# Patient Record
Sex: FEMALE | Race: OTHER | HISPANIC OR LATINO | Employment: FULL TIME | URBAN - METROPOLITAN AREA
[De-identification: names, ages, dates, MRNs, and addresses within clinical notes are randomized per-mention and may not be internally consistent; named-entity substitution may affect disease eponyms.]

---

## 2017-01-04 ENCOUNTER — CONVERSION ENCOUNTER (OUTPATIENT)
Dept: MAMMOGRAPHY | Facility: CLINIC | Age: 42
End: 2017-01-04

## 2017-04-19 ENCOUNTER — CONVERSION ENCOUNTER (OUTPATIENT)
Dept: MAMMOGRAPHY | Facility: CLINIC | Age: 42
End: 2017-04-19

## 2018-06-06 ENCOUNTER — CONVERSION ENCOUNTER (OUTPATIENT)
Dept: MAMMOGRAPHY | Facility: CLINIC | Age: 43
End: 2018-06-06

## 2018-08-23 ENCOUNTER — OFFICE VISIT (OUTPATIENT)
Dept: FAMILY MEDICINE CLINIC | Facility: CLINIC | Age: 43
End: 2018-08-23
Payer: COMMERCIAL

## 2018-08-23 VITALS
HEIGHT: 67 IN | WEIGHT: 243.3 LBS | RESPIRATION RATE: 16 BRPM | HEART RATE: 70 BPM | SYSTOLIC BLOOD PRESSURE: 110 MMHG | DIASTOLIC BLOOD PRESSURE: 70 MMHG | TEMPERATURE: 96.8 F | BODY MASS INDEX: 38.19 KG/M2

## 2018-08-23 DIAGNOSIS — G47.00 INSOMNIA, UNSPECIFIED TYPE: ICD-10-CM

## 2018-08-23 DIAGNOSIS — Z98.890 STATUS POST GASTRIC SURGERY: ICD-10-CM

## 2018-08-23 DIAGNOSIS — R51.9 CHRONIC NONINTRACTABLE HEADACHE, UNSPECIFIED HEADACHE TYPE: Primary | ICD-10-CM

## 2018-08-23 DIAGNOSIS — K21.9 GASTROESOPHAGEAL REFLUX DISEASE WITHOUT ESOPHAGITIS: ICD-10-CM

## 2018-08-23 DIAGNOSIS — R52 PAIN: ICD-10-CM

## 2018-08-23 DIAGNOSIS — G89.29 CHRONIC NONINTRACTABLE HEADACHE, UNSPECIFIED HEADACHE TYPE: Primary | ICD-10-CM

## 2018-08-23 DIAGNOSIS — R42 DIZZINESS: ICD-10-CM

## 2018-08-23 LAB — HBA1C MFR BLD HPLC: 5.5 %

## 2018-08-23 PROCEDURE — 3008F BODY MASS INDEX DOCD: CPT | Performed by: NURSE PRACTITIONER

## 2018-08-23 PROCEDURE — 99214 OFFICE O/P EST MOD 30 MIN: CPT | Performed by: NURSE PRACTITIONER

## 2018-08-23 RX ORDER — PANTOPRAZOLE SODIUM 40 MG/1
TABLET, DELAYED RELEASE ORAL EVERY 24 HOURS
COMMUNITY
Start: 2018-03-29 | End: 2018-08-23 | Stop reason: SDUPTHER

## 2018-08-23 RX ORDER — TOPIRAMATE 25 MG/1
TABLET ORAL
COMMUNITY
Start: 2018-01-25 | End: 2018-08-23 | Stop reason: SDUPTHER

## 2018-08-23 RX ORDER — ERGOCALCIFEROL 1.25 MG/1
CAPSULE ORAL
Refills: 1 | COMMUNITY
Start: 2018-08-01 | End: 2021-04-06

## 2018-08-23 RX ORDER — TRAMADOL HYDROCHLORIDE 50 MG/1
50 TABLET ORAL EVERY 8 HOURS PRN
Qty: 20 TABLET | Refills: 0 | Status: SHIPPED | OUTPATIENT
Start: 2018-08-23 | End: 2018-11-14 | Stop reason: SDUPTHER

## 2018-08-23 RX ORDER — IBUPROFEN 600 MG/1
600 TABLET ORAL EVERY 6 HOURS
COMMUNITY
Start: 2016-07-21 | End: 2020-12-03 | Stop reason: ALTCHOICE

## 2018-08-23 RX ORDER — TRAZODONE HYDROCHLORIDE 50 MG/1
50 TABLET ORAL
Qty: 30 TABLET | Refills: 0 | Status: SHIPPED | OUTPATIENT
Start: 2018-08-23 | End: 2018-08-23 | Stop reason: SDUPTHER

## 2018-08-23 RX ORDER — PANTOPRAZOLE SODIUM 40 MG/1
40 TABLET, DELAYED RELEASE ORAL EVERY 24 HOURS
Qty: 90 TABLET | Refills: 3 | Status: SHIPPED | OUTPATIENT
Start: 2018-08-23 | End: 2020-12-03 | Stop reason: SDUPTHER

## 2018-08-23 RX ORDER — TRAMADOL HYDROCHLORIDE 50 MG/1
TABLET ORAL EVERY 6 HOURS
COMMUNITY
Start: 2017-12-08 | End: 2018-08-23 | Stop reason: SDUPTHER

## 2018-08-23 RX ORDER — MISOPROSTOL 200 UG/1
TABLET ORAL
COMMUNITY
Start: 2015-05-01 | End: 2019-05-28 | Stop reason: ALTCHOICE

## 2018-08-23 RX ORDER — TRAZODONE HYDROCHLORIDE 50 MG/1
TABLET ORAL
Qty: 90 TABLET | Refills: 0 | Status: SHIPPED | OUTPATIENT
Start: 2018-08-23 | End: 2020-01-08 | Stop reason: SDUPTHER

## 2018-08-23 RX ORDER — LEVOTHYROXINE SODIUM 0.12 MG/1
TABLET ORAL
Refills: 1 | COMMUNITY
Start: 2018-08-23 | End: 2019-04-25 | Stop reason: SDUPTHER

## 2018-08-23 RX ORDER — TOPIRAMATE 50 MG/1
50 TABLET, FILM COATED ORAL 2 TIMES DAILY
Qty: 180 TABLET | Refills: 3 | Status: SHIPPED | OUTPATIENT
Start: 2018-08-23 | End: 2020-01-08 | Stop reason: SDUPTHER

## 2018-08-23 RX ORDER — PSEUDOEPHEDRINE HCL 30 MG
TABLET ORAL 3 TIMES DAILY
COMMUNITY
Start: 2018-01-17 | End: 2019-05-28 | Stop reason: SDUPTHER

## 2018-08-23 NOTE — PROGRESS NOTES
Assessment/Plan:    No problem-specific Assessment & Plan notes found for this encounter  Diagnoses and all orders for this visit:    Chronic nonintractable headache, unspecified headache type  -     topiramate (TOPAMAX) 25 mg tablet;   Get back on medication   Gastroesophageal reflux disease without esophagitis  -     pantoprazole (PROTONIX) 40 mg tablet; every 24 hours    Pain  -     traMADol (ULTRAM) 50 mg tablet; every 6 (six) hours  I appreciate no hernia   If flares and painful to ER   Travels for months at a time  If wants removed will let me know  Dizziness  Will check labs and make use not related to surgery   NO labs in long time for this  Set up appt for gastric bypass follow up       Other orders  -     levothyroxine 125 mcg tablet; TAKE ONE TABLET BY MOUTH IN THE MORNING ON EMPTY STOMACH  -     traMADol (ULTRAM) 50 mg tablet; every 6 (six) hours  -     topiramate (TOPAMAX) 25 mg tablet; take 1 tab  daily x 3 days then 1 tab twice a day x 5 days then 2 tabs twice a day  -     pantoprazole (PROTONIX) 40 mg tablet; every 24 hours  -     Multiple Vitamins-Minerals (MULTIVITAMIN ADULT PO); every 24 hours  -     misoprostol (CYTOTEC) 200 mcg tablet; Take by mouth  -     Calcium Citrate 250 MG TABS; 3 (three) times a day  -     Cyanocobalamin ER 1000 MCG TBCR; take one tab daily  -     ergocalciferol (VITAMIN D2) 50,000 units; TAKE 2 CAPSULES BY MOUTH EVERY WEEK WITH DINNER  -     ibuprofen (MOTRIN) 600 mg tablet; Take 600 mg by mouth every 6 (six) hours          Subjective:      Patient ID: Hillary Chang is a 41 y o  female  HPI    Ada here for headaches    Ongoing issues   Is using the topamax for headaches  Headaches 3 times this month   Has ran out of medication while traveling  Had gastric bypass about 1 year ago and hasn't follow up with labs  She is feeling dizziness at times   Is eating well      Using tylenol pm for sleep  Using nightly    Has tried melatonin and not helpful  Needs something for sleep     Feels a ball in the center of umbilicus    Is able to reduce at this time    No pain today       The following portions of the patient's history were reviewed and updated as appropriate: allergies, current medications, past family history, past medical history, past social history, past surgical history and problem list     Review of Systems      Objective:  Vitals:    08/23/18 1056   BP: 110/70   BP Location: Left arm   Patient Position: Sitting   Cuff Size: Large   Pulse: 70   Resp: 16   Temp: (!) 96 8 °F (36 °C)   TempSrc: Temporal   Weight: 110 kg (243 lb 4 8 oz)   Height: 5' 6 93" (1 7 m)      Physical Exam

## 2018-08-23 NOTE — PATIENT INSTRUCTIONS
Dolor de barbi por migraña, cuidados ambulatorios   INFORMACIÓN GENERAL:   Un dolor de barbi por migraña  es un dolor de barbi severo  El dolor puede ser tan severo que interfiere con bakari actividades cotidianas  Susan migraña puede durar de unas horas o hasta varios días  La causa exacta de la migraña no es conocida  Es posible que sea provocada por cambios en los químicos corporales y nervios muy sensibles en schulz cerebro  Factores desencadenantes comunes de la migraña incluyen los siguientes:   · La trudy del sol, luces brillantes o resplandecientes, ruido y olores enoc y el humo    · Ciertos alimentos o bebidas marc el chocolate, azúcares artificiales, vino tinto o bebidas alcohólicas    · El calor, la humedad o cambios de clima    · Cambios hormonales en las mujeres a causa de las píldoras anticonceptivas, el [place], la menopausia o carl schulz período menstrual    · El estrés, esfuerzo ocular, dormir demasiado o no dormir suficiente    · Omitir comidas o esperar mucho tiempo para comer  Signos de advertencia comunes incluyen los siguientes:  Los signos de advertencia típicamente comienzan de 15 a 60 minutos antes del comienzo de un dolor de barbi  Los signos de advertencia más comunes incluyen:  · Cambios visuales, comúnmente conocidos marc auras  Schulz visión podría estar borrosa o las cosas se podrían mervin diferente  Usted podría tener manchas de ceguera que marin [place]  Es posible también que jasson manchas brillantes, líneas o tenga alucinaciones       · Cansancio anormal o bostezos frecuentes    · Hormigueo en schulz brazo o pierna  Busque cuidados inmediatos para los siguientes síntomas:   · Un dolor de barbi que parece ser diferente o más doloroso que los que sufre típicamente    · Un dolor de barbi severo con fiebre o rigidez en el crystal    · Nuevos problemas con el habla, la visión, el equilibrio o el movimiento    · Sensación de [place] o confusión  El tratamiento para la migraña  puede llegar a incluir medicamentos para ayudar a prevenir o parar la migraña  Es probable que usted también necesite medicamentos para disminuir el dolor o prevenir el vómito  Maneje bakari síntomas:   · Descanse  en rae habitación oscura y silenciosa  [place] ayudará a reducir schulz dolor  · Aplique hielo  a schulz barbi por 15 a 20 minutos cada hora o según indicaciones  Use rae compresa de hielo o ponga hielo maday en rae bolsa de plástico  Jodi Sailors la compresa con rae toalla  El hielo ayuda a disminuir el dolor  · Aplique calor  a schulz barbi por 20 a 30 minutos cada 2 horas por los días indicados  El calor ayuda a disminuir el dolor y los espasmos musculares  Usted puede alternar [employer] calor y el frío  · Apunte en un diario bakari migrañas  Anote cuando empiezan y terminan bakari migrañas  Incluya bakari síntomas y lo que estaba haciendo cuando empezó la migraña  Anote lo que comió o tomó las 24 horas antes de que [employer]  Debbie Pride dolor y dónde le duele  Anote lo que hizo para tratar schulz migraña y si funcionó o no  Prevenga otro dolor de barbi por migraña:   · No fume  Si usted fuma, nunca es demasiado tarde para dejarlo  El humo del tabaco puede provocar [place]  Si usted fuma, pida información acerca de cómo dejar de hacerlo  · No luis f alcohol   el alcohol puede provocar migrañas  También es posible que interfiera con los medicamentos utilizados para tratar schulz migraña  · Lise ejercicio regularmente  Consulte acerca del plan de ejercicio más adecuado para usted  · Maneje el estrés  el estrés podría provocar migrañas  Aprenda nuevas maneras de relajarse hermila marc la respiración profunda  · Mantenga un horario de dormir  Acuéstese y levántese a la misma hora cada día  · Coma rae variedad de alimentos sanos  Los alimentos saludables incluyen la fruta, verduras, panes de grano entero, productos lácteos bajos en grasa, frijoles, carne magra y pescado   Evite alimentos que pueden provocar rae Benjadonis Desaiz marc el chocolate, cafeína o azúcares artificiales  Programe rae cherelle con schulz proveedor de hamilton según indicaciones:  Lleve schulz diario de migrañas a bakari citas con schulz proveedor de hamilton  Anote bakari preguntas para que las recuerde carl bakari citas  ACUERDOS SOBRE SCHULZ CUIDADO:   Usted tiene el derecho de participar en la planificación de schulz cuidado  Aprenda todo lo que pueda sobre schulz condición y marc darle tratamiento  Discuta con bakari médicos bakari opciones de tratamiento para juntos decidir el cuidado que usted quiere recibir  Usted siempre tiene el derecho a rechazar schulz tratamiento  Esta información es sólo para uso en educación  Schulz intención no es darle un consejo médico sobre enfermedades o tratamientos  Colsulte con schulz [employer] antes de seguir cualquier régimen médico para saber si es seguro y efectivo para usted  © 2014 [address] is for End User's use only and may not be sold, redistributed or otherwise used for commercial purposes  All illustrations and images included in CareNotes® are the copyrighted property of A D A M , Inc  or Michael Ferrari

## 2018-09-04 ENCOUNTER — TELEPHONE (OUTPATIENT)
Dept: FAMILY MEDICINE CLINIC | Facility: CLINIC | Age: 43
End: 2018-09-04

## 2018-09-04 NOTE — TELEPHONE ENCOUNTER
Advise labs back    B12 is ok  B1 is low  And ferritin which is iron stores is low   Need to take a Vitiamin B1 and iron daily

## 2018-09-06 DIAGNOSIS — E56.9 VITAMIN DEFICIENCY: Primary | ICD-10-CM

## 2018-09-06 RX ORDER — FERROUS SULFATE TAB EC 324 MG (65 MG FE EQUIVALENT) 324 (65 FE) MG
324 TABLET DELAYED RESPONSE ORAL
Refills: 0
Start: 2018-09-06 | End: 2021-06-25 | Stop reason: ALTCHOICE

## 2018-09-06 RX ORDER — THIAMINE HCL 50 MG
50 TABLET ORAL DAILY
Refills: 0
Start: 2018-09-06 | End: 2021-04-16 | Stop reason: HOSPADM

## 2018-09-06 NOTE — TELEPHONE ENCOUNTER
Have her start iron daily and B1(thiamine)  Can start with 50 mg of thiamine or whatever she has at home

## 2018-10-12 ENCOUNTER — HOSPITAL ENCOUNTER (EMERGENCY)
Facility: HOSPITAL | Age: 43
Discharge: HOME | End: 2018-10-12
Attending: EMERGENCY MEDICINE
Payer: COMMERCIAL

## 2018-10-12 ENCOUNTER — APPOINTMENT (EMERGENCY)
Dept: CT IMAGING | Facility: HOSPITAL | Age: 43
End: 2018-10-12
Payer: COMMERCIAL

## 2018-10-12 VITALS
SYSTOLIC BLOOD PRESSURE: 120 MMHG | BODY MASS INDEX: 38.84 KG/M2 | WEIGHT: 247.44 LBS | OXYGEN SATURATION: 100 % | RESPIRATION RATE: 18 BRPM | DIASTOLIC BLOOD PRESSURE: 77 MMHG | TEMPERATURE: 98.1 F | HEART RATE: 56 BPM

## 2018-10-12 DIAGNOSIS — R10.31 RIGHT LOWER QUADRANT ABDOMINAL PAIN: Primary | ICD-10-CM

## 2018-10-12 DIAGNOSIS — K42.9 UMBILICAL HERNIA WITHOUT OBSTRUCTION AND WITHOUT GANGRENE: ICD-10-CM

## 2018-10-12 DIAGNOSIS — M62.830 BACK MUSCLE SPASM: ICD-10-CM

## 2018-10-12 DIAGNOSIS — M54.50 ACUTE RIGHT-SIDED LOW BACK PAIN WITHOUT SCIATICA: ICD-10-CM

## 2018-10-12 LAB
ALBUMIN SERPL BCP-MCNC: 4 G/DL (ref 3–5.2)
ALP SERPL-CCNC: 118 U/L (ref 43–122)
ALT SERPL W P-5'-P-CCNC: 36 U/L (ref 9–52)
ANION GAP SERPL CALCULATED.3IONS-SCNC: 6 MMOL/L (ref 5–14)
AST SERPL W P-5'-P-CCNC: 19 U/L (ref 14–36)
BACTERIA UR QL AUTO: ABNORMAL /HPF
BASOPHILS # BLD AUTO: 0.1 THOUSANDS/ΜL (ref 0–0.1)
BASOPHILS NFR BLD AUTO: 1 % (ref 0–1)
BILIRUB SERPL-MCNC: 0.5 MG/DL
BILIRUB UR QL STRIP: NEGATIVE
BUN SERPL-MCNC: 16 MG/DL (ref 5–25)
CALCIUM SERPL-MCNC: 9 MG/DL (ref 8.4–10.2)
CHLORIDE SERPL-SCNC: 101 MMOL/L (ref 97–108)
CLARITY UR: CLEAR
CO2 SERPL-SCNC: 29 MMOL/L (ref 22–30)
COLOR UR: YELLOW
CREAT SERPL-MCNC: 1.02 MG/DL (ref 0.6–1.2)
EOSINOPHIL # BLD AUTO: 0.1 THOUSAND/ΜL (ref 0–0.4)
EOSINOPHIL NFR BLD AUTO: 1 % (ref 0–6)
ERYTHROCYTE [DISTWIDTH] IN BLOOD BY AUTOMATED COUNT: 16 %
EXT PREG TEST URINE: NEGATIVE
GFR SERPL CREATININE-BSD FRML MDRD: 68 ML/MIN/1.73SQ M
GLUCOSE SERPL-MCNC: 88 MG/DL (ref 70–99)
GLUCOSE UR STRIP-MCNC: NEGATIVE MG/DL
HCT VFR BLD AUTO: 34.2 % (ref 36–46)
HGB BLD-MCNC: 11 G/DL (ref 12–16)
HGB UR QL STRIP.AUTO: 10
KETONES UR STRIP-MCNC: NEGATIVE MG/DL
LEUKOCYTE ESTERASE UR QL STRIP: NEGATIVE
LYMPHOCYTES # BLD AUTO: 2.5 THOUSANDS/ΜL (ref 0.5–4)
LYMPHOCYTES NFR BLD AUTO: 24 % (ref 20–50)
MCH RBC QN AUTO: 27.5 PG (ref 26–34)
MCHC RBC AUTO-ENTMCNC: 32.2 G/DL (ref 31–36)
MCV RBC AUTO: 85 FL (ref 80–100)
MONOCYTES # BLD AUTO: 0.9 THOUSAND/ΜL (ref 0.2–0.9)
MONOCYTES NFR BLD AUTO: 9 % (ref 1–10)
NEUTROPHILS # BLD AUTO: 6.8 THOUSANDS/ΜL (ref 1.8–7.8)
NEUTS SEG NFR BLD AUTO: 66 % (ref 45–65)
NITRITE UR QL STRIP: NEGATIVE
NON-SQ EPI CELLS URNS QL MICRO: ABNORMAL /HPF
PH UR STRIP.AUTO: 5 [PH] (ref 4.5–8)
PLATELET # BLD AUTO: 555 THOUSANDS/UL (ref 150–450)
PMV BLD AUTO: 6.7 FL (ref 8.9–12.7)
POTASSIUM SERPL-SCNC: 4.6 MMOL/L (ref 3.6–5)
PROT SERPL-MCNC: 7.1 G/DL (ref 5.9–8.4)
PROT UR STRIP-MCNC: NEGATIVE MG/DL
RBC # BLD AUTO: 4.01 MILLION/UL (ref 4–5.2)
RBC #/AREA URNS AUTO: ABNORMAL /HPF
SODIUM SERPL-SCNC: 136 MMOL/L (ref 137–147)
SP GR UR STRIP.AUTO: 1.02 (ref 1–1.04)
UROBILINOGEN UA: NEGATIVE MG/DL
WBC # BLD AUTO: 10.3 THOUSAND/UL (ref 4.5–11)
WBC #/AREA URNS AUTO: ABNORMAL /HPF

## 2018-10-12 PROCEDURE — 96361 HYDRATE IV INFUSION ADD-ON: CPT

## 2018-10-12 PROCEDURE — 81001 URINALYSIS AUTO W/SCOPE: CPT | Performed by: NURSE PRACTITIONER

## 2018-10-12 PROCEDURE — 81003 URINALYSIS AUTO W/O SCOPE: CPT | Performed by: NURSE PRACTITIONER

## 2018-10-12 PROCEDURE — 85025 COMPLETE CBC W/AUTO DIFF WBC: CPT | Performed by: NURSE PRACTITIONER

## 2018-10-12 PROCEDURE — 36415 COLL VENOUS BLD VENIPUNCTURE: CPT | Performed by: NURSE PRACTITIONER

## 2018-10-12 PROCEDURE — 99284 EMERGENCY DEPT VISIT MOD MDM: CPT

## 2018-10-12 PROCEDURE — 80053 COMPREHEN METABOLIC PANEL: CPT | Performed by: NURSE PRACTITIONER

## 2018-10-12 PROCEDURE — 74176 CT ABD & PELVIS W/O CONTRAST: CPT

## 2018-10-12 PROCEDURE — 81025 URINE PREGNANCY TEST: CPT | Performed by: NURSE PRACTITIONER

## 2018-10-12 PROCEDURE — 96374 THER/PROPH/DIAG INJ IV PUSH: CPT

## 2018-10-12 RX ORDER — KETOROLAC TROMETHAMINE 30 MG/ML
INJECTION, SOLUTION INTRAMUSCULAR; INTRAVENOUS
Status: COMPLETED
Start: 2018-10-12 | End: 2018-10-12

## 2018-10-12 RX ORDER — SENNOSIDES 8.6 MG
650 CAPSULE ORAL EVERY 8 HOURS PRN
Qty: 21 TABLET | Refills: 0 | Status: SHIPPED | OUTPATIENT
Start: 2018-10-12 | End: 2018-10-19

## 2018-10-12 RX ORDER — METHOCARBAMOL 750 MG/1
750 TABLET, FILM COATED ORAL EVERY 6 HOURS PRN
Qty: 28 TABLET | Refills: 0 | Status: SHIPPED | OUTPATIENT
Start: 2018-10-12 | End: 2018-11-14 | Stop reason: SDUPTHER

## 2018-10-12 RX ORDER — DICLOFENAC POTASSIUM 50 MG/1
50 TABLET, FILM COATED ORAL 3 TIMES DAILY
Qty: 21 TABLET | Refills: 0 | Status: SHIPPED | OUTPATIENT
Start: 2018-10-12 | End: 2020-12-03 | Stop reason: ALTCHOICE

## 2018-10-12 RX ORDER — KETOROLAC TROMETHAMINE 30 MG/ML
15 INJECTION, SOLUTION INTRAMUSCULAR; INTRAVENOUS ONCE
Status: COMPLETED | OUTPATIENT
Start: 2018-10-12 | End: 2018-10-12

## 2018-10-12 RX ADMIN — SODIUM CHLORIDE 1000 ML: 9 INJECTION, SOLUTION INTRAVENOUS at 19:19

## 2018-10-12 RX ADMIN — KETOROLAC TROMETHAMINE 15 MG: 30 INJECTION, SOLUTION INTRAMUSCULAR; INTRAVENOUS at 19:37

## 2018-10-12 NOTE — ED PROVIDER NOTES
History  Chief Complaint   Patient presents with    Flank Pain     Right sided flank pain for 3 weeks, "Im peeing very little and dark yellow"  Was taking tylenol but hasn't had any for the past two days  8 out 10 pain   Nausea     Patient is a 44-year-old female presenting to the emergency department reporting right lower back right flank and right lower quadrant abdominal pain  Patient reports this has been ongoing over the past 3 weeks, progressively getting worse  She describes the reports decrease in urination and notes dark urine  She also reports pain with urination  She states she has history of pyelonephritis  She denies any fevers  She reports intermittent nausea  No vomiting or diarrhea  She reports no chest tightness or discomfort  No difficulty breathing  She denies any rashes  Otherwise reporting no other symptoms  Denies any other concerns  Prior to Admission Medications   Prescriptions Last Dose Informant Patient Reported? Taking?    Calcium Citrate 250 MG TABS  Self Yes No   Sig: 3 (three) times a day   Cyanocobalamin ER 1000 MCG TBCR  Self Yes No   Sig: take one tab daily   Multiple Vitamins-Minerals (MULTIVITAMIN ADULT PO)  Self Yes No   Sig: every 24 hours   ergocalciferol (VITAMIN D2) 50,000 units  Self Yes No   Sig: TAKE 2 CAPSULES BY MOUTH EVERY WEEK WITH DINNER   ferrous sulfate 324 (65 Fe) mg   No No   Sig: Take 1 tablet (324 mg total) by mouth daily before breakfast   ibuprofen (MOTRIN) 600 mg tablet   Yes No   Sig: Take 600 mg by mouth every 6 (six) hours   levothyroxine 125 mcg tablet  Self Yes No   Sig: TAKE ONE TABLET BY MOUTH IN THE MORNING ON EMPTY STOMACH   misoprostol (CYTOTEC) 200 mcg tablet  Self Yes No   Sig: Take by mouth   pantoprazole (PROTONIX) 40 mg tablet   No No   Sig: Take 1 tablet (40 mg total) by mouth every 24 hours   thiamine (VITAMIN B1) 50 mg tablet   No No   Sig: Take 1 tablet (50 mg total) by mouth daily   topiramate (TOPAMAX) 50 MG tablet   No No   Sig: Take 1 tablet (50 mg total) by mouth 2 (two) times a day   traMADol (ULTRAM) 50 mg tablet   No No   Sig: Take 1 tablet (50 mg total) by mouth every 8 (eight) hours as needed for moderate pain   traZODone (DESYREL) 50 mg tablet   No No   Sig: TAKE 1 TABLET BY MOUTH AT BEDTIME AS NEEDED FOR SLEEP      Facility-Administered Medications: None       Past Medical History:   Diagnosis Date    Abscess of labia     Anxiety     Bipolar disorder (HCC)     Breast lump     Frequent headaches     Hemorrhoids     Hypothyroidism 7/1/2013    Migraine     Morbid [facility] [address] ) 7/1/2013    Psychotic disorder ([facility] )        Past Surgical History:   Procedure Laterality Date    BARIATRIC SURGERY      BREAST LUMPECTOMY Left     benign    CHOLECYSTECTOMY      GALLBLADDER SURGERY         Family History   Problem Relation Age of Onset    Liver cancer Mother     Prostate cancer Father     Cancer Family      I have reviewed and agree with the history as documented  Social History   Substance Use Topics    Smoking status: Former Smoker     Types: Cigarettes     Quit date: 2008    Smokeless tobacco: Never Used    Alcohol use No        Review of Systems   Constitutional: Negative for chills, fatigue and fever  Respiratory: Negative for chest tightness and shortness of breath  Cardiovascular: Negative for chest pain and palpitations  Gastrointestinal: Positive for abdominal pain (Right lower quadrant) and nausea ( reporting no current nausea  )  Negative for blood in stool, constipation, diarrhea and vomiting  Genitourinary: Positive for decreased urine volume, dysuria, flank pain ( right ) and hematuria  Negative for difficulty urinating, frequency, pelvic pain, urgency, vaginal bleeding, vaginal discharge and vaginal pain  Musculoskeletal: Positive for back pain ( right lower and right flank)  Negative for joint swelling, neck pain and neck stiffness  Skin: Negative for rash  Allergic/Immunologic: Negative for immunocompromised state  Neurological: Negative for dizziness, weakness, light-headedness, numbness and headaches  Hematological: Negative for adenopathy  Physical Exam  Physical Exam   Constitutional: She is oriented to person, place, and time  She appears well-developed and well-nourished  No distress  Eyes: Conjunctivae are normal    Neck: Neck supple  Cardiovascular: Normal rate and regular rhythm  Pulmonary/Chest: Effort normal and breath sounds normal  No respiratory distress  Abdominal: Soft  Bowel sounds are normal  She exhibits no distension (Obese ) and no mass  There is tenderness in the right lower quadrant and suprapubic area  There is CVA tenderness (  Right)  There is no rigidity, no rebound and no guarding  Musculoskeletal:        Cervical back: Normal         Thoracic back: Normal         Lumbar back: She exhibits tenderness, pain and spasm  She exhibits normal range of motion, no bony tenderness, no swelling, no edema, no deformity, no laceration and normal pulse  Back:    Neurological: She is alert and oriented to person, place, and time  Skin: Skin is warm and dry  Psychiatric: She has a normal mood and affect  Nursing note and vitals reviewed        Vital Signs  ED Triage Vitals   Temperature Pulse Respirations Blood Pressure SpO2   10/12/18 1816 10/12/18 1816 10/12/18 1816 10/12/18 1816 10/12/18 1816   98 1 °F (36 7 °C) 63 18 111/74 98 %      Temp Source Heart Rate Source Patient Position - Orthostatic VS BP Location FiO2 (%)   10/12/18 1816 10/12/18 1816 10/12/18 1816 10/12/18 1816 --   Tympanic Monitor Sitting Left arm       Pain Score       10/12/18 1937       Worst Possible Pain           Vitals:    10/12/18 1816   BP: 111/74   Pulse: 63   Patient Position - Orthostatic VS: Sitting       Visual Acuity      ED Medications  Medications   sodium chloride 0 9 % bolus 1,000 mL (0 mL Intravenous Stopped 10/12/18 2036) ketorolac (TORADOL) injection 15 mg (15 mg Intravenous Given 10/12/18 1937)       Diagnostic Studies  Results Reviewed     Procedure Component Value Units Date/Time    Comprehensive metabolic panel [[id number]]  (Abnormal) Collected:  10/12/18 1918    Lab Status:  Final result Specimen:  Blood from Arm, Right Updated:  10/12/18 1948     Sodium 136 (L) mmol/L      Potassium 4 6 mmol/L      Chloride 101 mmol/L      CO2 29 mmol/L      ANION GAP 6 mmol/L      BUN 16 mg/dL      Creatinine 1 02 mg/dL      Glucose 88 mg/dL      Calcium 9 0 mg/dL      AST 19 U/L      ALT 36 U/L      Alkaline Phosphatase 118 U/L      Total Protein 7 1 g/dL      Albumin 4 0 g/dL      Total Bilirubin 0 50 mg/dL      eGFR 68 ml/min/1 73sq m     Narrative:         National Kidney Disease Education Program recommendations are as follows:  GFR calculation is accurate only with a steady state creatinine  Chronic Kidney disease less than 60 ml/min/1 73 sq  meters  Kidney failure less than 15 ml/min/1 73 sq  meters      Urine Microscopic [[id number]]  (Abnormal) Collected:  10/12/18 1859    Lab Status:  Final result Specimen:  Urine from Urine, Clean Catch Updated:  10/12/18 1938     RBC, UA 0-1 (A) /hpf      WBC, UA 0-1 (A) /hpf      Epithelial Cells Occasional /hpf      Bacteria, UA None Seen /hpf     CBC and differential [[id number]]  (Abnormal) Collected:  10/12/18 1918    Lab Status:  Final result Specimen:  Blood from Arm, Right Updated:  10/12/18 1936     WBC 10 30 Thousand/uL      RBC 4 01 Million/uL      Hemoglobin 11 0 (L) g/dL      Hematocrit 34 2 (L) %      MCV 85 fL      MCH 27 5 pg      MCHC 32 2 g/dL      RDW 16 0 (H) %      MPV 6 7 (L) fL      Platelets 750 (H) Thousands/uL      Neutrophils Relative 66 (H) %      Lymphocytes Relative 24 %      Monocytes Relative 9 %      Eosinophils Relative 1 %      Basophils Relative 1 %      Neutrophils Absolute 6 80 Thousands/µL      Lymphocytes Absolute 2 50 Thousands/µL      Monocytes Absolute 0 90 Thousand/µL      Eosinophils Absolute 0 10 Thousand/µL      Basophils Absolute 0 10 Thousands/µL     UA w Reflex to Microscopic w Reflex to Culture [[id number]]  (Abnormal) Collected:  10/12/18 1859    Lab Status:  Final result Specimen:  Urine from Urine, Clean Catch Updated:  10/12/18 1916     Color, UA Yellow     Clarity, UA Clear     Specific Gravity, UA 1 025     pH, UA 5 0     Leukocytes, UA Negative     Nitrite, UA Negative     Protein, UA Negative mg/dl      Glucose, UA Negative mg/dl      Ketones, UA Negative mg/dl      Bilirubin, UA Negative     Blood, UA 10 0 (A)     UROBILINOGEN UA Negative mg/dL     POCT pregnancy, urine [[id number]]  (Normal) Resulted:  10/12/18 1904    Lab Status:  Final result Specimen:  Urine Updated:  10/12/18 1904     EXT PREG TEST UR (Ref: Negative) negative                 CT abdomen pelvis wo contrast   Final Result by Honorio Sutherland DO (10/12 2009)   No CT evidence of renal colic  Bilobed paraumbilical fat-containing hernia with mild inflammatory change  Please correlate clinically  Workstation performed: [id number]                    Procedures  Procedures       Phone Contacts  ED Phone Contact    ED Course  ED Course as of Oct 12 2101   Fri Oct 12, 2018   2049 Labs overall normal   No white count  UA shows some microscopic red cells, no bacteria  There is no indication of UTI  CT was overall negative with the exception of some identified rafia umbilical hernias, which the patient states she know she has  These were likely incisional hernia secondary to her laparoscopic bariatric surgery that was done approximately 1 year ago  Patient states her pain has been mostly relieved after receiving IV Toradol  She continues to report no current nausea  She is otherwise denying any additional symptoms reports no other concerns    Patient instructed to follow up with bariatric surgeon also her family provider in General surgery regarding the periumbilical hernias  She is stable for discharge at this time  MDM  Number of Diagnoses or Management Options  Acute right-sided low back pain without sciatica:   Back muscle spasm:   Right lower quadrant abdominal pain:   Umbilical hernia without obstruction and without gangrene:   Diagnosis management comments: See ED course note  There is no concern for an emergent cause of her back and abdominal pain  Patient will be referred back to bariatric surgeon for follow-up and also will be referred to primary provider for further follow-up with General surgery regarding umbilical hernias  Patient will be given diclofenac, Tylenol, and Robaxin regarding low back pain and spasm  Patient instructed to return to the ED with any worsening symptoms or emergent concerns  Amount and/or Complexity of Data Reviewed  Clinical lab tests: ordered and reviewed  Tests in the radiology section of CPT®: ordered and reviewed  Discuss the patient with other providers: yes    Patient Progress  Patient progress: stable    CritCare Time    Disposition  Final diagnoses:   Right lower quadrant abdominal pain   Umbilical hernia without obstruction and without gangrene   Acute right-sided low back pain without sciatica   Back muscle spasm     Time reflects when diagnosis was documented in both MDM as applicable and the Disposition within this note     Time User Action Codes Description Comment    10/12/2018  8:54 PM Ronal Bell Add [R10 31] Right lower quadrant abdominal pain     10/12/2018  8:55 PM Ronal Slaughter [D34 9] Umbilical hernia without obstruction and without gangrene     10/12/2018  8:55 PM Ronal Slaughter [M54 5] Acute right-sided low back pain without sciatica     10/12/2018  8:55 PM Ronal Slaughter [P92 945] Back muscle spasm       ED Disposition     ED Disposition Condition Comment    Discharge  Dorota Bernard discharge to home/self care      Condition at discharge: Stable Follow-up Information     Follow up With Specialties Details Why Contact Info Additional Information    Leigh Ann Cronin MD Family Medicine In 3 days  [address] [facility] [address]  [phone]       Your bariatric surgeon, as discussed  Schedule an appointment as soon as possible for a visit       [address] [place] [address] Schedule an appointment as soon as possible for a visit  [address]a [address] [phone] [facility], [address] [facility] [place], [place], [address] [address] Heart Emergency Department Emergency Medicine  As needed, If symptoms worsen [address] [address] [phone]           Patient's Medications   Discharge Prescriptions    ACETAMINOPHEN (TYLENOL) 650 MG CR TABLET    Take 1 tablet (650 mg total) by mouth every 8 (eight) hours as needed for mild pain or moderate pain for up to 7 days       Start Date: 10/12/2018End Date: 10/19/2018       Order Dose: 650 mg       Quantity: 21 tablet    Refills: 0    DICLOFENAC POTASSIUM (CATAFLAM) 50 MG TABLET    Take 1 tablet (50 mg total) by mouth 3 (three) times a day for 7 days       Start Date: 10/12/2018End Date: 10/19/2018       Order Dose: 50 mg       Quantity: 21 tablet    Refills: 0    METHOCARBAMOL (ROBAXIN) 750 MG TABLET    Take 1 tablet (750 mg total) by mouth every 6 (six) hours as needed for muscle spasms for up to 7 days       Start Date: 10/12/2018End Date: 10/19/2018       Order Dose: 750 mg       Quantity: 28 tablet    Refills: 0     No discharge procedures on file      ED Provider  Electronically Signed by           Amy Parr  10/12/18 1210

## 2018-10-13 NOTE — DISCHARGE INSTRUCTIONS
Dolor abdominal   LO QUE NECESITA SABER:   El dolor abdominal puede ser sordo, [place], o jayda  Usted puede sentir dolor localizado en rae ton área del abdomen o en todo el abdomen  El dolor puede ser causado por rae afección marc estreñimiento, sensibilidad o intoxicación alimentaria, infección o rae obstrucción  Asimismo, el dolor abdominal puede deberse a rae hernia, apendicitis o Jigar Reek  Las enfermedades del hígado, la vesícula o el riñón también pueden causar dolor abdominal  La causa del dolor abdominal puede ser desconocida  INSTRUCCIONES SOBRE [facility]:   Regrese a la jesus de emergencias si:   · Usted comienza a sentir un dolor en el pecho o dificultad para respirar que antes no sentía  · Usted siente un dolor con pulsaciones en la parte superior del abdomen o en la parte inferior de la espalda que de repente se vuelve shana  · El dolor se localiza en la parte inferior derecha del abdomen y KÖBRANDYMANNYOGESH cuando se [place]  · Usted tiene fiebre por encima de los 100 4 °F (38 °C) o escalofríos  · Usted tiene vómitos y no puede retener líquidos ni alimentos en el estómago  · El dolor no mejora o empeora en las próximas 8 a 12 horas  · Usted nota jose en schulz vómito o heces, o éstas tienen un aspecto negruzco y alquitranado  · Schulz piel o las partes tanmay de bakari ojos se vuelven amarillentas  · Si usted es rae bethel y presenta abundante sangrado vaginal que no es schulz menstruación  Pregúntele a schulz Ramón Todd vitaminas y minerales son adecuados para usted  · Usted siente dolor en la parte inferior de la espalda  · Usted es Luci Balboa y tiene dolor en los testículos  · Siente dolor al Zack Fire  · Usted tiene preguntas o inquietudes acerca de schulz condición o cuidado  Acuda en 24 horas a rae cherelle de seguimiento con schulz médico o marc se le indique:  Anote bakari preguntas para que se acuerde de hacerlas carl bakari visitas     Medicamentos:   · Erenest Daksha ser administrados para calmar schulz estómago y prevenir los vómitos o para disminuir el dolor  Pregunte cómo se debe jesse los analgésicos de forma poe  · [place] bakari medicamentos marc se le haya indicado  Consulte con schulz médico si usted clarence que schulz medicamento no le está ayudando o si presenta efectos secundarios  Infórmele si es alérgico a algún medicamento  Mantenga rae lista actualizada de los [place], las vitaminas y los productos herbales que sabiha  Incluya los siguientes datos de los medicamentos: cantidad, frecuencia y motivo de administración  Traiga con usted la lista o los envases de la píldoras a bakari citas de seguimiento  Lleve la lista de los medicamentos con usted en quintin de rae emergencia  © 2017 [facility] Information is for End User's use only and may not be sold, redistributed or otherwise used for commercial purposes  All illustrations and images included in CareNotes® are the copyrighted property of A D A M , Inc  or [address]  Esta información es sólo para uso en educación  Schulz intención no es darle un consejo médico sobre enfermedades o tratamientos  Colsulte con schulz Madelin Skelton farmacéutico antes de seguir cualquier régimen médico para saber si es seguro y efectivo para usted  Hernia umbilical   LO QUE NECESITA SABER:   Rae hernia umbilical es un abultamiento a través de los músculos abdominales en el área del ombligo  La hernia podría contener tejido del abdomen, parte de un órgano (marc el intestino) o líquido  Las hernias umbilicales se deben usualmente a un hueco o a un área débil en los músculos de la pared abdominal   INSTRUCCIONES SOBRE [facility]:   Medicamentos:  · Ibuprofeno o acetaminofen:  Estos medicamentos disminuyen el dolor  Están disponibles sin receta médica  Consulte con schulz médico cuál medicamento es el adecuado para usted  Pregunte la cantidad y la frecuencia con que debe tomarlos  [address]   Estos medicamentos pueden provocar sangrado estomacal si no se gage correctamente  El ibuprofeno puede provocar daño al Luan Grise  No tome ibuprofeno si usted tiene enfermedad de los riñones, [place]ila o si es alérgico a la aspirina  El acetaminofeno puede dañar el hígado  No luis f alcohol si está tomado acetaminofén  · [place] bakari medicamentos marc se le haya indicado  Consulte con schulz médico si usted clarence que schulz medicamento no le está ayudando o si presenta efectos secundarios  Infórmele si es alérgico a algún medicamento  Mantenga rae lista actualizada de los [employer], las vitaminas y los productos herbales que sabiha  Incluya los siguientes datos de los medicamentos: cantidad, frecuencia y motivo de administración  Traiga con usted la lista o los envases de la píldoras a bakari citas de seguimiento  Lleve la lista de los medicamentos con usted en quintin de rae emergencia  Acuda a bakari consultas de control con schulz médico según le indicaron  Anote bakari preguntas para que se acuerde de hacerlas carl bakari visitas  Cuidado personal:   · Actividad:  Evite levantar objetos pesados, inclinarse y hacer esfuerzos  Trate de no permanecer de pie carl largos periodos de Prashanth  Si tiene que toser, trate de [employer]  Al toser sostenga la hernia con bakari mark o con rae almohada  Pregúntele a schulz médico si usted puede tener relaciones sexuales  · Evite el estreñimiento:  El esfuerzo carl las evacuaciones intestinales podría empeorar la hernia  Consuma alimentos ricos en fibra y tome diariamente por lo menos 8 vasos de [place]  Rae dieta antoinette en fibra incluye granos integrales, salvado de melinda, cereales y frutas y verduras sin cocinar  El caminar u otros ejercicios también pueden ayudar  Schulz médico podría ordenarle medicamentos altos en fibra o un ablandador de heces para ayudarlo a que las evacuaciones intestinales kylah mas suaves y regulares   No  use un enema o laxantes sin la autorización de schulz médico     · Lo que debe usar:  No use nada ajustado sobre la hernia  Pregunte a boone médico si usted debe usar un cinturón de soporte o rae faja para mantener la hernia en boone lugar  · Pregunte a boone médico cómo reducir la hernia:  Algunas veces la hernia puede deslizarse de regreso al abdomen si usted se acuesta en posición plana carl un rato  Si esto no funciona, pregunte a boone médico si usted debería empujar cuidadosamente la hernia para hacerla regresar a boone puesto  Si la hernia no se desliza de regreso al abdomen fácilmente, pare de empujarla y llame a boone médico  Si la hernia duele o está muy sensible, no trate de empujarla para hacerla regresar a boone sitio  Pregúntele a boone Olman Banker vitaminas y minerales son adecuados para usted  · Usted tiene náuseas o vómitos  · No puede empujar suavemente la hernia de regreso al abdomen  (Hágalo solamente si boone médico le ha mostrado cómo hacerlo )     · Está estreñido o tiene jose en las evacuaciones intestinales  · La hernia se está agrandando  · Usted tiene preguntas o inquietudes acerca de boone condición o cuidado  Regrese a la jesus de emergencias si:   · Usted tiene fiebre  · La hernia está atrapada afuera del abdomen y es dolorosa, está inflamada o dura  · Usted deshawn por completo de tener evacuaciones intestinales y deshawn de pasar gas  · El dolor intestinal es intenso o DAMION  © 2017 2600 Ramesh Bill Information is for End User's use only and may not be sold, redistributed or otherwise used for commercial purposes  All illustrations and images included in CareNotes® are the copyrighted property of A D A M , Inc  or Michael Ferrari  Esta información es sólo para uso en educación  Boone intención no es darle un consejo médico sobre enfermedades o tratamientos  Colsulte con boone Rajni Mccormick farmacéutico antes de seguir cualquier régimen médico para saber si es seguro y efectivo para usted        Dolor de espalda   LO QUE NECESITA SABER:   El dolor de espalda es común  Puede ser causado por rae gran cantidad de afecciones, marc la artritis o por el deterioro de los discos de la columna vertebral  El riesgo del dolor de espalda aumenta al sufrir lesiones, por falta de [place], o inclinarse hacia adelante o girar de un lado a otro de forma repetitiva  Usted puede estar adolorido o sentir rigidez en karoline o ambos lados de la espalda  El dolor se puede propagar a bakari glúteos o muslos  INSTRUCCIONES SOBRE [facility]:   Medicamentos:   · AINEs (Analgésicos antiinflamatorios no esteroides)  ayudan a disminuir la inflamación y el dolor  Genet medicamento esta disponible con o sin rae receta médica  Los AINEs pueden causar sangrado estomacal o problemas renales en ciertas personas  Si usted sabiha un medicamento anticoagulante, siempre pregúntele a schulz médico si los INA son seguros para usted  Siempre sigrid la etiqueta de genet medicamento y [place] instrucciones  · El acetaminofén  [employer]  Está disponible sin receta médica  Pregunte la cantidad y la frecuencia con que debe tomarlos  [address]  El acetaminofén puede causar daño en el hígado cuando no se sabiha de forma correcta  · Un medicamento con receta para el dolor  podrían ser Hunter Marinas  Pregunte al médico cómo debe jesse genet medicamento de forma poe  · [place] bakari medicamentos marc se le haya indicado  Consulte con schulz médico si usted clarence que schulz medicamento no le está ayudando o si presenta efectos secundarios  Infórmele si es alérgico a cualquier medicamento  Mantenga rae lista actualizada de los [place], las vitaminas y los productos herbales que sabiha  Incluya los siguientes datos de los medicamentos: cantidad, frecuencia y motivo de administración  Traiga con usted la lista o los envases de la píldoras a bakari citas de seguimiento  Lleve la lista de los medicamentos con usted en quintin de rae emergencia    Acuda en 2 semanas a schulz cherelle de control con schulz médico, o según le indicaron:  Anote bakari preguntas para que se acuerde de hacerlas carl bakari visitas  La forma de controlar schulz dolor de espalda:   · Aplique hielo  en schulz espalda o en el área afectada de 15 a 20 minutos cada hora o según le indicaron  Use un paquete de hielo o ponga hielo molido dentro de [employer]  Cúbrala con rae toalla  El hielo disminuye el dolor y ayuda a evitar el daño en los tejidos  · La aplicación de calor  en schulz espalda o en el área afectada de 20 a 30 minutos cada 2 horas carl los días que le indicaron  El calor ayuda a disminuir el dolor y los espasmos musculares  · Manténgase activo  lo más que pueda sin causar [employer]  El reposo en cama puede empeorar schulz dolor de espalda  Evite levantar objetos hasta que ya no tenga dolor  Regrese a la jesus de emergencias si:   · Usted tiene dolor, entumecimiento o debilidad en rae o en ambas piernas  · El dolor se vuelve tan intenso que lo incapacita para caminar  · Usted no puede controlar schulz orina ni bakari deposiciones intestinales  · Usted tiene dolor de espalda intenso con dolor en el pecho  · Usted tiene dolor de espalda intenso, náuseas y vómito  · Usted tiene un dolor de espalda intenso que se propaga a un costado o al área genital   Billie Jester a schulz Selah Freed vitaminas y minerales son adecuados para usted  · Usted tiene dolor de espalda que no mejora con el reposo, ni con el medicamento para el dolor  · Usted tiene fiebre  · Usted tiene un dolor que empeora cuando está acostado boca Shima Espinoza o al descansar  · Usted tiene dolor que empeora cuando tose o estornuda  · Usted pierde peso sin proponérselo  · Usted tiene preguntas o inquietudes acerca de schulz condición o cuidado  © 2017 26[address] Ramesh Bill Information is for End User's use only and may not be sold, redistributed or otherwise used for commercial purposes   All illustrations and images included in CareNotes® are the copyrighted property of A  D A M , Inc  or Michael Vega  Esta información es sólo para uso en educación  Schulz intención no es darle un consejo médico sobre enfermedades o tratamientos  Colsulte con schulz Verlon Martha farmacéutico antes de seguir cualquier régimen médico para saber si es seguro y efectivo para usted  Espasmo muscular   LO QUE NECESITA SABER:   Un espasmo muscular es rae contracción convulsiva involuntaria de cualquier músculo o de un david de músculos  Un calambre muscular es un espasmo muscular muy doloroso  Los calambres musculares generalmente ocurren después del ejercicio intenso o carl el [facility]  Estos también pueden ser provocados por ciertos medicamentos, la deshidratación, bajos niveles de calcio o magnesio u otras condiciones de [place]  Kassy Ghazi [facility]:   Medicamentos:  Usted podría  necesitar lo siguiente:  · AINEs (Analgésicos antiinflamatorios no esteroides)  pueden disminuir la inflamación y el dolor o la fiebre  [place]athy medicamento esta disponible con o sin rae receta médica  Los AINEs pueden causar sangrado estomacal o problemas renales en ciertas personas  Si usted sabiha un medicamento anticoagulante, siempre pregúntele a schulz médico si los INA son seguros para usted  Siempre sigrid la etiqueta de cathy medicamento y [place] instrucciones  · [place] bakari medicamentos marc se le haya indicado  Consulte con schulz médico si usted clarence que schulz medicamento no le está ayudando o si presenta efectos secundarios  Infórmele si es alérgico a algún medicamento  Mantenga rae lista actualizada de los [place], las vitaminas y los productos herbales que sabiha  Incluya los siguientes datos de los medicamentos: cantidad, frecuencia y motivo de administración  Traiga con usted la lista o los envases de la píldoras a bakari citas de seguimiento  Lleve la lista de los medicamentos con usted en quintin de rae emergencia  Acuda a bakari consultas de control con schulz médico según le indicaron    Larry Laos puede necesitar otros exámenes o tratamientos  También es posible que lo refieran a un fisioterapeuta u otro especialista  Anote bakari preguntas para que se acuerde de hacerlas carl bakari visitas  Cuidados personales:   · El estiramiento  de schulz músculo ayuda a aliviar el calambre  También puede ser de [place] el músculo estirado hasta que el calambre desaparezca  · Aplique calor  para ayudar a disminuir el dolor y el espasmo muscular  Aplíquese calor en el área lesionada carl 20 a 30 minutos cada 2 horas carl la cantidad de [employer] indiquen  · Aplique hielo  para ayudar a disminuir la inflamación y el dolor  El hielo también puede contribuir a evitar el daño de los tejidos  Use un paquete de hielo o ponga hielo molido dentro de [employer]  Envuelva la compresa o bolsa con rae toalla y colóquela sobre schulz músculo por 15 a 20 minutos cada hora o marc se lo indicaron  · Consuma más líquidos  para ayudar a prevenir espasmos musculares causados por la deshidratación  Las bebidas atléticas pueden ayudar a reemplazar los electrolitos que pierde carl el ejercicio por la sudoración  Pregunte a schulz médico sobre la cantidad de líquido que necesita jesse todos los días y cuáles le recomienda  · Consuma alimentos saludables , marc frutas, verduras, granos integrales, productos lácteos bajos en grasa y proteínas bajas en grasa (carne [place], legumbres y pescado)  Si está embarazada, pregúntele a schulz médico qué alimentos son ricos en magnesio y [place]  Estos pueden ayudar para [employer] calambres carl el Fran Diaz  · Dé masajes suaves sobre el músculo  para aliviar el calambre  · Respire profundo varias veces  hasta que el calambre se mejore  Acuéstese mientras respira profundo para que no sufra un mareo o desvanecimiento  Pregúntele a schulz Lesle Fetch vitaminas y minerales son adecuados para usted     · Usted presenta signos de deshidratación, marc dolor de [place], [place] de color amarillo oscuro, ojos o boca secos o latidos cardíacos rápidos  · Usted tiene preguntas o inquietudes acerca de boone condición o cuidado  Regrese a la jesus de emergencias si:   · El músculo con el calambre está caliente al tacto, inflamado o enrojecido  · Usted sufre con frecuencia y sin alivio de calambres musculares en varios músculos diferentes  · Usted presenta calambres musculares con entumecimiento, cosquilleo y sensación quemante en bakari mark y pies  © 2017 26[address] Ramesh Bill Information is for End User's use only and may not be sold, redistributed or otherwise used for commercial purposes  All illustrations and images included in CareNotes® are the copyrighted property of A D A M , Inc  or Michael Ferrari  Esta información es sólo para uso en educación  Boone intención no es darle un consejo médico sobre enfermedades o tratamientos  Colsulte con boone Sebastien Keas farmacéutico antes de seguir cualquier régimen médico para saber si es seguro y efectivo para usted

## 2018-11-14 DIAGNOSIS — R52 PAIN: ICD-10-CM

## 2018-11-14 DIAGNOSIS — M62.830 BACK MUSCLE SPASM: ICD-10-CM

## 2018-11-14 RX ORDER — METHOCARBAMOL 750 MG/1
750 TABLET, FILM COATED ORAL EVERY 6 HOURS PRN
Qty: 28 TABLET | Refills: 0 | Status: SHIPPED | OUTPATIENT
Start: 2018-11-14 | End: 2019-02-08 | Stop reason: SDUPTHER

## 2018-11-14 RX ORDER — TRAMADOL HYDROCHLORIDE 50 MG/1
50 TABLET ORAL EVERY 8 HOURS PRN
Qty: 20 TABLET | Refills: 0 | Status: SHIPPED | OUTPATIENT
Start: 2018-11-14 | End: 2019-05-28 | Stop reason: ALTCHOICE

## 2019-01-04 ENCOUNTER — TELEPHONE (OUTPATIENT)
Dept: FAMILY MEDICINE CLINIC | Facility: CLINIC | Age: 44
End: 2019-01-04

## 2019-01-23 ENCOUNTER — TELEPHONE (OUTPATIENT)
Dept: FAMILY MEDICINE CLINIC | Facility: CLINIC | Age: 44
End: 2019-01-23

## 2019-01-23 NOTE — TELEPHONE ENCOUNTER
Patient called she would like for you to call her she said that she needs to speak with you she can be reached at [phone] ty

## 2019-02-08 ENCOUNTER — OFFICE VISIT (OUTPATIENT)
Dept: FAMILY MEDICINE CLINIC | Facility: CLINIC | Age: 44
End: 2019-02-08
Payer: COMMERCIAL

## 2019-02-08 VITALS
TEMPERATURE: 98.9 F | SYSTOLIC BLOOD PRESSURE: 116 MMHG | DIASTOLIC BLOOD PRESSURE: 80 MMHG | HEART RATE: 70 BPM | BODY MASS INDEX: 39.27 KG/M2 | WEIGHT: 250.2 LBS

## 2019-02-08 DIAGNOSIS — R10.9 ABDOMINAL PAIN, UNSPECIFIED ABDOMINAL LOCATION: Primary | ICD-10-CM

## 2019-02-08 DIAGNOSIS — M62.830 BACK MUSCLE SPASM: ICD-10-CM

## 2019-02-08 DIAGNOSIS — K42.9 PERIUMBILICAL HERNIA: ICD-10-CM

## 2019-02-08 DIAGNOSIS — K29.50 CHRONIC GASTRITIS WITHOUT BLEEDING, UNSPECIFIED GASTRITIS TYPE: ICD-10-CM

## 2019-02-08 DIAGNOSIS — Z98.84 HISTORY OF GASTRIC BYPASS: ICD-10-CM

## 2019-02-08 PROCEDURE — 1036F TOBACCO NON-USER: CPT | Performed by: NURSE PRACTITIONER

## 2019-02-08 PROCEDURE — 99214 OFFICE O/P EST MOD 30 MIN: CPT | Performed by: NURSE PRACTITIONER

## 2019-02-08 RX ORDER — METHOCARBAMOL 750 MG/1
750 TABLET, FILM COATED ORAL EVERY 6 HOURS PRN
Qty: 28 TABLET | Refills: 0 | Status: CANCELLED | OUTPATIENT
Start: 2019-02-08 | End: 2019-02-15

## 2019-02-08 RX ORDER — METHOCARBAMOL 750 MG/1
750 TABLET, FILM COATED ORAL EVERY 6 HOURS PRN
Qty: 28 TABLET | Refills: 0 | Status: SHIPPED | OUTPATIENT
Start: 2019-02-08 | End: 2019-02-08 | Stop reason: SDUPTHER

## 2019-02-08 RX ORDER — METHOCARBAMOL 750 MG/1
750 TABLET, FILM COATED ORAL EVERY 6 HOURS PRN
Qty: 28 TABLET | Refills: 0 | Status: SHIPPED | OUTPATIENT
Start: 2019-02-08 | End: 2020-12-03 | Stop reason: SDUPTHER

## 2019-02-08 NOTE — PATIENT INSTRUCTIONS
Dieta para la diverticulitis   LO QUE NECESITA SABER:   ¿Cuál es la dieta para la diverticulitis? [place] [employer] para la diverticulitis incluye alimentos que permiten que los intestinos descansen mientras usted tiene diverticulitis  La diverticulitis es rae condición que provoca que los divertículos (bolsas pequeñas) que están a lo lam de los intestinos se inflamen o se infecten  [place] es provocado por evacuaciones intestinales duras, alimentos o bacteria que se quedan atorados [employer]  ¿Qué alimentos puedo comer mientras tengo diverticulitis? · Podrían recomendarle que siga rae dieta de líquidos katie por 2 a 3 días  [place] [employer]akes líquida absoluta se compone de líquidos transparentes y alimentos que se encuentran en estado líquido a temperatura ambiente  Boone médico le indicará cuándo puede comenzar a comer alimentos sólidos  Los ejemplos de líquidos katie [facility] siguientes:     ¨ [place] y [place] katie (marc de [place], arándano o uva), jugos cítricos colados o ponche de frutas  ¨ Café o té (sin crema o leche)    ¨ Bebidas deportivas o refrescos katie, marc refresco de jengibre, soda de lima kassandra, o agua mineral (no refresco de cola o cerveza de raíz)    ¨ Caldo o consomé everton    ¨ Paletas heladas (sin puré de frutas ni fibra)    ¨ Gelatina con sabor sin fruta    · Podrían recomendarle rae dieta baja en fibra hasta que bakari síntomas mejoren    Boone médico le indicará cuándo usted puede agregar lentamente alimentos altos en fibra de [place] a boone dieta:    ¨ Crema de melinda y [place] sruthi    ¨ Pan shaw, pastas de harina christina y [place]    ¨ Fruta enlatada y jayla cocida, sin la piel o las semillas, y jugo sin pulpa    ¨ Vegetales enlatados y jayla cocidos sin cáscara o semillas y [place] vegetal    ¨ Leche de Chad Santillan sin Darrick Pierre de soya y [id number]  Lisa Moody Arias y brittny de agua    ¨ Huevos, kim de ave (marc de puma y Raghu), pescado y carne de almas [place] y Jaya cocida     ¨ Tofu y [employer] de irene secos, marc la crema de cacahuate    ¨ Caldo y sopas coladas preparadas con alimentos bajos en fibra  ¿Qué alimentos debería evitar mientras tengo diverticulitis? Evite los alimentos que son altos en fibra mientras que tiene síntomas de diverticulitis  Los siguientes son ejemplos de alimentos altos en fibra:  · Granos integrales y panes, y cereales hechos de granos enteros    · Fruta seca, fruta fresca con la cáscara y pulpa de la fruta    · Vegetales crudos    · Verduras verdes, marc la espinaca    · Carne dura y carne con cartílago    · Legumbres, marc frijoles pintos y lentejas  ¿Cuándo riya comunicarme con mi médico?   · Dana síntomas empeoran o no desaparecen  · Tiene preguntas acerca de los alimentos que debería consumir  · Usted tiene preguntas o inquietudes acerca de belcher condición o cuidado  ACUERDOS SOBRE BELCHER CUIDADO:   Usted tiene el derecho de ayudar a planear belcher cuidado  Aprenda todo lo que pueda sobre belcher condición y marc darle tratamiento  Discuta dana opciones de tratamiento con dana médicos para decidir el cuidado que usted desea recibir  Usted siempre tiene el derecho de rechazar el tratamiento  Esta información es sólo para uso en educación  Belcher intención no es darle un consejo médico sobre enfermedades o tratamientos  Colsulte con belcher Letitia Jonatan farmacéutico antes de seguir cualquier régimen médico para saber si es seguro y efectivo para usted  © 2017 26[address] Ramesh Bill Information is for End User's use only and may not be sold, redistributed or otherwise used for commercial purposes  All illustrations and images included in CareNotes® are the copyrighted property of [employer]  or Michael Ferrari

## 2019-02-08 NOTE — PROGRESS NOTES
Assessment/Plan:    No problem-specific Assessment & Plan notes found for this encounter  Diagnoses and all orders for this visit:    Abdominal pain, unspecified abdominal location  -     CT abdomen pelvis wo contrast; Future  -     Ambulatory referral to Gastroenterology; Future    Chronic gastritis without bleeding, unspecified gastritis type  -     Ambulatory referral to Gastroenterology; Future    History of gastric bypass  -     Ambulatory referral to Bariatric Surgery; Future    Back muscle spasm  -     Discontinue: methocarbamol (ROBAXIN) 750 mg tablet; Take 1 tablet (750 mg total) by mouth every 6 (six) hours as needed for muscle spasms for up to 7 days  -     methocarbamol (ROBAXIN) 750 mg tablet; Take 1 tablet (750 mg total) by mouth every 6 (six) hours as needed for muscle spasms for up to 7 days    Periumbilical hernia  -     Ambulatory referral to General Surgery; Future          Subjective:      Patient ID: Georgette Worthy is a 41 y o  female  HPI    Francisco Malone presents today for abdominal pain  It is mainly on the right upper quadrant  She has had her gallbladder are removed already  She did have stone she relates  She went to the ER back in October for right flank pain  They did do a CT of the abdomen and pelvis  They did not see a stone at that time  They also noted noted the gastric bypass  She is still taking her Protonix on an empty stomach  She does have diarrhea  Feeling like always tired  Eating tons of seeds  The following portions of the patient's history were reviewed and updated as appropriate: allergies, current medications, past family history, past medical history, past social history, past surgical history and problem list     Review of Systems   Constitutional: Negative for activity change, appetite change, fatigue, fever and unexpected weight change  HENT: Negative for congestion, dental problem and sneezing  Eyes: Negative for discharge and visual disturbance  Respiratory: Negative for cough and wheezing  Gastrointestinal: Positive for abdominal pain, diarrhea and nausea  Negative for constipation and vomiting  Endocrine: Negative for polydipsia and polyuria  Genitourinary: Negative for dysuria and frequency  Musculoskeletal: Negative for arthralgias  Skin: Negative for rash  Allergic/Immunologic: Negative for environmental allergies and food allergies  Neurological: Negative for headaches  Hematological: Negative for adenopathy  Psychiatric/Behavioral: Negative for behavioral problems and sleep disturbance  Objective:  Vitals:    02/08/19 1556   BP: 116/80   BP Location: Left arm   Patient Position: Sitting   Cuff Size: Large   Pulse: 70   Temp: 98 9 °F (37 2 °C)   Weight: 113 kg (250 lb 3 2 oz)      Physical Exam   Constitutional: She appears well-developed and well-nourished  Cardiovascular: Normal rate, regular rhythm and normal heart sounds  Pulmonary/Chest: Effort normal and breath sounds normal    Abdominal: Soft  There is tenderness  Vitals reviewed

## 2019-04-24 ENCOUNTER — TELEPHONE (OUTPATIENT)
Dept: FAMILY MEDICINE CLINIC | Facility: CLINIC | Age: 44
End: 2019-04-24

## 2019-04-25 ENCOUNTER — TELEPHONE (OUTPATIENT)
Dept: FAMILY MEDICINE CLINIC | Facility: CLINIC | Age: 44
End: 2019-04-25

## 2019-04-25 DIAGNOSIS — R10.9 ABDOMINAL PAIN, UNSPECIFIED ABDOMINAL LOCATION: ICD-10-CM

## 2019-04-25 DIAGNOSIS — Z98.84 HISTORY OF GASTRIC BYPASS: ICD-10-CM

## 2019-04-25 DIAGNOSIS — K29.50 CHRONIC GASTRITIS WITHOUT BLEEDING, UNSPECIFIED GASTRITIS TYPE: ICD-10-CM

## 2019-04-25 DIAGNOSIS — E66.01 MORBID OBESITY (HCC): Primary | ICD-10-CM

## 2019-04-25 DIAGNOSIS — E03.9 HYPOTHYROIDISM, UNSPECIFIED TYPE: Primary | ICD-10-CM

## 2019-04-25 RX ORDER — LEVOTHYROXINE SODIUM 0.12 MG/1
125 TABLET ORAL DAILY
Qty: 90 TABLET | Refills: 0 | Status: SHIPPED | OUTPATIENT
Start: 2019-04-25 | End: 2019-07-24

## 2019-05-22 ENCOUNTER — TELEPHONE (OUTPATIENT)
Dept: FAMILY MEDICINE CLINIC | Facility: CLINIC | Age: 44
End: 2019-05-22

## 2019-05-22 DIAGNOSIS — G44.89 OTHER HEADACHE SYNDROME: Primary | ICD-10-CM

## 2019-05-22 LAB — HBA1C MFR BLD HPLC: 5.8 %

## 2019-05-24 ENCOUNTER — OFFICE VISIT (OUTPATIENT)
Dept: GASTROENTEROLOGY | Facility: MEDICAL CENTER | Age: 44
End: 2019-05-24
Payer: COMMERCIAL

## 2019-05-24 VITALS
HEIGHT: 67 IN | TEMPERATURE: 97.6 F | HEART RATE: 73 BPM | DIASTOLIC BLOOD PRESSURE: 78 MMHG | WEIGHT: 261 LBS | BODY MASS INDEX: 40.97 KG/M2 | SYSTOLIC BLOOD PRESSURE: 124 MMHG

## 2019-05-24 DIAGNOSIS — R10.11 RIGHT UPPER QUADRANT ABDOMINAL PAIN: Primary | ICD-10-CM

## 2019-05-24 DIAGNOSIS — K76.0 FATTY LIVER: ICD-10-CM

## 2019-05-24 DIAGNOSIS — R19.7 DIARRHEA, UNSPECIFIED TYPE: ICD-10-CM

## 2019-05-24 DIAGNOSIS — R11.0 NAUSEA: ICD-10-CM

## 2019-05-24 PROCEDURE — 99244 OFF/OP CNSLTJ NEW/EST MOD 40: CPT | Performed by: INTERNAL MEDICINE

## 2019-05-24 RX ORDER — SODIUM, POTASSIUM,MAG SULFATES 17.5-3.13G
180 SOLUTION, RECONSTITUTED, ORAL ORAL ONCE
Qty: 180 ML | Refills: 0 | Status: SHIPPED | OUTPATIENT
Start: 2019-05-24 | End: 2020-08-31 | Stop reason: ALTCHOICE

## 2019-05-28 ENCOUNTER — TRANSCRIBE ORDERS (OUTPATIENT)
Dept: ADMINISTRATIVE | Facility: HOSPITAL | Age: 44
End: 2019-05-28

## 2019-05-28 ENCOUNTER — OFFICE VISIT (OUTPATIENT)
Dept: FAMILY MEDICINE CLINIC | Facility: CLINIC | Age: 44
End: 2019-05-28
Payer: COMMERCIAL

## 2019-05-28 VITALS
HEIGHT: 67 IN | BODY MASS INDEX: 41.15 KG/M2 | WEIGHT: 262.2 LBS | SYSTOLIC BLOOD PRESSURE: 108 MMHG | DIASTOLIC BLOOD PRESSURE: 62 MMHG | HEART RATE: 80 BPM | TEMPERATURE: 98 F

## 2019-05-28 DIAGNOSIS — N62 LARGE BREASTS: ICD-10-CM

## 2019-05-28 DIAGNOSIS — Z98.84 HISTORY OF GASTRIC BYPASS: ICD-10-CM

## 2019-05-28 DIAGNOSIS — Z12.39 BREAST SCREENING, UNSPECIFIED: Primary | ICD-10-CM

## 2019-05-28 DIAGNOSIS — E66.01 MORBID OBESITY (HCC): ICD-10-CM

## 2019-05-28 DIAGNOSIS — E16.2 HYPOGLYCEMIA: Primary | ICD-10-CM

## 2019-05-28 DIAGNOSIS — Z12.31 ENCOUNTER FOR SCREENING MAMMOGRAM FOR BREAST CANCER: ICD-10-CM

## 2019-05-28 PROCEDURE — 99214 OFFICE O/P EST MOD 30 MIN: CPT | Performed by: NURSE PRACTITIONER

## 2019-05-28 RX ORDER — TOPIRAMATE 25 MG/1
TABLET ORAL
COMMUNITY
Start: 2018-01-25 | End: 2019-05-28 | Stop reason: ALTCHOICE

## 2019-05-28 RX ORDER — LEVOTHYROXINE SODIUM 137 UG/1
TABLET ORAL
Refills: 1 | COMMUNITY
Start: 2019-05-24 | End: 2021-09-13 | Stop reason: SDUPTHER

## 2019-05-28 RX ORDER — ATORVASTATIN CALCIUM 20 MG/1
TABLET, FILM COATED ORAL
COMMUNITY
Start: 2017-06-28 | End: 2021-06-08

## 2019-06-03 ENCOUNTER — HOSPITAL ENCOUNTER (OUTPATIENT)
Dept: ULTRASOUND IMAGING | Facility: HOSPITAL | Age: 44
Discharge: HOME | End: 2019-06-03
Attending: INTERNAL MEDICINE
Payer: COMMERCIAL

## 2019-06-03 DIAGNOSIS — K76.0 FATTY LIVER: ICD-10-CM

## 2019-06-03 DIAGNOSIS — R10.11 RIGHT UPPER QUADRANT ABDOMINAL PAIN: ICD-10-CM

## 2019-06-03 PROCEDURE — 76705 ECHO EXAM OF ABDOMEN: CPT

## 2019-06-11 ENCOUNTER — TELEPHONE (OUTPATIENT)
Dept: FAMILY MEDICINE CLINIC | Facility: CLINIC | Age: 44
End: 2019-06-11

## 2019-06-25 ENCOUNTER — HOSPITAL ENCOUNTER (OUTPATIENT)
Dept: MAMMOGRAPHY | Facility: CLINIC | Age: 44
Discharge: HOME | End: 2019-06-25
Payer: COMMERCIAL

## 2019-06-25 VITALS — BODY MASS INDEX: 42.11 KG/M2 | HEIGHT: 66 IN | WEIGHT: 262 LBS

## 2019-06-25 DIAGNOSIS — Z12.39 BREAST SCREENING, UNSPECIFIED: ICD-10-CM

## 2019-06-25 PROCEDURE — 77067 SCR MAMMO BI INCL CAD: CPT

## 2019-07-22 ENCOUNTER — TELEPHONE (OUTPATIENT)
Dept: FAMILY MEDICINE CLINIC | Facility: CLINIC | Age: 44
End: 2019-07-22

## 2019-07-22 NOTE — TELEPHONE ENCOUNTER
Patient is calling because she needs a work note due to her  migraines for today if you can please approved and give patient a call back when ready thank you   [phone]

## 2019-08-07 RX ORDER — SODIUM CHLORIDE 9 MG/ML
125 INJECTION, SOLUTION INTRAVENOUS CONTINUOUS
Status: CANCELLED | OUTPATIENT
Start: 2019-08-07

## 2019-08-08 ENCOUNTER — HOSPITAL ENCOUNTER (OUTPATIENT)
Dept: GASTROENTEROLOGY | Facility: HOSPITAL | Age: 44
Setting detail: OUTPATIENT SURGERY
Discharge: HOME | End: 2019-08-08
Attending: INTERNAL MEDICINE

## 2019-12-23 ENCOUNTER — TELEPHONE (OUTPATIENT)
Dept: FAMILY MEDICINE CLINIC | Facility: CLINIC | Age: 44
End: 2019-12-23

## 2019-12-23 NOTE — TELEPHONE ENCOUNTER
Left message to call back regarding verifying that FMLA forms are indeed for her and answer questions needed to fill out forms   Dates out etc     Forms in [employer]'s  bin front office by gautam Rodriguez [employer]

## 2020-01-07 DIAGNOSIS — Z23 NEED FOR INFLUENZA VACCINATION: Primary | ICD-10-CM

## 2020-01-07 DIAGNOSIS — Z11.4 SCREENING FOR HIV (HUMAN IMMUNODEFICIENCY VIRUS): ICD-10-CM

## 2020-01-07 DIAGNOSIS — Z23 NEED FOR TETANUS BOOSTER: ICD-10-CM

## 2020-01-08 ENCOUNTER — OFFICE VISIT (OUTPATIENT)
Dept: FAMILY MEDICINE CLINIC | Facility: CLINIC | Age: 45
End: 2020-01-08
Payer: COMMERCIAL

## 2020-01-08 VITALS
DIASTOLIC BLOOD PRESSURE: 80 MMHG | RESPIRATION RATE: 18 BRPM | HEIGHT: 66 IN | BODY MASS INDEX: 44.66 KG/M2 | WEIGHT: 277.9 LBS | HEART RATE: 80 BPM | SYSTOLIC BLOOD PRESSURE: 120 MMHG

## 2020-01-08 DIAGNOSIS — E03.9 HYPOTHYROIDISM, UNSPECIFIED TYPE: ICD-10-CM

## 2020-01-08 DIAGNOSIS — Z23 IMMUNIZATION DUE: ICD-10-CM

## 2020-01-08 DIAGNOSIS — E78.5 HYPERLIPIDEMIA, UNSPECIFIED HYPERLIPIDEMIA TYPE: ICD-10-CM

## 2020-01-08 DIAGNOSIS — R51.9 CHRONIC NONINTRACTABLE HEADACHE, UNSPECIFIED HEADACHE TYPE: ICD-10-CM

## 2020-01-08 DIAGNOSIS — G89.29 CHRONIC NONINTRACTABLE HEADACHE, UNSPECIFIED HEADACHE TYPE: ICD-10-CM

## 2020-01-08 DIAGNOSIS — E66.01 MORBID OBESITY WITH BMI OF 40.0-44.9, ADULT (HCC): Primary | ICD-10-CM

## 2020-01-08 DIAGNOSIS — G47.00 INSOMNIA, UNSPECIFIED TYPE: ICD-10-CM

## 2020-01-08 DIAGNOSIS — Z11.4 SCREENING FOR HIV (HUMAN IMMUNODEFICIENCY VIRUS): ICD-10-CM

## 2020-01-08 PROCEDURE — 99214 OFFICE O/P EST MOD 30 MIN: CPT | Performed by: NURSE PRACTITIONER

## 2020-01-08 PROCEDURE — 90715 TDAP VACCINE 7 YRS/> IM: CPT

## 2020-01-08 PROCEDURE — 90471 IMMUNIZATION ADMIN: CPT

## 2020-01-08 RX ORDER — TOPIRAMATE 50 MG/1
50 TABLET, FILM COATED ORAL 2 TIMES DAILY
Qty: 180 TABLET | Refills: 3 | Status: SHIPPED | OUTPATIENT
Start: 2020-01-08 | End: 2020-12-03 | Stop reason: SDUPTHER

## 2020-01-08 RX ORDER — TRAZODONE HYDROCHLORIDE 50 MG/1
50 TABLET ORAL
Qty: 90 TABLET | Refills: 0 | Status: SHIPPED | OUTPATIENT
Start: 2020-01-08 | End: 2021-04-16 | Stop reason: HOSPADM

## 2020-01-08 NOTE — PROGRESS NOTES
Assessment/Plan:         Diagnoses and all orders for this visit:    Morbid obesity with BMI of 40 0-44 9, adult ([facility] [address] )    Hyperlipidemia, unspecified hyperlipidemia type  lipids  Hypothyroidism, unspecified type  Seeing endocrine  Immunization due  -     TDAP VACCINE GREATER THAN OR EQUAL TO 8YO IM    Screening for HIV (human immunodeficiency virus)  -     HIV 1/2 AG-AB combo; Future          Subjective:      Patient ID: Jorge Luis Lopez is a 45 y o  female  HPI  Here for follow up on chronic conditions  Sees endocrine  Traveling for months at a time     The following portions of the patient's history were reviewed and updated as appropriate: allergies, current medications, past family history, past medical history, past social history, past surgical history and problem list     Review of Systems   Constitutional: Negative for activity change, appetite change, fatigue, fever and unexpected weight change  HENT: Negative for congestion, dental problem and sneezing  Eyes: Negative for discharge and visual disturbance  Respiratory: Negative for cough and wheezing  Cardiovascular: Negative for chest pain  Gastrointestinal: Negative for abdominal pain, constipation, diarrhea, nausea and vomiting  Endocrine: Negative for polydipsia and polyuria  Genitourinary: Negative for dysuria and frequency  Musculoskeletal: Negative for arthralgias  Skin: Negative for rash  Allergic/Immunologic: Negative for environmental allergies and food allergies  Neurological: Negative for headaches  Hematological: Negative for adenopathy  Psychiatric/Behavioral: Negative for behavioral problems and sleep disturbance  Objective:  Vitals:    01/08/20 1556   BP: 120/80   BP Location: Left arm   Patient Position: Sitting   Cuff Size: Large   Pulse: 80   Resp: 18   Weight: 126 kg (277 lb 14 4 oz)   Height: 5' 6" (1 676 m)      Physical Exam   Constitutional: She is oriented to person, place, and time  She appears well-developed and well-nourished  HENT:   Head: Normocephalic  Right Ear: External ear normal    Left Ear: External ear normal    Nose: Nose normal    Eyes: Pupils are equal, round, and reactive to light  Conjunctivae are normal  Right eye exhibits no discharge  Left eye exhibits no discharge  Neck: Normal range of motion  Neck supple  No thyromegaly present  Cardiovascular: Normal rate, regular rhythm and normal heart sounds  No murmur heard  Pulmonary/Chest: Effort normal and breath sounds normal    Abdominal: Soft  Bowel sounds are normal  There is no tenderness  Musculoskeletal: Normal range of motion  Lymphadenopathy:     She has no cervical adenopathy  Neurological: She is alert and oriented to person, place, and time  Skin: Skin is warm  No rash noted  Psychiatric: She has a normal mood and affect  Her behavior is normal    Vitals reviewed  On metformin   Does not know what mg  Drom dr Berta Black Counseling: Body mass index is 44 85 kg/m²  The BMI is above normal  Nutrition recommendations include reducing portion sizes, decreasing overall calorie intake, 3-5 servings of fruits/vegetables daily, reducing fast food intake, consuming healthier snacks, decreasing soda and/or juice intake and moderation in carbohydrate intake  Exercise recommendations include exercising 3-5 times per week

## 2020-01-15 ENCOUNTER — OFFICE VISIT (OUTPATIENT)
Dept: FAMILY MEDICINE CLINIC | Facility: CLINIC | Age: 45
End: 2020-01-15
Payer: COMMERCIAL

## 2020-01-15 VITALS
BODY MASS INDEX: 44.48 KG/M2 | TEMPERATURE: 98.7 F | SYSTOLIC BLOOD PRESSURE: 110 MMHG | RESPIRATION RATE: 18 BRPM | DIASTOLIC BLOOD PRESSURE: 84 MMHG | WEIGHT: 276.8 LBS | HEIGHT: 66 IN | HEART RATE: 80 BPM

## 2020-01-15 DIAGNOSIS — Z11.59 SCREENING FOR VIRAL DISEASE: ICD-10-CM

## 2020-01-15 DIAGNOSIS — J02.9 PHARYNGITIS, UNSPECIFIED ETIOLOGY: ICD-10-CM

## 2020-01-15 DIAGNOSIS — Z11.2 SCREENING FOR STREPTOCOCCAL INFECTION: Primary | ICD-10-CM

## 2020-01-15 DIAGNOSIS — J06.9 VIRAL UPPER RESPIRATORY INFECTION: ICD-10-CM

## 2020-01-15 DIAGNOSIS — B34.9 VIRAL INFECTION: Primary | ICD-10-CM

## 2020-01-15 PROCEDURE — 3008F BODY MASS INDEX DOCD: CPT | Performed by: NURSE PRACTITIONER

## 2020-01-15 PROCEDURE — 87651 STREP A DNA AMP PROBE: CPT | Performed by: NURSE PRACTITIONER

## 2020-01-15 PROCEDURE — 99214 OFFICE O/P EST MOD 30 MIN: CPT | Performed by: NURSE PRACTITIONER

## 2020-01-15 PROCEDURE — 87631 RESP VIRUS 3-5 TARGETS: CPT | Performed by: NURSE PRACTITIONER

## 2020-01-15 RX ORDER — BENZONATATE 100 MG/1
100 CAPSULE ORAL 3 TIMES DAILY PRN
Qty: 20 CAPSULE | Refills: 0 | Status: SHIPPED | OUTPATIENT
Start: 2020-01-15 | End: 2021-04-16 | Stop reason: HOSPADM

## 2020-01-15 NOTE — PATIENT INSTRUCTIONS
Infección respiratoria superior   LO QUE NECESITA SABER:   ¿Qué es rae infección de las vías respiratorias superiores? Rae infección de las vías respiratorias superiores también se conoce marc resfriado común  Puede afectar schulz [place], oídos y los senos paranasales  ¿Qué causa un resfriado? El resfriado común es causado por un virus  Existen varias clases de virus que causan resfriados y cada karoline es contagioso  [place] quiere decir que el virus puede transmitirse fácilmente a otra persona cuando rae persona enferma tose o estornuda  El virus también puede transmitirse si usted toca algo que rae persona con un resfriado ha tocado  Usted está más propenso a contraer un resfriado en el invierno  Schulz riesgo de contraer un resfriado puede aumentar si usted fuma cigarrillos o padece de alergias, marc la fiebre del heno  ¿Cuáles son los signos y síntomas de un resfriado? Los síntomas del resfriado generalmente son [place] graves carl los primeros 3 a 5 días  Puede presentar cualquiera de los siguientes signos o síntomas:  · Secreción nasal o nariz tapada    · Estornudos y tos    · [place] irritada o ronquera    · Ojos enrojecidos, llorosos e irritados    · [employer]     · Escalofríos y fiebre    · Dolor de [place], jerrod corporales o músculos adoloridos  ¿Cómo es tratado un resfriado? No hay william para el resfriado común  Los resfriados son provocados por virus y no mejoran con antibióticos  [address] en 7 a 14 días  Puede que usted siga tosiendo por 2 a 3 semanas  Lo siguiente podría ayudar a disminuir bakari síntomas:  · Los descongestionantes  ayudan a reducir la congestión nasal y Theadora Mooney a respirar más fácilmente  Si sabiha pastillas descongestionantes, pueden causarle agitación o problemas para dormir  No use aerosol descongestionante por más de unos cuantos días  · Los jarabes para la tos  ayudan a reducir la tos   Pregúntele a schulz médico cuál tipo de medicamento para la tos es mejor para usted  · AINEs (Analgésicos antiinflamatorios no esteroides) marc el ibuprofeno, ayudan a disminuir la inflamación, el dolor y la [place]  Los AINEs pueden causar sangrado estomacal o problemas renales en ciertas personas  Si usted sabiha un medicamento anticoagulante, siempre pregúntele a schulz médico si los INA son seguros para usted  Siempre aurelia la etiqueta de genet medicamento y [place] instrucciones  · Acetaminofeno:  cedric el dolor y baja la fiebre  Está disponible sin receta médica  Pregunte la cantidad y la frecuencia con que debe tomarlos  [address]  Aurelia las etiquetas de todos los demás medicamentos que esté usando para saber si también contienen acetaminofén, o pregunte a schulz médico o farmacéutico  El acetaminofén puede causar daño en el hígado cuando no se sabiha de forma correcta  No use más de 4 gramos (4000 miligramos) en total de acetaminofeno en un día  ¿Cómo puedo manejar mi resfriado? · Descanse el mayor tiempo posible  Empiece a hacer un poco más día a día  · [employer] líquidos marc se le indique  Los líquidos le ayudarán a aflojar y disolver la mucosidad para que la pueda expectorar  Los líquidos ayudarán a evitar la deshidratación  Los líquidos que ayudan a prevenir la deshidratación pueden ser Hedda Fast y caldo  No tome líquidos que contienen cafeína  La cafeína puede aumentar el riesgo de deshidratación  Pregúntele a schulz médico cuál es la cantidad de líquido que necesita ingerir a diario  · Alivie el dolor de garganta  Lise gárgaras de agua tibia con sal  [place] ayuda a aliviar el dolor de garganta  Prepare agua salina disolviendo ¼ de cucharada de sal a 1 taza de agua tibia  Usted puede también chupar dulces duros o pastillas para la garganta  Usted puede usar aerosol para el dolor de garganta  · Use un humidificador o vaporizador  Use un humidificador de ish frío o un vaporizador para elevar la humedad en schulz casa   [place] podría ayudarle a respirar más fácilmente y al mismo tiempo disminuir la tos  · Use gotas nasales de solución salina marc se le haya indicado  Estas ayudan a [place]  · Aplique vaselina en la parte externa alrededor de las fosas nasales  Esta puede disminuir la irritación de sonarse la nariz  · No fume  La nicotina y otros químicos en los cigarrillos y cigarros pueden empeorar dana síntomas  También pueden causar infecciones marc la bronquitis o la neumonía  Pida información a schulz médico si usted actualmente fuma y necesita ayuda para dejar de fumar  Los cigarrillos electrónicos o tabaco sin humo todavía contienen nicotina  Consulte con schulz médico antes de [employer]  ¿Qué puedo hacer para evitar la propagación del resfridado? · Trate de mantenerse alejado de [employer] primeros 2 a 3 días de schulz resfriado cuando éste es más fácil de transmitir  · No comparta alimentos o bebidas  · No comparta toallas de mano con otros miembros de ibeth en el hogar  · [employer] frecuentemente, especialmente después de sonarse la nariz  Bandar la espalda a otras personas y cúbrase la boca y la nariz con un pañuelo desechable cuando estornuda o tose  ¿Cuándo riya buscar atención inmediata? · Usted tiene dolor torácico y dificultad para respirar  ¿Cuándo riya comunicarme con mi médico?   · Usted tiene fiebre más antoinette de de 102ºF (39ºC)  · Schulz garganta irritada empeora o usted ve manchas tanmay o krishna en schulz garganta  · Dana síntomas empeoran después de 3 a 5 días o schulz resfriado no mejora en 14 días  · Usted tiene sarpullido en alguna parte de schulz piel  · Usted tiene bultos grandes y sensible en schulz crystal    · Usted tiene secreción espesa de color caroline o amarillo proveniente de schulz nariz  · Usted expectora mucosidad de color amarillo, caroline o con [place]  · Usted vomita por más de 24 horas y no puede retener líquidos en el estómago      · Usted tiene un grave dolor de oído  · Usted tiene preguntas o inquietudes acerca de schulz condición o cuidado  ACUERDOS SOBRE SCHULZ CUIDADO:   Usted tiene el derecho de ayudar a plan[place] schulz cuidado  Aprenda todo lo que pueda sobre schulz condición y marc darle tratamiento  Discuta bakari opciones de tratamiento con bakari médicos para decidir el cuidado que usted desea recibir  Usted siempre tiene el derecho de rechazar el tratamiento  Esta información es sólo para uso en educación  Schulz intención no es darle un consejo médico sobre enfermedades o tratamientos  Colsulte con schulz [place] [employer] farmacéutico antes de seguir cualquier régimen médico para saber si es seguro y efectivo para usted  © 2017 [address]  Information is for End User's use only and may not be sold, redistributed or otherwise used for commercial purposes  All illustrations and images included in CareNotes® are the copyrighted property of A D A M , Inc  or Michael Ferrair

## 2020-01-15 NOTE — PROGRESS NOTES
Assessment/Plan:         Diagnoses and all orders for this visit:    Viral upper respiratory infection  Increase fluids    OTC cold and sinus medication  Pharyngitis, unspecified etiology  Throat culture sent    No meds started   Viral infection  -     benzonatate (TESSALON PERLES) 100 mg capsule; Take 1 capsule (100 mg total) by mouth 3 (three) times a day as needed for cough  Flu screen done  No treatment started  Subjective:      Patient ID: Tomas Hernandez is a 45 y o  female  HPI  Patient is here for upper respiratory symptoms which started about a week ago  She is having hit congestion along with headaches  She also has cough and sore throat  Cough is waking her up at night  She recently has started to have a productive cough  She denies any fevers at this time  She also is having some body achiness  The following portions of the patient's history were reviewed and updated as appropriate: allergies, current medications, past family history, past medical history, past social history, past surgical history and problem list     Review of Systems   Constitutional: Positive for activity change, chills and fatigue  Negative for appetite change and fever  HENT: Positive for congestion, postnasal drip and sore throat  Respiratory: Positive for cough  Negative for shortness of breath and wheezing  Cardiovascular: Negative for chest pain  Gastrointestinal: Positive for diarrhea  Negative for vomiting  Musculoskeletal: Positive for arthralgias  Neurological: Positive for headaches  Negative for dizziness, weakness and light-headedness  Objective:  Vitals:    01/15/20 0941   BP: 110/84   BP Location: Left arm   Patient Position: Sitting   Cuff Size: Large   Pulse: 80   Resp: 18   Temp: 98 7 °F (37 1 °C)   TempSrc: Oral   Weight: 126 kg (276 lb 12 8 oz)   Height: 5' 6" (1 676 m)      Physical Exam   Constitutional: She appears well-developed and well-nourished     HENT: Right Ear: Hearing, tympanic membrane and ear canal normal    Left Ear: Hearing, tympanic membrane and ear canal normal    Mouth/Throat: Mucous membranes are normal  Posterior oropharyngeal erythema present  Tonsils are 1+ on the right  Tonsils are 1+ on the left  Eyes: EOM are normal    Cardiovascular: Normal rate  Pulmonary/Chest: Effort normal and breath sounds normal    Abdominal: Soft  Bowel sounds are normal    Lymphadenopathy:     She has cervical adenopathy  Skin: Skin is warm  Vitals reviewed

## 2020-01-15 NOTE — LETTER
January 15, 2020     Patient: Alberto Humphrey   YOB: 1975   Date of Visit: 1/15/2020       To Whom it May Concern:    Ta Umaña is under my professional care  She was seen in my office on 1/15/2020  She may return to work on 1/20/2020  If you have any questions or concerns, please don't hesitate to call           Sincerely,          Ivie Baumgarten, CRNP        CC: No Recipients

## 2020-01-16 LAB
FLUAV RNA NPH QL NAA+PROBE: NORMAL
FLUBV RNA NPH QL NAA+PROBE: NORMAL
RSV RNA NPH QL NAA+PROBE: NORMAL
S PYO DNA THROAT QL NAA+PROBE: NORMAL

## 2020-01-21 ENCOUNTER — HOSPITAL ENCOUNTER (EMERGENCY)
Facility: HOSPITAL | Age: 45
Discharge: HOME | End: 2020-01-21
Attending: EMERGENCY MEDICINE | Admitting: EMERGENCY MEDICINE
Payer: COMMERCIAL

## 2020-01-21 VITALS
SYSTOLIC BLOOD PRESSURE: 120 MMHG | TEMPERATURE: 97.1 F | DIASTOLIC BLOOD PRESSURE: 81 MMHG | HEART RATE: 76 BPM | BODY MASS INDEX: 45.12 KG/M2 | OXYGEN SATURATION: 98 % | RESPIRATION RATE: 18 BRPM | WEIGHT: 279.54 LBS

## 2020-01-21 DIAGNOSIS — J20.9 ACUTE BRONCHITIS: Primary | ICD-10-CM

## 2020-01-21 PROCEDURE — 99284 EMERGENCY DEPT VISIT MOD MDM: CPT | Performed by: PHYSICIAN ASSISTANT

## 2020-01-21 PROCEDURE — 99284 EMERGENCY DEPT VISIT MOD MDM: CPT

## 2020-01-21 RX ORDER — DEXTROMETHORPHAN HYDROBROMIDE AND PROMETHAZINE HYDROCHLORIDE 15; 6.25 MG/5ML; MG/5ML
5 SOLUTION ORAL 4 TIMES DAILY PRN
Qty: 118 ML | Refills: 0 | Status: SHIPPED | OUTPATIENT
Start: 2020-01-21 | End: 2020-01-28

## 2020-01-21 RX ORDER — AZITHROMYCIN 250 MG/1
250 TABLET, FILM COATED ORAL DAILY
Qty: 6 TABLET | Refills: 0 | Status: SHIPPED | OUTPATIENT
Start: 2020-01-21 | End: 2020-01-26

## 2020-01-21 NOTE — ED PROVIDER NOTES
History  Chief Complaint   Patient presents with    Sore Throat     for 1 week with congestion and cough    Chest Pain     from coughing so much  only took robitussin once in a while     This is a 61-year-old female patient who has had a productive cough x1 week does not believe she had fevers  She has been taking Robitussin and Tessalon Perles without improvement  The sputum is yellowish green  She is not short of breath no chest pain  The cough keeps her up at night  No headache no blurred vision or double vision no nasal congestion she does have a sore throat when she coughs but is able to swallow control secretions eating and drinking  No nausea vomiting diarrhea abdominal pain  No urinary symptoms no rash  Nothing makes this worse or better          Prior to Admission Medications   Prescriptions Last Dose Informant Patient Reported? Taking?    Calcium Citrate 200 MG TABS   Yes No   Si tablet 3 (three) times a day   Cyanocobalamin ER 1000 MCG TBCR  Self Yes No   Sig: take one tab daily   Multiple Vitamins-Minerals (MULTIVITAMIN ADULT PO)  Self Yes No   Sig: every 24 hours   SUPREP BOWEL PREP KIT 17 5-3 13-1 6 GM/177ML SOLN   No No   Sig: Take 180 mL by mouth once for 1 dose   atorvastatin (LIPITOR) 20 mg tablet   Yes No   Sig: TK 1 T PO QHS   benzonatate (TESSALON PERLES) 100 mg capsule   No No   Sig: Take 1 capsule (100 mg total) by mouth 3 (three) times a day as needed for cough   diclofenac potassium (CATAFLAM) 50 mg tablet   No No   Sig: Take 1 tablet (50 mg total) by mouth 3 (three) times a day for 7 days   ergocalciferol (VITAMIN D2) 50,000 units  Self Yes No   Sig: TAKE 2 CAPSULES BY MOUTH EVERY WEEK WITH DINNER   ferrous sulfate 324 (65 Fe) mg  Self No No   Sig: Take 1 tablet (324 mg total) by mouth daily before breakfast   ibuprofen (MOTRIN) 600 mg tablet  Self Yes No   Sig: Take 600 mg by mouth every 6 (six) hours   levothyroxine 125 mcg tablet   No No   Sig: Take 1 tablet (125 mcg total) by mouth daily for 90 days   levothyroxine 137 mcg tablet   Yes No   Sig: TAKE ONE TABLET BY MOUTH EVERY MORNING ON EMPTY STOMACH   methocarbamol (ROBAXIN) 750 mg tablet   No No   Sig: Take 1 tablet (750 mg total) by mouth every 6 (six) hours as needed for muscle spasms for up to 7 days   pantoprazole (PROTONIX) 40 mg tablet  Self No No   Sig: Take 1 tablet (40 mg total) by mouth every 24 hours   thiamine (VITAMIN B1) 50 mg tablet  Self No No   Sig: Take 1 tablet (50 mg total) by mouth daily   topiramate (TOPAMAX) 50 MG tablet   No No   Sig: Take 1 tablet (50 mg total) by mouth 2 (two) times a day   traZODone (DESYREL) 50 mg tablet   No No   Sig: Take 1 tablet (50 mg total) by mouth daily at bedtime as needed for sleep      Facility-Administered Medications: None       Past Medical History:   Diagnosis Date    Abscess of labia     Anxiety     Bipolar disorder (HCC)     Breast lump     Frequent headaches     Hemorrhoids     Hypothyroidism 2013    Migraine     Morbid obesity ([address] ) 2013    Psychotic disorder (HCC)        Past Surgical History:   Procedure Laterality Date    BARIATRIC SURGERY      BREAST BIOPSY Right 1999    BREAST LUMPECTOMY Left     benign    CHOLECYSTECTOMY      GALLBLADDER SURGERY         Family History   Problem Relation Age of Onset    Liver cancer Mother     Prostate cancer Father     Cancer Family      I have reviewed and agree with the history as documented  Social History     Tobacco Use    Smoking status: Former Smoker     Types: Cigarettes     Last attempt to quit: 2008     Years since quittin 0    Smokeless tobacco: Former User   Substance Use Topics    Alcohol use: No    Drug use: No        Review of Systems   All other systems reviewed and are negative  Physical Exam  Physical Exam   Constitutional: She appears well-developed and well-nourished  HENT:   Head: Normocephalic and atraumatic     Right Ear: External ear normal    Left Ear: External ear normal    Nose: Nose normal    Mouth/Throat: Oropharynx is clear and moist    Eyes: Pupils are equal, round, and reactive to light  Conjunctivae are normal    Neck: Normal range of motion  Neck supple  Cardiovascular: Normal rate and regular rhythm  Pulmonary/Chest: Effort normal    Patient has no respiratory distress  She has mild rhonchi that clears with cough   Abdominal: Soft  Bowel sounds are normal  There is no tenderness  Neurological: She is alert  Skin: Skin is warm  Psychiatric: She has a normal mood and affect  Her behavior is normal    Nursing note and vitals reviewed  Vital Signs  ED Triage Vitals [01/21/20 0844]   Temperature Pulse Respirations Blood Pressure SpO2   (!) 97 1 °F (36 2 °C) 76 18 120/81 98 %      Temp src Heart Rate Source Patient Position - Orthostatic VS BP Location FiO2 (%)   -- Monitor Sitting Left arm --      Pain Score       8           Vitals:    01/21/20 0844   BP: 120/81   Pulse: 76   Patient Position - Orthostatic VS: Sitting         Visual Acuity      ED Medications  Medications - No data to display    Diagnostic Studies  Results Reviewed     None                 No orders to display              Procedures  Procedures         ED Course                               MDM      Disposition  Final diagnoses:   Acute bronchitis     Time reflects when diagnosis was documented in both MDM as applicable and the Disposition within this note     Time User Action Codes Description Comment    1/21/2020  8:54 AM Dylon Rich Add [J20 9] Acute bronchitis       ED Disposition     ED Disposition Condition Date/Time Comment    Discharge Stable Tue Jan 21, 2020  8:54 AM Ta Umaña discharge to home/self care              Follow-up Information     Follow up With Specialties Details [address] [facility] Nurse Practitioner   [address] [place] [address]  [phone]            Patient's Medications   Discharge Prescriptions AZITHROMYCIN (ZITHROMAX) 250 MG TABLET    Take 1 tablet (250 mg total) by mouth daily for 5 days 2 po day 1 then the 1 po day 2-5       Start Date: 1/21/2020 End Date: 1/26/2020       Order Dose: 250 mg       Quantity: 6 tablet    Refills: 0    PROMETHAZINE-DM (PHENERGAN-DM) 6 25-15 MG/5 ML ORAL SYRUP    Take 5 mL by mouth 4 (four) times a day as needed for cough for up to 7 days       Start Date: 1/21/2020 End Date: 1/28/2020       Order Dose: 5 mL       Quantity: 118 mL    Refills: 0     No discharge procedures on file      ED Provider  Electronically Signed by           Bharat Crawley PA-C  01/21/20 5521

## 2020-01-27 LAB — HBA1C MFR BLD HPLC: 5.7 %

## 2020-03-18 ENCOUNTER — TELEPHONE (OUTPATIENT)
Dept: FAMILY MEDICINE CLINIC | Facility: CLINIC | Age: 45
End: 2020-03-18

## 2020-03-18 NOTE — TELEPHONE ENCOUNTER
Patient called requesting a letter to stay off work for the next 30 days  She states she has hashimoto's and anemia and she cant be expose to the covid 19  I asked if she was expose to covid 19, I even asked all the questions for covid 19 patient does not meet the criteria  I also asked if she was expose at work or anyone had any symptoms patient stated no one at work has complain  But she thinks since her immune system is low she needs to be out  Please advise patient would like letter to be done today

## 2020-04-01 ENCOUNTER — TELEPHONE (OUTPATIENT)
Dept: FAMILY MEDICINE CLINIC | Facility: CLINIC | Age: 45
End: 2020-04-01

## 2020-04-02 ENCOUNTER — TELEMEDICINE (OUTPATIENT)
Dept: FAMILY MEDICINE CLINIC | Facility: CLINIC | Age: 45
End: 2020-04-02
Payer: COMMERCIAL

## 2020-04-02 DIAGNOSIS — R05.9 COUGH: ICD-10-CM

## 2020-04-02 DIAGNOSIS — R05.9 COUGH: Primary | ICD-10-CM

## 2020-04-02 DIAGNOSIS — Z20.828 EXPOSURE TO SARS-ASSOCIATED CORONAVIRUS: ICD-10-CM

## 2020-04-02 PROCEDURE — 99214 OFFICE O/P EST MOD 30 MIN: CPT | Performed by: NURSE PRACTITIONER

## 2020-04-02 RX ORDER — ALBUTEROL SULFATE 90 UG/1
AEROSOL, METERED RESPIRATORY (INHALATION)
Qty: 42.5 G | Refills: 2 | Status: SHIPPED | OUTPATIENT
Start: 2020-04-02 | End: 2021-04-04 | Stop reason: SDUPTHER

## 2020-04-02 RX ORDER — ALBUTEROL SULFATE 90 UG/1
2 AEROSOL, METERED RESPIRATORY (INHALATION) EVERY 4 HOURS PRN
Qty: 1 INHALER | Refills: 5 | Status: SHIPPED | OUTPATIENT
Start: 2020-04-02 | End: 2020-04-02

## 2020-04-03 PROCEDURE — 99214 OFFICE O/P EST MOD 30 MIN: CPT | Performed by: NURSE PRACTITIONER

## 2020-04-04 ENCOUNTER — TELEPHONE (OUTPATIENT)
Dept: FAMILY MEDICINE CLINIC | Facility: CLINIC | Age: 45
End: 2020-04-04

## 2020-04-05 ENCOUNTER — TELEPHONE (OUTPATIENT)
Dept: FAMILY MEDICINE CLINIC | Facility: CLINIC | Age: 45
End: 2020-04-05

## 2020-04-06 ENCOUNTER — TELEPHONE (OUTPATIENT)
Dept: FAMILY MEDICINE CLINIC | Facility: CLINIC | Age: 45
End: 2020-04-06

## 2020-04-07 ENCOUNTER — TELEMEDICINE (OUTPATIENT)
Dept: FAMILY MEDICINE CLINIC | Facility: CLINIC | Age: 45
End: 2020-04-07
Payer: COMMERCIAL

## 2020-04-07 DIAGNOSIS — Z20.822 SUSPECTED COVID-19 VIRUS INFECTION: Primary | ICD-10-CM

## 2020-04-07 PROCEDURE — 99213 OFFICE O/P EST LOW 20 MIN: CPT | Performed by: NURSE PRACTITIONER

## 2020-04-08 ENCOUNTER — TELEPHONE (OUTPATIENT)
Dept: FAMILY MEDICINE CLINIC | Facility: CLINIC | Age: 45
End: 2020-04-08

## 2020-04-10 ENCOUNTER — TELEPHONE (OUTPATIENT)
Dept: FAMILY MEDICINE CLINIC | Facility: CLINIC | Age: 45
End: 2020-04-10

## 2020-04-13 ENCOUNTER — TELEPHONE (OUTPATIENT)
Dept: FAMILY MEDICINE CLINIC | Facility: CLINIC | Age: 45
End: 2020-04-13

## 2020-04-15 ENCOUNTER — TELEPHONE (OUTPATIENT)
Dept: FAMILY MEDICINE CLINIC | Facility: CLINIC | Age: 45
End: 2020-04-15

## 2020-05-19 ENCOUNTER — TELEPHONE (OUTPATIENT)
Dept: FAMILY MEDICINE CLINIC | Facility: CLINIC | Age: 45
End: 2020-05-19

## 2020-06-01 ENCOUNTER — TELEPHONE (OUTPATIENT)
Dept: FAMILY MEDICINE CLINIC | Facility: CLINIC | Age: 45
End: 2020-06-01

## 2020-07-10 ENCOUNTER — TRANSCRIBE ORDERS (OUTPATIENT)
Dept: ADMINISTRATIVE | Facility: HOSPITAL | Age: 45
End: 2020-07-10

## 2020-07-10 ENCOUNTER — APPOINTMENT (OUTPATIENT)
Dept: LAB | Facility: HOSPITAL | Age: 45
End: 2020-07-10
Payer: COMMERCIAL

## 2020-07-10 DIAGNOSIS — E06.3 CHRONIC LYMPHOCYTIC THYROIDITIS: ICD-10-CM

## 2020-07-10 DIAGNOSIS — E66.01 MORBID OBESITY (HCC): ICD-10-CM

## 2020-07-10 DIAGNOSIS — E03.8 CENTRAL HYPOTHYROIDISM: Primary | ICD-10-CM

## 2020-07-10 DIAGNOSIS — R73.03 DIABETES MELLITUS, LATENT: ICD-10-CM

## 2020-07-10 DIAGNOSIS — E03.8 CENTRAL HYPOTHYROIDISM: ICD-10-CM

## 2020-07-10 LAB
25(OH)D3 SERPL-MCNC: 22.6 NG/ML (ref 30–100)
ALBUMIN SERPL BCP-MCNC: 3.2 G/DL (ref 3.5–5)
ALP SERPL-CCNC: 119 U/L (ref 46–116)
ALT SERPL W P-5'-P-CCNC: 19 U/L (ref 12–78)
ANION GAP SERPL CALCULATED.3IONS-SCNC: 9 MMOL/L (ref 4–13)
AST SERPL W P-5'-P-CCNC: 13 U/L (ref 5–45)
BASOPHILS # BLD AUTO: 0.06 THOUSANDS/ΜL (ref 0–0.1)
BASOPHILS NFR BLD AUTO: 1 % (ref 0–1)
BILIRUB SERPL-MCNC: 0.29 MG/DL (ref 0.2–1)
BUN SERPL-MCNC: 12 MG/DL (ref 5–25)
CALCIUM SERPL-MCNC: 8.5 MG/DL (ref 8.3–10.1)
CHLORIDE SERPL-SCNC: 104 MMOL/L (ref 100–108)
CHOLEST SERPL-MCNC: 229 MG/DL (ref 50–200)
CO2 SERPL-SCNC: 24 MMOL/L (ref 21–32)
CREAT SERPL-MCNC: 1.1 MG/DL (ref 0.6–1.3)
EOSINOPHIL # BLD AUTO: 0.07 THOUSAND/ΜL (ref 0–0.61)
EOSINOPHIL NFR BLD AUTO: 1 % (ref 0–6)
ERYTHROCYTE [DISTWIDTH] IN BLOOD BY AUTOMATED COUNT: 20.8 % (ref 11.6–15.1)
EST. AVERAGE GLUCOSE BLD GHB EST-MCNC: 105 MG/DL
GFR SERPL CREATININE-BSD FRML MDRD: 62 ML/MIN/1.73SQ M
GLUCOSE P FAST SERPL-MCNC: 81 MG/DL (ref 65–99)
HBA1C MFR BLD: 5.3 %
HCT VFR BLD AUTO: 36.1 % (ref 34.8–46.1)
HDLC SERPL-MCNC: 64 MG/DL
HGB BLD-MCNC: 10.3 G/DL (ref 11.5–15.4)
IMM GRANULOCYTES # BLD AUTO: 0.02 THOUSAND/UL (ref 0–0.2)
IMM GRANULOCYTES NFR BLD AUTO: 0 % (ref 0–2)
LDLC SERPL CALC-MCNC: 142 MG/DL (ref 0–100)
LYMPHOCYTES # BLD AUTO: 2.32 THOUSANDS/ΜL (ref 0.6–4.47)
LYMPHOCYTES NFR BLD AUTO: 29 % (ref 14–44)
MCH RBC QN AUTO: 23.5 PG (ref 26.8–34.3)
MCHC RBC AUTO-ENTMCNC: 28.5 G/DL (ref 31.4–37.4)
MCV RBC AUTO: 82 FL (ref 82–98)
MONOCYTES # BLD AUTO: 0.6 THOUSAND/ΜL (ref 0.17–1.22)
MONOCYTES NFR BLD AUTO: 7 % (ref 4–12)
NEUTROPHILS # BLD AUTO: 5.02 THOUSANDS/ΜL (ref 1.85–7.62)
NEUTS SEG NFR BLD AUTO: 62 % (ref 43–75)
NONHDLC SERPL-MCNC: 165 MG/DL
NRBC BLD AUTO-RTO: 0 /100 WBCS
PLATELET # BLD AUTO: 661 THOUSANDS/UL (ref 149–390)
PMV BLD AUTO: 8.5 FL (ref 8.9–12.7)
POTASSIUM SERPL-SCNC: 4 MMOL/L (ref 3.5–5.3)
PROT SERPL-MCNC: 7.2 G/DL (ref 6.4–8.2)
RBC # BLD AUTO: 4.38 MILLION/UL (ref 3.81–5.12)
SODIUM SERPL-SCNC: 137 MMOL/L (ref 136–145)
T4 FREE SERPL-MCNC: 0.51 NG/DL (ref 0.76–1.46)
T4 FREE SERPL-MCNC: 0.51 NG/DL (ref 0.76–1.46)
TRIGL SERPL-MCNC: 113 MG/DL
TSH SERPL DL<=0.05 MIU/L-ACNC: 20.98 UIU/ML (ref 0.36–3.74)
WBC # BLD AUTO: 8.09 THOUSAND/UL (ref 4.31–10.16)

## 2020-07-10 PROCEDURE — 83036 HEMOGLOBIN GLYCOSYLATED A1C: CPT

## 2020-07-10 PROCEDURE — 84443 ASSAY THYROID STIM HORMONE: CPT

## 2020-07-10 PROCEDURE — 84439 ASSAY OF FREE THYROXINE: CPT

## 2020-07-10 PROCEDURE — 36415 COLL VENOUS BLD VENIPUNCTURE: CPT

## 2020-07-10 PROCEDURE — 82306 VITAMIN D 25 HYDROXY: CPT

## 2020-07-10 PROCEDURE — 80061 LIPID PANEL: CPT

## 2020-07-10 PROCEDURE — 85025 COMPLETE CBC W/AUTO DIFF WBC: CPT

## 2020-07-10 PROCEDURE — 80053 COMPREHEN METABOLIC PANEL: CPT

## 2020-08-31 ENCOUNTER — OFFICE VISIT (OUTPATIENT)
Dept: FAMILY MEDICINE CLINIC | Facility: CLINIC | Age: 45
End: 2020-08-31
Payer: COMMERCIAL

## 2020-08-31 VITALS
HEART RATE: 77 BPM | HEIGHT: 67 IN | WEIGHT: 289.3 LBS | DIASTOLIC BLOOD PRESSURE: 82 MMHG | OXYGEN SATURATION: 98 % | TEMPERATURE: 97.9 F | RESPIRATION RATE: 18 BRPM | SYSTOLIC BLOOD PRESSURE: 114 MMHG | BODY MASS INDEX: 45.4 KG/M2

## 2020-08-31 DIAGNOSIS — N76.0 ACUTE VAGINITIS: Primary | ICD-10-CM

## 2020-08-31 PROCEDURE — 1036F TOBACCO NON-USER: CPT | Performed by: NURSE PRACTITIONER

## 2020-08-31 PROCEDURE — 87661 TRICHOMONAS VAGINALIS AMPLIF: CPT | Performed by: NURSE PRACTITIONER

## 2020-08-31 PROCEDURE — 3008F BODY MASS INDEX DOCD: CPT | Performed by: NURSE PRACTITIONER

## 2020-08-31 PROCEDURE — 87591 N.GONORRHOEAE DNA AMP PROB: CPT | Performed by: NURSE PRACTITIONER

## 2020-08-31 PROCEDURE — 87481 CANDIDA DNA AMP PROBE: CPT | Performed by: NURSE PRACTITIONER

## 2020-08-31 PROCEDURE — 99214 OFFICE O/P EST MOD 30 MIN: CPT | Performed by: NURSE PRACTITIONER

## 2020-08-31 PROCEDURE — 87491 CHLMYD TRACH DNA AMP PROBE: CPT | Performed by: NURSE PRACTITIONER

## 2020-08-31 PROCEDURE — 87801 DETECT AGNT MULT DNA AMPLI: CPT | Performed by: NURSE PRACTITIONER

## 2020-08-31 NOTE — PROGRESS NOTES
Subjective:   Chief Complaint   Patient presents with    Vaginal discomfort     started 2 months ago with pain and itching, tried Monistat and it is not helping        Patient ID: Estrella Castro is a 39 y o  female  Presents today for complaints of vaginal discharge, odor and pain accompanied with bleeding off and on  The bleeding is not associated with her menses or intercourse  She has tried Monistat 1 day without relief      The following portions of the patient's history were reviewed and updated as appropriate: allergies, current medications, past family history, past medical history, past social history, past surgical history and problem list     Review of Systems   Constitutional: Negative for chills, fatigue and fever  Respiratory: Negative for cough, shortness of breath and wheezing  Cardiovascular: Negative for palpitations  Genitourinary: Positive for vaginal discharge (with blood and odor) and vaginal pain (irritation)  Negative for dysuria, genital sores and pelvic pain  Psychiatric/Behavioral: Negative for dysphoric mood  The patient is not nervous/anxious  Objective:  Vitals:    08/31/20 1035   BP: 114/82   BP Location: Left arm   Patient Position: Sitting   Cuff Size: Large   Pulse: 77   Resp: 18   Temp: 97 9 °F (36 6 °C)   TempSrc: Temporal   SpO2: 98%   Weight: 131 kg (289 lb 4 8 oz)   Height: 5' 7" (1 702 m)      Physical Exam  Constitutional:       Appearance: Normal appearance  Cardiovascular:      Rate and Rhythm: Normal rate and regular rhythm  Heart sounds: Normal heart sounds  Pulmonary:      Effort: Pulmonary effort is normal       Breath sounds: No wheezing  Genitourinary:     General: Normal vulva  Labia:         Right: No rash, tenderness, lesion or injury  Left: No rash, tenderness, lesion or injury  Vagina: Vaginal discharge and erythema present        Cervix: Normal       Uterus: Normal        Adnexa: Right: Tenderness present  No mass or fullness  Left: No mass, tenderness or fullness  Rectum: Normal    Skin:     General: Skin is warm and dry  Neurological:      Mental Status: She is alert and oriented to person, place, and time  Psychiatric:         Mood and Affect: Mood normal          Behavior: Behavior normal            Assessment/Plan:    No problem-specific Assessment & Plan notes found for this encounter         Diagnoses and all orders for this visit:    Acute vaginitis  -     Molecular Vaginal Panel  -     Chlamydia/GC amplified DNA by PCR

## 2020-08-31 NOTE — PATIENT INSTRUCTIONS
Vaginosis bacteriana   LO QUE NECESITA SABER:   La vaginosis bacteriana es rae infección en la vagina  Se puede desarrollar vaginitis, la cual es rae irritación e inflamación de la vagina  INSTRUCCIONES SOBRE [facility]:   Medicamentos:   · Antibióticos:  Le recetan éstos para matar la bacteria que causa la vaginosis bacteriana  Se los pueden recetar marc rae píldora o crema para introducir en la vagina  Ayr o use el medicamento marc se lo indicaron  · Ayr bakari medicamentos marc se le haya indicado  Consulte con schulz médico si usted clarence que schulz medicamento no le está ayudando o si presenta efectos secundarios  Infórmele si es alérgico a algún medicamento  Mantenga rae lista actualizada de los [place], las vitaminas y los productos herbales que sabiha  Incluya los siguientes datos de los medicamentos: cantidad, frecuencia y motivo de administración  Traiga con usted la lista o los envases de la píldoras a bakari citas de seguimiento  Lleve la lista de los medicamentos con usted en quintin de rae emergencia  Prevenir la vaginosis bacteriana:   · Mantenga el área vaginal limpio y seco:  Lleve ropa interior y pantimedias hechas de algodón en el área de la entrepierna  Límpiese de adelante hacia atrás después de orinar o de tener rae evacuación intestinal  Después de bañarse, enjuague el jabón de la shakir vaginal para disminuir el riesgo de rae irritación  · No utilice productos que provocan irritación:  Siempre use tampones y toallas sanitarias sin aroma  No use atomizadores, ni talcos, ni tampones con aroma debido a que pueden causar irritación y aumentar schulz riesgo de presentar vaginosis bacteriana  Los detergentes y suavizantes para la ropa también pueden causar irritación  · No se romain duchas vaginales:  Estas pueden provocar un desequilibrio de la bacteria saludable de la vagina      · Utilice preservativos o condones de látex:  Estos ayudan a prevenir contra otras infecciones y charlette que schulz esperanza contraiga la infección  Pregúntele a boone [place] vitaminas y minerales son adecuados para usted  · Dana síntomas regresan o no mejoran con el tratamiento  · Usted tiene sangrado vaginal que no es de boone [place]  · Usted tiene preguntas o inquietudes acerca de boone condición o cuidado  © 2017 [facility] Information is for End User's use only and may not be sold, redistributed or otherwise used for commercial purposes  All illustrations and images included in CareNotes® are the copyrighted property of [employer]  or Michael Ferrari  Esta información es sólo para uso en educación  Boone intención no es darle un consejo médico sobre enfermedades o tratamientos  Colsulte con boone Gracie Angst farmacéutico antes de seguir cualquier régimen médico para saber si es seguro y efectivo para usted

## 2020-09-01 LAB
C GLABRATA DNA VAG QL NAA+PROBE: NEGATIVE
C KRUSEI DNA VAG QL NAA+PROBE: NEGATIVE
CANDIDA SP 6 PNL VAG NAA+PROBE: NEGATIVE
T VAGINALIS DNA VAG QL NAA+PROBE: NEGATIVE
VAGINOSIS/ITIS DNA PNL VAG PROBE+SIG AMP: NEGATIVE

## 2020-09-02 ENCOUNTER — TELEPHONE (OUTPATIENT)
Dept: FAMILY MEDICINE CLINIC | Facility: CLINIC | Age: 45
End: 2020-09-02

## 2020-09-02 LAB
C TRACH DNA SPEC QL NAA+PROBE: NEGATIVE
N GONORRHOEA DNA SPEC QL NAA+PROBE: NEGATIVE

## 2020-09-02 NOTE — TELEPHONE ENCOUNTER
Left message for patient to call the office, per [facility]   Please call patient with vaginal cultures negative for infection

## 2020-09-03 NOTE — TELEPHONE ENCOUNTER
Gave patient the message  Patient is asking if there is anything she can do over the counter because she is uncomfortable  Please advise

## 2020-09-09 ENCOUNTER — TELEPHONE (OUTPATIENT)
Dept: OTHER | Facility: OTHER | Age: 45
End: 2020-09-09

## 2020-12-03 ENCOUNTER — OFFICE VISIT (OUTPATIENT)
Dept: FAMILY MEDICINE CLINIC | Facility: CLINIC | Age: 45
End: 2020-12-03
Payer: COMMERCIAL

## 2020-12-03 VITALS
HEIGHT: 67 IN | SYSTOLIC BLOOD PRESSURE: 118 MMHG | RESPIRATION RATE: 18 BRPM | TEMPERATURE: 95.6 F | HEART RATE: 78 BPM | DIASTOLIC BLOOD PRESSURE: 72 MMHG | BODY MASS INDEX: 44.76 KG/M2 | WEIGHT: 285.2 LBS

## 2020-12-03 DIAGNOSIS — K29.50 CHRONIC GASTRITIS WITHOUT BLEEDING, UNSPECIFIED GASTRITIS TYPE: Primary | ICD-10-CM

## 2020-12-03 DIAGNOSIS — Z98.84 HISTORY OF GASTRIC BYPASS: ICD-10-CM

## 2020-12-03 DIAGNOSIS — G89.29 CHRONIC NONINTRACTABLE HEADACHE, UNSPECIFIED HEADACHE TYPE: ICD-10-CM

## 2020-12-03 DIAGNOSIS — F50.89 PICA IN ADULTS: ICD-10-CM

## 2020-12-03 DIAGNOSIS — R74.8 ABNORMAL SERUM LEVEL OF ALKALINE PHOSPHATASE: Primary | ICD-10-CM

## 2020-12-03 DIAGNOSIS — R73.03 PREDIABETES: ICD-10-CM

## 2020-12-03 DIAGNOSIS — E78.5 HYPERLIPIDEMIA, UNSPECIFIED HYPERLIPIDEMIA TYPE: ICD-10-CM

## 2020-12-03 DIAGNOSIS — R51.9 CHRONIC NONINTRACTABLE HEADACHE, UNSPECIFIED HEADACHE TYPE: ICD-10-CM

## 2020-12-03 DIAGNOSIS — E03.9 HYPOTHYROIDISM, UNSPECIFIED TYPE: ICD-10-CM

## 2020-12-03 DIAGNOSIS — K21.9 GASTROESOPHAGEAL REFLUX DISEASE WITHOUT ESOPHAGITIS: ICD-10-CM

## 2020-12-03 DIAGNOSIS — M62.830 BACK MUSCLE SPASM: ICD-10-CM

## 2020-12-03 DIAGNOSIS — E66.01 MORBID OBESITY (HCC): ICD-10-CM

## 2020-12-03 PROBLEM — E04.2 MULTINODULAR GOITER: Status: ACTIVE | Noted: 2020-07-01

## 2020-12-03 PROBLEM — E55.9 VITAMIN D DEFICIENCY: Status: ACTIVE | Noted: 2020-07-01

## 2020-12-03 PROBLEM — E03.8 HYPOTHYROIDISM DUE TO HASHIMOTO'S THYROIDITIS: Status: ACTIVE | Noted: 2020-07-01

## 2020-12-03 PROBLEM — E06.3 HYPOTHYROIDISM DUE TO HASHIMOTO'S THYROIDITIS: Status: ACTIVE | Noted: 2020-07-01

## 2020-12-03 LAB
25(OH)D3 SERPL-MCNC: 26.5 NG/ML (ref 30–100)
ALBUMIN SERPL BCP-MCNC: 3.4 G/DL (ref 3.5–5)
ALP SERPL-CCNC: 159 U/L (ref 46–116)
ALT SERPL W P-5'-P-CCNC: 18 U/L (ref 12–78)
ANION GAP SERPL CALCULATED.3IONS-SCNC: 3 MMOL/L (ref 4–13)
AST SERPL W P-5'-P-CCNC: 11 U/L (ref 5–45)
BASOPHILS # BLD AUTO: 0.06 THOUSANDS/ΜL (ref 0–0.1)
BASOPHILS NFR BLD AUTO: 1 % (ref 0–1)
BILIRUB SERPL-MCNC: 0.25 MG/DL (ref 0.2–1)
BUN SERPL-MCNC: 13 MG/DL (ref 5–25)
CALCIUM ALBUM COR SERPL-MCNC: 9.3 MG/DL (ref 8.3–10.1)
CALCIUM SERPL-MCNC: 8.8 MG/DL (ref 8.3–10.1)
CHLORIDE SERPL-SCNC: 109 MMOL/L (ref 100–108)
CHOLEST SERPL-MCNC: 211 MG/DL (ref 50–200)
CO2 SERPL-SCNC: 27 MMOL/L (ref 21–32)
CREAT SERPL-MCNC: 0.95 MG/DL (ref 0.6–1.3)
EOSINOPHIL # BLD AUTO: 0.08 THOUSAND/ΜL (ref 0–0.61)
EOSINOPHIL NFR BLD AUTO: 1 % (ref 0–6)
ERYTHROCYTE [DISTWIDTH] IN BLOOD BY AUTOMATED COUNT: 18.9 % (ref 11.6–15.1)
EST. AVERAGE GLUCOSE BLD GHB EST-MCNC: 108 MG/DL
FERRITIN SERPL-MCNC: 4 NG/ML (ref 8–388)
GFR SERPL CREATININE-BSD FRML MDRD: 74 ML/MIN/1.73SQ M
GLUCOSE SERPL-MCNC: 84 MG/DL (ref 65–140)
HBA1C MFR BLD: 5.4 %
HCT VFR BLD AUTO: 33.1 % (ref 34.8–46.1)
HDLC SERPL-MCNC: 60 MG/DL
HGB BLD-MCNC: 9.5 G/DL (ref 11.5–15.4)
IMM GRANULOCYTES # BLD AUTO: 0.03 THOUSAND/UL (ref 0–0.2)
IMM GRANULOCYTES NFR BLD AUTO: 0 % (ref 0–2)
IRON SERPL-MCNC: 7 UG/DL (ref 50–170)
LDLC SERPL CALC-MCNC: 133 MG/DL (ref 0–100)
LYMPHOCYTES # BLD AUTO: 2.59 THOUSANDS/ΜL (ref 0.6–4.47)
LYMPHOCYTES NFR BLD AUTO: 27 % (ref 14–44)
MCH RBC QN AUTO: 22.7 PG (ref 26.8–34.3)
MCHC RBC AUTO-ENTMCNC: 28.7 G/DL (ref 31.4–37.4)
MCV RBC AUTO: 79 FL (ref 82–98)
MONOCYTES # BLD AUTO: 0.66 THOUSAND/ΜL (ref 0.17–1.22)
MONOCYTES NFR BLD AUTO: 7 % (ref 4–12)
NEUTROPHILS # BLD AUTO: 6.11 THOUSANDS/ΜL (ref 1.85–7.62)
NEUTS SEG NFR BLD AUTO: 64 % (ref 43–75)
NONHDLC SERPL-MCNC: 151 MG/DL
NRBC BLD AUTO-RTO: 0 /100 WBCS
PLATELET # BLD AUTO: 641 THOUSANDS/UL (ref 149–390)
PMV BLD AUTO: 8.9 FL (ref 8.9–12.7)
POTASSIUM SERPL-SCNC: 4.2 MMOL/L (ref 3.5–5.3)
PROT SERPL-MCNC: 7.4 G/DL (ref 6.4–8.2)
RBC # BLD AUTO: 4.19 MILLION/UL (ref 3.81–5.12)
SODIUM SERPL-SCNC: 139 MMOL/L (ref 136–145)
T4 FREE SERPL-MCNC: 0.8 NG/DL (ref 0.76–1.46)
TRIGL SERPL-MCNC: 92 MG/DL
TSH SERPL DL<=0.05 MIU/L-ACNC: 17.6 UIU/ML (ref 0.36–3.74)
VIT B12 SERPL-MCNC: 217 PG/ML (ref 100–900)
WBC # BLD AUTO: 9.53 THOUSAND/UL (ref 4.31–10.16)

## 2020-12-03 PROCEDURE — 80053 COMPREHEN METABOLIC PANEL: CPT | Performed by: NURSE PRACTITIONER

## 2020-12-03 PROCEDURE — 36415 COLL VENOUS BLD VENIPUNCTURE: CPT | Performed by: NURSE PRACTITIONER

## 2020-12-03 PROCEDURE — 82728 ASSAY OF FERRITIN: CPT | Performed by: NURSE PRACTITIONER

## 2020-12-03 PROCEDURE — 83036 HEMOGLOBIN GLYCOSYLATED A1C: CPT | Performed by: NURSE PRACTITIONER

## 2020-12-03 PROCEDURE — 80061 LIPID PANEL: CPT | Performed by: NURSE PRACTITIONER

## 2020-12-03 PROCEDURE — 1036F TOBACCO NON-USER: CPT | Performed by: NURSE PRACTITIONER

## 2020-12-03 PROCEDURE — 99214 OFFICE O/P EST MOD 30 MIN: CPT | Performed by: NURSE PRACTITIONER

## 2020-12-03 PROCEDURE — 84439 ASSAY OF FREE THYROXINE: CPT | Performed by: NURSE PRACTITIONER

## 2020-12-03 PROCEDURE — 84443 ASSAY THYROID STIM HORMONE: CPT | Performed by: NURSE PRACTITIONER

## 2020-12-03 PROCEDURE — 84080 ASSAY ALKALINE PHOSPHATASES: CPT | Performed by: NURSE PRACTITIONER

## 2020-12-03 PROCEDURE — 82607 VITAMIN B-12: CPT | Performed by: NURSE PRACTITIONER

## 2020-12-03 PROCEDURE — 82306 VITAMIN D 25 HYDROXY: CPT | Performed by: NURSE PRACTITIONER

## 2020-12-03 PROCEDURE — 84075 ASSAY ALKALINE PHOSPHATASE: CPT | Performed by: NURSE PRACTITIONER

## 2020-12-03 PROCEDURE — 3008F BODY MASS INDEX DOCD: CPT | Performed by: NURSE PRACTITIONER

## 2020-12-03 PROCEDURE — 83540 ASSAY OF IRON: CPT | Performed by: NURSE PRACTITIONER

## 2020-12-03 PROCEDURE — 85025 COMPLETE CBC W/AUTO DIFF WBC: CPT | Performed by: NURSE PRACTITIONER

## 2020-12-03 RX ORDER — ACARBOSE 50 MG/1
50 TABLET ORAL
COMMUNITY
Start: 2020-11-28 | End: 2021-04-06

## 2020-12-03 RX ORDER — METHOCARBAMOL 750 MG/1
TABLET, FILM COATED ORAL
Qty: 30 TABLET | Refills: 0 | Status: SHIPPED | OUTPATIENT
Start: 2020-12-03 | End: 2021-04-06

## 2020-12-03 RX ORDER — TOPIRAMATE 50 MG/1
50 TABLET, FILM COATED ORAL 2 TIMES DAILY
Qty: 180 TABLET | Refills: 3 | Status: SHIPPED | OUTPATIENT
Start: 2020-12-03 | End: 2021-04-16 | Stop reason: HOSPADM

## 2020-12-03 RX ORDER — ROSUVASTATIN CALCIUM 10 MG/1
10 TABLET, COATED ORAL DAILY
COMMUNITY
Start: 2020-11-28 | End: 2021-04-16 | Stop reason: HOSPADM

## 2020-12-03 RX ORDER — PANTOPRAZOLE SODIUM 40 MG/1
40 TABLET, DELAYED RELEASE ORAL EVERY 24 HOURS
Qty: 90 TABLET | Refills: 3 | Status: SHIPPED | OUTPATIENT
Start: 2020-12-03 | End: 2021-09-29

## 2020-12-08 LAB
ALP BONE CFR SERPL: 33 % (ref 14–68)
ALP INTEST CFR SERPL: 2 % (ref 0–18)
ALP LIVER CFR SERPL: 65 % (ref 18–85)
ALP SERPL-CCNC: 157 IU/L (ref 39–117)

## 2021-01-04 ENCOUNTER — TELEPHONE (OUTPATIENT)
Dept: FAMILY MEDICINE CLINIC | Facility: CLINIC | Age: 46
End: 2021-01-04

## 2021-01-13 ENCOUNTER — TELEPHONE (OUTPATIENT)
Dept: GASTROENTEROLOGY | Facility: CLINIC | Age: 46
End: 2021-01-13

## 2021-01-15 ENCOUNTER — TELEPHONE (OUTPATIENT)
Dept: FAMILY MEDICINE CLINIC | Facility: CLINIC | Age: 46
End: 2021-01-15

## 2021-01-19 ENCOUNTER — TELEPHONE (OUTPATIENT)
Dept: FAMILY MEDICINE CLINIC | Facility: CLINIC | Age: 46
End: 2021-01-19

## 2021-02-05 ENCOUNTER — TRANSCRIBE ORDERS (OUTPATIENT)
Dept: MAMMOGRAPHY | Facility: CLINIC | Age: 46
End: 2021-02-05

## 2021-02-05 DIAGNOSIS — N64.4 BREAST PAIN: Primary | ICD-10-CM

## 2021-02-09 ENCOUNTER — OFFICE VISIT (OUTPATIENT)
Dept: FAMILY MEDICINE CLINIC | Facility: CLINIC | Age: 46
End: 2021-02-09
Payer: COMMERCIAL

## 2021-02-09 VITALS
BODY MASS INDEX: 45.2 KG/M2 | RESPIRATION RATE: 20 BRPM | WEIGHT: 288 LBS | OXYGEN SATURATION: 98 % | HEART RATE: 73 BPM | HEIGHT: 67 IN | TEMPERATURE: 97.8 F | DIASTOLIC BLOOD PRESSURE: 80 MMHG | SYSTOLIC BLOOD PRESSURE: 142 MMHG

## 2021-02-09 DIAGNOSIS — N62 LARGE BREASTS: Primary | ICD-10-CM

## 2021-02-09 DIAGNOSIS — M54.6 CHRONIC BILATERAL THORACIC BACK PAIN: ICD-10-CM

## 2021-02-09 DIAGNOSIS — G89.29 CHRONIC BILATERAL THORACIC BACK PAIN: ICD-10-CM

## 2021-02-09 DIAGNOSIS — F50.89 PICA IN ADULTS: ICD-10-CM

## 2021-02-09 LAB
BASOPHILS # BLD AUTO: 0.07 THOUSANDS/ΜL (ref 0–0.1)
BASOPHILS NFR BLD AUTO: 1 % (ref 0–1)
EOSINOPHIL # BLD AUTO: 0.08 THOUSAND/ΜL (ref 0–0.61)
EOSINOPHIL NFR BLD AUTO: 1 % (ref 0–6)
ERYTHROCYTE [DISTWIDTH] IN BLOOD BY AUTOMATED COUNT: 18.3 % (ref 11.6–15.1)
FERRITIN SERPL-MCNC: 3 NG/ML (ref 8–388)
HCT VFR BLD AUTO: 32.3 % (ref 34.8–46.1)
HGB BLD-MCNC: 9.1 G/DL (ref 11.5–15.4)
IMM GRANULOCYTES # BLD AUTO: 0.04 THOUSAND/UL (ref 0–0.2)
IMM GRANULOCYTES NFR BLD AUTO: 0 % (ref 0–2)
LYMPHOCYTES # BLD AUTO: 2.3 THOUSANDS/ΜL (ref 0.6–4.47)
LYMPHOCYTES NFR BLD AUTO: 21 % (ref 14–44)
MCH RBC QN AUTO: 21 PG (ref 26.8–34.3)
MCHC RBC AUTO-ENTMCNC: 28.2 G/DL (ref 31.4–37.4)
MCV RBC AUTO: 75 FL (ref 82–98)
MONOCYTES # BLD AUTO: 0.63 THOUSAND/ΜL (ref 0.17–1.22)
MONOCYTES NFR BLD AUTO: 6 % (ref 4–12)
NEUTROPHILS # BLD AUTO: 7.63 THOUSANDS/ΜL (ref 1.85–7.62)
NEUTS SEG NFR BLD AUTO: 71 % (ref 43–75)
NRBC BLD AUTO-RTO: 0 /100 WBCS
PLATELET # BLD AUTO: 671 THOUSANDS/UL (ref 149–390)
PMV BLD AUTO: 8.9 FL (ref 8.9–12.7)
RBC # BLD AUTO: 4.33 MILLION/UL (ref 3.81–5.12)
WBC # BLD AUTO: 10.75 THOUSAND/UL (ref 4.31–10.16)

## 2021-02-09 PROCEDURE — 3008F BODY MASS INDEX DOCD: CPT | Performed by: NURSE PRACTITIONER

## 2021-02-09 PROCEDURE — 36415 COLL VENOUS BLD VENIPUNCTURE: CPT | Performed by: NURSE PRACTITIONER

## 2021-02-09 PROCEDURE — 85025 COMPLETE CBC W/AUTO DIFF WBC: CPT | Performed by: NURSE PRACTITIONER

## 2021-02-09 PROCEDURE — 1036F TOBACCO NON-USER: CPT | Performed by: NURSE PRACTITIONER

## 2021-02-09 PROCEDURE — 82728 ASSAY OF FERRITIN: CPT | Performed by: NURSE PRACTITIONER

## 2021-02-09 PROCEDURE — 3725F SCREEN DEPRESSION PERFORMED: CPT | Performed by: NURSE PRACTITIONER

## 2021-02-09 PROCEDURE — 99213 OFFICE O/P EST LOW 20 MIN: CPT | Performed by: NURSE PRACTITIONER

## 2021-02-09 NOTE — PROGRESS NOTES
Assessment/Plan:         Diagnoses and all orders for this visit:    Large breasts  -     Ambulatory referral to Plastic Surgery; Future    Chronic bilateral thoracic back pain  -     Ambulatory referral to Plastic Surgery; Future    Pica in adults  -     CBC and differential  -     Ferritin          Subjective:      Patient ID: Shekhar Ibrahim is a 39 y o  female  HPI  Bilateral breast pain   Mainly on left breast and now starting on right  Feels like something burning  Had a lump in left breast when in teens  Benign  Was removed  Having back pain    Pain in shoulders and back area  Ongoing issues with very large breasts  Has pICA   Taking iron not as directed   The following portions of the patient's history were reviewed and updated as appropriate: allergies, current medications, past family history, past medical history, past social history, past surgical history and problem list     Review of Systems   Constitutional: Negative for activity change and appetite change  Respiratory: Negative for cough  Cardiovascular: Negative for chest pain  Musculoskeletal: Positive for back pain  Objective:  Vitals:    02/09/21 1118   BP: 142/80   BP Location: Left arm   Patient Position: Sitting   Cuff Size: Standard   Pulse: 73   Resp: 20   Temp: 97 8 °F (36 6 °C)   TempSrc: Temporal   SpO2: 98%   Weight: 131 kg (288 lb)   Height: 5' 7" (1 702 m)      Physical Exam  Vitals signs reviewed  Constitutional:       Appearance: Normal appearance  Chest:      Breasts:         Right: No mass  Left: No mass  Comments: No lumps noted on exam      Quite large breasts   Neurological:      Mental Status: She is alert

## 2021-02-11 ENCOUNTER — TELEPHONE (OUTPATIENT)
Dept: FAMILY MEDICINE CLINIC | Facility: CLINIC | Age: 46
End: 2021-02-11

## 2021-02-11 DIAGNOSIS — D50.9 IRON DEFICIENCY ANEMIA, UNSPECIFIED IRON DEFICIENCY ANEMIA TYPE: Primary | ICD-10-CM

## 2021-02-11 NOTE — TELEPHONE ENCOUNTER
Patient verbally understood and made aware of blood work results  Patient also aware of Anastacio GUZMAN recommendation on daily Iron supplements and referral to Hematology      Order placed

## 2021-02-11 NOTE — TELEPHONE ENCOUNTER
----- [facility] at 2/11/2021  9:09 AM EST -----  Advise definite anemia    Needs to take iron routinely   I want to send her to see a hematologist to get her anemia better       Is she ok if I place referral?   If so send back to me

## 2021-03-25 ENCOUNTER — HOSPITAL ENCOUNTER (OUTPATIENT)
Dept: MAMMOGRAPHY | Facility: CLINIC | Age: 46
Discharge: HOME | End: 2021-03-25
Payer: COMMERCIAL

## 2021-03-25 VITALS — BODY MASS INDEX: 46.28 KG/M2 | WEIGHT: 288 LBS | HEIGHT: 66 IN

## 2021-03-25 DIAGNOSIS — N64.4 BREAST PAIN: ICD-10-CM

## 2021-03-25 PROCEDURE — 77066 DX MAMMO INCL CAD BI: CPT

## 2021-03-25 PROCEDURE — G0279 TOMOSYNTHESIS, MAMMO: HCPCS

## 2021-03-25 PROCEDURE — 76642 ULTRASOUND BREAST LIMITED: CPT

## 2021-04-01 ENCOUNTER — TELEMEDICINE (OUTPATIENT)
Dept: FAMILY MEDICINE CLINIC | Facility: CLINIC | Age: 46
End: 2021-04-01
Payer: COMMERCIAL

## 2021-04-01 DIAGNOSIS — U07.1 COVID-19 VIRUS INFECTION: Primary | ICD-10-CM

## 2021-04-01 PROCEDURE — 99213 OFFICE O/P EST LOW 20 MIN: CPT | Performed by: NURSE PRACTITIONER

## 2021-04-01 NOTE — PROGRESS NOTES
COVID-19 Outpatient Progress Note    Assessment/Plan:    Problem List Items Addressed This Visit     None         Disposition:     I recommended continued isolation until at least 24 hours have passed since recovery defined as resolution of fever without the use of fever-reducing medications AND improvement in COVID symptoms AND 10 days have passed since onset of symptoms (or 10 days have passed since date of first positive viral diagnostic test for asymptomatic patients)  I have spent 15 minutes directly with the patient  Greater than 50% of this time was spent in counseling/coordination of care regarding: prognosis, instructions for management, patient and family education, importance of treatment compliance and impressions  Encounter provider THOMAS Galloway    Provider located at [facility] AT [address] GROUP  [address] [place] [id number]    Recent Visits  No visits were found meeting these conditions  Showing recent visits within past 7 days and meeting all other requirements     Today's Visits  Date Type Provider Dept   04/01/21 Telemedicine Juan Carlos Garcia, [address]   Showing today's visits and meeting all other requirements     Future Appointments  No visits were found meeting these conditions  Showing future appointments within next 150 days and meeting all other requirements      This virtual check-in was done via [employer] and patient was informed that this is a secure, HIPAA-compliant platform  She agrees to proceed  Patient agrees to participate in a virtual check in via telephone or video visit instead of presenting to the office to address urgent/immediate medical needs  Patient is aware this is a billable service  After connecting through [facility], the patient was identified by name and date of birth   Ta Umaña was informed that this was a telemedicine visit and that the exam was being conducted confidentially over secure lines  My office door was closed  No one else was in the room  Ta CROFT Chasidy acknowledged consent and understanding of privacy and security of the telemedicine visit  I informed the patient that I have reviewed her record in Epic and presented the opportunity for her to ask any questions regarding the visit today  The patient agreed to participate  Subjective: Doug Wen is a 39 y o  female who has been screened for COVID-19  Symptom change since last report: unchanged  Patient is currently asymptomatic  Patient's symptoms include fever, chills, fatigue, malaise, nasal congestion, anosmia, loss of taste, cough, shortness of breath, chest tightness, abdominal pain, nausea, diarrhea, myalgias and headache  Patient denies rhinorrhea, sore throat and vomiting  Ta has been staying home and has isolated themselves in her home  She is taking care to not share personal items and is cleaning all surfaces that are touched often, like counters, tabletops, and doorknobs using household cleaning sprays or wipes  She is wearing a mask when she leaves her room       Date of symptom onset: 3/30/2021  Date of positive COVID-19 PCR: 3/30/2021    No results found for: Maria Elena Nunes, [address]  Past Medical History:   Diagnosis Date    Abscess of labia     Anxiety     [facility] ([facility] )     Breast lump     Frequent headaches     Hemorrhoids     Hypothyroidism 7/1/2013    Migraine     Morbid [facility] ([facility] ) 7/1/2013    Obesity     Psychotic disorder ([facility] )      Past Surgical History:   Procedure Laterality Date    BARIATRIC SURGERY      BREAST BIOPSY Right 1999    BREAST LUMPECTOMY Left     benign    CHOLECYSTECTOMY      GALLBLADDER SURGERY       Current Outpatient Medications   Medication Sig Dispense Refill    acarbose (PRECOSE) 50 mg tablet Take 50 mg by mouth      albuterol (PROVENTIL HFA,VENTOLIN HFA) 90 mcg/act inhaler INHALE 2 PUFFS EVERY 4 HOURS AS NEEDED FOR WHEEZING OR SHORTNESS OR BREATH 42 5 g 2    atorvastatin (LIPITOR) 20 mg tablet TK 1 T PO QHS      benzonatate (TESSALON PERLES) 100 mg capsule Take 1 capsule (100 mg total) by mouth 3 (three) times a day as needed for cough 20 capsule 0    Calcium Citrate 200 MG TABS 1 tablet 3 (three) times a day      Cyanocobalamin ER 1000 MCG TBCR take one tab daily      ergocalciferol (VITAMIN D2) 50,000 units TAKE 2 CAPSULES BY MOUTH EVERY WEEK WITH DINNER  1    ferrous sulfate 324 (65 Fe) mg Take 1 tablet (324 mg total) by mouth daily before breakfast  0    levothyroxine 137 mcg tablet TAKE ONE TABLET BY MOUTH EVERY MORNING ON EMPTY STOMACH  1    methocarbamol (ROBAXIN) 750 mg tablet Take 1 tab twice a day as needed for spasms 30 tablet 0    Multiple Vitamins-Minerals (MULTIVITAMIN ADULT PO) every 24 hours      pantoprazole (PROTONIX) 40 mg tablet Take 1 tablet (40 mg total) by mouth every 24 hours 90 tablet 3    rosuvastatin (CRESTOR) 10 MG tablet Take 10 mg by mouth daily      thiamine (VITAMIN B1) 50 mg tablet Take 1 tablet (50 mg total) by mouth daily  0    topiramate (TOPAMAX) 50 MG tablet Take 1 tablet (50 mg total) by mouth 2 (two) times a day 180 tablet 3    traZODone (DESYREL) 50 mg tablet Take 1 tablet (50 mg total) by mouth daily at bedtime as needed for sleep 90 tablet 0     No current facility-administered medications for this visit  Allergies   Allergen Reactions    No Active Allergies        Review of Systems   Constitutional: Positive for chills, fatigue and fever  HENT: Positive for congestion  Negative for rhinorrhea and sore throat  Respiratory: Positive for cough, chest tightness and shortness of breath  Gastrointestinal: Positive for abdominal pain, diarrhea and nausea  Negative for vomiting  Musculoskeletal: Positive for myalgias  Neurological: Positive for headaches  Objective:     There were no vitals filed for this visit     Physical Exam  Constitutional:       Appearance: She is ill-appearing  Pulmonary:      Effort: Pulmonary effort is normal  No respiratory distress (No dyspnea or cough noted while conversing)  Neurological:      Mental Status: She is alert and oriented to person, place, and time  Psychiatric:         Mood and Affect: Mood normal          Behavior: Behavior normal        VIRTUAL VISIT DISCLAIMER    Ta Umaña acknowledges that she has consented to an online visit or consultation  She understands that the online visit is based solely on information provided by her, and that, in the absence of a face-to-face physical evaluation by the physician, the diagnosis she receives is both limited and provisional in terms of accuracy and completeness  This is not intended to replace a full medical face-to-face evaluation by the physician  Ta Umaña understands and accepts these terms

## 2021-04-02 ENCOUNTER — TELEMEDICINE (OUTPATIENT)
Dept: FAMILY MEDICINE CLINIC | Facility: CLINIC | Age: 46
End: 2021-04-02
Payer: COMMERCIAL

## 2021-04-02 DIAGNOSIS — U07.1 COVID-19 VIRUS INFECTION: Primary | ICD-10-CM

## 2021-04-02 PROCEDURE — 99213 OFFICE O/P EST LOW 20 MIN: CPT | Performed by: NURSE PRACTITIONER

## 2021-04-02 NOTE — PROGRESS NOTES
COVID-19 Outpatient Progress Note    Assessment/Plan:    Problem List Items Addressed This Visit        Other    COVID-19 virus infection - Primary       offered Critical access hospital   Not interested   Will prone and stay hydrated  Disposition:     I recommended continued isolation until at least 24 hours have passed since recovery defined as resolution of fever without the use of fever-reducing medications AND improvement in COVID symptoms AND 10 days have passed since onset of symptoms (or 10 days have passed since date of first positive viral diagnostic test for asymptomatic patients)  In isolation until until 4/10    I have spent 10 minutes directly with the patient  Greater than 50% of this time was spent in counseling/coordination of care regarding: prognosis and impressions  Encounter provider Emmanuel Marshall, [facility]    Provider located at [facility] AT [address] GROUP  [address] [place] [phone]    Recent Visits  Date Type Provider Dept   04/01/21 Telemedicine Radha Gordon, [address]    Showing recent visits within past 7 days and meeting all other requirements     Future Appointments  No visits were found meeting these conditions  Showing future appointments within next 150 days and meeting all other requirements      This virtual check-in was done via [employer] and patient was informed that this is a secure, HIPAA-compliant platform  She agrees to proceed  Patient agrees to participate in a virtual check in via telephone or video visit instead of presenting to the office to address urgent/immediate medical needs  Patient is aware this is a billable service  After connecting through [facility], the patient was identified by name and date of birth  Ta Umaña was informed that this was a telemedicine visit and that the exam was being conducted confidentially over secure lines  My office door was closed   No one else was in the room  Ta Umaña acknowledged consent and understanding of privacy and security of the telemedicine visit  I informed the patient that I have reviewed her record in Epic and presented the opportunity for her to ask any questions regarding the visit today  The patient agreed to participate  Subjective: Ileana Pérez is a 39 y o  female who has been screened for COVID-19  Symptom change since last report: improving  Patient's symptoms include fever, chills, fatigue, anosmia, cough, shortness of breath, chest tightness, abdominal pain, nausea, diarrhea, myalgias and headache  Patient denies rhinorrhea, sore throat, loss of taste and vomiting  Ta has been staying home and has isolated themselves in her home  She is taking care to not share personal items and is cleaning all surfaces that are touched often, like counters, tabletops, and doorknobs using household cleaning sprays or wipes  She is wearing a mask when she leaves her room       Date of symptom onset: 3/30/2021  Date of positive COVID-19 PCR: 4/1/2021    No results found for: Ava Polo  Past Medical History:   Diagnosis Date    Abscess of labia     Anxiety     [facility]     Breast lump     Frequent headaches     Hemorrhoids     Hypothyroidism 7/1/2013    Migraine     Morbid obesity ([facility] ) 7/1/2013    Obesity     Psychotic disorder ([facility] )      Past Surgical History:   Procedure Laterality Date    BARIATRIC SURGERY      BREAST BIOPSY Right 1999    BREAST LUMPECTOMY Left     benign    CHOLECYSTECTOMY      GALLBLADDER SURGERY       Current Outpatient Medications   Medication Sig Dispense Refill    acarbose (PRECOSE) 50 mg tablet Take 50 mg by mouth      albuterol (PROVENTIL HFA,VENTOLIN HFA) 90 mcg/act inhaler INHALE 2 PUFFS EVERY 4 HOURS AS NEEDED FOR WHEEZING OR SHORTNESS OR BREATH 42 5 g 2    atorvastatin (LIPITOR) 20 mg tablet TK 1 T PO QHS  benzonatate (TESSALON PERLES) 100 mg capsule Take 1 capsule (100 mg total) by mouth 3 (three) times a day as needed for cough 20 capsule 0    Calcium Citrate 200 MG TABS 1 tablet 3 (three) times a day      Cyanocobalamin ER 1000 MCG TBCR take one tab daily      ergocalciferol (VITAMIN D2) 50,000 units TAKE 2 CAPSULES BY MOUTH EVERY WEEK WITH DINNER  1    ferrous sulfate 324 (65 Fe) mg Take 1 tablet (324 mg total) by mouth daily before breakfast  0    levothyroxine 137 mcg tablet TAKE ONE TABLET BY MOUTH EVERY MORNING ON EMPTY STOMACH  1    methocarbamol (ROBAXIN) 750 mg tablet Take 1 tab twice a day as needed for spasms 30 tablet 0    Multiple Vitamins-Minerals (MULTIVITAMIN ADULT PO) every 24 hours      pantoprazole (PROTONIX) 40 mg tablet Take 1 tablet (40 mg total) by mouth every 24 hours 90 tablet 3    rosuvastatin (CRESTOR) 10 MG tablet Take 10 mg by mouth daily      thiamine (VITAMIN B1) 50 mg tablet Take 1 tablet (50 mg total) by mouth daily  0    topiramate (TOPAMAX) 50 MG tablet Take 1 tablet (50 mg total) by mouth 2 (two) times a day 180 tablet 3    traZODone (DESYREL) 50 mg tablet Take 1 tablet (50 mg total) by mouth daily at bedtime as needed for sleep 90 tablet 0     No current facility-administered medications for this visit  Allergies   Allergen Reactions    No Active Allergies        Review of Systems   Constitutional: Positive for chills, fatigue and fever  HENT: Negative for rhinorrhea and sore throat  Respiratory: Positive for cough, chest tightness and shortness of breath  Gastrointestinal: Positive for abdominal pain, diarrhea and nausea  Negative for vomiting  Musculoskeletal: Positive for myalgias  Neurological: Positive for headaches  Objective: There were no vitals filed for this visit  Physical Exam  Constitutional:       Appearance: Normal appearance  She is obese     Pulmonary:      Effort: Pulmonary effort is normal    Neurological:      Mental Status: She is alert and oriented to person, place, and time  VIRTUAL VISIT DISCLAIMER    Ta Umaña acknowledges that she has consented to an online visit or consultation  She understands that the online visit is based solely on information provided by her, and that, in the absence of a face-to-face physical evaluation by the physician, the diagnosis she receives is both limited and provisional in terms of accuracy and completeness  This is not intended to replace a full medical face-to-face evaluation by the physician  Ta Umaña understands and accepts these terms

## 2021-04-04 ENCOUNTER — TELEMEDICINE (OUTPATIENT)
Dept: FAMILY MEDICINE CLINIC | Facility: CLINIC | Age: 46
End: 2021-04-04
Payer: COMMERCIAL

## 2021-04-04 DIAGNOSIS — U07.1 COVID-19 VIRUS INFECTION: Primary | ICD-10-CM

## 2021-04-04 PROCEDURE — 99214 OFFICE O/P EST MOD 30 MIN: CPT | Performed by: NURSE PRACTITIONER

## 2021-04-04 RX ORDER — ALBUTEROL SULFATE 90 UG/1
2 AEROSOL, METERED RESPIRATORY (INHALATION) EVERY 6 HOURS PRN
Qty: 1 INHALER | Refills: 5 | Status: SHIPPED | OUTPATIENT
Start: 2021-04-04 | End: 2021-07-22

## 2021-04-04 RX ORDER — IBUPROFEN 600 MG/1
600 TABLET ORAL EVERY 8 HOURS PRN
Qty: 30 TABLET | Refills: 0 | Status: SHIPPED | OUTPATIENT
Start: 2021-04-04 | End: 2021-04-06

## 2021-04-05 ENCOUNTER — TELEMEDICINE (OUTPATIENT)
Dept: FAMILY MEDICINE CLINIC | Facility: CLINIC | Age: 46
End: 2021-04-05
Payer: COMMERCIAL

## 2021-04-05 DIAGNOSIS — U07.1 COVID-19 VIRUS INFECTION: Primary | ICD-10-CM

## 2021-04-05 PROCEDURE — 99212 OFFICE O/P EST SF 10 MIN: CPT | Performed by: NURSE PRACTITIONER

## 2021-04-05 NOTE — PROGRESS NOTES
COVID-19 Outpatient Progress Note    Assessment/Plan:    Problem List Items Addressed This Visit        Other    COVID-19 virus infection - Primary       doing better today   Disposition:     I recommended continued isolation until at least 24 hours have passed since recovery defined as resolution of fever without the use of fever-reducing medications AND improvement in COVID symptoms AND 10 days have passed since onset of symptoms (or 10 days have passed since date of first positive viral diagnostic test for asymptomatic patients)  I have spent 10 minutes directly with the patient  Greater than 50% of this time was spent in counseling/coordination of care regarding: prognosis and impressions  Encounter provider THOMAS Lau    Provider located at [facility] AT [address] GROUP  [address] [place] [id number]    Recent Visits  Date Type Provider Dept   04/04/21 [address], [address]   04/02/21 [address], CRNP [facility] [place]   04/01/21 Telemedicine Larry Call, [address]   Showing recent visits within past 7 days and meeting all other requirements     Today's Visits  Date Type Provider Dept   04/05/21 [address], [address]   Showing today's visits and meeting all other requirements     Future Appointments  No visits were found meeting these conditions  Showing future appointments within next 150 days and meeting all other requirements      This virtual check-in was done via [employer] and patient was informed that this is a secure, HIPAA-compliant platform  She agrees to proceed  Patient agrees to participate in a virtual check in via telephone or video visit instead of presenting to the office to address urgent/immediate medical needs  Patient is aware this is a billable service      After connecting through [facility], the patient was identified by name and date of birth  Gerardadolfo Douglasfatmatajin was informed that this was a telemedicine visit and that the exam was being conducted confidentially over secure lines  My office door was closed  No one else was in the room  Shellisharlene CROFT LanreAmarjitfatmatajin acknowledged consent and understanding of privacy and security of the telemedicine visit  I informed the patient that I have reviewed her record in Epic and presented the opportunity for her to ask any questions regarding the visit today  The patient agreed to participate  Subjective: Lo Espana is a 39 y o  female who has been screened for COVID-19  Symptom change since last report: improving  Patient's symptoms include fever, fatigue, anosmia, loss of taste, cough, shortness of breath, chest tightness, abdominal pain, myalgias and headache  Patient denies chills, nausea, vomiting and diarrhea  Ta has been staying home and has isolated themselves in her home  She is taking care to not share personal items and is cleaning all surfaces that are touched often, like counters, tabletops, and doorknobs using household cleaning sprays or wipes  She is wearing a mask when she leaves her room       Date of symptom onset: 3/30/2021    No results found for: Codey Farr, [address]  Past Medical History:   Diagnosis Date    Abscess of labia     Anxiety     [facility]ipolar disorder ([facility] )     Breast lump     Frequent headaches     Hemorrhoids     Hypothyroidism 7/1/2013    Migraine     Morbid obesity [facility] ) 7/1/2013    Obesity     Psychotic disorder ([facility] )      Past Surgical History:   Procedure Laterality Date    BARIATRIC SURGERY      BREAST BIOPSY Right 1999    BREAST LUMPECTOMY Left     benign    CHOLECYSTECTOMY      GALLBLADDER SURGERY       Current Outpatient Medications   Medication Sig Dispense Refill    acarbose (PRECOSE) 50 mg tablet Take 50 mg by mouth      albuterol (PROVENTIL HFA,VENTOLIN HFA) 90 mcg/act inhaler Inhale 2 puffs every 6 (six) hours as needed for wheezing or shortness of breath 1 Inhaler 5    atorvastatin (LIPITOR) 20 mg tablet TK 1 T PO QHS      benzonatate (TESSALON PERLES) 100 mg capsule Take 1 capsule (100 mg total) by mouth 3 (three) times a day as needed for cough 20 capsule 0    Calcium Citrate 200 MG TABS 1 tablet 3 (three) times a day      Cyanocobalamin ER 1000 MCG TBCR take one tab daily      ergocalciferol (VITAMIN D2) 50,000 units TAKE 2 CAPSULES BY MOUTH EVERY WEEK WITH DINNER  1    ferrous sulfate 324 (65 Fe) mg Take 1 tablet (324 mg total) by mouth daily before breakfast  0    ibuprofen (MOTRIN) 600 mg tablet Take 1 tablet (600 mg total) by mouth every 8 (eight) hours as needed for mild pain or fever 30 tablet 0    levothyroxine 137 mcg tablet TAKE ONE TABLET BY MOUTH EVERY MORNING ON EMPTY STOMACH  1    methocarbamol (ROBAXIN) 750 mg tablet Take 1 tab twice a day as needed for spasms 30 tablet 0    Multiple Vitamins-Minerals (MULTIVITAMIN ADULT PO) every 24 hours      pantoprazole (PROTONIX) 40 mg tablet Take 1 tablet (40 mg total) by mouth every 24 hours 90 tablet 3    rosuvastatin (CRESTOR) 10 MG tablet Take 10 mg by mouth daily      thiamine (VITAMIN B1) 50 mg tablet Take 1 tablet (50 mg total) by mouth daily  0    topiramate (TOPAMAX) 50 MG tablet Take 1 tablet (50 mg total) by mouth 2 (two) times a day 180 tablet 3    traZODone (DESYREL) 50 mg tablet Take 1 tablet (50 mg total) by mouth daily at bedtime as needed for sleep 90 tablet 0     No current facility-administered medications for this visit  Allergies   Allergen Reactions    No Active Allergies        Review of Systems   Constitutional: Positive for fatigue and fever  Negative for chills  Respiratory: Positive for cough, chest tightness and shortness of breath  Gastrointestinal: Positive for abdominal pain  Negative for diarrhea, nausea and vomiting     Musculoskeletal: Positive for myalgias  Neurological: Positive for headaches  Objective: There were no vitals filed for this visit  Physical Exam  Constitutional:       Appearance: Normal appearance  She is obese  Pulmonary:      Effort: Pulmonary effort is normal    Neurological:      Mental Status: She is alert and oriented to person, place, and time  VIRTUAL VISIT DISCLAIMER    Ta Umaña acknowledges that she has consented to an online visit or consultation  She understands that the online visit is based solely on information provided by her, and that, in the absence of a face-to-face physical evaluation by the physician, the diagnosis she receives is both limited and provisional in terms of accuracy and completeness  This is not intended to replace a full medical face-to-face evaluation by the physician  Ta Umaña understands and accepts these terms

## 2021-04-06 ENCOUNTER — HOSPITAL ENCOUNTER (OUTPATIENT)
Facility: HOSPITAL | Age: 46
Setting detail: OBSERVATION
End: 2021-04-07
Attending: EMERGENCY MEDICINE | Admitting: FAMILY MEDICINE
Payer: COMMERCIAL

## 2021-04-06 ENCOUNTER — APPOINTMENT (EMERGENCY)
Dept: RADIOLOGY | Facility: HOSPITAL | Age: 46
End: 2021-04-06
Payer: COMMERCIAL

## 2021-04-06 ENCOUNTER — TELEMEDICINE (OUTPATIENT)
Dept: FAMILY MEDICINE CLINIC | Facility: CLINIC | Age: 46
End: 2021-04-06
Payer: COMMERCIAL

## 2021-04-06 ENCOUNTER — TELEPHONE (OUTPATIENT)
Dept: FAMILY MEDICINE CLINIC | Facility: CLINIC | Age: 46
End: 2021-04-06

## 2021-04-06 ENCOUNTER — APPOINTMENT (OUTPATIENT)
Dept: CT IMAGING | Facility: HOSPITAL | Age: 46
End: 2021-04-06
Payer: COMMERCIAL

## 2021-04-06 DIAGNOSIS — U07.1 COVID-19 VIRUS INFECTION: Primary | ICD-10-CM

## 2021-04-06 DIAGNOSIS — R06.82 TACHYPNEA: ICD-10-CM

## 2021-04-06 DIAGNOSIS — U07.1 PNEUMONIA DUE TO COVID-19 VIRUS: Primary | ICD-10-CM

## 2021-04-06 DIAGNOSIS — R06.02 SHORTNESS OF BREATH: ICD-10-CM

## 2021-04-06 DIAGNOSIS — J12.82 PNEUMONIA DUE TO COVID-19 VIRUS: Primary | ICD-10-CM

## 2021-04-06 PROBLEM — A41.9 SEPSIS (HCC): Status: ACTIVE | Noted: 2021-04-06

## 2021-04-06 PROBLEM — D64.9 ANEMIA: Status: ACTIVE | Noted: 2021-04-06

## 2021-04-06 LAB
ALBUMIN SERPL BCP-MCNC: 3.5 G/DL (ref 3–5.2)
ALP SERPL-CCNC: 129 U/L (ref 43–122)
ALT SERPL W P-5'-P-CCNC: 12 U/L
ANION GAP SERPL CALCULATED.3IONS-SCNC: 7 MMOL/L (ref 5–14)
APTT PPP: 36 SECONDS (ref 23–37)
AST SERPL W P-5'-P-CCNC: 31 U/L (ref 14–36)
ATRIAL RATE: 84 BPM
BASE EX.OXY STD BLDV CALC-SCNC: 58.9 %
BASE EXCESS BLDV CALC-SCNC: -1.9 MMOL/L (ref -2.1–2.1)
BASOPHILS # BLD AUTO: 0.1 THOUSANDS/ΜL (ref 0–0.1)
BASOPHILS NFR BLD AUTO: 3 % (ref 0–1)
BILIRUB SERPL-MCNC: 0.2 MG/DL
BUN SERPL-MCNC: 6 MG/DL (ref 5–25)
CALCIUM SERPL-MCNC: 8.3 MG/DL (ref 8.4–10.2)
CHLORIDE SERPL-SCNC: 103 MMOL/L (ref 97–108)
CK SERPL-CCNC: 84 U/L (ref 30–135)
CO2 SERPL-SCNC: 26 MMOL/L (ref 22–30)
CREAT SERPL-MCNC: 0.76 MG/DL (ref 0.6–1.2)
CRP SERPL QL: 168.9 MG/L
D DIMER PPP FEU-MCNC: 0.79 UG/ML FEU
EOSINOPHIL # BLD AUTO: 0 THOUSAND/ΜL (ref 0–0.4)
EOSINOPHIL NFR BLD AUTO: 0 % (ref 0–6)
ERYTHROCYTE [DISTWIDTH] IN BLOOD BY AUTOMATED COUNT: 22.4 %
ERYTHROCYTE [SEDIMENTATION RATE] IN BLOOD: 118 MM/HOUR (ref 0–19)
FERRITIN SERPL-MCNC: 79 NG/ML (ref 8–388)
FOLATE SERPL-MCNC: >20 NG/ML (ref 3.1–17.5)
GFR SERPL CREATININE-BSD FRML MDRD: 96 ML/MIN/1.73SQ M
GLUCOSE SERPL-MCNC: 97 MG/DL (ref 70–99)
HCO3 BLDV-SCNC: 23.7 MMOL/L (ref 23–28)
HCT VFR BLD AUTO: 31.9 % (ref 36–46)
HGB BLD-MCNC: 9.6 G/DL (ref 12–16)
HIV 1+2 AB+HIV1 P24 AG SERPL QL IA: NORMAL
HIV1 P24 AG SER QL: NORMAL
INR PPP: 1.02 (ref 0.84–1.19)
IRON SERPL-MCNC: 14 UG/DL (ref 50–170)
LACTATE SERPL-SCNC: 1.1 MMOL/L (ref 0.7–2)
LDH SERPL-CCNC: 1060 U/L (ref 313–618)
LYMPHOCYTES # BLD AUTO: 0.5 THOUSANDS/ΜL (ref 0.5–4)
LYMPHOCYTES NFR BLD AUTO: 11 % (ref 25–45)
MCH RBC QN AUTO: 21.3 PG (ref 26–34)
MCHC RBC AUTO-ENTMCNC: 30.2 G/DL (ref 31–36)
MCV RBC AUTO: 70 FL (ref 80–100)
MICROCYTES BLD QL AUTO: PRESENT
MONOCYTES # BLD AUTO: 0.2 THOUSAND/ΜL (ref 0.2–0.9)
MONOCYTES NFR BLD AUTO: 5 % (ref 1–10)
NEUTROPHILS # BLD AUTO: 3.5 THOUSANDS/ΜL (ref 1.8–7.8)
NEUTS SEG NFR BLD AUTO: 81 % (ref 45–65)
NT-PROBNP SERPL-MCNC: 52.5 PG/ML (ref 0–299)
O2 CT BLDV-SCNC: 8.5 ML/DL
P AXIS: 12 DEGREES
PCO2 BLDV: 43 MM HG (ref 41–51)
PH BLDV: 7.35 [PH] (ref 7.35–7.45)
PLATELET # BLD AUTO: 369 THOUSANDS/UL (ref 150–450)
PLATELET BLD QL SMEAR: ADEQUATE
PMV BLD AUTO: 7.3 FL (ref 8.9–12.7)
PO2 BLDV: 32 MM HG
POIKILOCYTOSIS BLD QL SMEAR: PRESENT
POTASSIUM SERPL-SCNC: 3.5 MMOL/L (ref 3.6–5)
PR INTERVAL: 136 MS
PROCALCITONIN SERPL-MCNC: <0.05 NG/ML
PROT SERPL-MCNC: 7.3 G/DL (ref 5.9–8.4)
PROTHROMBIN TIME: 13.6 SECONDS (ref 11.6–14.5)
QRS AXIS: 44 DEGREES
QRSD INTERVAL: 74 MS
QT INTERVAL: 378 MS
QTC INTERVAL: 446 MS
RBC # BLD AUTO: 4.53 MILLION/UL (ref 4–5.2)
RBC MORPH BLD: NORMAL
SODIUM SERPL-SCNC: 136 MMOL/L (ref 137–147)
T WAVE AXIS: 43 DEGREES
TIBC SERPL-MCNC: 330 UG/DL (ref 250–450)
TROPONIN I SERPL-MCNC: <0.01 NG/ML (ref 0–0.03)
VENTRICULAR RATE: 84 BPM
VIT B12 SERPL-MCNC: 886 PG/ML (ref 100–900)
WBC # BLD AUTO: 4.3 THOUSAND/UL (ref 4.5–11)

## 2021-04-06 PROCEDURE — 87040 BLOOD CULTURE FOR BACTERIA: CPT | Performed by: EMERGENCY MEDICINE

## 2021-04-06 PROCEDURE — 93010 ELECTROCARDIOGRAM REPORT: CPT | Performed by: INTERNAL MEDICINE

## 2021-04-06 PROCEDURE — 82550 ASSAY OF CK (CPK): CPT | Performed by: EMERGENCY MEDICINE

## 2021-04-06 PROCEDURE — 83540 ASSAY OF IRON: CPT | Performed by: STUDENT IN AN ORGANIZED HEALTH CARE EDUCATION/TRAINING PROGRAM

## 2021-04-06 PROCEDURE — 86705 HEP B CORE ANTIBODY IGM: CPT | Performed by: STUDENT IN AN ORGANIZED HEALTH CARE EDUCATION/TRAINING PROGRAM

## 2021-04-06 PROCEDURE — 86704 HEP B CORE ANTIBODY TOTAL: CPT | Performed by: STUDENT IN AN ORGANIZED HEALTH CARE EDUCATION/TRAINING PROGRAM

## 2021-04-06 PROCEDURE — 83880 ASSAY OF NATRIURETIC PEPTIDE: CPT | Performed by: EMERGENCY MEDICINE

## 2021-04-06 PROCEDURE — 84145 PROCALCITONIN (PCT): CPT | Performed by: EMERGENCY MEDICINE

## 2021-04-06 PROCEDURE — 83605 ASSAY OF LACTIC ACID: CPT | Performed by: EMERGENCY MEDICINE

## 2021-04-06 PROCEDURE — 82746 ASSAY OF FOLIC ACID SERUM: CPT | Performed by: STUDENT IN AN ORGANIZED HEALTH CARE EDUCATION/TRAINING PROGRAM

## 2021-04-06 PROCEDURE — 99219 PR INITIAL OBSERVATION CARE/DAY 50 MINUTES: CPT | Performed by: FAMILY MEDICINE

## 2021-04-06 PROCEDURE — 86803 HEPATITIS C AB TEST: CPT | Performed by: STUDENT IN AN ORGANIZED HEALTH CARE EDUCATION/TRAINING PROGRAM

## 2021-04-06 PROCEDURE — 71045 X-RAY EXAM CHEST 1 VIEW: CPT

## 2021-04-06 PROCEDURE — 80053 COMPREHEN METABOLIC PANEL: CPT | Performed by: EMERGENCY MEDICINE

## 2021-04-06 PROCEDURE — 85025 COMPLETE CBC W/AUTO DIFF WBC: CPT | Performed by: EMERGENCY MEDICINE

## 2021-04-06 PROCEDURE — 93005 ELECTROCARDIOGRAM TRACING: CPT

## 2021-04-06 PROCEDURE — 83550 IRON BINDING TEST: CPT | Performed by: STUDENT IN AN ORGANIZED HEALTH CARE EDUCATION/TRAINING PROGRAM

## 2021-04-06 PROCEDURE — 87806 HIV AG W/HIV1&2 ANTB W/OPTIC: CPT | Performed by: STUDENT IN AN ORGANIZED HEALTH CARE EDUCATION/TRAINING PROGRAM

## 2021-04-06 PROCEDURE — 83615 LACTATE (LD) (LDH) ENZYME: CPT | Performed by: EMERGENCY MEDICINE

## 2021-04-06 PROCEDURE — 86140 C-REACTIVE PROTEIN: CPT | Performed by: EMERGENCY MEDICINE

## 2021-04-06 PROCEDURE — 85610 PROTHROMBIN TIME: CPT | Performed by: EMERGENCY MEDICINE

## 2021-04-06 PROCEDURE — 36415 COLL VENOUS BLD VENIPUNCTURE: CPT | Performed by: EMERGENCY MEDICINE

## 2021-04-06 PROCEDURE — 71275 CT ANGIOGRAPHY CHEST: CPT

## 2021-04-06 PROCEDURE — 84484 ASSAY OF TROPONIN QUANT: CPT | Performed by: EMERGENCY MEDICINE

## 2021-04-06 PROCEDURE — 96374 THER/PROPH/DIAG INJ IV PUSH: CPT

## 2021-04-06 PROCEDURE — NC001 PR NO CHARGE: Performed by: FAMILY MEDICINE

## 2021-04-06 PROCEDURE — 85652 RBC SED RATE AUTOMATED: CPT | Performed by: EMERGENCY MEDICINE

## 2021-04-06 PROCEDURE — 3725F SCREEN DEPRESSION PERFORMED: CPT | Performed by: NURSE PRACTITIONER

## 2021-04-06 PROCEDURE — 87340 HEPATITIS B SURFACE AG IA: CPT | Performed by: STUDENT IN AN ORGANIZED HEALTH CARE EDUCATION/TRAINING PROGRAM

## 2021-04-06 PROCEDURE — 96361 HYDRATE IV INFUSION ADD-ON: CPT

## 2021-04-06 PROCEDURE — 85379 FIBRIN DEGRADATION QUANT: CPT | Performed by: EMERGENCY MEDICINE

## 2021-04-06 PROCEDURE — 85730 THROMBOPLASTIN TIME PARTIAL: CPT | Performed by: EMERGENCY MEDICINE

## 2021-04-06 PROCEDURE — 83520 IMMUNOASSAY QUANT NOS NONAB: CPT | Performed by: EMERGENCY MEDICINE

## 2021-04-06 PROCEDURE — 99213 OFFICE O/P EST LOW 20 MIN: CPT | Performed by: NURSE PRACTITIONER

## 2021-04-06 PROCEDURE — 99285 EMERGENCY DEPT VISIT HI MDM: CPT | Performed by: EMERGENCY MEDICINE

## 2021-04-06 PROCEDURE — G1004 CDSM NDSC: HCPCS

## 2021-04-06 PROCEDURE — 99285 EMERGENCY DEPT VISIT HI MDM: CPT

## 2021-04-06 PROCEDURE — 82728 ASSAY OF FERRITIN: CPT | Performed by: EMERGENCY MEDICINE

## 2021-04-06 PROCEDURE — 82805 BLOOD GASES W/O2 SATURATION: CPT | Performed by: EMERGENCY MEDICINE

## 2021-04-06 PROCEDURE — 1036F TOBACCO NON-USER: CPT | Performed by: NURSE PRACTITIONER

## 2021-04-06 PROCEDURE — 82607 VITAMIN B-12: CPT | Performed by: STUDENT IN AN ORGANIZED HEALTH CARE EDUCATION/TRAINING PROGRAM

## 2021-04-06 RX ORDER — FERROUS SULFATE 325(65) MG
325 TABLET ORAL
Status: DISCONTINUED | OUTPATIENT
Start: 2021-04-07 | End: 2021-04-07 | Stop reason: HOSPADM

## 2021-04-06 RX ORDER — ACETAMINOPHEN 325 MG/1
975 TABLET ORAL ONCE
Status: COMPLETED | OUTPATIENT
Start: 2021-04-06 | End: 2021-04-06

## 2021-04-06 RX ORDER — KETOROLAC TROMETHAMINE 30 MG/ML
15 INJECTION, SOLUTION INTRAMUSCULAR; INTRAVENOUS ONCE
Status: COMPLETED | OUTPATIENT
Start: 2021-04-06 | End: 2021-04-06

## 2021-04-06 RX ORDER — BENZONATATE 100 MG/1
100 CAPSULE ORAL 3 TIMES DAILY PRN
Status: DISCONTINUED | OUTPATIENT
Start: 2021-04-06 | End: 2021-04-07 | Stop reason: HOSPADM

## 2021-04-06 RX ORDER — ACETAMINOPHEN 325 MG/1
975 TABLET ORAL EVERY 8 HOURS SCHEDULED
Status: DISCONTINUED | OUTPATIENT
Start: 2021-04-06 | End: 2021-04-07 | Stop reason: HOSPADM

## 2021-04-06 RX ORDER — ZINC SULFATE 50(220)MG
220 CAPSULE ORAL
Status: DISCONTINUED | OUTPATIENT
Start: 2021-04-07 | End: 2021-04-07 | Stop reason: HOSPADM

## 2021-04-06 RX ORDER — LANOLIN ALCOHOL/MO/W.PET/CERES
50 CREAM (GRAM) TOPICAL DAILY
Status: DISCONTINUED | OUTPATIENT
Start: 2021-04-07 | End: 2021-04-07 | Stop reason: HOSPADM

## 2021-04-06 RX ORDER — FAMOTIDINE 20 MG/1
20 TABLET, FILM COATED ORAL 2 TIMES DAILY
Status: DISCONTINUED | OUTPATIENT
Start: 2021-04-06 | End: 2021-04-06

## 2021-04-06 RX ORDER — FAMOTIDINE 20 MG/1
20 TABLET, FILM COATED ORAL 2 TIMES DAILY
Status: DISCONTINUED | OUTPATIENT
Start: 2021-04-06 | End: 2021-04-07 | Stop reason: HOSPADM

## 2021-04-06 RX ORDER — ASCORBIC ACID 500 MG
1000 TABLET ORAL EVERY 12 HOURS SCHEDULED
Status: DISCONTINUED | OUTPATIENT
Start: 2021-04-06 | End: 2021-04-07 | Stop reason: HOSPADM

## 2021-04-06 RX ORDER — ZINC SULFATE 50(220)MG
220 CAPSULE ORAL DAILY
Status: DISCONTINUED | OUTPATIENT
Start: 2021-04-07 | End: 2021-04-06

## 2021-04-06 RX ORDER — DEXAMETHASONE SODIUM PHOSPHATE 4 MG/ML
6 INJECTION, SOLUTION INTRA-ARTICULAR; INTRALESIONAL; INTRAMUSCULAR; INTRAVENOUS; SOFT TISSUE EVERY 24 HOURS
Status: DISCONTINUED | OUTPATIENT
Start: 2021-04-06 | End: 2021-04-07 | Stop reason: HOSPADM

## 2021-04-06 RX ORDER — DOXYCYCLINE HYCLATE 100 MG/1
100 CAPSULE ORAL EVERY 12 HOURS
Status: DISCONTINUED | OUTPATIENT
Start: 2021-04-06 | End: 2021-04-07 | Stop reason: HOSPADM

## 2021-04-06 RX ORDER — SODIUM CHLORIDE 9 MG/ML
200 INJECTION, SOLUTION INTRAVENOUS CONTINUOUS
Status: DISCONTINUED | OUTPATIENT
Start: 2021-04-06 | End: 2021-04-07

## 2021-04-06 RX ORDER — PANTOPRAZOLE SODIUM 40 MG/1
40 TABLET, DELAYED RELEASE ORAL EVERY 24 HOURS
Status: DISCONTINUED | OUTPATIENT
Start: 2021-04-06 | End: 2021-04-06

## 2021-04-06 RX ORDER — MELATONIN
2000 DAILY
Status: DISCONTINUED | OUTPATIENT
Start: 2021-04-07 | End: 2021-04-07 | Stop reason: HOSPADM

## 2021-04-06 RX ORDER — CEFTRIAXONE 1 G/50ML
1000 INJECTION, SOLUTION INTRAVENOUS EVERY 24 HOURS
Status: DISCONTINUED | OUTPATIENT
Start: 2021-04-06 | End: 2021-04-07 | Stop reason: HOSPADM

## 2021-04-06 RX ORDER — ATORVASTATIN CALCIUM 40 MG/1
40 TABLET, FILM COATED ORAL
Status: DISCONTINUED | OUTPATIENT
Start: 2021-04-06 | End: 2021-04-07 | Stop reason: HOSPADM

## 2021-04-06 RX ORDER — CALCIUM CARBONATE 500(1250)
1 TABLET ORAL
Status: DISCONTINUED | OUTPATIENT
Start: 2021-04-07 | End: 2021-04-06

## 2021-04-06 RX ORDER — CALCIUM CARBONATE 500(1250)
1 TABLET ORAL
Status: DISCONTINUED | OUTPATIENT
Start: 2021-04-07 | End: 2021-04-07 | Stop reason: HOSPADM

## 2021-04-06 RX ORDER — ALBUTEROL SULFATE 90 UG/1
2 AEROSOL, METERED RESPIRATORY (INHALATION) EVERY 6 HOURS PRN
Status: DISCONTINUED | OUTPATIENT
Start: 2021-04-06 | End: 2021-04-07 | Stop reason: HOSPADM

## 2021-04-06 RX ADMIN — ACETAMINOPHEN 975 MG: 325 TABLET, FILM COATED ORAL at 23:06

## 2021-04-06 RX ADMIN — ACETAMINOPHEN 975 MG: 325 TABLET, FILM COATED ORAL at 16:14

## 2021-04-06 RX ADMIN — DEXAMETHASONE SODIUM PHOSPHATE 6 MG: 4 INJECTION, SOLUTION INTRAMUSCULAR; INTRAVENOUS at 20:32

## 2021-04-06 RX ADMIN — FAMOTIDINE 20 MG: 20 TABLET ORAL at 20:31

## 2021-04-06 RX ADMIN — ENOXAPARIN SODIUM 40 MG: 40 INJECTION SUBCUTANEOUS at 20:32

## 2021-04-06 RX ADMIN — IOHEXOL 100 ML: 350 INJECTION, SOLUTION INTRAVENOUS at 18:35

## 2021-04-06 RX ADMIN — SODIUM CHLORIDE 500 ML: 0.9 INJECTION, SOLUTION INTRAVENOUS at 16:00

## 2021-04-06 RX ADMIN — SODIUM CHLORIDE 200 ML/HR: 0.9 INJECTION, SOLUTION INTRAVENOUS at 20:31

## 2021-04-06 RX ADMIN — KETOROLAC TROMETHAMINE 15 MG: 30 INJECTION, SOLUTION INTRAMUSCULAR; INTRAVENOUS at 16:11

## 2021-04-06 RX ADMIN — OXYCODONE HYDROCHLORIDE AND ACETAMINOPHEN 1000 MG: 500 TABLET ORAL at 20:32

## 2021-04-06 RX ADMIN — ALBUTEROL SULFATE 2 PUFF: 90 AEROSOL, METERED RESPIRATORY (INHALATION) at 21:09

## 2021-04-06 RX ADMIN — CEFTRIAXONE 1000 MG: 1 INJECTION, SOLUTION INTRAVENOUS at 20:31

## 2021-04-06 RX ADMIN — ATORVASTATIN CALCIUM 40 MG: 20 TABLET, FILM COATED ORAL at 23:07

## 2021-04-06 RX ADMIN — REMDESIVIR 200 MG: 100 INJECTION, POWDER, LYOPHILIZED, FOR SOLUTION INTRAVENOUS at 20:56

## 2021-04-06 RX ADMIN — DOXYCYCLINE 100 MG: 100 CAPSULE ORAL at 20:32

## 2021-04-06 NOTE — ASSESSMENT & PLAN NOTE
COVID Positive - 3/30/2021 with commencement of symptoms 3/29/2021: Headache, SOB, dry cough, fever, diarrhea,  Temp 100 5, RR 30, P102, /86  Oxygen Sat 88%    In ED: Given 500mL NS Bolus    See A&P for COVID-19 for further management

## 2021-04-06 NOTE — PROGRESS NOTES
COVID-19 Outpatient Progress Note    Assessment/Plan:    Problem List Items Addressed This Visit        Other    COVID-19 virus infection - Primary       worse today than yesterday   Was doing better yesterday  Will cont with symptomatic treatment and proning   Disposition:     I recommended continued isolation until at least 24 hours have passed since recovery defined as resolution of fever without the use of fever-reducing medications AND improvement in COVID symptoms AND 10 days have passed since onset of symptoms (or 10 days have passed since date of first positive viral diagnostic test for asymptomatic patients)  I have spent 10 minutes directly with the patient  Greater than 50% of this time was spent in counseling/coordination of care regarding: prognosis and impressions  Encounter provider THOMAS Hooper    Provider located at [facility] AT [address] GROUP  [address] [place] [phone]    Recent Visits  Date Type Provider Dept   04/05/21 [address], [address]   [facility] [place]   [facility] [place]   04/01/21 Telemedicine Corinne Tate, [address]   Showing recent visits within past 7 days and meeting all other requirements     Today's Visits  Date Type Provider Dept   04/06/21 Telephone Alek [facility]    04/06/21 Telemedicine Leah Fraire, [address]   Showing today's visits and meeting all other requirements     Future Appointments  No visits were found meeting these conditions  Showing future appointments within next 150 days and meeting all other requirements      This virtual check-in was done via [employer] and patient was informed that this is a secure, HIPAA-compliant platform  She agrees to proceed      Patient agrees to participate in a virtual check in via telephone or video visit instead of presenting to the office to address urgent/immediate medical needs  Patient is aware this is a billable service  After connecting through [facility], the patient was identified by name and date of birth  Shellisharlene Umaña was informed that this was a telemedicine visit and that the exam was being conducted confidentially over secure lines  My office door was closed  No one else was in the room  Gerards LANG De DiosAmarjitfatmatajin acknowledged consent and understanding of privacy and security of the telemedicine visit  I informed the patient that I have reviewed her record in Epic and presented the opportunity for her to ask any questions regarding the visit today  The patient agreed to participate  Subjective: Baljit Warren is a 39 y o  female who has been screened for COVID-19  Symptom change since last report: worsening  Patient's symptoms include fever, chills, fatigue, anosmia, loss of taste, cough, shortness of breath, chest tightness, abdominal pain, diarrhea, myalgias and headache  Patient denies sore throat, nausea and vomiting  Ta has been staying home and has isolated themselves in her home  She is taking care to not share personal items and is cleaning all surfaces that are touched often, like counters, tabletops, and doorknobs using household cleaning sprays or wipes  She is wearing a mask when she leaves her room       Date of symptom onset: 3/30/2021    No results found for: Karina Abdi  Past Medical History:   Diagnosis Date    Abscess of labia     Anxiety     [facility]     Breast lump     Frequent headaches     Hemorrhoids     Hypothyroidism 7/1/2013    Migraine     Morbid obesity ([facility] ) 7/1/2013    Obesity     Psychotic disorder ([facility] )      Past Surgical History:   Procedure Laterality Date    BARIATRIC SURGERY      BREAST BIOPSY Right 1999    BREAST LUMPECTOMY Left     benign    CHOLECYSTECTOMY      GALLBLADDER SURGERY       Current Outpatient Medications   Medication Sig Dispense Refill    acarbose (PRECOSE) 50 mg tablet Take 50 mg by mouth      albuterol (PROVENTIL HFA,VENTOLIN HFA) 90 mcg/act inhaler Inhale 2 puffs every 6 (six) hours as needed for wheezing or shortness of breath 1 Inhaler 5    Cyanocobalamin ER 1000 MCG TBCR take one tab daily      ergocalciferol (VITAMIN D2) 50,000 units TAKE 2 CAPSULES BY MOUTH EVERY WEEK WITH DINNER  1    ferrous sulfate 324 (65 Fe) mg Take 1 tablet (324 mg total) by mouth daily before breakfast  0    ibuprofen (MOTRIN) 600 mg tablet Take 1 tablet (600 mg total) by mouth every 8 (eight) hours as needed for mild pain or fever 30 tablet 0    Multiple Vitamins-Minerals (MULTIVITAMIN ADULT PO) every 24 hours      topiramate (TOPAMAX) 50 MG tablet Take 1 tablet (50 mg total) by mouth 2 (two) times a day 180 tablet 3    atorvastatin (LIPITOR) 20 mg tablet TK 1 T PO QHS      benzonatate (TESSALON PERLES) 100 mg capsule Take 1 capsule (100 mg total) by mouth 3 (three) times a day as needed for cough (Patient not taking: Reported on 4/6/2021) 20 capsule 0    Calcium Citrate 200 MG TABS 1 tablet 3 (three) times a day      levothyroxine 137 mcg tablet TAKE ONE TABLET BY MOUTH EVERY MORNING ON EMPTY STOMACH  1    methocarbamol (ROBAXIN) 750 mg tablet Take 1 tab twice a day as needed for spasms (Patient not taking: Reported on 4/6/2021) 30 tablet 0    pantoprazole (PROTONIX) 40 mg tablet Take 1 tablet (40 mg total) by mouth every 24 hours (Patient not taking: Reported on 4/6/2021) 90 tablet 3    rosuvastatin (CRESTOR) 10 MG tablet Take 10 mg by mouth daily      thiamine (VITAMIN B1) 50 mg tablet Take 1 tablet (50 mg total) by mouth daily (Patient not taking: Reported on 4/6/2021)  0    traZODone (DESYREL) 50 mg tablet Take 1 tablet (50 mg total) by mouth daily at bedtime as needed for sleep (Patient not taking: Reported on 4/6/2021) 90 tablet 0     No current facility-administered medications for this visit  Allergies   Allergen Reactions    No Active Allergies        Review of Systems   Constitutional: Positive for chills, fatigue and fever  HENT: Negative for sore throat  Respiratory: Positive for cough, chest tightness and shortness of breath  Gastrointestinal: Positive for abdominal pain and diarrhea  Negative for nausea and vomiting  Musculoskeletal: Positive for myalgias  Neurological: Positive for headaches  Objective: There were no vitals filed for this visit  Physical Exam  Constitutional:       Appearance: She is obese  She is ill-appearing  Pulmonary:      Effort: Pulmonary effort is normal    Neurological:      Mental Status: She is alert and oriented to person, place, and time  VIRTUAL VISIT DISCLAIMER    Ta Umaña acknowledges that she has consented to an online visit or consultation  She understands that the online visit is based solely on information provided by her, and that, in the absence of a face-to-face physical evaluation by the physician, the diagnosis she receives is both limited and provisional in terms of accuracy and completeness  This is not intended to replace a full medical face-to-face evaluation by the physician  Ta Umaña understands and accepts these terms

## 2021-04-06 NOTE — ED NOTES
Patient transported to [facility], [address] [place] [address]  04/06/21 [id number]

## 2021-04-06 NOTE — ED PROVIDER NOTES
History  Chief Complaint   Patient presents with    Shortness of Breath     HPI    40 yo F hx of gerd, hypothyroidism, obesity, hld, known covid positive 3/30/21, presents to ed for eval of sob  Patient sent in by her pcp due to worsening breathing, patient has been symptomatic for past 4 days  Unable to speak in complete sentences  No chest pain  No n/v  She has diarrhea  Feels near syncope, weakness fatigue  Patient took advil 3 hours ago  No other complaints on ROS  tachypnea hypoxia at 88% on arrival,on 4L patient sating 97%    Prior to Admission Medications   Prescriptions Last Dose Informant Patient Reported? Taking?    Calcium Citrate 200 MG TABS Past Week at Unknown time Self Yes Yes   Si tablet 3 (three) times a day   Cyanocobalamin ER 1000 MCG TBCR Not Taking at Unknown time Self Yes No   Sig: take one tab daily   Multiple Vitamins-Minerals (MULTIVITAMIN ADULT PO) Past Week at Unknown time Self Yes Yes   Sig: every 24 hours   albuterol (PROVENTIL HFA,VENTOLIN HFA) 90 mcg/act inhaler 2021 at Unknown time Self No Yes   Sig: Inhale 2 puffs every 6 (six) hours as needed for wheezing or shortness of breath   atorvastatin (LIPITOR) 20 mg tablet Not Taking at Unknown time Self Yes No   Sig: TK 1 T PO QHS   benzonatate (TESSALON PERLES) 100 mg capsule Not Taking at Unknown time Self No No   Sig: Take 1 capsule (100 mg total) by mouth 3 (three) times a day as needed for cough   Patient not taking: Reported on 2021   ferrous sulfate 324 (65 Fe) mg 2021 at Unknown time Self No Yes   Sig: Take 1 tablet (324 mg total) by mouth daily before breakfast   levothyroxine 137 mcg tablet Past Week at Unknown time Self Yes Yes   Sig: TAKE ONE TABLET BY MOUTH EVERY MORNING ON EMPTY STOMACH   pantoprazole (PROTONIX) 40 mg tablet Past Month at Unknown time Self No Yes   Sig: Take 1 tablet (40 mg total) by mouth every 24 hours   rosuvastatin (CRESTOR) 10 MG tablet Not Taking at Unknown time Self Yes No   Sig: Take 10 mg by mouth daily   thiamine (VITAMIN B1) 50 mg tablet Not Taking at Unknown time Self No No   Sig: Take 1 tablet (50 mg total) by mouth daily   Patient not taking: Reported on 4/6/2021   topiramate (TOPAMAX) 50 MG tablet Not Taking at Unknown time Self No No   Sig: Take 1 tablet (50 mg total) by mouth 2 (two) times a day   Patient not taking: Reported on 4/6/2021   traZODone (DESYREL) 50 mg tablet Not Taking at Unknown time Self No No   Sig: Take 1 tablet (50 mg total) by mouth daily at bedtime as needed for sleep   Patient not taking: Reported on 4/6/2021      Facility-Administered Medications: None       Past Medical History:   Diagnosis Date    Abscess of labia     Anemia 4/6/2021    Anxiety     Bipolar disorder (HCC)     Breast lump     Frequent headaches     Hemorrhoids     Hypothyroidism 7/1/2013    Migraine     Morbid obesity ([address] ) 7/1/2013    Obesity     Psychotic disorder (HCC)        Past Surgical History:   Procedure Laterality Date    BARIATRIC SURGERY      BREAST BIOPSY Right 1999    BREAST LUMPECTOMY Left     benign    CHOLECYSTECTOMY      GALLBLADDER SURGERY         Family History   Problem Relation Age of Onset    Liver cancer Mother     Prostate cancer Father     Cancer Family     No Known Problems Sister     No Known Problems Maternal Grandmother     No Known Problems Maternal Grandfather     No Known Problems Paternal Grandmother     No Known Problems Paternal Grandfather     No Known Problems Sister     No Known Problems Sister     No Known Problems Sister     No Known Problems Sister     No Known Problems Sister     No Known Problems Sister     No Known Problems Sister     No Known Problems Sister     No Known Problems Sister     Cancer Maternal Aunt     Cancer Maternal Aunt     Cancer Maternal Aunt     Cancer Maternal Aunt     Cancer Maternal Aunt     Cancer Maternal Aunt     Cancer Maternal Aunt     No Known Problems Paternal Aunt     No Known Problems Paternal Aunt     No Known Problems Paternal Aunt     No Known Problems Paternal Aunt     Cancer Paternal Aunt     Cancer Paternal Aunt      I have reviewed and agree with the history as documented  E-Cigarette/Vaping    E-Cigarette Use Never User      E-Cigarette/Vaping Substances    Nicotine No     CBD No      Social History     Tobacco Use    Smoking status: Former Smoker     Types: Cigarettes     Quit date:      Years since quittin 2    Smokeless tobacco: Former User   Substance Use Topics    Alcohol use: Never     Frequency: Never    Drug use: No       Review of Systems   Constitutional: Negative for chills, fatigue and fever  HENT: Negative for sore throat  Eyes: Negative for redness and visual disturbance  Respiratory: Positive for shortness of breath  Negative for cough  Cardiovascular: Negative for chest pain  Gastrointestinal: Positive for diarrhea  Negative for abdominal pain and nausea  Genitourinary: Negative for difficulty urinating, dysuria and pelvic pain  Musculoskeletal: Negative for back pain  Skin: Negative for rash  Neurological: Negative for syncope, weakness and headaches  All other systems reviewed and are negative  Physical Exam  Physical Exam  Vitals signs and nursing note reviewed  Constitutional:       General: She is not in acute distress  Appearance: She is obese  HENT:      Head: Normocephalic and atraumatic  Eyes:      Conjunctiva/sclera: Conjunctivae normal    Neck:      Musculoskeletal: Normal range of motion  Cardiovascular:      Rate and Rhythm: Normal rate and regular rhythm  Heart sounds: Normal heart sounds  Pulmonary:      Effort: Tachypnea and accessory muscle usage present  No respiratory distress  Breath sounds: Normal breath sounds        Comments: Patient breathing 30 per min, hypoxic 88% on RA  Improved to 98% with 4 L NC  Abdominal:      General: Bowel sounds are normal       Palpations: Abdomen is soft  Tenderness: There is no abdominal tenderness  Musculoskeletal: Normal range of motion  Skin:     General: Skin is warm and dry  Findings: No rash  Neurological:      Mental Status: She is alert and oriented to person, place, and time  Cranial Nerves: No cranial nerve deficit  Sensory: No sensory deficit  Motor: No abnormal muscle tone        Coordination: Coordination normal          Vital Signs  ED Triage Vitals   Temperature Pulse Respirations Blood Pressure SpO2   04/06/21 1514 04/06/21 1508 04/06/21 1508 04/06/21 1508 04/06/21 1508   100 5 °F (38 1 °C) 102 (!) 30 102/86 (!) 89 %      Temp Source Heart Rate Source Patient Position - Orthostatic VS BP Location FiO2 (%)   04/06/21 1514 04/06/21 1508 04/06/21 1508 04/06/21 1508 --   Tympanic Monitor Lying Left arm       Pain Score       04/06/21 1917       2           Vitals:    04/07/21 0130 04/07/21 0145 04/07/21 0300 04/07/21 0722   BP:  105/56 123/89 118/68   Pulse: 77 75 82 84   Patient Position - Orthostatic VS:  Lying Lying Lying         Visual Acuity      ED Medications  Medications   sodium chloride 0 9 % bolus 500 mL (0 mL Intravenous Stopped 4/6/21 1748)   acetaminophen (TYLENOL) tablet 975 mg (975 mg Oral Given 4/6/21 1614)   ketorolac (TORADOL) injection 15 mg (15 mg Intravenous Given 4/6/21 1611)   iohexol (OMNIPAQUE) 350 MG/ML injection (SINGLE-DOSE) 100 mL (100 mL Intravenous Given 4/6/21 1835)   remdesivir (Veklury) 200 mg in sodium chloride 0 9 % 250 mL IVPB (0 mg Intravenous Stopped 4/6/21 2301)   sodium chloride 0 9 % bolus 500 mL (0 mL Intravenous Stopped 4/7/21 0149)   furosemide (LASIX) injection 20 mg (20 mg Intravenous Given 4/7/21 0518)       Diagnostic Studies  Results Reviewed     Procedure Component Value Units Date/Time    Interleukin-6,Serum [[id number]]  (Abnormal) Collected: 04/06/21 1601    Lab Status: Final result Specimen: Blood from Arm, Left Updated: 04/09/21 0606     Interleukin 6, Serum 144 7 pg/mL     Narrative:      Test(s) 140922-Interleukin-6, Serum  has not been FDA cleared or approved  This test has been  authorized by FDA under an EUA for use by authorized laboratories  This test has been authorized only to assist in identifying  severe inflammatory response, when used as an aid in determining  the risk of intubation with mechanical ventilation in confirmed  COVID-19 patients  This test is only authorized for the  duration of the declaration that circumstances exist justifying the  authorization of emergency use of medical devices under Section  564(b)(1) of the Act, [place]  / 360bbb-3(b)(1), unless the  authorization is terminated or revoked sooner  Performed at:  [address]  [id number]  : Carlos Guido MD, Phone:  [phone]    Blood culture #1 [[id number]] Collected: 04/06/21 1634    Lab Status: Preliminary result Specimen: Blood from Arm, Right Updated: 04/08/21 2201     Blood Culture No Growth at 48 hrs  Blood culture #2 [[id number]] Collected: 04/06/21 1601    Lab Status: Preliminary result Specimen: Blood from Arm, Left Updated: 04/08/21 2201     Blood Culture No Growth at 48 hrs      Procalcitonin Reflex [[id number]]  (Normal) Collected: 04/07/21 0626    Lab Status: Final result Specimen: Blood from Arm, Right Updated: 04/07/21 1627     Procalcitonin 0 12 ng/ml     Ferritin [[id number]]  (Normal) Collected: 04/07/21 0619    Lab Status: Final result Specimen: Blood from Arm, Right Updated: 04/07/21 1444     Ferritin 90 ng/mL     Chronic Hepatitis Panel [[id number]]  (Normal) Collected: 04/06/21 2300    Lab Status: Final result Specimen: Blood from Arm, Left Updated: 04/07/21 1038     Hepatitis B Surface Ag Non-reactive     Hepatitis C Ab Non-reactive     Hep B C IgM Non-reactive     Hep B Core Total Ab Non-reactive    CBC (With Platelets) [[id number]]  (Abnormal) Collected: 04/07/21 0914    Lab Status: Final result Specimen: Blood from Arm, Left Updated: 04/07/21 1033     WBC 5 30 Thousand/uL      RBC 4 49 Million/uL      Hemoglobin 9 8 g/dL      Hematocrit 31 6 %      MCV 70 fL      MCH 21 9 pg      MCHC 31 1 g/dL      RDW 22 6 %      Platelets 345 Thousands/uL      MPV 7 7 fL     C-reactive protein [[id number]]  (Abnormal) Collected: 04/07/21 0619    Lab Status: Final result Specimen: Blood from Arm, Right Updated: 04/07/21 1022      9 mg/L     TSH, 3rd generation with Free T4 reflex [[id number]]  (Normal) Collected: 04/07/21 0619    Lab Status: Final result Specimen: Blood from Arm, Right Updated: 04/07/21 0927     TSH 3RD GENERATON 0 987 uIU/mL     Narrative:      Patients undergoing fluorescein dye angiography may retain small amounts of fluorescein in the body for 48-72 hours post procedure  Samples containing fluorescein can produce falsely depressed TSH values  If the patient had this procedure,a specimen should be resubmitted post fluorescein clearance        Comprehensive metabolic panel [[id number]]  (Abnormal) Collected: 04/07/21 0619    Lab Status: Final result Specimen: Blood from Arm, Right Updated: 04/07/21 0926     Sodium 137 mmol/L      Potassium 4 0 mmol/L      Chloride 105 mmol/L      CO2 23 mmol/L      ANION GAP 9 mmol/L      BUN 7 mg/dL      Creatinine 0 70 mg/dL      Glucose 123 mg/dL      Calcium 8 2 mg/dL      AST 34 U/L      ALT 14 U/L      Alkaline Phosphatase 134 U/L      Total Protein 7 3 g/dL      Albumin 3 5 g/dL      Total Bilirubin 0 20 mg/dL      eGFR 106 ml/min/1 73sq m     Narrative:      [place] guidelines for Chronic Kidney Disease (CKD):     Stage 1 with normal or high GFR (GFR > 90 mL/min/1 73 square meters)    Stage 2 Mild CKD (GFR = 60-89 mL/min/1 73 square meters)    Stage 3A Moderate CKD (GFR = 45-59 mL/min/1 73 square meters)    Stage 3B Moderate CKD (GFR = 30-44 mL/min/1 73 square meters)    Stage 4 Severe CKD (GFR = 15-29 mL/min/1 73 square meters)    Stage 5 End Stage CKD (GFR <15 mL/min/1 73 square meters)  Note: GFR calculation is accurate only with a steady state creatinine    D-dimer, quantitative [[id number]]  (Abnormal) Collected: 04/07/21 0619    Lab Status: Final result Specimen: Blood from Arm, Right Updated: 04/07/21 0834     D-Dimer, Quant 0 99 ug/ml FEU     Rapid HIV 1/2 AB-AG Combo [[id number]]  (Normal) Collected: 04/06/21 2300    Lab Status: Final result Specimen: Blood from Arm, Left Updated: 04/06/21 2332     Rapid HIV 1 AND 2 Non-Reactive     HIV-1 P24 Ag Screen Non-Reactive    Narrative:      Negative for HIV-1 p24 Antigen  Negative for HIV-1 and/or HIV-2 Antibody      Folate [[id number]]  (Abnormal) Collected: 04/06/21 1601    Lab Status: Final result Specimen: Blood from Arm, Left Updated: 04/06/21 2311     Folate >20 0 ng/mL     Iron [[id number]]  (Abnormal) Collected: 04/06/21 1601    Lab Status: Final result Specimen: Blood from Arm, Left Updated: 04/06/21 2311     Iron 14 ug/dL     TIBC [[id number]]  (Normal) Collected: 04/06/21 1601    Lab Status: Final result Specimen: Blood from Arm, Left Updated: 04/06/21 2311     TIBC 330 ug/dL     Vitamin B12 [[id number]]  (Normal) Collected: 04/06/21 1601    Lab Status: Final result Specimen: Blood from Arm, Left Updated: 04/06/21 2311     Vitamin B-12 886 pg/mL     Procalcitonin with AM Reflex [[id number]]  (Normal) Collected: 04/06/21 1601    Lab Status: Final result Specimen: Blood from Arm, Left Updated: 04/06/21 2112     Procalcitonin <0 05 ng/ml     Ferritin [[id number]]  (Normal) Collected: 04/06/21 1601    Lab Status: Final result Specimen: Blood from Arm, Left Updated: 04/06/21 1936     Ferritin 79 ng/mL     C-reactive protein [[id number]]  (Abnormal) Collected: 04/06/21 1601    Lab Status: Final result Specimen: Blood from Arm, Left Updated: 04/06/21 1722      9 mg/L     D-dimer, quantitative [[id number]]  (Abnormal) Collected: 04/06/21 1601    Lab Status: Final result Specimen: Blood from Arm, Left Updated: 04/06/21 1653     D-Dimer, Quant 0 79 ug/ml FEU     Sedimentation rate, automated [[id number]]  (Abnormal) Collected: 04/06/21 1601    Lab Status: Final result Specimen: Blood from Arm, Left Updated: 04/06/21 1645     Sed Rate 118 mm/hour     NT-BNP PRO [[id number]]  (Normal) Collected: 04/06/21 1601    Lab Status: Final result Specimen: Blood from Arm, Left Updated: 04/06/21 1644     NT-proBNP 52 5 pg/mL     Troponin I [[id number]]  (Normal) Collected: 04/06/21 1601    Lab Status: Final result Specimen: Blood from Arm, Left Updated: 04/06/21 1644     Troponin I <0 01 ng/mL     LD,Blood [[id number]]  (Abnormal) Collected: 04/06/21 1601    Lab Status: Final result Specimen: Blood from Arm, Left Updated: 04/06/21 1633     LD 1,060 U/L     CK [[id number]]  (Normal) Collected: 04/06/21 1601    Lab Status: Final result Specimen: Blood from Arm, Left Updated: 04/06/21 1633     Total CK 84 U/L     Lactic acid [[id number]]  (Normal) Collected: 04/06/21 1601    Lab Status: Final result Specimen: Blood from Arm, Left Updated: 04/06/21 1633     LACTIC ACID 1 1 mmol/L     Narrative:      Result may be elevated if tourniquet was used during collection      Comprehensive metabolic panel [[id number]]  (Abnormal) Collected: 04/06/21 1601    Lab Status: Final result Specimen: Blood from Arm, Left Updated: 04/06/21 1633     Sodium 136 mmol/L      Potassium 3 5 mmol/L      Chloride 103 mmol/L      CO2 26 mmol/L      ANION GAP 7 mmol/L      BUN 6 mg/dL      Creatinine 0 76 mg/dL      Glucose 97 mg/dL      Calcium 8 3 mg/dL      AST 31 U/L      ALT 12 U/L      Alkaline Phosphatase 129 U/L      Total Protein 7 3 g/dL      Albumin 3 5 g/dL      Total Bilirubin 0 20 mg/dL      eGFR 96 ml/min/1 73sq m     Narrative:      [place] guidelines for Chronic Kidney Disease (CKD):     Stage 1 with normal or high GFR (GFR > 90 mL/min/1 73 square meters)    Stage 2 Mild CKD (GFR = 60-89 mL/min/1 73 square meters)    Stage 3A Moderate CKD (GFR = 45-59 mL/min/1 73 square meters)    Stage 3B Moderate CKD (GFR = 30-44 mL/min/1 73 square meters)    Stage 4 Severe CKD (GFR = 15-29 mL/min/1 73 square meters)    Stage 5 End Stage CKD (GFR <15 mL/min/1 73 square meters)  Note: GFR calculation is accurate only with a steady state creatinine    Smear Review(Phlebs Do Not Order) [[id number]] Collected: 04/06/21 1601    Lab Status: Final result Specimen: Blood from Arm, Left Updated: 04/06/21 1628     RBC Morphology abnormal     Microcytes Present     Poikilocytes Present     Platelet Estimate Adequate    Protime-INR [[id number]]  (Normal) Collected: 04/06/21 1601    Lab Status: Final result Specimen: Blood from Arm, Left Updated: 04/06/21 1627     Protime 13 6 seconds      INR 1 02    APTT [[id number]]  (Normal) Collected: 04/06/21 1601    Lab Status: Final result Specimen: Blood from Arm, Left Updated: 04/06/21 1627     PTT 36 seconds     Blood gas, Venous [[id number]]  (Abnormal) Collected: 04/06/21 1601    Lab Status: Final result Specimen: Blood from Arm, Left Updated: 04/06/21 1625     pH, Sameer 7 350     pCO2, Sameer 43 0 mm Hg      pO2, Sameer 32 0 mm Hg      HCO3, Sameer 23 7 mmol/L      Base Excess, Sameer -1 9 mmol/L      O2 Content, Sameer 8 5 ml/dL      O2 HGB, VENOUS 58 9 %     CBC and differential [[id number]]  (Abnormal) Collected: 04/06/21 1601    Lab Status: Final result Specimen: Blood from Arm, Left Updated: 04/06/21 1624     WBC 4 30 Thousand/uL      RBC 4 53 Million/uL      Hemoglobin 9 6 g/dL      Hematocrit 31 9 %      MCV 70 fL      MCH 21 3 pg      MCHC 30 2 g/dL      RDW 22 4 %      MPV 7 3 fL      Platelets 811 Thousands/uL      Neutrophils Relative 81 %      Lymphocytes Relative 11 %      Monocytes Relative 5 %      Eosinophils Relative 0 %      Basophils Relative 3 %      Neutrophils Absolute 3 50 Thousands/µL      Lymphocytes Absolute 0 50 Thousands/µL      Monocytes Absolute 0 20 Thousand/µL      Eosinophils Absolute 0 00 Thousand/µL      Basophils Absolute 0 10 Thousands/µL                  XR chest portable   Final Result by Pelon Foss MD (04/07 0945)      Progressive diffuse bilateral infiltrates, likely Covid related                  Workstation performed: [id number]         CTA chest pe study   Final Result by Glo Sheikh MD (04/06 1859)         1  No pulmonary embolism  2   Multifocal parenchymal opacities, most likely multifocal pneumonia/pneumonitis  In the setting of clinically suspected/proven COVID-19, the above lung parenchymal findings on CT indicate high confidence level for COVID-19  Workstation performed: [id number]         XR chest portable   Final Result by Florentino Greene MD (04/06 5813)      Multifocal pneumonia  Workstation performed: [id number]                    Procedures  Procedures         ED Course         MDM     38 yo F with sob  Known covid pos patient, with hypoxia, relieved with O2  Admitted to medicine for further management  Disposition  Final diagnoses:   Shortness of breath   Pneumonia due to COVID-19 virus   Tachypnea     Time reflects when diagnosis was documented in both MDM as applicable and the Disposition within this note     Time User Action Codes Description Comment    4/6/2021  5:47 PM Emry Rodrigues Add [R06 02] Shortness of breath     4/6/2021  5:47 PM Emry Rodrigues Add [U07 1,  J12 82] Pneumonia due to COVID-19 virus     4/6/2021  5:47 PM Emry Rodrigues Modify [R06 02] Shortness of breath     4/6/2021  5:47 PM Emry Rodrigues Modify [U07 1,  J12 82] Pneumonia due to COVID-19 virus     4/6/2021  5:47 PM Emry Rodrigues Add [R06 82] Tachypnea       ED Disposition     ED Disposition Condition Date/Time Comment    Admit Stable Tue Apr 6, 2021  5:46 PM Case was discussed with MOISES and the patient's admission status was agreed to be Admission Status: observation status to the service of Dr David Manrique          Follow-up Information    None Discharge Medication List as of 4/7/2021 10:20 AM      CONTINUE these medications which have NOT CHANGED    Details   albuterol (PROVENTIL HFA,VENTOLIN HFA) 90 mcg/act inhaler Inhale 2 puffs every 6 (six) hours as needed for wheezing or shortness of breath, Starting Sun 4/4/2021, Normal      atorvastatin (LIPITOR) 20 mg tablet TK 1 T PO QHS, Historical Med      Calcium Citrate 200 MG TABS 1 tablet 3 (three) times a day, Starting Wed 1/17/2018, Historical Med      Cyanocobalamin ER 1000 MCG TBCR take one tab daily, Historical Med      ferrous sulfate 324 (65 Fe) mg Take 1 tablet (324 mg total) by mouth daily before breakfast, Starting Thu 9/6/2018, No Print      levothyroxine 137 mcg tablet TAKE ONE TABLET BY MOUTH EVERY MORNING ON EMPTY STOMACH, Historical Med      Multiple Vitamins-Minerals (MULTIVITAMIN ADULT PO) every 24 hours, Starting Wed 1/17/2018, Historical Med      pantoprazole (PROTONIX) 40 mg tablet Take 1 tablet (40 mg total) by mouth every 24 hours, Starting Thu 12/3/2020, Normal      benzonatate (TESSALON PERLES) 100 mg capsule Take 1 capsule (100 mg total) by mouth 3 (three) times a day as needed for cough, Starting Wed 1/15/2020, Normal      rosuvastatin (CRESTOR) 10 MG tablet Take 10 mg by mouth daily, Starting Sat 11/28/2020, Until Sun 11/28/2021, Historical Med      thiamine (VITAMIN B1) 50 mg tablet Take 1 tablet (50 mg total) by mouth daily, Starting Thu 9/6/2018, No Print      topiramate (TOPAMAX) 50 MG tablet Take 1 tablet (50 mg total) by mouth 2 (two) times a day, Starting Thu 12/3/2020, Normal      traZODone (DESYREL) 50 mg tablet Take 1 tablet (50 mg total) by mouth daily at bedtime as needed for sleep, Starting Wed 1/8/2020, Normal           No discharge procedures on file      PDMP Review     None          ED Provider  Electronically Signed by           Marquis Erin MD  04/09/21 9817

## 2021-04-07 ENCOUNTER — APPOINTMENT (OUTPATIENT)
Dept: RADIOLOGY | Facility: HOSPITAL | Age: 46
End: 2021-04-07
Payer: COMMERCIAL

## 2021-04-07 ENCOUNTER — HOSPITAL ENCOUNTER (INPATIENT)
Facility: HOSPITAL | Age: 46
LOS: 9 days | Discharge: HOME HEALTH | DRG: 177 | End: 2021-04-16
Attending: INTERNAL MEDICINE | Admitting: INTERNAL MEDICINE
Payer: COMMERCIAL

## 2021-04-07 VITALS
RESPIRATION RATE: 30 BRPM | TEMPERATURE: 98.1 F | BODY MASS INDEX: 44.47 KG/M2 | WEIGHT: 276.68 LBS | DIASTOLIC BLOOD PRESSURE: 68 MMHG | HEIGHT: 66 IN | SYSTOLIC BLOOD PRESSURE: 118 MMHG | OXYGEN SATURATION: 95 % | HEART RATE: 84 BPM

## 2021-04-07 DIAGNOSIS — A41.9 SEPSIS (HCC): ICD-10-CM

## 2021-04-07 DIAGNOSIS — U07.1 COVID-19 VIRUS INFECTION: Primary | ICD-10-CM

## 2021-04-07 DIAGNOSIS — J96.01 ACUTE RESPIRATORY FAILURE WITH HYPOXIA (HCC): ICD-10-CM

## 2021-04-07 LAB
ABO GROUP BLD: NORMAL
ALBUMIN SERPL BCP-MCNC: 3.5 G/DL (ref 3–5.2)
ALP SERPL-CCNC: 134 U/L (ref 43–122)
ALT SERPL W P-5'-P-CCNC: 14 U/L
ANION GAP SERPL CALCULATED.3IONS-SCNC: 9 MMOL/L (ref 5–14)
AST SERPL W P-5'-P-CCNC: 34 U/L (ref 14–36)
BACTERIA UR QL AUTO: ABNORMAL /HPF
BILIRUB SERPL-MCNC: 0.2 MG/DL
BILIRUB UR QL STRIP: NEGATIVE
BUN SERPL-MCNC: 7 MG/DL (ref 5–25)
CALCIUM SERPL-MCNC: 8.2 MG/DL (ref 8.4–10.2)
CHLORIDE SERPL-SCNC: 105 MMOL/L (ref 97–108)
CLARITY UR: CLEAR
CO2 SERPL-SCNC: 23 MMOL/L (ref 22–30)
COLOR UR: ABNORMAL
CREAT SERPL-MCNC: 0.7 MG/DL (ref 0.6–1.2)
CRP SERPL QL: 251.9 MG/L
D DIMER PPP FEU-MCNC: 0.99 UG/ML FEU
ERYTHROCYTE [DISTWIDTH] IN BLOOD BY AUTOMATED COUNT: 22.6 %
FERRITIN SERPL-MCNC: 90 NG/ML (ref 8–388)
GFR SERPL CREATININE-BSD FRML MDRD: 106 ML/MIN/1.73SQ M
GLUCOSE SERPL-MCNC: 123 MG/DL (ref 70–99)
GLUCOSE UR STRIP-MCNC: NEGATIVE MG/DL
HBV CORE AB SER QL: NORMAL
HBV CORE IGM SER QL: NORMAL
HBV SURFACE AG SER QL: NORMAL
HCT VFR BLD AUTO: 31.6 % (ref 36–46)
HCT VFR BLD AUTO: 38.1 % (ref 34.8–46.1)
HCV AB SER QL: NORMAL
HGB BLD-MCNC: 11 G/DL (ref 11.5–15.4)
HGB BLD-MCNC: 9.8 G/DL (ref 12–16)
HGB UR QL STRIP.AUTO: ABNORMAL
KETONES UR STRIP-MCNC: NEGATIVE MG/DL
LEUKOCYTE ESTERASE UR QL STRIP: NEGATIVE
MCH RBC QN AUTO: 21.9 PG (ref 26–34)
MCHC RBC AUTO-ENTMCNC: 31.1 G/DL (ref 31–36)
MCV RBC AUTO: 70 FL (ref 80–100)
MUCOUS THREADS UR QL AUTO: ABNORMAL
NITRITE UR QL STRIP: NEGATIVE
NON-SQ EPI CELLS URNS QL MICRO: ABNORMAL /HPF
PH UR STRIP.AUTO: 6 [PH]
PLATELET # BLD AUTO: 386 THOUSANDS/UL (ref 150–450)
PMV BLD AUTO: 7.7 FL (ref 8.9–12.7)
POTASSIUM SERPL-SCNC: 4 MMOL/L (ref 3.6–5)
PROCALCITONIN SERPL-MCNC: 0.12 NG/ML
PROT SERPL-MCNC: 7.3 G/DL (ref 5.9–8.4)
PROT UR STRIP-MCNC: NEGATIVE MG/DL
RBC # BLD AUTO: 4.49 MILLION/UL (ref 4–5.2)
RBC #/AREA URNS AUTO: ABNORMAL /HPF
RH BLD: POSITIVE
SODIUM SERPL-SCNC: 137 MMOL/L (ref 137–147)
SP GR UR STRIP.AUTO: 1.01 (ref 1–1.03)
TSH SERPL DL<=0.05 MIU/L-ACNC: 0.99 UIU/ML (ref 0.47–4.68)
UROBILINOGEN UR QL STRIP.AUTO: 0.2 E.U./DL
WBC # BLD AUTO: 5.3 THOUSAND/UL (ref 4.5–11)
WBC #/AREA URNS AUTO: ABNORMAL /HPF

## 2021-04-07 PROCEDURE — XW033H5 INTRODUCTION OF TOCILIZUMAB INTO PERIPHERAL VEIN, PERCUTANEOUS APPROACH, NEW TECHNOLOGY GROUP 5: ICD-10-PCS | Performed by: INTERNAL MEDICINE

## 2021-04-07 PROCEDURE — 99223 1ST HOSP IP/OBS HIGH 75: CPT | Performed by: INTERNAL MEDICINE

## 2021-04-07 PROCEDURE — 86140 C-REACTIVE PROTEIN: CPT | Performed by: STUDENT IN AN ORGANIZED HEALTH CARE EDUCATION/TRAINING PROGRAM

## 2021-04-07 PROCEDURE — 80053 COMPREHEN METABOLIC PANEL: CPT | Performed by: STUDENT IN AN ORGANIZED HEALTH CARE EDUCATION/TRAINING PROGRAM

## 2021-04-07 PROCEDURE — 94760 N-INVAS EAR/PLS OXIMETRY 1: CPT

## 2021-04-07 PROCEDURE — 82728 ASSAY OF FERRITIN: CPT | Performed by: STUDENT IN AN ORGANIZED HEALTH CARE EDUCATION/TRAINING PROGRAM

## 2021-04-07 PROCEDURE — 85379 FIBRIN DEGRADATION QUANT: CPT | Performed by: STUDENT IN AN ORGANIZED HEALTH CARE EDUCATION/TRAINING PROGRAM

## 2021-04-07 PROCEDURE — 99255 IP/OBS CONSLTJ NEW/EST HI 80: CPT | Performed by: INTERNAL MEDICINE

## 2021-04-07 PROCEDURE — 86901 BLOOD TYPING SEROLOGIC RH(D): CPT | Performed by: STUDENT IN AN ORGANIZED HEALTH CARE EDUCATION/TRAINING PROGRAM

## 2021-04-07 PROCEDURE — 71045 X-RAY EXAM CHEST 1 VIEW: CPT

## 2021-04-07 PROCEDURE — 85027 COMPLETE CBC AUTOMATED: CPT | Performed by: STUDENT IN AN ORGANIZED HEALTH CARE EDUCATION/TRAINING PROGRAM

## 2021-04-07 PROCEDURE — 85018 HEMOGLOBIN: CPT | Performed by: INTERNAL MEDICINE

## 2021-04-07 PROCEDURE — 86900 BLOOD TYPING SEROLOGIC ABO: CPT | Performed by: STUDENT IN AN ORGANIZED HEALTH CARE EDUCATION/TRAINING PROGRAM

## 2021-04-07 PROCEDURE — 85014 HEMATOCRIT: CPT | Performed by: INTERNAL MEDICINE

## 2021-04-07 PROCEDURE — 84443 ASSAY THYROID STIM HORMONE: CPT | Performed by: STUDENT IN AN ORGANIZED HEALTH CARE EDUCATION/TRAINING PROGRAM

## 2021-04-07 PROCEDURE — XW033E5 INTRODUCTION OF REMDESIVIR ANTI-INFECTIVE INTO PERIPHERAL VEIN, PERCUTANEOUS APPROACH, NEW TECHNOLOGY GROUP 5: ICD-10-PCS | Performed by: INTERNAL MEDICINE

## 2021-04-07 PROCEDURE — 84145 PROCALCITONIN (PCT): CPT | Performed by: EMERGENCY MEDICINE

## 2021-04-07 PROCEDURE — 81001 URINALYSIS AUTO W/SCOPE: CPT | Performed by: INTERNAL MEDICINE

## 2021-04-07 RX ORDER — DOXYCYCLINE HYCLATE 100 MG/1
100 CAPSULE ORAL EVERY 12 HOURS
Status: DISCONTINUED | OUTPATIENT
Start: 2021-04-07 | End: 2021-04-09

## 2021-04-07 RX ORDER — AMOXICILLIN 250 MG
1 CAPSULE ORAL
Status: CANCELLED | OUTPATIENT
Start: 2021-04-07

## 2021-04-07 RX ORDER — ACETAMINOPHEN 325 MG/1
975 TABLET ORAL EVERY 8 HOURS SCHEDULED
Status: CANCELLED | OUTPATIENT
Start: 2021-04-07

## 2021-04-07 RX ORDER — POLYETHYLENE GLYCOL 3350 17 G/17G
17 POWDER, FOR SOLUTION ORAL DAILY PRN
Status: DISCONTINUED | OUTPATIENT
Start: 2021-04-07 | End: 2021-04-16 | Stop reason: HOSPADM

## 2021-04-07 RX ORDER — CEFTRIAXONE 1 G/50ML
1000 INJECTION, SOLUTION INTRAVENOUS EVERY 24 HOURS
Status: CANCELLED | OUTPATIENT
Start: 2021-04-07

## 2021-04-07 RX ORDER — FERROUS SULFATE 325(65) MG
325 TABLET ORAL
Status: DISCONTINUED | OUTPATIENT
Start: 2021-04-08 | End: 2021-04-16 | Stop reason: HOSPADM

## 2021-04-07 RX ORDER — LANOLIN ALCOHOL/MO/W.PET/CERES
50 CREAM (GRAM) TOPICAL DAILY
Status: CANCELLED | OUTPATIENT
Start: 2021-04-07

## 2021-04-07 RX ORDER — ZINC SULFATE 50(220)MG
220 CAPSULE ORAL
Status: COMPLETED | OUTPATIENT
Start: 2021-04-08 | End: 2021-04-13

## 2021-04-07 RX ORDER — ATORVASTATIN CALCIUM 40 MG/1
40 TABLET, FILM COATED ORAL
Status: DISCONTINUED | OUTPATIENT
Start: 2021-04-07 | End: 2021-04-16 | Stop reason: HOSPADM

## 2021-04-07 RX ORDER — ZINC SULFATE 50(220)MG
220 CAPSULE ORAL
Status: CANCELLED | OUTPATIENT
Start: 2021-04-08 | End: 2021-04-14

## 2021-04-07 RX ORDER — DEXAMETHASONE SODIUM PHOSPHATE 4 MG/ML
6 INJECTION, SOLUTION INTRA-ARTICULAR; INTRALESIONAL; INTRAMUSCULAR; INTRAVENOUS; SOFT TISSUE EVERY 24 HOURS
Status: CANCELLED | OUTPATIENT
Start: 2021-04-07 | End: 2021-04-16

## 2021-04-07 RX ORDER — CALCIUM CARBONATE 500(1250)
1 TABLET ORAL
Status: DISCONTINUED | OUTPATIENT
Start: 2021-04-07 | End: 2021-04-16 | Stop reason: HOSPADM

## 2021-04-07 RX ORDER — CEFTRIAXONE 1 G/50ML
1000 INJECTION, SOLUTION INTRAVENOUS EVERY 24 HOURS
Status: DISCONTINUED | OUTPATIENT
Start: 2021-04-07 | End: 2021-04-09

## 2021-04-07 RX ORDER — GUAIFENESIN 600 MG
600 TABLET, EXTENDED RELEASE 12 HR ORAL EVERY 12 HOURS SCHEDULED
Status: CANCELLED | OUTPATIENT
Start: 2021-04-07

## 2021-04-07 RX ORDER — CALCIUM CARBONATE 500(1250)
1 TABLET ORAL
Status: CANCELLED | OUTPATIENT
Start: 2021-04-07

## 2021-04-07 RX ORDER — MELATONIN
2000 DAILY
Status: DISCONTINUED | OUTPATIENT
Start: 2021-04-08 | End: 2021-04-16 | Stop reason: HOSPADM

## 2021-04-07 RX ORDER — GUAIFENESIN 600 MG
600 TABLET, EXTENDED RELEASE 12 HR ORAL EVERY 12 HOURS SCHEDULED
Status: DISCONTINUED | OUTPATIENT
Start: 2021-04-07 | End: 2021-04-10

## 2021-04-07 RX ORDER — BENZONATATE 100 MG/1
100 CAPSULE ORAL 3 TIMES DAILY PRN
Status: DISCONTINUED | OUTPATIENT
Start: 2021-04-07 | End: 2021-04-16 | Stop reason: HOSPADM

## 2021-04-07 RX ORDER — SODIUM CHLORIDE 9 MG/ML
165 INJECTION, SOLUTION INTRAVENOUS CONTINUOUS
Status: DISCONTINUED | OUTPATIENT
Start: 2021-04-07 | End: 2021-04-07

## 2021-04-07 RX ORDER — ATORVASTATIN CALCIUM 40 MG/1
40 TABLET, FILM COATED ORAL
Status: CANCELLED | OUTPATIENT
Start: 2021-04-07

## 2021-04-07 RX ORDER — LANOLIN ALCOHOL/MO/W.PET/CERES
50 CREAM (GRAM) TOPICAL DAILY
Status: DISCONTINUED | OUTPATIENT
Start: 2021-04-08 | End: 2021-04-16 | Stop reason: HOSPADM

## 2021-04-07 RX ORDER — ACETAMINOPHEN 325 MG/1
975 TABLET ORAL EVERY 8 HOURS SCHEDULED
Status: DISCONTINUED | OUTPATIENT
Start: 2021-04-07 | End: 2021-04-16 | Stop reason: HOSPADM

## 2021-04-07 RX ORDER — FAMOTIDINE 20 MG/1
20 TABLET, FILM COATED ORAL 2 TIMES DAILY
Status: DISCONTINUED | OUTPATIENT
Start: 2021-04-07 | End: 2021-04-16 | Stop reason: HOSPADM

## 2021-04-07 RX ORDER — FERROUS SULFATE 325(65) MG
325 TABLET ORAL
Status: CANCELLED | OUTPATIENT
Start: 2021-04-07

## 2021-04-07 RX ORDER — ALBUTEROL SULFATE 90 UG/1
2 AEROSOL, METERED RESPIRATORY (INHALATION) EVERY 6 HOURS PRN
Status: DISCONTINUED | OUTPATIENT
Start: 2021-04-07 | End: 2021-04-16 | Stop reason: HOSPADM

## 2021-04-07 RX ORDER — MELATONIN
2000 DAILY
Status: CANCELLED | OUTPATIENT
Start: 2021-04-07

## 2021-04-07 RX ORDER — ASCORBIC ACID 500 MG
1000 TABLET ORAL EVERY 12 HOURS SCHEDULED
Status: CANCELLED | OUTPATIENT
Start: 2021-04-07 | End: 2021-04-13

## 2021-04-07 RX ORDER — DOXYCYCLINE HYCLATE 100 MG/1
100 CAPSULE ORAL EVERY 12 HOURS
Status: CANCELLED | OUTPATIENT
Start: 2021-04-07

## 2021-04-07 RX ORDER — AMOXICILLIN 250 MG
1 CAPSULE ORAL
Status: DISCONTINUED | OUTPATIENT
Start: 2021-04-07 | End: 2021-04-16 | Stop reason: HOSPADM

## 2021-04-07 RX ORDER — GUAIFENESIN 600 MG
600 TABLET, EXTENDED RELEASE 12 HR ORAL EVERY 12 HOURS SCHEDULED
Status: DISCONTINUED | OUTPATIENT
Start: 2021-04-07 | End: 2021-04-07 | Stop reason: HOSPADM

## 2021-04-07 RX ORDER — DEXAMETHASONE SODIUM PHOSPHATE 4 MG/ML
6 INJECTION, SOLUTION INTRA-ARTICULAR; INTRALESIONAL; INTRAMUSCULAR; INTRAVENOUS; SOFT TISSUE EVERY 24 HOURS
Status: DISCONTINUED | OUTPATIENT
Start: 2021-04-07 | End: 2021-04-08

## 2021-04-07 RX ORDER — BENZONATATE 100 MG/1
100 CAPSULE ORAL 3 TIMES DAILY PRN
Status: CANCELLED | OUTPATIENT
Start: 2021-04-07

## 2021-04-07 RX ORDER — ALBUTEROL SULFATE 90 UG/1
2 AEROSOL, METERED RESPIRATORY (INHALATION) EVERY 6 HOURS PRN
Status: CANCELLED | OUTPATIENT
Start: 2021-04-07

## 2021-04-07 RX ORDER — AMOXICILLIN 250 MG
1 CAPSULE ORAL
Status: DISCONTINUED | OUTPATIENT
Start: 2021-04-07 | End: 2021-04-07 | Stop reason: HOSPADM

## 2021-04-07 RX ORDER — FUROSEMIDE 10 MG/ML
20 INJECTION INTRAMUSCULAR; INTRAVENOUS ONCE
Status: COMPLETED | OUTPATIENT
Start: 2021-04-07 | End: 2021-04-07

## 2021-04-07 RX ORDER — FAMOTIDINE 20 MG/1
20 TABLET, FILM COATED ORAL 2 TIMES DAILY
Status: CANCELLED | OUTPATIENT
Start: 2021-04-07

## 2021-04-07 RX ORDER — ASCORBIC ACID 500 MG
1000 TABLET ORAL EVERY 12 HOURS SCHEDULED
Status: COMPLETED | OUTPATIENT
Start: 2021-04-07 | End: 2021-04-13

## 2021-04-07 RX ADMIN — GUAIFENESIN 600 MG: 600 TABLET ORAL at 05:19

## 2021-04-07 RX ADMIN — ENOXAPARIN SODIUM 40 MG: 40 INJECTION SUBCUTANEOUS at 20:04

## 2021-04-07 RX ADMIN — ACETAMINOPHEN 975 MG: 325 TABLET ORAL at 14:01

## 2021-04-07 RX ADMIN — REMDESIVIR 100 MG: 100 INJECTION, POWDER, LYOPHILIZED, FOR SOLUTION INTRAVENOUS at 20:55

## 2021-04-07 RX ADMIN — ACETAMINOPHEN 975 MG: 325 TABLET ORAL at 23:28

## 2021-04-07 RX ADMIN — CYANOCOBALAMIN TAB 1000 MCG 1000 MCG: 1000 TAB at 09:04

## 2021-04-07 RX ADMIN — FUROSEMIDE 20 MG: 10 INJECTION, SOLUTION INTRAVENOUS at 05:18

## 2021-04-07 RX ADMIN — FAMOTIDINE 20 MG: 20 TABLET ORAL at 17:26

## 2021-04-07 RX ADMIN — ATORVASTATIN CALCIUM 40 MG: 40 TABLET, FILM COATED ORAL at 23:28

## 2021-04-07 RX ADMIN — ALBUTEROL SULFATE 2 PUFF: 90 AEROSOL, METERED RESPIRATORY (INHALATION) at 05:20

## 2021-04-07 RX ADMIN — CHOLECALCIFEROL (VITAMIN D3) 2000 UNITS: 25 TAB ORAL at 09:03

## 2021-04-07 RX ADMIN — THIAMINE HCL TAB 100 MG 50 MG: 100 TAB at 09:02

## 2021-04-07 RX ADMIN — SODIUM CHLORIDE 500 ML: 0.9 INJECTION, SOLUTION INTRAVENOUS at 01:31

## 2021-04-07 RX ADMIN — FAMOTIDINE 20 MG: 20 TABLET ORAL at 09:03

## 2021-04-07 RX ADMIN — BENZONATATE 100 MG: 100 CAPSULE ORAL at 01:55

## 2021-04-07 RX ADMIN — GUAIFENESIN 600 MG: 600 TABLET, EXTENDED RELEASE ORAL at 20:04

## 2021-04-07 RX ADMIN — DEXAMETHASONE SODIUM PHOSPHATE 6 MG: 4 INJECTION, SOLUTION INTRAMUSCULAR; INTRAVENOUS at 20:04

## 2021-04-07 RX ADMIN — OXYCODONE HYDROCHLORIDE AND ACETAMINOPHEN 1000 MG: 500 TABLET ORAL at 09:03

## 2021-04-07 RX ADMIN — BENZONATATE 100 MG: 100 CAPSULE ORAL at 05:19

## 2021-04-07 RX ADMIN — DOXYCYCLINE 100 MG: 100 CAPSULE ORAL at 20:04

## 2021-04-07 RX ADMIN — CEFTRIAXONE 1000 MG: 1 INJECTION, SOLUTION INTRAVENOUS at 20:13

## 2021-04-07 RX ADMIN — FERROUS SULFATE TAB 325 MG (65 MG ELEMENTAL FE) 325 MG: 325 (65 FE) TAB at 09:02

## 2021-04-07 RX ADMIN — SODIUM CHLORIDE 165 ML/HR: 0.9 INJECTION, SOLUTION INTRAVENOUS at 01:48

## 2021-04-07 RX ADMIN — LEVOTHYROXINE SODIUM 137 MCG: 25 TABLET ORAL at 05:18

## 2021-04-07 RX ADMIN — CALCIUM 1 TABLET: 500 TABLET ORAL at 14:01

## 2021-04-07 RX ADMIN — OXYCODONE HYDROCHLORIDE AND ACETAMINOPHEN 1000 MG: 500 TABLET ORAL at 20:04

## 2021-04-07 RX ADMIN — ACETAMINOPHEN 975 MG: 325 TABLET, FILM COATED ORAL at 05:19

## 2021-04-07 RX ADMIN — DOCUSATE SODIUM AND SENNOSIDES 1 TABLET: 8.6; 5 TABLET, FILM COATED ORAL at 23:28

## 2021-04-07 RX ADMIN — ENOXAPARIN SODIUM 40 MG: 40 INJECTION SUBCUTANEOUS at 09:04

## 2021-04-07 RX ADMIN — TOCILIZUMAB 800 MG: 20 INJECTION, SOLUTION, CONCENTRATE INTRAVENOUS at 17:26

## 2021-04-07 NOTE — CONSULTS
Consultation - Pulmonary Medicine   Pamela Umaña 39 y o  female MRN: [id number]  Unit/Bed#: [address] [place] [address] Encounter: [id number]      Assessment and Plan:    1  Acute hypoxic respiratory failure: The patient has acute hypoxic respiratory failure secondary to COVID pneumonia  Currently she is needing up to 10 liters/minute of oxygen supplementation and she is saturating well  Her oxygen levels can be titrated down  for  sats of  94% and above  2  COVID pneumonia:  She tested for COVID infection on 03/30/2021  Her CT scan shows extensive bilateral pneumonia  She has been started on Decadron, Actemra and remdesivir  She is out of window for convalescent plasma  Her D-dimer is 0 99 and she is on prophylactic Lovenox  We will follow the inflammatory markers  She is on empiric antibiotic coverage of ceftriaxone and doxycycline  3  Morbid obesity  She is morbidly obese  She had  bariatric surgery before  She would need to be screened for sleep apnea  I had a long discussion with the patient and answered all her questions  I discussed the case with Dr Alfredo Guallap, the patient's hospitalist     Clive butler for allowing me to participate in the care of the patient  History of Present Illness   Physician Requesting Consult: Eben Serrano MD  Reason for Consult / Principal Problem:  Shortness of breath COVID pneumonia  Hx and PE limited by:  None  HPI: Brigitte Hampton is a 39y o  year old female who for yeast transferred here from [facility] with COVID pneumonia and hypoxic respiratory failure requiring significant oxygen supplementation  Currently she states that her shortness of breath is much better and is comfortable at rest   She is needing 10 liters/minute of oxygen supplementation  She has occasional nonproductive cough and no wheezing  No chest pain or palpitations  She has history of fever and chills and diarrhea  She feels tired    She denies any nausea or vomiting  Has had some headache  No previous history of asthma or COPD  She works as a   She tested positive for COVID on 03/30/2021 and she had the onset of symptoms 1 day earlier  She believes that she got it from her workplace  She has been started on steroid, prophylactic Lovenox and remdisivir  She is also scheduled for Actemra  She is out of window for convalescent plasma  She is a never smoker and do not consume alcohol  Inpatient consult to Pulmonology  Consult performed by: Chong Arrington MD  Consult ordered by: Damaso Jorgensen MD          Review of Systems   Constitutional: Positive for chills, fatigue and fever  HENT: Negative for hearing loss, nosebleeds, rhinorrhea, sore throat, trouble swallowing and voice change  Eyes: Negative for visual disturbance  Respiratory: Positive for cough and shortness of breath  Negative for chest tightness, wheezing and stridor  Cardiovascular: Negative for chest pain, palpitations and leg swelling  Gastrointestinal: Positive for diarrhea  Negative for abdominal pain, nausea and vomiting  Black stools   Endocrine: Negative for polyuria  Genitourinary: Negative for dysuria, frequency and urgency  Musculoskeletal: Negative for arthralgias and myalgias  Skin: Negative for rash  Allergic/Immunologic: Negative for environmental allergies  Neurological: Positive for headaches  Negative for dizziness and syncope  Psychiatric/Behavioral: Positive for sleep disturbance  The patient is nervous/anxious          Historical Information   Past Medical History:   Diagnosis Date    Abscess of labia     Anemia 4/6/2021    Anxiety     Bipolar disorder (HCC)     Breast lump     Frequent headaches     Hemorrhoids     Hypothyroidism 7/1/2013    Migraine     Morbid obesity (HCC) 7/1/2013    Obesity     Psychotic disorder ([facility] [address] )      Past Surgical History:   Procedure Laterality Date    BARIATRIC SURGERY      BREAST BIOPSY Right 1999    BREAST LUMPECTOMY Left     benign    CHOLECYSTECTOMY      GALLBLADDER SURGERY       Social History   Social History     Substance and Sexual Activity   Alcohol Use Never    Frequency: Never     Social History     Substance and Sexual Activity   Drug Use No     E-Cigarette/Vaping    E-Cigarette Use Never User      E-Cigarette/Vaping Substances    Nicotine No     CBD No      Social History     Tobacco Use   Smoking Status Former Smoker    Types: Cigarettes    Quit date:     Years since quittin 2   Smokeless Tobacco Former User     Occupational History:    Family History: No history of COVID  No history of asthma or COPD  Mother had liver cancer  Meds/Allergies   all current active meds have been reviewed    Allergies   Allergen Reactions    No Active Allergies        Objective   Vitals: Blood pressure 105/60, pulse 84, temperature 98 2 °F (36 8 °C), temperature source Temporal, resp  rate (!) 24, height 5' 6" (1 676 m), weight 126 kg (276 lb 14 4 oz), last menstrual period 2021, SpO2 100 %  ,Body mass index is 44 69 kg/m²  Intake/Output Summary (Last 24 hours) at 2021 1603  Last data filed at 2021 1230  Gross per 24 hour   Intake 480 ml   Output --   Net 480 ml     Invasive Devices     Peripheral Intravenous Line            Peripheral IV 21 Left Antecubital 1 day                Physical Exam  Vitals signs reviewed  Constitutional:       General: She is not in acute distress  Appearance: She is obese  She is not ill-appearing, toxic-appearing or diaphoretic  HENT:      Head: Normocephalic  Eyes:      General: No scleral icterus  Conjunctiva/sclera: Conjunctivae normal    Cardiovascular:      Rate and Rhythm: Normal rate and regular rhythm  Pulmonary:      Effort: Pulmonary effort is normal  No respiratory distress  Breath sounds: No stridor   No wheezing, rhonchi or rales (Occasional crackles bilaterally more on the right side)  Abdominal:      General: Bowel sounds are normal       Palpations: Abdomen is soft  Tenderness: There is no abdominal tenderness  There is no guarding  Musculoskeletal:      Right lower leg: No edema  Left lower leg: No edema  Lymphadenopathy:      Cervical: No cervical adenopathy  Skin:     General: Skin is warm  Coloration: Skin is not jaundiced or pale  Neurological:      Mental Status: She is alert and oriented to person, place, and time  Psychiatric:         Mood and Affect: Mood normal          Behavior: Behavior normal          Thought Content: Thought content normal          Judgment: Judgment normal          Lab Results: I have personally reviewed pertinent lab results  Potassium 4 creatinine normal   White cell count 5 30  Hemoglobin 11   9  D-dimer 0 99  Imaging Studies: I have personally reviewed pertinent reports  The CT scan shows bilateral extensive airspace disease  EKG, Pathology, and Other Studies: I have personally reviewed pertinent reports  VTE Prophylaxis: Enoxaparin (Lovenox)    Code Status: Level 1 - Full Code  Advance Directive and Living Will:      Power of :    POLST:      Counseling/Coordination of Care: Total floor / unit time spent today Fifty-five minutes  Greater than 50% of total time was spent with the patient and / or family counseling and / or coordination of care  A description of the counseling / coordination of care: Discussion with the patient regarding respiratory failure COVID pneumonia plan of treatment  Discussion with the primary team regarding treatment plan  Discussion with nursing staff

## 2021-04-07 NOTE — ASSESSMENT & PLAN NOTE
BMI 44  Weight 125 kg  History of gastric bypass surgery (2017) with for maintenance of weight loss there after  Will continue admission with bariatric diet with low fat  Continue vitamin supplementation inclusive of vitamin-C, iron, B12

## 2021-04-07 NOTE — ASSESSMENT & PLAN NOTE
Chronic - microcytic hypochromic  Hemoglobin 9 6    Will continue home medications supplementation of iron sulfate 325 mg daily, vitamin B12 1000 mcg daily, vitamin-C  Will do anemia screen with iron panel, folate and B12 levels  Daily CBC as per COVID management protocol

## 2021-04-07 NOTE — ASSESSMENT & PLAN NOTE
Symptoms started 03/29/2021-headache, fever, dry cough, shortness of breath, diarrhea  Likely contracted from work environment at where [employer]  Tested positive 03/30/2021  Failed outpatient observation and management thus referred to hospital for admission  Sirs positive with tachypnea, tachycardia and mild fever  In the ED, patient received 500 cc normal saline bolus  Chest x-ray demonstrating positive features in keeping with COVID and CT PE study negative for pulmonary embolism  D-dimer elevated 0 79  CRP elevated 138  Leukopenia mild 4 3  Good renal function  Hypoxic 88% on room air requires 4 L of oxygen via nasal cannula  Patient admitted for management on moderate COVID pathway  Daily CBC, CMP and trending of procalcitonin, ferritin, CRP  Antibiotics-ceftriaxone 1 g daily, doxycycline 100 mg p o  b i d  Vitamin supplementation, acid suppression and statin therapy ordered  IV dexamethasone 6 mg daily  Remdesivir 200mg x 1 then 100mg daily x 4  Continue Oxygen supplementation to maintain Saturation > 91% - currently 4L via [place]  IVF 200mL/hr NS  Incentive Spirometry, Self proning encouraged and PT/OT for eval and treatment as needed

## 2021-04-07 NOTE — ED NOTES
PT reports a worsening headache on the apex of her head  Blood pressure has dropped to 91/61 with a manual cuff, bilateral radial pulses are moderate/strong  Pt denies any dizziness, N/V, and is alert and oriented x 4  This was discussed with admitting provider        [place], RN  04/07/21 6028

## 2021-04-07 NOTE — ASSESSMENT & PLAN NOTE
History of Hashimoto's Thyroiditis  Home Medication: Levothyroxine 137 mcg daily  Last TSH on 12/3/2020: 17 6    - Continue Levothyroxine daily   - TSH pending

## 2021-04-07 NOTE — PLAN OF CARE
Problem: Potential for Falls  Goal: Patient will remain free of falls  Description: INTERVENTIONS:  - Assess patient frequently for physical needs  -  Identify cognitive and physical deficits and behaviors that affect risk of falls  -  [place] fall precautions as indicated by assessment   - Educate patient/family on patient safety including physical limitations  - Instruct patient to call for assistance with activity based on assessment  - Modify environment to reduce risk of injury  - Consider OT/PT consult to assist with strengthening/mobility  Outcome: Progressing     Problem: NEUROSENSORY - ADULT  Goal: Achieves stable or improved neurological status  Description: INTERVENTIONS  - Monitor and report changes in neurological status  - Monitor vital signs such as temperature, blood pressure, glucose, and any other labs ordered   - Initiate measures to prevent increased intracranial pressure  - Monitor for seizure activity and implement precautions if appropriate      Outcome: Progressing  Goal: Remains free of injury related to seizures activity  Description: INTERVENTIONS  - Maintain airway, patient safety  and administer oxygen as ordered  - Monitor patient for seizure activity, document and report duration and description of seizure to physician/advanced practitioner  - If seizure occurs,  ensure patient safety during seizure  - Reorient patient post seizure  - Seizure pads on all 4 side rails  - Instruct patient/family to notify RN of any seizure activity including if an aura is experienced  - Instruct patient/family to call for assistance with activity based on nursing assessment  - Administer anti-seizure medications if ordered    Outcome: Progressing  Goal: Achieves maximal functionality and self care  Description: INTERVENTIONS  - Monitor swallowing and airway patency with patient fatigue and changes in neurological status  - Encourage and assist patient to increase activity and self care     - Encourage visually impaired, hearing impaired and aphasic patients to use assistive/communication devices  Outcome: Progressing     Problem: CARDIOVASCULAR - ADULT  Goal: Maintains optimal cardiac output and hemodynamic stability  Description: INTERVENTIONS:  - Monitor I/O, vital signs and rhythm  - Monitor for S/S and trends of decreased cardiac output  - Administer and titrate ordered vasoactive medications to optimize hemodynamic stability  - Assess quality of pulses, skin color and temperature  - Assess for signs of decreased coronary artery perfusion  - Instruct patient to report change in severity of symptoms  Outcome: Progressing  Goal: Absence of cardiac dysrhythmias or at baseline rhythm  Description: INTERVENTIONS:  - Continuous cardiac monitoring, vital signs, obtain 12 lead EKG if ordered  - Administer antiarrhythmic and heart rate control medications as ordered  - Monitor electrolytes and administer replacement therapy as ordered  Outcome: Progressing     Problem: RESPIRATORY - ADULT  Goal: Achieves optimal ventilation and oxygenation  Description: INTERVENTIONS:  - Assess for changes in respiratory status  - Assess for changes in mentation and behavior  - Position to facilitate oxygenation and minimize respiratory effort  - Oxygen administered by appropriate delivery if ordered  - Initiate smoking cessation education as indicated  - Encourage broncho-pulmonary hygiene including cough, deep breathe, Incentive Spirometry  - Assess the need for suctioning and aspirate as needed  - Assess and instruct to report SOB or any respiratory difficulty  - Respiratory Therapy support as indicated  Outcome: Progressing     Problem: GASTROINTESTINAL - ADULT  Goal: Minimal or absence of nausea and/or vomiting  Description: INTERVENTIONS:  - Administer IV fluids if ordered to ensure adequate hydration  - Maintain NPO status until nausea and vomiting are resolved  - Nasogastric tube if ordered  - Administer ordered antiemetic medications as needed  - Provide nonpharmacologic comfort measures as appropriate  - Advance diet as tolerated, if ordered  - Consider nutrition services referral to assist patient with adequate nutrition and appropriate food choices  Outcome: Progressing  Goal: Maintains or returns to baseline bowel function  Description: INTERVENTIONS:  - Assess bowel function  - Encourage oral fluids to ensure adequate hydration  - Administer IV fluids if ordered to ensure adequate hydration  - Administer ordered medications as needed  - Encourage mobilization and activity  - Consider nutritional services referral to assist patient with adequate nutrition and appropriate food choices  Outcome: Progressing  Goal: Maintains adequate nutritional intake  Description: INTERVENTIONS:  - Monitor percentage of each meal consumed  - Identify factors contributing to decreased intake, treat as appropriate  - Assist with meals as needed  - Monitor I&O, weight, and lab values if indicated  - Obtain nutrition services referral as needed  Outcome: Progressing  Goal: Establish and maintain optimal ostomy function  Description: INTERVENTIONS:  - Assess bowel function  - Encourage oral fluids to ensure adequate hydration  - Administer IV fluids if ordered to ensure adequate hydration   - Administer ordered medications as needed  - Encourage mobilization and activity  - Nutrition services referral to assist patient with appropriate food choices  - Assess stoma site  - Consider wound care consult   Outcome: Progressing     Problem: GENITOURINARY - ADULT  Goal: Maintains or returns to baseline urinary function  Description: INTERVENTIONS:  - Assess urinary function  - Encourage oral fluids to ensure adequate hydration if ordered  - Administer IV fluids as ordered to ensure adequate hydration  - Administer ordered medications as needed  - Offer frequent toileting  - Follow urinary retention protocol if ordered  Outcome: Progressing Problem: METABOLIC, FLUID AND ELECTROLYTES - ADULT  Goal: Electrolytes maintained within normal limits  Description: INTERVENTIONS:  - Monitor labs and assess patient for signs and symptoms of electrolyte imbalances  - Administer electrolyte replacement as ordered  - Monitor response to electrolyte replacements, including repeat lab results as appropriate  - Instruct patient on fluid and nutrition as appropriate  Outcome: Progressing  Goal: Fluid balance maintained  Description: INTERVENTIONS:  - Monitor labs   - Monitor I/O and WT  - Instruct patient on fluid and nutrition as appropriate  - Assess for signs & symptoms of volume excess or deficit  Outcome: Progressing  Goal: Glucose maintained within target range  Description: INTERVENTIONS:  - Monitor Blood Glucose as ordered  - Assess for signs and symptoms of hyperglycemia and hypoglycemia  - Administer ordered medications to maintain glucose within target range  - Assess nutritional intake and initiate nutrition service referral as needed  Outcome: Progressing     Problem: SKIN/TISSUE INTEGRITY - ADULT  Goal: Skin integrity remains intact  Description: INTERVENTIONS  - Identify patients at risk for skin breakdown  - Assess and monitor skin integrity  - Assess and monitor nutrition and hydration status  - Monitor labs (i e  albumin)  - Assess for incontinence   - Turn and reposition patient  - Assist with mobility/ambulation  - Relieve pressure over bony prominences  - Avoid friction and shearing  - Provide appropriate hygiene as needed including keeping skin clean and dry  - Evaluate need for skin moisturizer/barrier cream  - Collaborate with interdisciplinary team (i e  Nutrition, Rehabilitation, etc )   - Patient/family teaching  Outcome: Progressing  Goal: Incision(s), wounds(s) or drain site(s) healing without S/S of infection  Description: INTERVENTIONS  - Assess and document risk factors for skin impairment   - Assess and document dressing, incision, wound bed, drain sites and surrounding tissue  - Consider nutrition services referral as needed  - Oral mucous membranes remain intact  - Provide patient/ family education  Outcome: Progressing  Goal: Oral mucous membranes remain intact  Description: INTERVENTIONS  - Assess oral mucosa and hygiene practices  - Implement preventative oral hygiene regimen  - Implement oral medicated treatments as ordered  - Initiate Nutrition services referral as needed  Outcome: Progressing     Problem: HEMATOLOGIC - ADULT  Goal: Maintains hematologic stability  Description: INTERVENTIONS  - Assess for signs and symptoms of bleeding or hemorrhage  - Monitor labs  - Administer supportive blood products/factors as ordered and appropriate  Outcome: Progressing     Problem: MUSCULOSKELETAL - ADULT  Goal: Maintain or return mobility to safest level of function  Description: INTERVENTIONS:  - Assess patient's ability to carry out ADLs; assess patient's baseline for ADL function and identify physical deficits which impact ability to perform ADLs (bathing, care of mouth/teeth, toileting, grooming, dressing, etc )  - Assess/evaluate cause of self-care deficits   - Assess range of motion  - Assess patient's mobility  - Assess patient's need for assistive devices and provide as appropriate  - Encourage maximum independence but intervene and supervise when necessary  - Involve family in performance of ADLs  - Assess for home care needs following discharge   - Consider OT consult to assist with ADL evaluation and planning for discharge  - Provide patient education as appropriate  Outcome: Progressing  Goal: Maintain proper alignment of affected body part  Description: INTERVENTIONS:  - Support, maintain and protect limb and body alignment  - Provide patient/ family with appropriate education  Outcome: Progressing

## 2021-04-07 NOTE — ED NOTES
PT is tolerating lateral decubitus position as discussed with admitting provider with mid flow oxygen @ 10L, placed by Respiratory        Pierre Moura RN  04/07/21 0558

## 2021-04-07 NOTE — ED NOTES
Pt woke up with a coughing fit that put her O2 saturation into the mid 70s  Pt was then put on a non rebreather and oxygen saturation went back up to mid 90's  Admitting provider contacted for a reassessment before pt is taken to [facility]        Yancy Cuellar RN  04/07/21 [id number]

## 2021-04-07 NOTE — H&P
History and Physical - Ady Frias    Patient Information: Ad Umaña 39 y o  female MRN: [id number]  Unit/Bed#: 7[facility] 713-01 Encounter: [id number]  Admitting Physician: Joey Haines MD  PCP: THOMAS Montiel  Date of Admission:  04/07/21    Assessment and Plan    Sepsis [facility])  Assessment & Plan  COVID Positive - 3/30/2021 with commencement of symptoms 3/29/2021: Headache, SOB, dry cough, fever, diarrhea,  Temp 100 5, RR 30, P102, /86  Oxygen Sat 88%    In ED: Given 500mL NS Bolus    See A&P for COVID-19 for further management  * COVID-19 virus infection  Assessment & Plan  Symptoms started 03/29/2021-headache, fever, dry cough, shortness of breath, diarrhea  Likely contracted from work environment at [employer]  Tested positive 03/30/2021  Failed outpatient observation and management thus referred to hospital for admission  SIRS positive with tachypnea, tachycardia and mild fever  In the ED, patient received 500 cc normal saline bolus, Toradol 15mg IV and Tylenol 975mg  Chest x-ray demonstrating positive features in keeping with COVID and CT PE study negative for pulmonary embolism  D-dimer elevated 0 79  CRP elevated 138  Leukopenia mild 4 3  Good renal function  Hypoxic 88% on room air requires 4 L of oxygen via nasal cannula  Patient admitted for management on moderate COVID pathway  Daily CBC, CMP and trending of procalcitonin, ferritin, CRP  Antibiotics-ceftriaxone 1 g daily, doxycycline 100 mg p o  b i d  Vitamin supplementation, acid suppression and statin therapy ordered  IV dexamethasone 6 mg daily  Remdesivir 200mg x 1 then 100mg daily x 4  Continue Oxygen supplementation to maintain Saturation > 90% - currently 4L via NC  IVF 200mL/hr NS  Incentive Spirometry, Self proning encouraged and PT/OT for eval and treatment as needed  Anemia  Assessment & Plan  Chronic - microcytic hypochromic    Hemoglobin 9 6    Will continue home medications supplementation of iron sulfate 325 mg daily, vitamin B12 1000 mcg daily, vitamin-C  Check TIBC and Iron level, folate and B12 levels  Daily CBC as per COVID management protocol  Morbid obesity ([facility] [address] )  Assessment & Plan  BMI 44  Weight 125 kg  History of gastric bypass surgery (2017) with for maintenance of weight loss there after  Will continue admission with bariatric diet with low fat  Continue vitamin supplementation inclusive of vitamin-C, iron, B12  Avoid NSAIDs    Hypothyroidism  Assessment & Plan  History of Hashimoto's Thyroiditis medicated on Levothyroxine 137mcg daily  Lab Results   Component Value Date    [id number] 17 600 (H) 12/03/2020     Will continue levothyroxine 137 mcg daily  Will do TSH tomorrow morning and reassess medication dosage with same  VTE Prophylaxis: Enoxaparin (Lovenox)  Code Status: Level 1 - Full Code  Anticipated Length of Stay:  Patient will be admitted on an Observation basis with an anticipated length of stay of  more than 2 midnights  Justification for Hospital Stay: Sepsis secondary to COVID 19  Total Time for Visit, including Counseling / Coordination of Care: 45 mins  Greater than 50% of this total time spent on direct patient counseling and coordination of care  Chief Complaint:     Chief Complaint   Patient presents with    Shortness of Breath     History of Present Illness:    Jose Quiles is a 39 y o  female who presents with complaints of worsening shortness of breath, dry cough, headache and diarrhea for 9 days  Patient has past medical history of hypothyroidism (Hashimoto's), morbid obesity with gastric bypass surgery done in 2017, chronic anemia, hyperlipidemia  Patient works as a fork  in a warehouse and noted to have sudden symptom onset 03/29/2021  No reported contact with COVID positive person or otherwise ill contact    Patient tested at local pharmacy 1 day after symptom onset 03/30/2021 and was found positive for COVID infection  She continued to be managed at home virtually by PCP however worsening of symptoms required referral for hospital admission  Patient has also complained of intermittent fever  Her headaches are parietal, bilateral and 10/10 in severity  She has had no upper respiratory tract symptoms of sore throat or rhinorrhea  She has had intermittent wheezing for which she was given albuterol inhaler  Her cough has been nonproductive  She denies having any orthopnea or pedal edema  She is had diarrhea up to 4 times per day intermittently over the past week with no blood in her stool and denies any nausea or vomiting or abdominal pain associated with this  Patient does not smoke, drink alcohol or do any illicit drugs  She has a past surgical history as noted above as well as a cholecystectomy in 2013 and a left breast lumpectomy in her 25s  She denies having any known drug or food allergies  In the ED, patient received 500 cc normal saline bolus, Toradol IV 15 mg, Tylenol 975mg  She was then referred to [facility] for admission due to Sepsis secondary to COVID 19 infection  Review of Systems:  Review of Systems   Constitutional: Positive for chills and fever  HENT: Negative for rhinorrhea and sore throat  Respiratory: Positive for cough, shortness of breath and wheezing  Cardiovascular: Negative for chest pain, palpitations and leg swelling  Gastrointestinal: Positive for diarrhea  Negative for abdominal pain, blood in stool, constipation, nausea and vomiting  Genitourinary: Negative for dysuria and hematuria  Musculoskeletal: Negative for arthralgias and back pain  Skin: Negative for rash  Neurological: Positive for light-headedness and headaches  Negative for seizures and syncope  All other systems reviewed and are negative        Past Medical and Surgical History:   Past Medical History:   Diagnosis Date    Abscess of labia     Anemia 4/6/2021    Anxiety  Bipolar disorder ([facility] [address] )     Breast lump     Frequent headaches     Hemorrhoids     Hypothyroidism 2013    Migraine     Morbid obesity (HCC) 2013    Obesity     Psychotic disorder (HCC)      Past Surgical History:   Procedure Laterality Date    BARIATRIC SURGERY      BREAST BIOPSY Right 1999    BREAST LUMPECTOMY Left     benign    CHOLECYSTECTOMY      GALLBLADDER SURGERY       Meds/Allergies: Allergies: Allergies   Allergen Reactions    No Active Allergies      Prior to Admission Medications   Prescriptions Last Dose Informant Patient Reported? Taking?    Calcium Citrate 200 MG TABS Past Week at Unknown time Self Yes Yes   Si tablet 3 (three) times a day   Cyanocobalamin ER 1000 MCG TBCR Not Taking at Unknown time Self Yes No   Sig: take one tab daily   Multiple Vitamins-Minerals (MULTIVITAMIN ADULT PO) Past Week at Unknown time Self Yes Yes   Sig: every 24 hours   albuterol (PROVENTIL HFA,VENTOLIN HFA) 90 mcg/act inhaler 2021 at Unknown time Self No Yes   Sig: Inhale 2 puffs every 6 (six) hours as needed for wheezing or shortness of breath   atorvastatin (LIPITOR) 20 mg tablet Not Taking at Unknown time Self Yes No   Sig: TK 1 T PO QHS   benzonatate (TESSALON PERLES) 100 mg capsule Not Taking at Unknown time Self No No   Sig: Take 1 capsule (100 mg total) by mouth 3 (three) times a day as needed for cough   Patient not taking: Reported on 2021   ferrous sulfate 324 (65 Fe) mg 2021 at Unknown time Self No Yes   Sig: Take 1 tablet (324 mg total) by mouth daily before breakfast   levothyroxine 137 mcg tablet Past Week at Unknown time Self Yes Yes   Sig: TAKE ONE TABLET BY MOUTH EVERY MORNING ON EMPTY STOMACH   pantoprazole (PROTONIX) 40 mg tablet Past Month at Unknown time Self No Yes   Sig: Take 1 tablet (40 mg total) by mouth every 24 hours   rosuvastatin (CRESTOR) 10 MG tablet Not Taking at Unknown time Self Yes No   Sig: Take 10 mg by mouth daily   thiamine (VITAMIN B1) 50 mg tablet Not Taking at Unknown time Self No No   Sig: Take 1 tablet (50 mg total) by mouth daily   Patient not taking: Reported on 2021   topiramate (TOPAMAX) 50 MG tablet Not Taking at Unknown time Self No No   Sig: Take 1 tablet (50 mg total) by mouth 2 (two) times a day   Patient not taking: Reported on 2021   traZODone (DESYREL) 50 mg tablet Not Taking at Unknown time Self No No   Sig: Take 1 tablet (50 mg total) by mouth daily at bedtime as needed for sleep   Patient not taking: Reported on 2021      Facility-Administered Medications: None     Social History:     Social History     Socioeconomic History    Marital status: Single     Spouse name: Not on file    Number of children: 1    Years of education: Not on file    Highest education level: Not on file   Occupational History    Not on file   Social Needs    Financial resource strain: Not on file    Food insecurity     Worry: Not on file     Inability: Not on file   [employer] needs     Medical: Not on file     Non-medical: Not on file   Tobacco Use    Smoking status: Former Smoker     Types: Cigarettes     Quit date:      Years since quittin 2    Smokeless tobacco: Former User   Substance and Sexual Activity    Alcohol use: Never     Frequency: Never    Drug use: No    Sexual activity: Not Currently   Lifestyle    Physical activity     Days per week: Not on file     Minutes per session: Not on file    Stress: Not on file   Relationships    Social connections     Talks on phone: Not on file     Gets together: Not on file     Attends [employer] service: Not on file     Active member of club or organization: Not on file     Attends meetings of clubs or organizations: Not on file     Relationship status: Not on file    Intimate partner violence     Fear of current or ex partner: Not on file     Emotionally abused: Not on file     Physically abused: Not on file     Forced sexual activity: Not on file   Other Topics Concern    Not on file   Social History Narrative    Not on file     Patient Pre-hospital Living Situation: At home with daughter  Patient Pre-hospital Level of Mobility: Full  Patient Pre-hospital Diet Restrictions: Bariatric    Family History:  Family History   Problem Relation Age of Onset    Liver cancer Mother     Prostate cancer Father     Cancer Family     No Known Problems Sister     No Known Problems Maternal Grandmother     No Known Problems Maternal Grandfather     No Known Problems Paternal Grandmother     No Known Problems Paternal Grandfather     No Known Problems Sister     No Known Problems Sister     No Known Problems Sister     No Known Problems Sister     No Known Problems Sister     No Known Problems Sister     No Known Problems Sister     No Known Problems Sister     No Known Problems Sister     Cancer Maternal Aunt     Cancer Maternal Aunt     Cancer Maternal Aunt     Cancer Maternal Aunt     Cancer Maternal Aunt     Cancer Maternal Aunt     Cancer Maternal Aunt     No Known Problems Paternal Aunt     No Known Problems Paternal Aunt     No Known Problems Paternal Aunt     No Known Problems Paternal Aunt     Cancer Paternal Aunt     Cancer Paternal Aunt        Physical Exam:   Vitals:   Blood Pressure: 123/89 (04/07/21 0300)  Pulse: 82 (04/07/21 0300)  Temperature: (!) 97 4 °F (36 3 °C) (04/07/21 0300)  Temp Source: Temporal (04/07/21 0300)  Respirations: (!) 44 (04/07/21 0300)  Height: 5' 6" (167 6 cm) (04/07/21 0300)  Weight - Scale: 126 kg (276 lb 10 8 oz) (04/07/21 0300)  SpO2: 93 % (04/07/21 0403)    Physical Exam  Vitals signs reviewed  Constitutional:       General: She is in acute distress  Appearance: She is well-developed  She is obese  She is ill-appearing  She is not diaphoretic  HENT:      Head: Normocephalic  Nose: Nose normal       Mouth/Throat:      Mouth: Mucous membranes are moist       Pharynx: Oropharynx is clear     Eyes: Conjunctiva/sclera: Conjunctivae normal    Neck:      Musculoskeletal: Normal range of motion  Thyroid: No thyromegaly  Cardiovascular:      Rate and Rhythm: Regular rhythm  Tachycardia present  Pulses: Normal pulses  Heart sounds: Normal heart sounds  No murmur  No friction rub  No gallop  Pulmonary:      Effort: Pulmonary effort is normal  No respiratory distress  Breath sounds: Normal breath sounds  No wheezing, rhonchi or rales  Abdominal:      General: Bowel sounds are normal  There is no distension  Palpations: Abdomen is soft  There is no mass  Tenderness: There is no abdominal tenderness  There is no right CVA tenderness or left CVA tenderness  Hernia: No hernia is present  Comments: Exam findings limited by abdominal girth   Musculoskeletal: Normal range of motion  Skin:     General: Skin is warm and dry  Findings: No rash  Neurological:      General: No focal deficit present  Mental Status: She is alert and oriented to person, place, and time  Cranial Nerves: No cranial nerve deficit  Psychiatric:         Behavior: Behavior normal        Lab Results: I have personally reviewed pertinent reports      Results from last 7 days   Lab Units 04/06/21  1601   WBC Thousand/uL 4 30*   HEMOGLOBIN g/dL 9 6*   HEMATOCRIT % 31 9*   PLATELETS Thousands/uL 369   NEUTROS PCT % 81*   LYMPHS PCT % 11*   MONOS PCT % 5   EOS PCT % 0     Results from last 7 days   Lab Units 04/06/21  1601   POTASSIUM mmol/L 3 5*   CHLORIDE mmol/L 103   CO2 mmol/L 26   BUN mg/dL 6   CREATININE mg/dL 0 76   CALCIUM mg/dL 8 3*   ALK PHOS U/L 129*   ALT U/L 12   AST U/L 31   EGFR ml/min/1 73sq m 96     Results from last 7 days   Lab Units 04/06/21  1601   INR  1 02     Results from last 7 days   Lab Units 04/06/21  1601   CK TOTAL U/L 84   TROPONIN I ng/mL <0 01     Results from last 7 days   Lab Units 04/06/21  1601   LACTIC ACID mmol/L 1 1     Results from last 7 days   Lab Units 04/06/21  1601   D-DIMER QUANTITATIVE ug/ml FEU 0 79*     Results from last 7 days   Lab Units 04/06/21  1601   NT-PRO BNP pg/mL 52 5            Invalid input(s): URIBILINOGEN          Imaging: I have personally reviewed pertinent reports  Xr Chest Portable    Result Date: 4/6/2021  Narrative: CHEST INDICATION:   sob  COMPARISON:  None EXAM PERFORMED/VIEWS:  XR CHEST PORTABLE FINDINGS: Cardiomediastinal silhouette appears unremarkable  Multifocal lung opacities suggesting multifocal pneumonia most likely Covid 19 related  No pneumothorax  No pleural effusion  Osseous structures appear within normal limits for patient age  Impression: Multifocal pneumonia  Workstation performed: [id number]     Cta Chest Pe Study    Result Date: 4/6/2021  Narrative: CTA - CHEST WITH IV CONTRAST - PULMONARY ANGIOGRAM INDICATION:   Shortness of breath r/o PE  Patient has suspected COVID-19  COMPARISON: None  TECHNIQUE: CTA examination of the chest was performed using angiographic technique according to a protocol specifically tailored to evaluate for pulmonary embolism  Axial, sagittal, and coronal 2D reformatted images were created from the source data and  submitted for interpretation  In addition, coronal 3D MIP postprocessing was performed on the acquisition scanner  Radiation dose length product (DLP) for this visit:  416 mGy-cm   This examination, like all CT scans performed in the [facility], was performed utilizing techniques to minimize radiation dose exposure, including the use of iterative reconstruction and automated exposure control  IV Contrast:  100 mL of iohexol (OMNIPAQUE)  FINDINGS: PULMONARY ARTERIAL TREE:  No pulmonary embolus is seen  LUNGS:  Extensive multifocal groundglass opacities throughout the lung fields bilaterally, both centrally and peripherally located  PLEURA:  Unremarkable  HEART/GREAT VESSELS:  Unremarkable for patient's age  MEDIASTINUM AND NORA:  Unremarkable   CHEST WALL AND LOWER NECK:   Unremarkable  VISUALIZED STRUCTURES IN THE UPPER ABDOMEN:  Sutures about the stomach suggestive of prior gastric bypass surgery  Cholecystectomy clips are noted  OSSEOUS STRUCTURES:  No acute fracture or destructive osseous lesion  Mild spondylotic changes noted  Impression: 1  No pulmonary embolism  2   Multifocal parenchymal opacities, most likely multifocal pneumonia/pneumonitis  In the setting of clinically suspected/proven COVID-19, the above lung parenchymal findings on CT indicate high confidence level for COVID-19  Workstation performed: [id number]     Mammo Diagnostic Bilateral W 3d & Cad, Us Breast Bilateral Limited (diagnostic)    Result Date: 3/25/2021  Narrative: DIAGNOSIS: Breast pain TECHNIQUE: Digital diagnostic mammography was performed  Computer Aided Detection (CAD) analyzed all applicable images  Ultrasound of the bilateral breast(s) was performed  COMPARISONS: Prior breast imaging dated: 06/25/2019, 06/19/2018, 06/06/2018, 04/19/2017, 01/04/2017, 05/10/2016, 09/11/2015, 08/20/2013, and 10/11/2011 RELEVANT HISTORY: Family Breast Cancer History: No known family history of breast cancer  Family Medical History: No known relevant family medical history  Personal History: No known relevant hormone history  Surgical history includes breast biopsy and lumpectomy  No known relevant medical history  RISK ASSESSMENT: 5 Year Tyrer-Cuzick: 0 4 % 10 Year Tyrer-Cuzick: 0 96 % Lifetime Tyrer-Cuzick: 6 38 % TISSUE DENSITY: The breasts are almost entirely fatty  INDICATION: Kalli Perry is a 39 y o  female presenting for Pain in both breast  FINDINGS: Bilateral There are pain markers over the inner right breast without underlying mammographic abnormality  There are stable well-circumscribed nodules bilaterally unchanged from prior exams  There are stable scattered calcifications present bilaterally    There are no suspicious masses, grouped microcalcifications or areas of architectural distortion  The skin and nipple areolar complex are unremarkable  Targeted sonographic evaluation of the inner right breast area of pain demonstrates no suspicious mass or abnormal shadowing  Targeted sonographic evaluation of multiple areas of pain throughout the left breast was performed  There is no suspicious mass or abnormal shadowing  Impression: No evidence of malignancy  ASSESSMENT/BI-RADS CATEGORY: Left: 2 - Benign Right: 2 - Benign Overall: 2 - Benign RECOMMENDATION:      - Routine screening mammogram in 1 year for both breasts  - Clinical management for both breasts   Workstation ID: [id number]       EKG, Pathology, and Other Studies Reviewed on Admission:   EKG  Result Date: 04/07/21  Impression:  Normal Sinus Rhythm HR 84    Entire H&P was discussed with Dr [place] who agreed to what is noted above    Alcides Smith MD  04/07/21  5:04 AM

## 2021-04-07 NOTE — QUICK NOTE
Received notification from nursing staff that patient appeared uncomfortable and in respiratory distress  Nurse also reported that patient was desaturating to mid 80s on 10 L midflow and had trace bilateral lower extremity pitting edema  Advised by phone to titrate the midflow up with O2 sat goal >90%  STAT CXR ordered  Evaluated patient at bedside who was on 10L midflow  Patient was anxious about expressed anxiety about her breathing and was aware that her oxygen levels were decreasing  We assessed her ability to prone as she had previously declined due to body habitus  Pillows provided and patient found a comfortable prone position  Oxygen levels shown to improve  Portable CXR obtained and reviewed at bedside  Lung exam significant for slightly decreased breath sound on the right lower lung field, otherwise normal  This was a slight change from previous exam that was completely normal     -  D/c IV fluids    - Administer Furosemide 20 mg IV once  - Continue proning position as much as tolerated  - Oxygen supplemtation to with goal SpO2 >90%, currently on 10L midflow  - Alerted On call Critical care doctor covering [facility]  by tiger [facility]

## 2021-04-07 NOTE — NURSING NOTE
Transport information to [facility] [address] [facility]  Room 315  Report [phone]  Accepting - Dr Live Tobias

## 2021-04-07 NOTE — ASSESSMENT & PLAN NOTE
COVID-19 positive on 3/30/2021 after symptom onset on 3/29/2021: headache, SOB, dry cough, fever, diarrhea,  Vitals: Temperature 100 5, RR 30/minute, /minute, /86, oxygen saturation 88%  ED: IV normal saline 500 cc  - Refer to A&P for COVID-19 for further management

## 2021-04-07 NOTE — ED NOTES
Pt is maintaining good O2 saturation on non rebreather @ 15L  RESP will come put Pt on mid flow as talked about with admitting provider        Aj Stinson RN  04/07/21 [id number]

## 2021-04-07 NOTE — LETTER
[address]  [facility] [address]  [place] [address]  Dept: [phone]    April 16, 2021     Patient: Zoila Loaiza   YOB: 1975   Date of Visit: 4/7/2021       To Whom it May Concern:    Rebekah Diamond is under my professional care  She was seen in the hospital from 4/7/2021  At this point I am unable to determine when the patient will go back to work since she is still hospitalize and recovery could take days to weeks  If you have any questions or concerns, please don't hesitate to call           Sincerely,          Valerie Ramirez MD

## 2021-04-07 NOTE — PLAN OF CARE
Problem: Potential for Falls  Goal: Patient will remain free of falls  Description: INTERVENTIONS:  - Assess patient frequently for physical needs  -  Identify cognitive and physical deficits and behaviors that affect risk of falls  -  [place] fall precautions as indicated by assessment   - Educate patient/family on patient safety including physical limitations  - Instruct patient to call for assistance with activity based on assessment  - Modify environment to reduce risk of injury  - Consider OT/PT consult to assist with strengthening/mobility  Outcome: Progressing     Problem: NEUROSENSORY - ADULT  Goal: Achieves stable or improved neurological status  Description: INTERVENTIONS  - Monitor and report changes in neurological status  - Monitor vital signs such as temperature, blood pressure, glucose, and any other labs ordered   - Initiate measures to prevent increased intracranial pressure  - Monitor for seizure activity and implement precautions if appropriate      Outcome: Progressing  Goal: Remains free of injury related to seizures activity  Description: INTERVENTIONS  - Maintain airway, patient safety  and administer oxygen as ordered  - Monitor patient for seizure activity, document and report duration and description of seizure to physician/advanced practitioner  - If seizure occurs,  ensure patient safety during seizure  - Reorient patient post seizure  - Seizure pads on all 4 side rails  - Instruct patient/family to notify RN of any seizure activity including if an aura is experienced  - Instruct patient/family to call for assistance with activity based on nursing assessment  - Administer anti-seizure medications if ordered    Outcome: Progressing  Goal: Achieves maximal functionality and self care  Description: INTERVENTIONS  - Monitor swallowing and airway patency with patient fatigue and changes in neurological status  - Encourage and assist patient to increase activity and self care     - Encourage visually impaired, hearing impaired and aphasic patients to use assistive/communication devices  Outcome: Progressing     Problem: CARDIOVASCULAR - ADULT  Goal: Maintains optimal cardiac output and hemodynamic stability  Description: INTERVENTIONS:  - Monitor I/O, vital signs and rhythm  - Monitor for S/S and trends of decreased cardiac output  - Administer and titrate ordered vasoactive medications to optimize hemodynamic stability  - Assess quality of pulses, skin color and temperature  - Assess for signs of decreased coronary artery perfusion  - Instruct patient to report change in severity of symptoms  Outcome: Progressing  Goal: Absence of cardiac dysrhythmias or at baseline rhythm  Description: INTERVENTIONS:  - Continuous cardiac monitoring, vital signs, obtain 12 lead EKG if ordered  - Administer antiarrhythmic and heart rate control medications as ordered  - Monitor electrolytes and administer replacement therapy as ordered  Outcome: Progressing     Problem: RESPIRATORY - ADULT  Goal: Achieves optimal ventilation and oxygenation  Description: INTERVENTIONS:  - Assess for changes in respiratory status  - Assess for changes in mentation and behavior  - Position to facilitate oxygenation and minimize respiratory effort  - Oxygen administered by appropriate delivery if ordered  - Initiate smoking cessation education as indicated  - Encourage broncho-pulmonary hygiene including cough, deep breathe, Incentive Spirometry  - Assess the need for suctioning and aspirate as needed  - Assess and instruct to report SOB or any respiratory difficulty  - Respiratory Therapy support as indicated  Outcome: Progressing     Problem: GASTROINTESTINAL - ADULT  Goal: Minimal or absence of nausea and/or vomiting  Description: INTERVENTIONS:  - Administer IV fluids if ordered to ensure adequate hydration  - Maintain NPO status until nausea and vomiting are resolved  - Nasogastric tube if ordered  - Administer ordered antiemetic medications as needed  - Provide nonpharmacologic comfort measures as appropriate  - Advance diet as tolerated, if ordered  - Consider nutrition services referral to assist patient with adequate nutrition and appropriate food choices  Outcome: Progressing  Goal: Maintains or returns to baseline bowel function  Description: INTERVENTIONS:  - Assess bowel function  - Encourage oral fluids to ensure adequate hydration  - Administer IV fluids if ordered to ensure adequate hydration  - Administer ordered medications as needed  - Encourage mobilization and activity  - Consider nutritional services referral to assist patient with adequate nutrition and appropriate food choices  Outcome: Progressing  Goal: Maintains adequate nutritional intake  Description: INTERVENTIONS:  - Monitor percentage of each meal consumed  - Identify factors contributing to decreased intake, treat as appropriate  - Assist with meals as needed  - Monitor I&O, weight, and lab values if indicated  - Obtain nutrition services referral as needed  Outcome: Progressing  Goal: Establish and maintain optimal ostomy function  Description: INTERVENTIONS:  - Assess bowel function  - Encourage oral fluids to ensure adequate hydration  - Administer IV fluids if ordered to ensure adequate hydration   - Administer ordered medications as needed  - Encourage mobilization and activity  - Nutrition services referral to assist patient with appropriate food choices  - Assess stoma site  - Consider wound care consult   Outcome: Progressing     Problem: GENITOURINARY - ADULT  Goal: Maintains or returns to baseline urinary function  Description: INTERVENTIONS:  - Assess urinary function  - Encourage oral fluids to ensure adequate hydration if ordered  - Administer IV fluids as ordered to ensure adequate hydration  - Administer ordered medications as needed  - Offer frequent toileting  - Follow urinary retention protocol if ordered  Outcome: Progressing Problem: METABOLIC, FLUID AND ELECTROLYTES - ADULT  Goal: Electrolytes maintained within normal limits  Description: INTERVENTIONS:  - Monitor labs and assess patient for signs and symptoms of electrolyte imbalances  - Administer electrolyte replacement as ordered  - Monitor response to electrolyte replacements, including repeat lab results as appropriate  - Instruct patient on fluid and nutrition as appropriate  Outcome: Progressing  Goal: Fluid balance maintained  Description: INTERVENTIONS:  - Monitor labs   - Monitor I/O and WT  - Instruct patient on fluid and nutrition as appropriate  - Assess for signs & symptoms of volume excess or deficit  Outcome: Progressing  Goal: Glucose maintained within target range  Description: INTERVENTIONS:  - Monitor Blood Glucose as ordered  - Assess for signs and symptoms of hyperglycemia and hypoglycemia  - Administer ordered medications to maintain glucose within target range  - Assess nutritional intake and initiate nutrition service referral as needed  Outcome: Progressing     Problem: SKIN/TISSUE INTEGRITY - ADULT  Goal: Skin integrity remains intact  Description: INTERVENTIONS  - Identify patients at risk for skin breakdown  - Assess and monitor skin integrity  - Assess and monitor nutrition and hydration status  - Monitor labs (i e  albumin)  - Assess for incontinence   - Turn and reposition patient  - Assist with mobility/ambulation  - Relieve pressure over bony prominences  - Avoid friction and shearing  - Provide appropriate hygiene as needed including keeping skin clean and dry  - Evaluate need for skin moisturizer/barrier cream  - Collaborate with interdisciplinary team (i e  Nutrition, Rehabilitation, etc )   - Patient/family teaching  Outcome: Progressing  Goal: Incision(s), wounds(s) or drain site(s) healing without S/S of infection  Description: INTERVENTIONS  - Assess and document risk factors for skin impairment   - Assess and document dressing, incision, wound bed, drain sites and surrounding tissue  - Consider nutrition services referral as needed  - Oral mucous membranes remain intact  - Provide patient/ family education  Outcome: Progressing  Goal: Oral mucous membranes remain intact  Description: INTERVENTIONS  - Assess oral mucosa and hygiene practices  - Implement preventative oral hygiene regimen  - Implement oral medicated treatments as ordered  - Initiate Nutrition services referral as needed  Outcome: Progressing     Problem: HEMATOLOGIC - ADULT  Goal: Maintains hematologic stability  Description: INTERVENTIONS  - Assess for signs and symptoms of bleeding or hemorrhage  - Monitor labs  - Administer supportive blood products/factors as ordered and appropriate  Outcome: Progressing     Problem: MUSCULOSKELETAL - ADULT  Goal: Maintain or return mobility to safest level of function  Description: INTERVENTIONS:  - Assess patient's ability to carry out ADLs; assess patient's baseline for ADL function and identify physical deficits which impact ability to perform ADLs (bathing, care of mouth/teeth, toileting, grooming, dressing, etc )  - Assess/evaluate cause of self-care deficits   - Assess range of motion  - Assess patient's mobility  - Assess patient's need for assistive devices and provide as appropriate  - Encourage maximum independence but intervene and supervise when necessary  - Involve family in performance of ADLs  - Assess for home care needs following discharge   - Consider OT consult to assist with ADL evaluation and planning for discharge  - Provide patient education as appropriate  Outcome: Progressing  Goal: Maintain proper alignment of affected body part  Description: INTERVENTIONS:  - Support, maintain and protect limb and body alignment  - Provide patient/ family with appropriate education  Outcome: Progressing

## 2021-04-07 NOTE — ASSESSMENT & PLAN NOTE
Chronic  Microcytic, hypochromic evidenced by low iron level of 14 and MCV of 70  Hemoglobin: 9 6  Home Medications: Iron Sulfate 325 mg daily, Vitamin B12 1000 mcg daily, Vitamin C  Ferritin, TIBC, and B12 normal     - Daily CBC, per COVID-19 protocol

## 2021-04-07 NOTE — CASE MANAGEMENT
COVID-19 positive  Pt is on 12LO2  Pt is being transferred to [facility]  Medical necessity completed and provided to RN

## 2021-04-07 NOTE — CASE MANAGEMENT
CM called [employer] at [phone] to check if transport [facility] is required  CM spoke with Leonardo and was informed that no transport [facility] is required   Call Reference # [id number]

## 2021-04-07 NOTE — ED NOTES
Pt has been reassessed by admitting provider and is comfortable with the pt being taken to 7t         Suzanna Monroe RN  04/07/21 8503

## 2021-04-07 NOTE — PLAN OF CARE
Problem: Potential for Falls  Goal: Patient will remain free of falls  Description: INTERVENTIONS:  - Assess patient frequently for physical needs  -  Identify cognitive and physical deficits and behaviors that affect risk of falls    -  [place] fall precautions as indicated by assessment   - Educate patient/family on patient safety including physical limitations  - Instruct patient to call for assistance with activity based on assessment  - Modify environment to reduce risk of injury  - Consider OT/PT consult to assist with strengthening/mobility  Outcome: Progressing     Problem: RESPIRATORY - ADULT  Goal: Achieves optimal ventilation and oxygenation  Description: INTERVENTIONS:  - Assess for changes in respiratory status  - Assess for changes in mentation and behavior  - Position to facilitate oxygenation and minimize respiratory effort  - Oxygen administered by appropriate delivery if ordered  - Initiate smoking cessation education as indicated  - Encourage broncho-pulmonary hygiene including cough, deep breathe, Incentive Spirometry  - Assess the need for suctioning and aspirate as needed  - Assess and instruct to report SOB or any respiratory difficulty  - Respiratory Therapy support as indicated  Outcome: Progressing

## 2021-04-07 NOTE — DISCHARGE SUMMARY
[facility]  Discharge- Yuly Umaña 1975, 39 y o  female MRN: [id number]  Unit/Bed#: 7T [facility] 713-01 Encounter: [id number]  Primary Care Provider: THOMAS Ac   Date and time admitted to hospital: 4/6/2021  3:08 PM    * COVID-19 virus infection  Assessment & Plan  Presentation: headache, fever, dry cough, shortness of breath, and diarrhea starting 3/29/2021  Likely contracted from work environment at [employer]  Tested positive 3/30/2021  Failed outpatient observation and management and was referred to hospital for admission  Met SIRS criteria on admission with tachypnea, tachycardia, and mild fever  ED: IV normal salinie 500 cc, IV Toradol 15 mg, Tylenol 975 mg  Chest X-Ray can be correlated with COVID-19 and CT-PE is negative for pulmonary embolism  D-dimer elevated at 0 79 and CRP elevated at 138  Patient currently saturating at 90% on 10 liters via mid-flow  Symptoms, imaging, and oxygen needs consistent with moderate COVID-19 disease  Patient is Level 1 full code without intubation  Patient received Actemra 4/7/2021     - Continue COVID-19 moderate pathway protocol   - Titrate oxygen saturation to maintain levels >90% via nasal cannula  - Continuous telemetry and pulse oximetry  - Continue Lipitor 40 mg daily at bedtime    - Continue Famotidine 20 mg BID   - Continue Dexamethasone 6 mg IV daily (Day 2/10)  - Continue Remdesivir (Day 2)  - Continue Vitamin C 1 g Q12H, Vitamin D3 2000 IU daily, and Zinc 220 mg PO daily  - Continue antibiotics: Ceftriaxone 1 gram daily & Doxycycline 100 mg BID   - Follow up on CRP, ferritin, TSH, D-Dimer, procalcitonin reflex, hepatitis panel, and blood cultures  - AM CBC & CMP   - Encourage ambulation, mobilization, and self-proning    - Incentive spirometry  Anemia  Assessment & Plan  Chronic  Microcytic, hypochromic evidenced by low iron level of 14 and MCV of 70    Hemoglobin: 9 6  Home Medications: Iron Sulfate 325 mg daily, Vitamin B12 1000 mcg daily, Vitamin C  Ferritin, TIBC, and B12 normal     - Daily CBC, per COVID-19 protocol  Hypothyroidism  Assessment & Plan  History of Hashimoto's Thyroiditis  Home Medication: Levothyroxine 137 mcg daily  Last TSH on 12/3/2020: 17 6    - Continue Levothyroxine daily   - TSH pending  Morbid obesity ([facility] [address] )  Assessment & Plan  BMI 44  Weight 125 kg  History of gastric bypass surgery in 2017     - Continue bariatric diet  - Continue vitamin supplementation with vitaminC, iron, and B12   - Avoid NSAIDs  Sepsis [facility])  Assessment & Plan  COVID-19 positive on 3/30/2021 after symptom onset on 3/29/2021: headache, SOB, dry cough, fever, diarrhea,  Vitals: Temperature 100 5, RR 30/minute, /minute, /86, oxygen saturation 88%  ED: IV normal saline 500 cc  - Refer to A&P for COVID-19 for further management  Discharging Physician / Practitioner: Dr Ryley Lainez  PCP: Nico Brown[address]  Admission Date:   Admission Orders (From admission, onward)     Ordered        04/06/21 1747  Place in Observation  Once                   Discharge Date: 4/7/2021    Reason for Admission: Sepsis Secondary to COVID Pneumonia    Discharge Diagnoses:     Principal Problem:    COVID-19 virus infection  Active Problems:    Anemia    Hypothyroidism    Morbid obesity ([facility] )    Sepsis ([facility] )  Resolved Problems:    * No resolved hospital problems   *      Consultations During Hospital Stay:  · Pulmonology/Critical Care    Procedures Performed:   · None    Significant Findings / Test Results:   · Multifocal Pneumonia on CXR and CT PE  · Hb: 9 6  · WBC: 4 3  · CRP: 168, D-Dimer 0 7    Incidental Findings:   · None     Test Results Pending at Discharge (will require follow up):   · CBC/CMP  · Inflammatory Markers: CRP, D-Dimer, Ferritin  · TSH  · Procalcitonin Reflex  · ABO/Rh  · IL-6  · Blood Cultures     Outpatient Tests Requested:  · None    Outpatient follow-up Requested:   PCP    Complications:  None    Hospital Course:     Flakita Mead is a 39 y o  female with a PMH of chronic anemia, hypothyroidism (Hashimoto's), hyperlipidemia, and morbid obesity (status/post gastric bypass surgery in 2017) who originally presented to the hospital on 4/6/2021 with worsening shortness of breath, dry cough, headache and diarrhea for 9 days  The patient works as a fork  in a warehouse and states her symptoms started on 3/29/2021  She denies sick contacts and tested positive for COVID on 3/30/2021  She was initially managed at home virtually by her PCP  However, she came to the hospital with worsening of symptoms  Patient has also complained of intermittent fever  Her headaches are parietal, bilateral and 10/10 in severity  She has had no upper respiratory tract symptoms of sore throat or rhinorrhea  She has had intermittent wheezing for which she was given Albuterol inhaler  Her cough has been non-productive  She denies having any orthopnea or pedal edema  She has had diarrhea up to 4 times per day intermittently over the past week with no blood in her stool and denies any nausea, vomiting or abdominal pain  ED Course:  > Vitals on Arrival: temperature 100 5, /minute, RR 30/minute, /6, SpO2 89% on room air   > Medications: IV normal saline 500 cc bolus, IV Toradol 15 mg, Tylenol 975 mg  > Labs: CBC- WBC 4 3, Hb 9 6  CMP-Na 136, K 3 5, Glucose 97   9 (H), D-Dimer 0 79 (H), Folate >20 (H), Iron 14 (L), TIBC/ferritin normal, lactic acid normal, Procalcitonin normal, PT/PTT/INR normal, BNP normal, Troponin negative x1, CK normal      She was admitted to [address] [place] [address] for sepsis secondary to COVID-19 infection  She was initially on 4 liters of oxygen on arrival to the ED  Over a few hours, her oxygen requirements went from 4 liters to 15 liters via non-rebreather mask   Around 12:45 AM, she was put on 10 liters mid-flow and has remained at this level  Critical care was consulted and due to increasing oxygen requirements, advised us to transfer the patient to [address] [place] [address]  Condition at Discharge: stable      Discharge Day Visit / Exam:     Vitals: Blood Pressure: 118/68 (04/07/21 0722)  Pulse: 84 (04/07/21 0722)  Temperature: 98 1 °F (36 7 °C) (04/07/21 0722)  Temp Source: Temporal (04/07/21 0722)  Respirations: (!) 30 (04/07/21 0722)  Height: 5' 6" (167 6 cm) (04/07/21 0300)  Weight - Scale: 126 kg (276 lb 10 8 oz) (04/07/21 0300)  SpO2: (!) 86 % (04/07/21 0722)    Exam:   Physical Exam  Vitals signs reviewed  Constitutional:       General: She is not in acute distress  Appearance: She is well-developed  She is obese  She is ill-appearing  She is not diaphoretic  HENT:      Head: Normocephalic and atraumatic  Eyes:      Conjunctiva/sclera: Conjunctivae normal    Neck:      Musculoskeletal: Normal range of motion  Cardiovascular:      Rate and Rhythm: Normal rate and regular rhythm  Heart sounds: Normal heart sounds  No murmur  No friction rub  No gallop  Pulmonary:      Effort: Pulmonary effort is normal  No respiratory distress  Breath sounds: Normal breath sounds  No wheezing or rales  Abdominal:      General: Bowel sounds are normal  There is no distension  Palpations: Abdomen is soft  There is no mass  Tenderness: There is no abdominal tenderness  There is no guarding  Comments: Abdominal exam limited by girth  Musculoskeletal: Normal range of motion  General: No tenderness or deformity  Skin:     General: Skin is warm and dry  Neurological:      Mental Status: She is alert and oriented to person, place, and time  Discussion with Family: Spoke to Daughter Dinorah Coronel    Discharge instructions/Information to patient and family:   See after visit summary for information provided to patient and family        Discharge Medications:      acetaminophen (TYLENOL) tablet 975 mg, 975 mg, Oral, Q8H [address], Nazia Ferguson MD, 975 mg at 04/07/21 0519    albuterol (PROVENTIL HFA,VENTOLIN HFA) inhaler 2 puff, 2 puff, Inhalation, Q6H PRN, Levi Silver MD, 2 puff at 04/07/21 0520    ascorbic acid (VITAMIN C) tablet 1,000 mg, 1,000 mg, Oral, Q12H [address], Nazia Ferguson MD, 1,000 mg at 04/06/21 2032    atorvastatin (LIPITOR) tablet 40 mg, 40 mg, Oral, HS, Nazia Ferguson MD, 40 mg at 04/06/21 2307    benzonatate (TESSALON PERLES) capsule 100 mg, 100 mg, Oral, TID PRN, Levi Silver MD, 100 mg at 04/07/21 0519    calcium carbonate (OYSTER SHELL,OSCAL) 500 mg tablet 1 tablet, 1 tablet, Oral, Daily With Lunch, Janey Treadwell MD    cefTRIAXone (ROCEPHIN) IVPB (premix in dextrose) 1,000 mg 50 mL, 1,000 mg, Intravenous, Q24H, Janey Treadwell MD, Stopped at 04/06/21 2301    cholecalciferol (VITAMIN D3) tablet 2,000 Units, 2,000 Units, Oral, Daily, Nazia Ferguson MD    cyanocobalamin (VITAMIN B-12) tablet 1,000 mcg, 1,000 mcg, Oral, Daily, Nazia Ferguson MD    dexamethasone (DECADRON) injection 6 mg, 6 mg, Intravenous, Q24H, Nazia Ferguson MD, 6 mg at 04/06/21 2032    doxycycline hyclate (VIBRAMYCIN) capsule 100 mg, 100 mg, Oral, Q12H, Nazia Ferguson MD, 100 mg at 04/06/21 2032    enoxaparin (LOVENOX) subcutaneous injection 40 mg, 40 mg, Subcutaneous, Q12H [address], Nazia Ferguson MD, 40 mg at 04/06/21 2032    famotidine (PEPCID) tablet 20 mg, 20 mg, Oral, BID, Nazia Ferguson MD, 20 mg at 04/06/21 2031    ferrous sulfate tablet 325 mg, 325 mg, Oral, Daily With Breakfast, Nazia Ferguson MD    guaiFENesin (MUCINEX) 12 hr tablet 600 mg, 600 mg, Oral, Q12H [address], Nazia Ferguson MD, 600 mg at 04/07/21 0519    levothyroxine tablet 137 mcg, 137 mcg, Oral, Early Morning, Nazia Ferguson MD, 137 mcg at 04/07/21 0518    zinc sulfate (ZINCATE) capsule 220 mg, 220 mg, Oral, Daily With Lunch **FOLLOWED BY** [START ON 4/14/2021] multivitamin-minerals (CENTRUM) tablet 1 tablet, 1 tablet, Oral, Daily, Janey Nichols MD    [COMPLETED] remdesivir (Veklury) 200 mg in sodium chloride 0 9 % 250 mL IVPB, 200 mg, Intravenous, Q24H, Stopped at 04/06/21 2301 **FOLLOWED BY** remdesivir (Veklury) 100 mg in sodium chloride 0 9 % 250 mL IVPB, 100 mg, Intravenous, Q24H, Corey Ferguson MD    senna-docusate sodium (SENOKOT S) 8 6-50 mg per tablet 1 tablet, 1 tablet, Oral, HS, Corey Ferguson MD    thiamine tablet 50 mg, 50 mg, Oral, Daily, Corey Ferguson MD    tocilizumab (ACTEMRA) 800 mg in sodium chloride 0 9 % 60 mL IVPB, 800 mg, Intravenous, Once, Janey Nichols MD    Provisions for Follow-Up Care:  See after visit summary for information related to follow-up care and any pertinent home health orders  Disposition:     [address] Transfer to [facility]    For Discharges to [facility] SNF:   · Not Applicable to this Patient - Not Applicable to this Patient    Planned Readmission: No     Discharge Statement:  I spent 60 minutes discharging the patient  This time was spent on the day of discharge  I had direct contact with the patient on the day of discharge  Greater than 50% of the total time was spent examining patient, answering all patient questions, arranging and discussing plan of care with patient as well as directly providing post-discharge instructions  Additional time then spent on discharge activities        Cherylene Ley, MD  04/07/21  8:21 AM

## 2021-04-07 NOTE — ED NOTES
RESP paged due to pt being 83% oxygen at 2300 when nurse evaluated pt in room on 4 L NC  Pt was put on to Non rebreather and oxygen saturation levels went into the mid 90%'s        Pierre Moura RN  04/06/21 7156

## 2021-04-07 NOTE — QUICK NOTE
Patient reassessed in ED prior to transfer to Med/Surg floor  Patient sitting up to the side of bed with rebreather bag receiving 10L via Face mask  Patient reports headache completely resolved and wants to go upstairs  Patient unable to converse in full sentences but able to give few worded answers to question  States has not attempted proning since being at home and encouraged to do so and if not able to tolerate after a few minutes then to lie on her side  /68  HR 80s  Oxygen Sat 93%  Shallow rapid breaths noted in between coughing spells  Air entry still present in all lung fields with no crackles or crepitations but BS more bronchial in anterior right side of chest  S1 S2 heard with no murmur or other abnormal heart sounds and no pedal edema present  Will proceed with transfer to med surg flo and continue management as previously outlined  Will add mucinex 600mg po stat then BID to aid with coughing spells in addition to Tessalon PRN  Will add Tocilizumab if oxygen requirements remain over the next hour

## 2021-04-07 NOTE — ASSESSMENT & PLAN NOTE
BMI 44  Weight 125 kg  History of gastric bypass surgery in 2017     - Continue bariatric diet  - Continue vitamin supplementation with vitaminC, iron, and B12   - Avoid NSAIDs

## 2021-04-07 NOTE — ASSESSMENT & PLAN NOTE
History of Hashimoto's Thyroiditis medicated on Levothyroxine 137mcg daily  Lab Results   Component Value Date    [id number] 17 600 (H) 12/03/2020     Will continue levothyroxine 137 mcg daily  Will do TSH tomorrow morning and reassess medication dosage with same

## 2021-04-07 NOTE — ASSESSMENT & PLAN NOTE
Presentation: headache, fever, dry cough, shortness of breath, and diarrhea starting 3/29/2021  Likely contracted from work environment at [employer]  Tested positive 3/30/2021  Failed outpatient observation and management and was referred to hospital for admission  Met SIRS criteria on admission with tachypnea, tachycardia, and mild fever  ED: IV normal salinie 500 cc, IV Toradol 15 mg, Tylenol 975 mg  Chest X-Ray can be correlated with COVID-19 and CT-PE is negative for pulmonary embolism  D-dimer elevated at 0 79 and CRP elevated at 138  Patient currently saturating at 90% on 10 liters via mid-flow  Symptoms, imaging, and oxygen needs consistent with moderate COVID-19 disease  Patient is Level 1 full code without intubation  Patient received Actemra 4/7/2021     - Continue COVID-19 moderate pathway protocol   - Titrate oxygen saturation to maintain levels >90% via nasal cannula  - Continuous telemetry and pulse oximetry  - Continue Lipitor 40 mg daily at bedtime    - Continue Famotidine 20 mg BID   - Continue Dexamethasone 6 mg IV daily (Day 2/10)  - Continue Remdesivir (Day 2)  - Continue Vitamin C 1 g Q12H, Vitamin D3 2000 IU daily, and Zinc 220 mg PO daily  - Continue antibiotics: Ceftriaxone 1 gram daily & Doxycycline 100 mg BID   - Follow up on CRP, ferritin, TSH, D-Dimer, procalcitonin reflex, hepatitis panel, and blood cultures  - AM CBC & CMP   - Encourage ambulation, mobilization, and self-proning    - Incentive spirometry

## 2021-04-08 PROBLEM — U07.1 PNEUMONIA DUE TO COVID-19 VIRUS: Status: ACTIVE | Noted: 2021-04-08

## 2021-04-08 PROBLEM — J96.91 RESPIRATORY FAILURE WITH HYPOXIA (HCC): Status: ACTIVE | Noted: 2021-04-08

## 2021-04-08 PROBLEM — R65.10 SIRS (SYSTEMIC INFLAMMATORY RESPONSE SYNDROME) (HCC): Status: ACTIVE | Noted: 2021-04-06

## 2021-04-08 PROBLEM — J12.82 PNEUMONIA DUE TO COVID-19 VIRUS: Status: ACTIVE | Noted: 2021-04-08

## 2021-04-08 LAB
ALBUMIN SERPL BCP-MCNC: 2.4 G/DL (ref 3.5–5)
ALP SERPL-CCNC: 115 U/L (ref 46–116)
ALT SERPL W P-5'-P-CCNC: 18 U/L (ref 12–78)
ANION GAP SERPL CALCULATED.3IONS-SCNC: 9 MMOL/L (ref 4–13)
AST SERPL W P-5'-P-CCNC: 41 U/L (ref 5–45)
BILIRUB SERPL-MCNC: 0.22 MG/DL (ref 0.2–1)
BUN SERPL-MCNC: 11 MG/DL (ref 5–25)
CALCIUM ALBUM COR SERPL-MCNC: 10.1 MG/DL (ref 8.3–10.1)
CALCIUM SERPL-MCNC: 8.8 MG/DL (ref 8.3–10.1)
CHLORIDE SERPL-SCNC: 103 MMOL/L (ref 100–108)
CO2 SERPL-SCNC: 25 MMOL/L (ref 21–32)
CREAT SERPL-MCNC: 0.89 MG/DL (ref 0.6–1.3)
CRP SERPL QL: 106.8 MG/L
D DIMER PPP FEU-MCNC: 0.99 UG/ML FEU
ERYTHROCYTE [DISTWIDTH] IN BLOOD BY AUTOMATED COUNT: 22.2 % (ref 11.6–15.1)
FERRITIN SERPL-MCNC: 150 NG/ML (ref 8–388)
GFR SERPL CREATININE-BSD FRML MDRD: 80 ML/MIN/1.73SQ M
GLUCOSE SERPL-MCNC: 111 MG/DL (ref 65–140)
HCT VFR BLD AUTO: 36.6 % (ref 34.8–46.1)
HGB BLD-MCNC: 10.5 G/DL (ref 11.5–15.4)
MCH RBC QN AUTO: 21.3 PG (ref 26.8–34.3)
MCHC RBC AUTO-ENTMCNC: 28.7 G/DL (ref 31.4–37.4)
MCV RBC AUTO: 74 FL (ref 82–98)
PLATELET # BLD AUTO: 466 THOUSANDS/UL (ref 149–390)
PMV BLD AUTO: 9.1 FL (ref 8.9–12.7)
POTASSIUM SERPL-SCNC: 4.3 MMOL/L (ref 3.5–5.3)
PROCALCITONIN SERPL-MCNC: 0.05 NG/ML
PROT SERPL-MCNC: 7.2 G/DL (ref 6.4–8.2)
RBC # BLD AUTO: 4.94 MILLION/UL (ref 3.81–5.12)
SODIUM SERPL-SCNC: 137 MMOL/L (ref 136–145)
TROPONIN I SERPL-MCNC: <0.02 NG/ML
WBC # BLD AUTO: 4.54 THOUSAND/UL (ref 4.31–10.16)

## 2021-04-08 PROCEDURE — 80053 COMPREHEN METABOLIC PANEL: CPT | Performed by: INTERNAL MEDICINE

## 2021-04-08 PROCEDURE — 94760 N-INVAS EAR/PLS OXIMETRY 1: CPT

## 2021-04-08 PROCEDURE — 94003 VENT MGMT INPAT SUBQ DAY: CPT

## 2021-04-08 PROCEDURE — 84145 PROCALCITONIN (PCT): CPT | Performed by: INTERNAL MEDICINE

## 2021-04-08 PROCEDURE — 85379 FIBRIN DEGRADATION QUANT: CPT | Performed by: INTERNAL MEDICINE

## 2021-04-08 PROCEDURE — 99232 SBSQ HOSP IP/OBS MODERATE 35: CPT | Performed by: INTERNAL MEDICINE

## 2021-04-08 PROCEDURE — 85027 COMPLETE CBC AUTOMATED: CPT | Performed by: INTERNAL MEDICINE

## 2021-04-08 PROCEDURE — 99233 SBSQ HOSP IP/OBS HIGH 50: CPT | Performed by: INTERNAL MEDICINE

## 2021-04-08 PROCEDURE — 99255 IP/OBS CONSLTJ NEW/EST HI 80: CPT | Performed by: PHYSICIAN ASSISTANT

## 2021-04-08 PROCEDURE — 84484 ASSAY OF TROPONIN QUANT: CPT | Performed by: INTERNAL MEDICINE

## 2021-04-08 PROCEDURE — 82728 ASSAY OF FERRITIN: CPT | Performed by: INTERNAL MEDICINE

## 2021-04-08 PROCEDURE — NC001 PR NO CHARGE: Performed by: PHYSICIAN ASSISTANT

## 2021-04-08 PROCEDURE — 87081 CULTURE SCREEN ONLY: CPT | Performed by: ANESTHESIOLOGY

## 2021-04-08 PROCEDURE — 86140 C-REACTIVE PROTEIN: CPT | Performed by: INTERNAL MEDICINE

## 2021-04-08 RX ADMIN — ZINC SULFATE 220 MG (50 MG) CAPSULE 220 MG: CAPSULE at 13:39

## 2021-04-08 RX ADMIN — DOXYCYCLINE 100 MG: 100 CAPSULE ORAL at 10:01

## 2021-04-08 RX ADMIN — DOXYCYCLINE 100 MG: 100 CAPSULE ORAL at 20:01

## 2021-04-08 RX ADMIN — DEXAMETHASONE SODIUM PHOSPHATE 12.6 MG: 10 INJECTION, SOLUTION INTRAMUSCULAR; INTRAVENOUS at 17:51

## 2021-04-08 RX ADMIN — FAMOTIDINE 20 MG: 20 TABLET ORAL at 17:51

## 2021-04-08 RX ADMIN — CYANOCOBALAMIN TAB 500 MCG 1000 MCG: 500 TAB at 09:59

## 2021-04-08 RX ADMIN — CEFTRIAXONE 1000 MG: 1 INJECTION, SOLUTION INTRAVENOUS at 20:00

## 2021-04-08 RX ADMIN — LEVOTHYROXINE SODIUM 137 MCG: 25 TABLET ORAL at 06:00

## 2021-04-08 RX ADMIN — ENOXAPARIN SODIUM 40 MG: 40 INJECTION SUBCUTANEOUS at 10:01

## 2021-04-08 RX ADMIN — GUAIFENESIN 600 MG: 600 TABLET, EXTENDED RELEASE ORAL at 20:01

## 2021-04-08 RX ADMIN — FAMOTIDINE 20 MG: 20 TABLET ORAL at 10:02

## 2021-04-08 RX ADMIN — ACETAMINOPHEN 975 MG: 325 TABLET ORAL at 06:00

## 2021-04-08 RX ADMIN — Medication 2000 UNITS: at 09:59

## 2021-04-08 RX ADMIN — ENOXAPARIN SODIUM 40 MG: 40 INJECTION SUBCUTANEOUS at 21:34

## 2021-04-08 RX ADMIN — ACETAMINOPHEN 975 MG: 325 TABLET ORAL at 19:08

## 2021-04-08 RX ADMIN — OXYCODONE HYDROCHLORIDE AND ACETAMINOPHEN 1000 MG: 500 TABLET ORAL at 20:01

## 2021-04-08 RX ADMIN — OXYCODONE HYDROCHLORIDE AND ACETAMINOPHEN 1000 MG: 500 TABLET ORAL at 10:01

## 2021-04-08 RX ADMIN — Medication 325 MG: at 09:59

## 2021-04-08 RX ADMIN — REMDESIVIR 100 MG: 100 INJECTION, POWDER, LYOPHILIZED, FOR SOLUTION INTRAVENOUS at 21:34

## 2021-04-08 RX ADMIN — GUAIFENESIN 600 MG: 600 TABLET, EXTENDED RELEASE ORAL at 09:59

## 2021-04-08 RX ADMIN — THIAMINE HCL TAB 100 MG 50 MG: 100 TAB at 09:59

## 2021-04-08 NOTE — PHYSICAL THERAPY NOTE
04/08/21 1355   PT Last Visit   PT Visit Date 04/08/21   Note Type   Note type Evaluation   Cancel Reasons Medical status;SAO2 84-86% with pt on 21T O2 at rest   [facility] Insurance Number  [address] [place] [address] [id number]

## 2021-04-08 NOTE — PLAN OF CARE
Problem: Potential for Falls  Goal: Patient will remain free of falls  Description: INTERVENTIONS:  - Assess patient frequently for physical needs  -  Identify cognitive and physical deficits and behaviors that affect risk of falls    -  [place] fall precautions as indicated by assessment   - Educate patient/family on patient safety including physical limitations  - Instruct patient to call for assistance with activity based on assessment  - Modify environment to reduce risk of injury  - Consider OT/PT consult to assist with strengthening/mobility  Outcome: Progressing     Problem: RESPIRATORY - ADULT  Goal: Achieves optimal ventilation and oxygenation  Description: INTERVENTIONS:  - Assess for changes in respiratory status  - Assess for changes in mentation and behavior  - Position to facilitate oxygenation and minimize respiratory effort  - Oxygen administered by appropriate delivery if ordered  - Initiate smoking cessation education as indicated  - Encourage broncho-pulmonary hygiene including cough, deep breathe, Incentive Spirometry  - Assess the need for suctioning and aspirate as needed  - Assess and instruct to report SOB or any respiratory difficulty  - Respiratory Therapy support as indicated  Outcome: Progressing

## 2021-04-08 NOTE — H&P
History and Physical - [facility] Internal Medicine    Patient Information: Castro Umaña 39 y o  female MRN: [id number]  Unit/Bed#: [facility] 315-01 Encounter: [id number]  Admitting Physician: Edel Christine MD  PCP: Amy Green  Date of Admission:  04/08/21        Hospital Problem List:     Principal Problem:    COVID-19 virus infection  Active Problems:    Sepsis ([facility] [address] )    Respiratory failure with hypoxia ([facility] [address] )    History of gastric bypass    Anemia    Hypothyroidism    Morbid obesity ([facility] [address] )      Assessment/Plan:    Respiratory failure with hypoxia ([facility] [address] )  Assessment & Plan  Secondary to COVID pneumonia  Currently saturating adequately on 10-11 L of supplemental oxygen  CTA of the chest on admission without any evidence of pulmonary embolism  · Discussed regarding incentive spirometry, activity as tolerated and Proning  · Continue supplemental oxygen, titrate as tolerated    Sepsis ([facility] [address] )  Assessment & Plan  As evidenced by low-grade fever, tachypnea tachycardia on presentation  Appears to be improving  · Follow-up blood culture  · Trend procalcitonin  · Continue treatment as above    * COVID-19 virus infection  Assessment & Plan  Presented with increasing shortness of breath and cough  Initial symptoms started on 03/29  Moderate disease based on oxygen  10- 40 L requirement on presentation  CXR and CTA- evidence of multifocal pneumonia  No evidence of PE  SARS-CoV-2 PCR positive-3/30  · Medication treatment started per COVID protocol:  · Dexamethasone 6 mg IV daily for 10 days  · Remdesivir  · Famotidine 20 mg IV b i d  · Lovenox for DVT prophylaxis  · Doxycycline 100 mg p o  Q 12 hours, ceftriaxone 1 g daily to rule out superimposed bacterial infection  · Previous provider discussed with patient regarding Actemra  Patient was educated about the treatment plan  · HIV and hepatitis serology negative  · Check and trend inflammatory markers, CRP, ferritin  · CRP significantly elevated    Ferritin normal  · Check troponin, proBNP, D-dimer  · Troponin, proBNP negative  D-dimer minimally elevated  · Daily CBC, CMP  · Trend procalcitonin-discontinue antibiotics if negative x2  · Patient was educated and encouraged self proing  · Increase activity and mobilization as tolerated  · PT/OT as needed  · Pulmonary evaluation  · Bowel regimen          Results from last 7 days   Lab Units 04/07/21  0619 04/06/21  1601   CRP mg/L 251 9* 168 9*   FERRITIN ng/mL 90 79   LD U/L  --  1,060*   D-DIMER QUANTITATIVE ug/ml FEU 0 99* 0 79*   SED RATE mm/hour  --  118*      Results from last 7 days   Lab Units 04/07/21  0626 04/06/21  1601   PROCALCITONIN ng/ml 0 12 <0 05      Results from last 7 days   Lab Units 04/06/21  1601   TROPONIN I ng/mL <0 01         Anemia  Assessment & Plan  Microcytic, appears chronic   Patient does reports some back stool but on iron supplementation  Trend hemoglobin    Morbid obesity ([address] )  Assessment & Plan  History of gastric bypass      Hypothyroidism  Assessment & Plan  On levothyroxine          VTE Prophylaxis: Enoxaparin (Lovenox)  / sequential compression device   Code Status: Level 1 - Full Code    Anticipated Length of Stay:  Patient will be admitted on an Inpatient basis with an anticipated length of stay of  > 2 midnights  Justification for Hospital Stay:  Hypoxic respiratory failure, COVID pneumonia    Total Time for Visit, including Counseling / Coordination of Care: 45 minutes  Greater than 50% of this total time spent on direct patient counseling and coordination of care  History of Present Illness:    Puja Rhodes is a 39 y o  female with history of morbid obesity, hypothyroidism, gastric bypass surgery, anemia, dyslipidemia who was initially presented to [facility] at [facility] with worsening of shortness of breath  Patient reported that she was diagnosed to have at [facility] on 3/30/2021, patient initially had milder symptoms starting around 3/29    Her initial symptoms including fever, chills, generalized malaise, loss of taste and smell, poor appetite  but over last few days she had increasing shortness of breath and cough  She was evaluated and noted to be hypoxic and required 4 L of supplemental oxygen and admitted  After hospitalization patient was noted to have ex collecting oxygen requirement to 12 liters/minute and patient was subsequently transferred here for close monitoring and ICU capabilities  Patient was started on COVID treatment based on moderate pathway and was also ordered for Actemra  Currently patient appears comfortable sitting up in bed, mildly anxious due to ongoing medical illness  Discussed at length regarding treatment option with the patient and counseled  Review of Systems:    Review of Systems   Constitutional: Positive for appetite change, fatigue and fever  HENT: Negative for congestion and sore throat  Eyes: Negative for visual disturbance  Respiratory: Positive for cough, chest tightness and shortness of breath  Cardiovascular: Negative for chest pain and leg swelling  Gastrointestinal: Positive for diarrhea, nausea and vomiting  Negative for abdominal pain  Genitourinary: Negative for difficulty urinating  Neurological: Positive for headaches  Psychiatric/Behavioral: Negative for behavioral problems         Past Medical and Surgical History:     Past Medical History:   Diagnosis Date    Abscess of labia     Anemia 4/6/2021    Anxiety     Bipolar disorder (HCC)     Breast lump     Frequent headaches     Hemorrhoids     Hypothyroidism 7/1/2013    Migraine     Morbid obesity (HCC) 7/1/2013    Obesity     Psychotic disorder (HCC)        Past Surgical History:   Procedure Laterality Date    BARIATRIC SURGERY      BREAST BIOPSY Right 1999    BREAST LUMPECTOMY Left     benign    CHOLECYSTECTOMY      GALLBLADDER SURGERY         Meds/Allergies:    PTA meds:   Prior to Admission Medications Prescriptions Last Dose Informant Patient Reported? Taking?    Calcium Citrate 200 MG TABS 2021 at Unknown time Self Yes Yes   Si tablet 3 (three) times a day   Cyanocobalamin ER 1000 MCG TBCR 2021 at Unknown time Self Yes Yes   Sig: take one tab daily   Multiple Vitamins-Minerals (MULTIVITAMIN ADULT PO) 2021 at Unknown time Self Yes Yes   Sig: every 24 hours   albuterol (PROVENTIL HFA,VENTOLIN HFA) 90 mcg/act inhaler Past Week at Unknown time Self No Yes   Sig: Inhale 2 puffs every 6 (six) hours as needed for wheezing or shortness of breath   atorvastatin (LIPITOR) 20 mg tablet 2021 at Unknown time Self Yes Yes   Sig: TK 1 T PO QHS   benzonatate (TESSALON PERLES) 100 mg capsule Not Taking at Unknown time Self No No   Sig: Take 1 capsule (100 mg total) by mouth 3 (three) times a day as needed for cough   Patient not taking: Reported on 2021   ferrous sulfate 324 (65 Fe) mg 2021 at Unknown time Self No Yes   Sig: Take 1 tablet (324 mg total) by mouth daily before breakfast   levothyroxine 137 mcg tablet 2021 at Unknown time Self Yes Yes   Sig: TAKE ONE TABLET BY MOUTH EVERY MORNING ON EMPTY STOMACH   pantoprazole (PROTONIX) 40 mg tablet 2021 at Unknown time Self No Yes   Sig: Take 1 tablet (40 mg total) by mouth every 24 hours   rosuvastatin (CRESTOR) 10 MG tablet Unknown at Unknown time Self Yes No   Sig: Take 10 mg by mouth daily   thiamine (VITAMIN B1) 50 mg tablet Not Taking at Unknown time Self No No   Sig: Take 1 tablet (50 mg total) by mouth daily   Patient not taking: Reported on 2021   topiramate (TOPAMAX) 50 MG tablet Not Taking at Unknown time Self No No   Sig: Take 1 tablet (50 mg total) by mouth 2 (two) times a day   Patient not taking: Reported on 2021   traZODone (DESYREL) 50 mg tablet Not Taking at Unknown time Self No No   Sig: Take 1 tablet (50 mg total) by mouth daily at bedtime as needed for sleep   Patient not taking: Reported on 2021 Facility-Administered Medications: None       Allergies: Allergies   Allergen Reactions    No Active Allergies      History:     Marital Status: Single     Substance Use History:   Social History     Substance and Sexual Activity   Alcohol Use Never    Frequency: Never     Social History     Tobacco Use   Smoking Status Former Smoker    Types: Cigarettes    Quit date:     Years since quittin 2   Smokeless Tobacco Former User     Social History     Substance and Sexual Activity   Drug Use No       Family History:    Family History   Problem Relation Age of Onset    Liver cancer Mother     Prostate cancer Father     Cancer Family     No Known Problems Sister     No Known Problems Maternal Grandmother     No Known Problems Maternal Grandfather     No Known Problems Paternal Grandmother     No Known Problems Paternal Grandfather     No Known Problems Sister     No Known Problems Sister     No Known Problems Sister     No Known Problems Sister     No Known Problems Sister     No Known Problems Sister     No Known Problems Sister     No Known Problems Sister     No Known Problems Sister     Cancer Maternal Aunt     Cancer Maternal Aunt     Cancer Maternal Aunt     Cancer Maternal Aunt     Cancer Maternal Aunt     Cancer Maternal Aunt     Cancer Maternal Aunt     No Known Problems Paternal Aunt     No Known Problems Paternal Aunt     No Known Problems Paternal Aunt     No Known Problems Paternal Aunt     Cancer Paternal Aunt     Cancer Paternal Aunt        Physical Exam:     Vitals:   Blood Pressure: 96/57 (21)  Pulse: 70 (21)  Temperature: 98 °F (36 7 °C) (21)  Temp Source: Oral (21)  Respirations: (!) 24 (21)  Height: 5' 6" (167 6 cm) (21 1230)  Weight - Scale: 126 kg (276 lb 14 4 oz) (21 1230)  SpO2: 96 % (21)    Physical Exam  Constitutional:       General: She is not in acute distress  Appearance: She is obese  HENT:      Head: Normocephalic and atraumatic  Neck:      Musculoskeletal: Neck supple  Cardiovascular:      Rate and Rhythm: Normal rate  Pulmonary:      Effort: Pulmonary effort is normal  Tachypnea present  No accessory muscle usage or respiratory distress  Breath sounds: Decreased breath sounds present  No wheezing, rhonchi or rales  Comments: Diminished, mild conversational dyspnea  Abdominal:      General: Bowel sounds are normal  There is no distension  Palpations: Abdomen is soft  Tenderness: There is no abdominal tenderness  There is no guarding or rebound  Skin:     General: Skin is warm and dry  Findings: No rash  Neurological:      General: No focal deficit present  Mental Status: She is alert and oriented to person, place, and time  Mental status is at baseline  Lab Results: I have personally reviewed pertinent reports  Results from last 7 days   Lab Units 04/07/21  1414 04/07/21  0914 04/06/21  1601   WBC Thousand/uL  --  5 30 4 30*   HEMOGLOBIN g/dL 11 0* 9 8* 9 6*   HEMATOCRIT % 38 1 31 6* 31 9*   PLATELETS Thousands/uL  --  386 369   NEUTROS PCT %  --   --  81*   LYMPHS PCT %  --   --  11*   MONOS PCT %  --   --  5   EOS PCT %  --   --  0     Results from last 7 days   Lab Units 04/07/21  0619   POTASSIUM mmol/L 4 0   CHLORIDE mmol/L 105   CO2 mmol/L 23   BUN mg/dL 7   CREATININE mg/dL 0 70   CALCIUM mg/dL 8 2*   ALK PHOS U/L 134*   ALT U/L 14   AST U/L 34     Results from last 7 days   Lab Units 04/06/21  1601   INR  1 02       Imaging: I have personally reviewed pertinent reports  Xr Chest Portable    Result Date: 4/7/2021  Narrative: CHEST INDICATION:   worsening Acute Respiratory distress   COMPARISON:  4/6/2021 EXAM PERFORMED/VIEWS:  XR CHEST PORTABLE Single view FINDINGS: Progressive diffuse bilateral infiltrates consistent with multifocal pneumonia, likely Covid related Cardiomediastinal silhouette appears unremarkable  No pneumothorax or pleural effusion  Osseous structures appear within normal limits for patient age  Impression: Progressive diffuse bilateral infiltrates, likely Covid related Workstation performed: [id number]     Xr Chest Portable    Result Date: 4/6/2021  Narrative: CHEST INDICATION:   sob  COMPARISON:  None EXAM PERFORMED/VIEWS:  XR CHEST PORTABLE FINDINGS: Cardiomediastinal silhouette appears unremarkable  Multifocal lung opacities suggesting multifocal pneumonia most likely Covid 19 related  No pneumothorax  No pleural effusion  Osseous structures appear within normal limits for patient age  Impression: Multifocal pneumonia  Workstation performed: [id number]     Cta Chest Pe Study    Result Date: 4/6/2021  Narrative: CTA - CHEST WITH IV CONTRAST - PULMONARY ANGIOGRAM INDICATION:   Shortness of breath r/o PE  Patient has suspected COVID-19  COMPARISON: None  TECHNIQUE: CTA examination of the chest was performed using angiographic technique according to a protocol specifically tailored to evaluate for pulmonary embolism  Axial, sagittal, and coronal 2D reformatted images were created from the source data and  submitted for interpretation  In addition, coronal 3D MIP postprocessing was performed on the acquisition scanner  Radiation dose length product (DLP) for this visit:  416 mGy-cm   This examination, like all CT scans performed in the [facility], was performed utilizing techniques to minimize radiation dose exposure, including the use of iterative reconstruction and automated exposure control  IV Contrast:  100 mL of iohexol (OMNIPAQUE)  FINDINGS: PULMONARY ARTERIAL TREE:  No pulmonary embolus is seen  LUNGS:  Extensive multifocal groundglass opacities throughout the lung fields bilaterally, both centrally and peripherally located  PLEURA:  Unremarkable  HEART/GREAT VESSELS:  Unremarkable for patient's age  MEDIASTINUM AND NORA:  Unremarkable   CHEST WALL AND LOWER NECK:   Unremarkable  VISUALIZED STRUCTURES IN THE UPPER ABDOMEN:  Sutures about the stomach suggestive of prior gastric bypass surgery  Cholecystectomy clips are noted  OSSEOUS STRUCTURES:  No acute fracture or destructive osseous lesion  Mild spondylotic changes noted  Impression: 1  No pulmonary embolism  2   Multifocal parenchymal opacities, most likely multifocal pneumonia/pneumonitis  In the setting of clinically suspected/proven COVID-19, the above lung parenchymal findings on CT indicate high confidence level for COVID-19  Workstation performed: [id number]     Mammo Diagnostic Bilateral W 3d & Cad, Us Breast Bilateral Limited (diagnostic)    Result Date: 3/25/2021  Narrative: DIAGNOSIS: Breast pain TECHNIQUE: Digital diagnostic mammography was performed  Computer Aided Detection (CAD) analyzed all applicable images  Ultrasound of the bilateral breast(s) was performed  COMPARISONS: Prior breast imaging dated: 06/25/2019, 06/19/2018, 06/06/2018, 04/19/2017, 01/04/2017, 05/10/2016, 09/11/2015, 08/20/2013, and 10/11/2011 RELEVANT HISTORY: Family Breast Cancer History: No known family history of breast cancer  Family Medical History: No known relevant family medical history  Personal History: No known relevant hormone history  Surgical history includes breast biopsy and lumpectomy  No known relevant medical history  RISK ASSESSMENT: 5 Year Tyrer-Cuzick: 0 4 % 10 Year Tyrer-Cuzick: 0 96 % Lifetime Tyrer-Cuzick: 6 38 % TISSUE DENSITY: The breasts are almost entirely fatty  INDICATION: Francisco Parrish is a 39 y o  female presenting for Pain in both breast  FINDINGS: Bilateral There are pain markers over the inner right breast without underlying mammographic abnormality  There are stable well-circumscribed nodules bilaterally unchanged from prior exams  There are stable scattered calcifications present bilaterally    There are no suspicious masses, grouped microcalcifications or areas of architectural distortion  The skin and nipple areolar complex are unremarkable  Targeted sonographic evaluation of the inner right breast area of pain demonstrates no suspicious mass or abnormal shadowing  Targeted sonographic evaluation of multiple areas of pain throughout the left breast was performed  There is no suspicious mass or abnormal shadowing  Impression: No evidence of malignancy  ASSESSMENT/BI-RADS CATEGORY: Left: 2 - Benign Right: 2 - Benign Overall: 2 - Benign RECOMMENDATION:      - Routine screening mammogram in 1 year for both breasts  - Clinical management for both breasts  Workstation ID: [id number]       No orders to display       EKG, Pathology, and Other Studies Reviewed on Admission:   · EKG-normal sinus rhythm without any acute ST T-wave changes    Allscripts/EPIC Records Reviewed: Yes     ** Please Note: "This note has been constructed using a voice recognition system  Therefore there may be syntax, spelling, and/or grammatical errors   Please call if you have any questions  "**

## 2021-04-08 NOTE — ASSESSMENT & PLAN NOTE
· Initial symptoms on 3/29, positive on 3/30  · Now severe disease pathway  · Increase Dexamethasone to 0 1mg/kg BID  · Complete remdesevir as already started  · Lovenox DVT ppx - trend d dimer  · Continue Ceftriaxone/Doxy, can likely discontinue as procalcitonin not uptrending  · HIV/Hepatitis panel negative  · CRP/Ferritin downtrending  · Encourage Self proning  · Received actemra x 1 on 4/7

## 2021-04-08 NOTE — ASSESSMENT & PLAN NOTE
· Secondary to COVID pneumonia  · Currently saturating adequately on 10-11 L of supplemental oxygen  · CTA of the chest on admission without any evidence of pulmonary embolism  · Discussed regarding incentive spirometry, activity as tolerated and Proning  · Continue supplemental oxygen, titrate as tolerated

## 2021-04-08 NOTE — ASSESSMENT & PLAN NOTE
· Presented with increasing shortness of breath and cough  · Initial symptoms started on 03/29 and COVID-19 positive 03/30   · Moderate disease treatment protocol, on supp oxygen 10L requirement since admission   · CXR and CTA- evidence of extensive multifocal pneumonia  No evidence of PE  · Dexamethasone 6 mg IV daily for 10 days with pepcid 20mg IV BID  Consider increased decadron dosing  · Remdesivir x 5 days   · Lovenox for DVT prophylaxis  · Doxycycline 100 mg p o  Q 12 hours, ceftriaxone 1 g daily to rule out superimposed bacterial infection  PCT trending upward, follow up 3rd PCT today to determine if abx can be d/c    · S/p Actemra 4/7/21  Continue bowel regimen  · Consider IV lasix  PO intake just improving today however  · HIV and hepatitis serology negative    · Check and trend inflammatory markers, CRP and d-dimer   · CRP improving   · Daily CBC, CMP  · Patient was educated and encouraged self proning, Increase activity and mobilization as tolerated, PT/OT as needed  · Pulmonary evaluation appreciated   · Continue to maintain O2 sats >/= 90%

## 2021-04-08 NOTE — CONSULTS
[id number] [facility] 1975, 39 y o  female MRN: [id number]  Unit/Bed#: [address] [place] [address] Encounter: [id number]  Primary Care Provider: THOMAS Beltran   Date and time admitted to hospital: 4/7/2021 11:38 AM    Inpatient consult to Mode Raman Toney performed by: Solo Khan PA-C  Consult ordered by: Gwendolyn Sparrow MD        * Pneumonia due to COVID-19 virus  Assessment & Plan  · Initial symptoms on 3/29, positive on 3/30  · Now severe disease pathway  · Increase Dexamethasone to 0 1mg/kg BID  · Complete remdesevir as already started  · Lovenox DVT ppx - trend d dimer  · Continue Ceftriaxone/Doxy, can likely discontinue as procalcitonin not uptrending  · HIV/Hepatitis panel negative  · CRP/Ferritin downtrending  · Encourage Self proning  · Received actemra x 1 on 4/7    Respiratory failure with hypoxia ([facility] [address] )  Assessment & Plan  · Secondary to COVID-19  · Currently on 15L midflow and desaturating  Begin HFNC    SIRS (systemic inflammatory response syndrome) (HCC)  Assessment & Plan  · Patient tachypneic, tachycardic, febrile, but all likely secondary to COVID-19  · Procalcitonin <0 05 - 0 12- 0 05  · Trend tomorrow,consider D/C antibiotics    Hypothyroidism  Assessment & Plan  · Continue synthroid    Morbid obesity ([facility] [address] )  Assessment & Plan  · Nutrition consult when appropriate      Anemia  Assessment & Plan  · Continue Vit B 12 and iron      -------------------------------------------------------------------------------------------------------------  Chief Complaint: shortness of breath    History of Present Illness   HX and PE limited by: Dylan Ponce is a 39 y o  female past medical history of morbid obesity, hypothyroidism, anemia, dyslipidemia who presents with shortness of breath  Patient began to have symptoms of shortness of breath on 03/29/2021 when tested positive for COVID on 03/30/2021    She presented to the emergency department at [facility] on 04/06/21 with worsening shortness of breath and was admitted to the [facility] but required transfer after worsening hypoxia  Was initially admitted to the med surg floor at [address] [place] [address], however progressively had worsening hypoxia today on 04/08 which prompted critical care evaluation  History obtained from chart review and the patient   -------------------------------------------------------------------------------------------------------------  Dispo: Admit to Stepdown Level 1    Code Status: Level 1 - Full Code  --------------------------------------------------------------------------------------------------------------  Review of Systems    A 12-point, complete review of systems was reviewed and negative except as stated above     Physical Exam  Vitals signs and nursing note reviewed  Constitutional:       Appearance: She is ill-appearing  HENT:      Head: Normocephalic and atraumatic  Nose: Nose normal       Mouth/Throat:      Mouth: Mucous membranes are moist    Eyes:      Pupils: Pupils are equal, round, and reactive to light  Cardiovascular:      Rate and Rhythm: Normal rate and regular rhythm  Pulses: Normal pulses  Heart sounds: No murmur  Pulmonary:      Effort: Respiratory distress present  Breath sounds: Normal breath sounds  Abdominal:      General: Abdomen is flat  There is no distension  Palpations: Abdomen is soft  Musculoskeletal:         General: No swelling  Skin:     General: Skin is warm  Neurological:      General: No focal deficit present  Mental Status: She is alert     Psychiatric:         Mood and Affect: Mood normal          --------------------------------------------------------------------------------------------------------------  Vitals:   Vitals:    04/08/21 0835 04/08/21 0916 04/08/21 0920 04/08/21 1430   BP:  100/54     BP Location:  Right arm     Pulse:   71    Resp:  22 Temp:  98 °F (36 7 °C)     TempSrc:  Oral     SpO2: 94% 94%  96%   Weight:       Height:         Temp  Min: 97 4 °F (36 3 °C)  Max: 100 5 °F (38 1 °C)  IBW: 59 3 kg  Height: 5' 6" (167 6 cm)  Body mass index is 44 69 kg/m²  Laboratory and Diagnostics:  Results from last 7 days   Lab Units 04/08/21  0710 04/07/21  1414 04/07/21  0914 04/06/21  1601   WBC Thousand/uL 4 54  --  5 30 4 30*   HEMOGLOBIN g/dL 10 5* 11 0* 9 8* 9 6*   HEMATOCRIT % 36 6 38 1 31 6* 31 9*   PLATELETS Thousands/uL 466*  --  386 369   NEUTROS PCT %  --   --   --  81*   MONOS PCT %  --   --   --  5     Results from last 7 days   Lab Units 04/08/21  0710 04/07/21  0619 04/06/21  1601   SODIUM mmol/L 137 137 136*   POTASSIUM mmol/L 4 3 4 0 3 5*   CHLORIDE mmol/L 103 105 103   CO2 mmol/L 25 23 26   ANION GAP mmol/L 9 9 7   BUN mg/dL 11 7 6   CREATININE mg/dL 0 89 0 70 0 76   CALCIUM mg/dL 8 8 8 2* 8 3*   GLUCOSE RANDOM mg/dL 111 123* 97   ALT U/L 18 14 12   AST U/L 41 34 31   ALK PHOS U/L 115 134* 129*   ALBUMIN g/dL 2 4* 3 5 3 5   TOTAL BILIRUBIN mg/dL 0 22 0 20 0 20          Results from last 7 days   Lab Units 04/06/21  1601   INR  1 02   PTT seconds 36      Results from last 7 days   Lab Units 04/08/21  0710 04/06/21  1601   TROPONIN I ng/mL <0 02 <0 01     Results from last 7 days   Lab Units 04/06/21  1601   LACTIC ACID mmol/L 1 1     ABG:    VBG:  Results from last 7 days   Lab Units 04/06/21  1601   PH NOLAN  7 350   PCO2 NOLAN mm Hg 43 0   PO2 NOLAN mm Hg 32 0*   HCO3 NOLAN mmol/L 23 7   BASE EXC NOLAN mmol/L -1 9     Results from last 7 days   Lab Units 04/08/21  0710 04/07/21  0626 04/06/21  1601   PROCALCITONIN ng/ml 0 05 0 12 <0 05       Micro:  Results from last 7 days   Lab Units 04/06/21  1634 04/06/21  1601   BLOOD CULTURE  No Growth at 24 hrs  No Growth at 24 hrs  EKG:   Imaging: I have personally reviewed pertinent reports          Historical Information   Past Medical History:   Diagnosis Date    Abscess of labia     Anemia 2021    Anxiety     Bipolar disorder (HCC)     Breast lump     Frequent headaches     Hemorrhoids     Hypothyroidism 2013    Migraine     Morbid obesity (HCC) 2013    Obesity     Psychotic disorder (HCC)      Past Surgical History:   Procedure Laterality Date    BARIATRIC SURGERY      BREAST BIOPSY Right 1999    BREAST LUMPECTOMY Left     benign    CHOLECYSTECTOMY      GALLBLADDER SURGERY       Social History   Social History     Substance and Sexual Activity   Alcohol Use Never    Frequency: Never     Social History     Substance and Sexual Activity   Drug Use No     Social History     Tobacco Use   Smoking Status Former Smoker    Types: Cigarettes    Quit date:     Years since quittin 2   Smokeless Tobacco Former User     Exercise History:   Family History:   Family History   Problem Relation Age of Onset    Liver cancer Mother     Prostate cancer Father     Cancer Family     No Known Problems Sister     No Known Problems Maternal Grandmother     No Known Problems Maternal Grandfather     No Known Problems Paternal Grandmother     No Known Problems Paternal Grandfather     No Known Problems Sister     No Known Problems Sister     No Known Problems Sister     No Known Problems Sister     No Known Problems Sister     No Known Problems Sister     No Known Problems Sister     No Known Problems Sister     No Known Problems Sister     Cancer Maternal Aunt     Cancer Maternal Aunt     Cancer Maternal Aunt     Cancer Maternal Aunt     Cancer Maternal Aunt     Cancer Maternal Aunt     Cancer Maternal Aunt     No Known Problems Paternal Aunt     No Known Problems Paternal Aunt     No Known Problems Paternal Aunt     No Known Problems Paternal Aunt     Cancer Paternal Aunt     Cancer Paternal Aunt      I have reviewed this patient's family history and commented on sigificant items within the HPI      Medications:  Current Facility-Administered Medications Medication Dose Route Frequency    acetaminophen (TYLENOL) tablet 975 mg  975 mg Oral Q8H [facility]    albuterol (PROVENTIL HFA,VENTOLIN HFA) inhaler 2 puff  2 puff Inhalation Q6H PRN    ascorbic acid (VITAMIN C) tablet 1,000 mg  1,000 mg Oral Q12H KAYA    atorvastatin (LIPITOR) tablet 40 mg  40 mg Oral HS    benzonatate (TESSALON PERLES) capsule 100 mg  100 mg Oral TID PRN    calcium carbonate (OYSTER SHELL,OSCAL) 500 mg tablet 1 tablet  1 tablet Oral Daily With Lunch    cefTRIAXone (ROCEPHIN) IVPB (premix in dextrose) 1,000 mg 50 mL  1,000 mg Intravenous Q24H    cholecalciferol (VITAMIN D3) tablet 2,000 Units  2,000 Units Oral Daily    cyanocobalamin (VITAMIN B-12) tablet 1,000 mcg  1,000 mcg Oral Daily    dexamethasone (DECADRON) 12 6 mg in sodium chloride 0 9 % 50 mL IVPB  0 1 mg/kg Intravenous Q12H    doxycycline hyclate (VIBRAMYCIN) capsule 100 mg  100 mg Oral Q12H    enoxaparin (LOVENOX) subcutaneous injection 40 mg  40 mg Subcutaneous Q12H KAYA    famotidine (PEPCID) tablet 20 mg  20 mg Oral BID    ferrous sulfate tablet 325 mg  325 mg Oral Daily With Breakfast    guaiFENesin (MUCINEX) 12 hr tablet 600 mg  600 mg Oral Q12H KAYA    levothyroxine tablet 137 mcg  137 mcg Oral Early Morning    zinc sulfate (ZINCATE) capsule 220 mg  220 mg Oral Daily With Lunch    Followed by   Yani Santos ON 2021] multivitamin-minerals (CENTRUM) tablet 1 tablet  1 tablet Oral Daily    polyethylene glycol (MIRALAX) packet 17 g  17 g Oral Daily PRN    remdesivir (Veklury) 100 mg in sodium chloride 0 9 % 250 mL IVPB  100 mg Intravenous Q24H    senna-docusate sodium (SENOKOT S) 8 6-50 mg per tablet 1 tablet  1 tablet Oral HS    thiamine tablet 50 mg  50 mg Oral Daily     Home medications:  Prior to Admission Medications   Prescriptions Last Dose Informant Patient Reported? Taking?    Calcium Citrate 200 MG TABS 2021 at Unknown time Self Yes Yes   Si tablet 3 (three) times a day   Cyanocobalamin ER 1000 MCG TBCR 4/6/2021 at Unknown time Self Yes Yes   Sig: take one tab daily   Multiple Vitamins-Minerals (MULTIVITAMIN ADULT PO) 4/7/2021 at Unknown time Self Yes Yes   Sig: every 24 hours   albuterol (PROVENTIL HFA,VENTOLIN HFA) 90 mcg/act inhaler Past Week at Unknown time Self No Yes   Sig: Inhale 2 puffs every 6 (six) hours as needed for wheezing or shortness of breath   atorvastatin (LIPITOR) 20 mg tablet 4/6/2021 at Unknown time Self Yes Yes   Sig: TK 1 T PO QHS   benzonatate (TESSALON PERLES) 100 mg capsule Not Taking at Unknown time Self No No   Sig: Take 1 capsule (100 mg total) by mouth 3 (three) times a day as needed for cough   Patient not taking: Reported on 4/6/2021   ferrous sulfate 324 (65 Fe) mg 4/6/2021 at Unknown time Self No Yes   Sig: Take 1 tablet (324 mg total) by mouth daily before breakfast   levothyroxine 137 mcg tablet 4/7/2021 at Unknown time Self Yes Yes   Sig: TAKE ONE TABLET BY MOUTH EVERY MORNING ON EMPTY STOMACH   pantoprazole (PROTONIX) 40 mg tablet 4/7/2021 at Unknown time Self No Yes   Sig: Take 1 tablet (40 mg total) by mouth every 24 hours   rosuvastatin (CRESTOR) 10 MG tablet Unknown at Unknown time Self Yes No   Sig: Take 10 mg by mouth daily   thiamine (VITAMIN B1) 50 mg tablet Not Taking at Unknown time Self No No   Sig: Take 1 tablet (50 mg total) by mouth daily   Patient not taking: Reported on 4/6/2021   topiramate (TOPAMAX) 50 MG tablet Not Taking at Unknown time Self No No   Sig: Take 1 tablet (50 mg total) by mouth 2 (two) times a day   Patient not taking: Reported on 4/6/2021   traZODone (DESYREL) 50 mg tablet Not Taking at Unknown time Self No No   Sig: Take 1 tablet (50 mg total) by mouth daily at bedtime as needed for sleep   Patient not taking: Reported on 4/6/2021      Facility-Administered Medications: None     Allergies:   Allergies   Allergen Reactions    No Active Allergies ------------------------------------------------------------------------------------------------------------  Advance Directive and Living Will:      Power of :    POLST:    ------------------------------------------------------------------------------------------------------------  Care Time Delivered:   No Critical Care time spent       Raymundo Tom PA-C        Portions of the record may have been created with voice recognition software  Occasional wrong word or "sound a like" substitutions may have occurred due to the inherent limitations of voice recognition software    Read the chart carefully and recognize, using context, where substitutions have occurred

## 2021-04-08 NOTE — PROGRESS NOTES
[address] 47  Progress Note - Teodora Umaña 1975, 39 y o  female MRN: [id number]  Unit/Bed#: [address] [place] [address] Encounter: [id number]  Primary Care Provider: THOMAS Lugo   Date and time admitted to hospital: 4/7/2021 11:38 AM    * Pneumonia due to COVID-19 virus  Assessment & Plan  · Presented with increasing shortness of breath and cough  · Initial symptoms started on 03/29 and COVID-19 positive 03/30   · Moderate disease treatment protocol, on supp oxygen 10L requirement since admission   · CXR and CTA- evidence of extensive multifocal pneumonia  No evidence of PE  · Dexamethasone 6 mg IV daily for 10 days with pepcid 20mg IV BID  Consider increased decadron dosing  · Remdesivir x 5 days   · Lovenox for DVT prophylaxis  · Doxycycline 100 mg p o  Q 12 hours, ceftriaxone 1 g daily to rule out superimposed bacterial infection  PCT trending upward, follow up 3rd PCT today to determine if abx can be d/c    · S/p Actemra 4/7/21  Continue bowel regimen  · Consider IV lasix  PO intake just improving today however  · HIV and hepatitis serology negative    · Check and trend inflammatory markers, CRP and d-dimer   · CRP improving   · Daily CBC, CMP  · Patient was educated and encouraged self proning, Increase activity and mobilization as tolerated, PT/OT as needed  · Pulmonary evaluation appreciated   · Continue to maintain O2 sats >/= 90%    Respiratory failure with hypoxia ([address] )  Assessment & Plan  · Secondary to COVID pneumonia  · Currently saturating adequately on 10-11 L of supplemental oxygen  · CTA of the chest on admission without any evidence of pulmonary embolism  · Discussed regarding incentive spirometry, activity as tolerated and Proning  · Continue supplemental oxygen, titrate as tolerated    Sepsis ([address] )  Assessment & Plan  As evidenced by low-grade fever, tachypnea tachycardia on presentation  Appears to be improving  · Follow-up blood culture  · Trend procalcitonin  · Continue treatment as above    Anemia  Assessment & Plan  · Microcytic, appears chronic   · Patient does reports some back stool but on iron supplementation  · Trend hemoglobin    Morbid obesity (HCC)  Assessment & Plan  · History of gastric bypass    Hypothyroidism  Assessment & Plan  · On levothyroxine    VTE Pharmacologic Prophylaxis:   Pharmacologic: Enoxaparin (Lovenox)  Mechanical VTE Prophylaxis in Place: Yes    Patient Centered Rounds: I have performed bedside rounds with nursing staff today  Discussions with Specialists or Other Care Team Provider: nursing     Education and Discussions with Family / Patient: patient, spoke with dtr and pts friend     Time Spent for Care: 30 minutes  More than 50% of total time spent on counseling and coordination of care as described above  Current Length of Stay: 1 day(s)    Current Patient Status: Inpatient   Certification Statement: The patient will continue to require additional inpatient hospital stay due to pending improvement in SOB, hypoxia     Discharge Plan: pending clinical course     Code Status: Level 1 - Full Code    Subjective:   Pt seen and examined at bedside  Notes improvement in appetite but otherwise feels "stable " no cp but feels sob with movement and walking, sometimes speaking  Objective:     Vitals:   Temp (24hrs), Av 6 °F (37 °C), Min:98 °F (36 7 °C), Max:99 8 °F (37 7 °C)    Temp:  [98 °F (36 7 °C)-99 8 °F (37 7 °C)] 98 °F (36 7 °C)  HR:  [67-84] 71  Resp:  [24-30] 24  BP: ()/(50-60) 100/54  SpO2:  [92 %-100 %] 94 %  Body mass index is 44 69 kg/m²  Input and Output Summary (last 24 hours): Intake/Output Summary (Last 24 hours) at 2021 1151  Last data filed at 2021 1230  Gross per 24 hour   Intake 480 ml   Output --   Net 480 ml       Physical Exam:     Physical Exam  Constitutional:       Appearance: Normal appearance  She is obese  HENT:      Head: Normocephalic and atraumatic  Mouth/Throat:      Mouth: Mucous membranes are moist    Eyes:      Extraocular Movements: Extraocular movements intact  Neck:      Musculoskeletal: Normal range of motion and neck supple  Cardiovascular:      Rate and Rhythm: Normal rate and regular rhythm  Pulmonary:      Effort: Pulmonary effort is normal       Breath sounds: Rhonchi present  Comments: Coarse breath sounds  11L MFNC  Abdominal:      General: Abdomen is flat  Palpations: Abdomen is soft  Musculoskeletal: Normal range of motion  General: No swelling  Skin:     General: Skin is warm and dry  Neurological:      General: No focal deficit present  Mental Status: She is alert  Psychiatric:         Mood and Affect: Mood normal          Behavior: Behavior normal        Additional Data:     Labs:    Results from last 7 days   Lab Units 04/08/21  0710  04/06/21  1601   WBC Thousand/uL 4 54   < > 4 30*   HEMOGLOBIN g/dL 10 5*   < > 9 6*   HEMATOCRIT % 36 6   < > 31 9*   PLATELETS Thousands/uL 466*   < > 369   NEUTROS PCT %  --   --  81*   LYMPHS PCT %  --   --  11*   MONOS PCT %  --   --  5   EOS PCT %  --   --  0    < > = values in this interval not displayed  Results from last 7 days   Lab Units 04/08/21  0710   SODIUM mmol/L 137   POTASSIUM mmol/L 4 3   CHLORIDE mmol/L 103   CO2 mmol/L 25   BUN mg/dL 11   CREATININE mg/dL 0 89   ANION GAP mmol/L 9   CALCIUM mg/dL 8 8   ALBUMIN g/dL 2 4*   TOTAL BILIRUBIN mg/dL 0 22   ALK PHOS U/L 115   ALT U/L 18   AST U/L 41   GLUCOSE RANDOM mg/dL 111     Results from last 7 days   Lab Units 04/06/21  1601   INR  1 02             Results from last 7 days   Lab Units 04/07/21  0626 04/06/21  1601   LACTIC ACID mmol/L  --  1 1   PROCALCITONIN ng/ml 0 12 <0 05           * I Have Reviewed All Lab Data Listed Above  * Additional Pertinent Lab Tests Reviewed:  All Labs For Current Hospital Admission Reviewed    Imaging:    Imaging Reports Reviewed Today Include: all  Imaging Personally Reviewed by Myself Includes:  none    Recent Cultures (last 7 days):     Results from last 7 days   Lab Units 04/06/21  1634 04/06/21  1601   BLOOD CULTURE  No Growth at 24 hrs  No Growth at 24 hrs         Last 24 Hours Medication List:   Current Facility-Administered Medications   Medication Dose Route Frequency Provider Last Rate    acetaminophen  975 mg Oral Q8H Amanda Steward MD      albuterol  2 puff Inhalation Q6H STAR Sparrow MD      ascorbic acid  1,000 mg Oral Q12H Amanda Steward MD      atorvastatin  40 mg Oral HS Gwendolyn Sparrow MD      benzonatate  100 mg Oral TID PRN Gwendolyn Sparrow MD      calcium carbonate  1 tablet Oral Daily With Lunch Gwendolyn Sparrow MD      cefTRIAXone  1,000 mg Intravenous Q24H Gwendolyn Sparrow MD 1,000 mg (04/07/21 2013)    cholecalciferol  2,000 Units Oral Daily Gwendolyn Sparrow MD      cyanocobalamin  1,000 mcg Oral Daily Gwendolyn Sparrow MD      dexamethasone  6 mg Intravenous Q24H Gwendolyn Sparrow MD      doxycycline hyclate  100 mg Oral Q12H Gwendolyn Sparrow MD      enoxaparin  40 mg Subcutaneous Q12H Amanda Steward MD      famotidine  20 mg Oral BID Gwendolyn Sparrow MD      ferrous sulfate  325 mg Oral Daily With Breakfast Gwendolyn Sparrow MD      guaiFENesin  600 mg Oral Q12H Amanda Steward MD      levothyroxine  137 mcg Oral Early Morning Gwendolyn Sparrow MD      zinc sulfate  220 mg Oral Daily With Lunch Gwendolyn Sparrow MD      Followed by   Nano Walker ON 4/14/2021] multivitamin-minerals  1 tablet Oral Daily Gwendolyn Sparrow MD      polyethylene glycol  17 g Oral Daily PRN Gwendolyn Sparrow MD      remdesivir  100 mg Intravenous Q24H Gwendolyn Sparrow MD      senna-docusate sodium  1 tablet Oral HS Gwendolyn Sparrow MD      thiamine  50 mg Oral Daily Gwendolyn Sparrow MD          Today, Patient Was Seen By: Lorna Kim PA-C    ** Please Note: Dictation voice to text software may have been used in the creation of this document   **

## 2021-04-08 NOTE — ASSESSMENT & PLAN NOTE
· Microcytic, appears chronic   · Patient does reports some back stool but on iron supplementation  · Trend hemoglobin

## 2021-04-08 NOTE — ASSESSMENT & PLAN NOTE
As evidenced by low-grade fever, tachypnea tachycardia on presentation  Appears to be improving  · Follow-up blood culture  · Trend procalcitonin  · Continue treatment as above

## 2021-04-08 NOTE — UTILIZATION REVIEW
Notification of Inpatient Admission/Inpatient Authorization Request   This is a Notification of Inpatient Admission for [facility] 47  Be advised that this patient was admitted to our facility under Inpatient Status  Contact Jade Terry at [phone] for additional admission information  [address] DEPT  DEDICATED -[phone]  Patient Name:   Leydi Gary   YOB: 1975       [address]   [address]:   [address]  Tax ID: [id number]  NPI: [id number] Attending Provider/NPI:  Phone:  Address: Alissa Barajas [[id number]]  [phone]  Same as GELY/Christina Escobar 1106 of Service Code: 24 Place of Service Name:  [address] [place] [address]   Start Date: 4/7/21 1138 Discharge Date & Time: No discharge date for patient encounter  Type of Admission: Inpatient Status Discharge Disposition (if discharged): [address] [place] [address]   Patient Diagnoses: [id number] [[id number]]     Orders: Admission Orders (From admission, onward)     Ordered        04/07/21 1204  Inpatient Admission  Once                    Assigned Utilization Review Contact: Darron Terry  Utilization   Network Utilization Review Department  Phone: [phone]; Fax [phone]  Email: [email or web address]   ATTENTION PAYERS: Please call the assigned Utilization  directly with any questions or concerns ALL voicemails in the department are confidential  Send all requests for admission clinical reviews, approved or denied determinations and any other requests to dedicated fax number belonging to the campus where the patient is receiving treatment

## 2021-04-08 NOTE — PROGRESS NOTES
Progress Note - Pulmonary   Ada Donya Umaña 39 y o  female MRN: [id number]  Unit/Bed#: [address] [place] [address] Encounter: [id number]    Assessment and Plan:    1  Acute hypoxic respiratory failure: The patient has acute hypoxic respiratory failure secondary to COVID pneumonia and this has worsened since admission     Currently she is needing up to 15 liters/minute of oxygen supplementation and also non-rebreather mask  Her saturations are borderline at this time  She will need to be closely monitored for any further worsening of the respiratory status and would benefit from being in the ICU  I spoke to the ICU team   She is to be transferred over there      2  COVID pneumonia:  She tested positive for COVID infection on 03/30/2021  Her CT scan shows extensive bilateral pneumonia  She has been started on Decadron, Actemra and remdesivir  She is out of window for convalescent plasma  Her D-dimer is 0 99 and  8  she is on  Lovenox b i d  She is on empiric antibiotic coverage of ceftriaxone and doxycycline  The blood cultures are so far negative  Procalcitonin is low      3  Morbid obesity  She is morbidly obese  She had  bariatric surgery before  She would need to be screened for sleep apnea      I spoke to the patient and answered all her questions  Discussed with nursing staff      I discussed the case with Dr Jocelyn Zhang, the patient's hospitalist      Thank you for allowing me to participate in the care of the patient  Chief Complaint:   Shortness of breath, worsening    Subjective:   She feels that her shortness of breath has worsened over the day  Currently she is needing up to 15 liters/minute of oxygen supplementation and non-rebreather mask  Her oxygen saturations are still borderline  She has difficulty completing sentences  She denies any wheezing but has occasional nonproductive cough      Objective:     Vitals: Blood pressure 106/55, pulse 75, temperature 98 2 °F (36 8 °C), temperature source Oral, resp  rate 22, height 5' 6" (1 676 m), weight 126 kg (276 lb 14 4 oz), last menstrual period 03/18/2021, SpO2 97 %  ,Body mass index is 44 69 kg/m²  No intake or output data in the 24 hours ending 04/08/21 1624    Invasive Devices     Peripheral Intravenous Line            Peripheral IV 04/08/21 Right Forearm less than 1 day                Physical Exam:  Currently sitting in bed  Hemodynamically stable and afebrile  Saturations 92% on 15 liters/minute of oxygen supplementation along with non-rebreather mask  She has some difficulty completing sentences  Has mild respiratory distress  HEENT:  No conjunctival pallor no cyanosis  Neck:  JVD could not be made out  Heart:  S1-S2 heard chest:  Air entry present bilaterally  Occasional rhonchi and crackles  Abdomen soft and nontender  Neuro awake alert oriented  She is morbidly obese  Labs: I have personally reviewed pertinent lab results  Hemoglobin 10 5  No leukocytosis  Procalcitonin not significant  Imaging and other studies: I have personally reviewed pertinent films in PACS  Her chest x-ray from 04/07/2021 showed worsening bilateral infiltrates

## 2021-04-08 NOTE — UTILIZATION REVIEW
Initial Clinical Review    Admission: Date/Time/Statement:   Admission Orders (From admission, onward)     Ordered        04/07/21 1204  Inpatient Admission  Once                   Orders Placed This Encounter   Procedures    Inpatient Admission     Standing Status:   Standing     Number of Occurrences:   1     Order Specific Question:   Level of Care     Answer:   Med Surg [16]     Order Specific Question:   Estimated length of stay     Answer:   More than 2 Midnights     Order Specific Question:   Certification     Answer:   I certify that inpatient services are medically necessary for this patient for a duration of greater than two midnights  See H&P and MD Progress Notes for additional information about the patient's course of treatment  Assessment/Plan: 39 y o  female with history of morbid obesity, hypothyroidism, gastric bypass surgery, anemia, dyslipidemia who was initially presented to [facility] at [facility] with worsening of shortness of breath  Patient reported that she was diagnosed to have at [facility] on 3/30/2021, patient initially had milder symptoms starting around 3/29  She was evaluated and noted to be hypoxic and required 4 L of supplemental oxygen and admitted  After hospitalization patient was noted to have ex collecting oxygen requirement to 12 liters/minute and patient was subsequently transferred here [address] [place] [address]  for close monitoring and ICU capabilities  Pt admitted Inpatient Moderate COVID pathway and ordered Actemra  Respiratory failure with hypoxia  Mid flow oxygen nc 12L currently  Self proning IS  COVID  Dexamethasone 6 mg IV daily for 10 days  Remdesivir  Famotidine 20 mg IV b i d  Lovenox for DVT prophylaxis  Doxycycline 100 mg p o  Q 12 hours, ceftriaxone 1 g daily to rule out superimposed bacterial infection  Check and trend inflammatory markers, CRP, ferritin  Pulmonary consult  Acute hypoxic respiratory failure:   The patient has acute hypoxic respiratory failure secondary to COVID pneumonia  Currently she is needing up to 10 liters/minute of oxygen supplementation and she is saturating well  Her oxygen levels can be titrated down  for  sats of  94% and above  2  COVID pneumonia:  She tested for COVID infection on 03/30/2021  Her CT scan shows extensive bilateral pneumonia  She has been started on Decadron, Actemra and remdesivir  She is out of window for convalescent plasma  Her D-dimer is 0 99 and she is on prophylactic Lovenox  We will follow the inflammatory markers  She is on empiric antibiotic coverage of ceftriaxone and doxycycline  3  Morbid obesity  She is morbidly obese  She had  bariatric surgery before  She would need to be screened for sleep apnea    ED Triage Vitals [04/07/21 1230]   Temperature Pulse Respirations Blood Pressure SpO2   98 2 °F (36 8 °C) 84 (!) 24 105/60 94 %      Temp Source Heart Rate Source Patient Position - Orthostatic VS BP Location FiO2 (%)   Temporal Monitor Sitting Right arm --      Pain Score       No Pain          Wt Readings from Last 1 Encounters:   04/07/21 126 kg (276 lb 14 4 oz)     Additional Vital Signs:   04/07/21 2000  98 4 °F (36 9 °C)  67  26Abnormal   93/50  66  92 %  --  --  --  --  --  Lying   04/07/21 1514  --  --  --  --  --  100 %  64  11 L/min  11 L/min  Mid flow nasal cannula  --  --   04/07/21 1500  99 8 °F (37 7 °C)  81  30Abnormal   102/58  77  93 %  --  --  --  --  --  Lying   04/07/21 1230  98 2 °F (36 8 °C)  84  24Abnormal   105/60  75  94 %  64  --  11 L/min  Mid flow nasal cannula  --       Pertinent Labs/Diagnostic Test Results:   4/7  CXR Progressive diffuse bilateral infiltrates, likely Covid related   Results from last 7 days   Lab Units 04/08/21  0710 04/07/21  1414 04/07/21  0914 04/06/21  1601   WBC Thousand/uL 4 54  --  5 30 4 30*   HEMOGLOBIN g/dL 10 5* 11 0* 9 8* 9 6*   HEMATOCRIT % 36 6 38 1 31 6* 31 9*   PLATELETS Thousands/uL 466*  --  386 369   NEUTROS ABS Thousands/µL  -- --   --  3 50     Results from last 7 days   Lab Units 04/08/21  0710 04/07/21  0619 04/06/21  1601   SODIUM mmol/L 137 137 136*   POTASSIUM mmol/L 4 3 4 0 3 5*   CHLORIDE mmol/L 103 105 103   CO2 mmol/L 25 23 26   ANION GAP mmol/L 9 9 7   BUN mg/dL 11 7 6   CREATININE mg/dL 0 89 0 70 0 76   EGFR ml/min/1 73sq m 80 106 96   CALCIUM mg/dL 8 8 8 2* 8 3*     Results from last 7 days   Lab Units 04/08/21  0710 04/07/21  0619 04/06/21  1601   AST U/L 41 34 31   ALT U/L 18 14 12   ALK PHOS U/L 115 134* 129*   TOTAL PROTEIN g/dL 7 2 7 3 7 3   ALBUMIN g/dL 2 4* 3 5 3 5   TOTAL BILIRUBIN mg/dL 0 22 0 20 0 20     Results from last 7 days   Lab Units 04/08/21  0710 04/07/21  0619 04/06/21  1601   GLUCOSE RANDOM mg/dL 111 123* 97     Results from last 7 days   Lab Units 04/06/21  1601   PH NOLAN  7 350   PCO2 NOLAN mm Hg 43 0   PO2 NOLAN mm Hg 32 0*   HCO3 NOLAN mmol/L 23 7   BASE EXC NOLAN mmol/L -1 9   O2 CONTENT NOLAN ml/dL 8 5   O2 HGB, VENOUS % 58 9*     Results from last 7 days   Lab Units 04/06/21  1601   CK TOTAL U/L 84     Results from last 7 days   Lab Units 04/08/21  0710 04/06/21  1601   TROPONIN I ng/mL <0 02 <0 01     Results from last 7 days   Lab Units 04/08/21  0710 04/07/21  0619 04/06/21  1601   D-DIMER QUANTITATIVE ug/ml FEU 0 99* 0 99* 0 79*     Results from last 7 days   Lab Units 04/06/21  1601   PROTIME seconds 13 6   INR  1 02   PTT seconds 36     Results from last 7 days   Lab Units 04/07/21  0619   TSH 3RD GENERATON uIU/mL 0 987     Results from last 7 days   Lab Units 04/07/21  0626 04/06/21  1601   PROCALCITONIN ng/ml 0 12 <0 05     Results from last 7 days   Lab Units 04/06/21  1601   LACTIC ACID mmol/L 1 1     Results from last 7 days   Lab Units 04/06/21  1601   NT-PRO BNP pg/mL 52 5     Results from last 7 days   Lab Units 04/08/21  0710 04/07/21  0619 04/06/21  1601   FERRITIN ng/mL 150 90 79     Results from last 7 days   Lab Units 04/06/21  2300   HEP B S AG  Non-reactive   HEP C AB  Non-reactive   HEP B C IGM  Non-reactive   HEP B C TOTAL AB  Non-reactive     Results from last 7 days   Lab Units 04/08/21  0710 04/07/21  0619 04/06/21  1601   CRP mg/L 106 8* 251 9* 168 9*   SED RATE mm/hour  --   --  118*     Results from last 7 days   Lab Units 04/07/21  1952   CLARITY UA  Clear   COLOR UA  Light Yellow   SPEC GRAV UA  1 015   PH UA  6 0   GLUCOSE UA mg/dl Negative   KETONES UA mg/dl Negative   BLOOD UA  Large*   PROTEIN UA mg/dl Negative   NITRITE UA  Negative   BILIRUBIN UA  Negative   UROBILINOGEN UA E U /dl 0 2   LEUKOCYTES UA  Negative   WBC UA /hpf 0-1*   RBC UA /hpf Innumerable*   BACTERIA UA /hpf Occasional   EPITHELIAL CELLS WET PREP /hpf Occasional   MUCUS THREADS  Occasional*     Results from last 7 days   Lab Units 04/06/21  1634 04/06/21  1601   BLOOD CULTURE  No Growth at 24 hrs  No Growth at 24 hrs         Past Medical History:   Diagnosis Date    Abscess of labia     Anemia 4/6/2021    Anxiety     [facility] disorder ([facility])     Breast lump     Frequent headaches     Hemorrhoids     Hypothyroidism 7/1/2013    Migraine     Morbid obesity ([facility]) 7/1/2013    Obesity     Psychotic disorder ([facility] [address] [facility]      Present on Admission:   COVID-19 virus infection   Hypothyroidism   Morbid obesity ([facility] [address] [facility]   Sepsis ([facility] [address] [facility]   Respiratory failure with hypoxia ([facility] [address] )   Anemia      Admitting Diagnosis: COVID-19 [U07 1]  Age/Sex: 39 y o  female  Admission Orders:  gmf  Oxygen   is  Contact airborne isolation  Mid flow oxygen 11L  cmp cbc plt  D dimer  procalcitonin  Scheduled Medications:  acetaminophen, 975 mg, Oral, Q8H [address]  ascorbic acid, 1,000 mg, Oral, Q12H KAYA  atorvastatin, 40 mg, Oral, HS  calcium carbonate, 1 tablet, Oral, Daily With Lunch  cefTRIAXone, 1,000 mg, Intravenous, Q24H  cholecalciferol, 2,000 Units, Oral, Daily  cyanocobalamin, 1,000 mcg, Oral, Daily  dexamethasone, 6 mg, Intravenous, Q24H  doxycycline hyclate, 100 mg, Oral, Q12H  enoxaparin, 40 mg, Subcutaneous, Q12H KAYA  famotidine, 20 mg, Oral, BID  ferrous sulfate, 325 mg, Oral, Daily With Breakfast  guaiFENesin, 600 mg, Oral, Q12H [address]  levothyroxine, 137 mcg, Oral, Early Morning  zinc sulfate, 220 mg, Oral, Daily With Lunch    Followed by  [START ON 4/14/2021] multivitamin-minerals, 1 tablet, Oral, Daily  remdesivir, 100 mg, Intravenous, Q24H  senna-docusate sodium, 1 tablet, Oral, HS  thiamine, 50 mg, Oral, Daily      Continuous IV Infusions:     PRN Meds:  albuterol, 2 puff, Inhalation, Q6H PRN  benzonatate, 100 mg, Oral, TID PRN  polyethylene glycol, 17 g, Oral, Daily PRN        IP CONSULT TO PULMONOLOGY    Network Utilization Review Department  ATTENTION: Please call with any questions or concerns to [phone] and carefully listen to the prompts so that you are directed to the right person  All voicemails are confidential   Yesi Lopez all requests for admission clinical reviews, approved or denied determinations and any other requests to dedicated fax number below belonging to the campus where the patient is receiving treatment   List of dedicated fax numbers for the Facilities:  [address] DENIALS (Administrative/Medical Necessity) [phone]   [address] (Maternity/NICU/Pediatrics) [phone] 401 [address] Dr Geetha Chery 8396 (Julia Dang "[place]" [address]) [address] [place] [address] Kaitlyn Pyle 1481 [address] [place] [address] [phone]

## 2021-04-08 NOTE — ASSESSMENT & PLAN NOTE
· Patient tachypneic, tachycardic, febrile, but all likely secondary to COVID-19  · Procalcitonin <0 05 - 0 12- 0 05  · Trend tomorrow,consider D/C antibiotics

## 2021-04-08 NOTE — UTILIZATION REVIEW
Continued Stay Review    Date:  4/8/21                         Current Patient Class: inpatient    Current Level of Care: acute    Assessment/Plan:   COVID 19 pneumonia with Acute respiratory failure continues mid flow 10L 60$fio2  Continues on Moderate covid pathway   Pertinent Labs/Diagnostic Results:       Results from last 7 days   Lab Units 04/08/21  0710 04/07/21  1414 04/07/21  0914 04/06/21  1601   WBC Thousand/uL 4 54  --  5 30 4 30*   HEMOGLOBIN g/dL 10 5* 11 0* 9 8* 9 6*   HEMATOCRIT % 36 6 38 1 31 6* 31 9*   PLATELETS Thousands/uL 466*  --  386 369   NEUTROS ABS Thousands/µL  --   --   --  3 50     Results from last 7 days   Lab Units 04/08/21  0710 04/07/21  0619 04/06/21  1601   SODIUM mmol/L 137 137 136*   POTASSIUM mmol/L 4 3 4 0 3 5*   CHLORIDE mmol/L 103 105 103   CO2 mmol/L 25 23 26   ANION GAP mmol/L 9 9 7   BUN mg/dL 11 7 6   CREATININE mg/dL 0 89 0 70 0 76   EGFR ml/min/1 73sq m 80 106 96   CALCIUM mg/dL 8 8 8 2* 8 3*     Results from last 7 days   Lab Units 04/08/21  0710 04/07/21  0619 04/06/21  1601   AST U/L 41 34 31   ALT U/L 18 14 12   ALK PHOS U/L 115 134* 129*   TOTAL PROTEIN g/dL 7 2 7 3 7 3   ALBUMIN g/dL 2 4* 3 5 3 5   TOTAL BILIRUBIN mg/dL 0 22 0 20 0 20     Results from last 7 days   Lab Units 04/08/21  0710 04/07/21  0619 04/06/21  1601   GLUCOSE RANDOM mg/dL 111 123* 97     Results from last 7 days   Lab Units 04/06/21  1601   PH NOLAN  7 350   PCO2 NOLAN mm Hg 43 0   PO2 NOLAN mm Hg 32 0*   HCO3 NOLAN mmol/L 23 7   BASE EXC NOLAN mmol/L -1 9   O2 CONTENT NOLAN ml/dL 8 5   O2 HGB, VENOUS % 58 9*     Results from last 7 days   Lab Units 04/06/21  1601   CK TOTAL U/L 84     Results from last 7 days   Lab Units 04/08/21  0710 04/06/21  1601   TROPONIN I ng/mL <0 02 <0 01     Results from last 7 days   Lab Units 04/08/21  0710 04/07/21  0619 04/06/21  1601   D-DIMER QUANTITATIVE ug/ml FEU 0 99* 0 99* 0 79*     Results from last 7 days   Lab Units 04/06/21  1601   PROTIME seconds 13 6   INR  1 02 PTT seconds 36     Results from last 7 days   Lab Units 04/07/21  0619   TSH 3RD GENERATON uIU/mL 0 987     Results from last 7 days   Lab Units 04/07/21  0626 04/06/21  1601   PROCALCITONIN ng/ml 0 12 <0 05     Results from last 7 days   Lab Units 04/06/21  1601   LACTIC ACID mmol/L 1 1     Results from last 7 days   Lab Units 04/06/21  1601   NT-PRO BNP pg/mL 52 5     Results from last 7 days   Lab Units 04/08/21  0710 04/07/21  0619 04/06/21  1601   FERRITIN ng/mL 150 90 79     Results from last 7 days   Lab Units 04/06/21  2300   HEP B S AG  Non-reactive   HEP C AB  Non-reactive   HEP B C IGM  Non-reactive   HEP B C TOTAL AB  Non-reactive     Results from last 7 days   Lab Units 04/08/21  0710 04/07/21  0619 04/06/21  1601   CRP mg/L 106 8* 251 9* 168 9*   SED RATE mm/hour  --   --  118*     Results from last 7 days   Lab Units 04/07/21  1952   CLARITY UA  Clear   COLOR UA  Light Yellow   SPEC GRAV UA  1 015   PH UA  6 0   GLUCOSE UA mg/dl Negative   KETONES UA mg/dl Negative   BLOOD UA  Large*   PROTEIN UA mg/dl Negative   NITRITE UA  Negative   BILIRUBIN UA  Negative   UROBILINOGEN UA E U /dl 0 2   LEUKOCYTES UA  Negative   WBC UA /hpf 0-1*   RBC UA /hpf Innumerable*   BACTERIA UA /hpf Occasional   EPITHELIAL CELLS WET PREP /hpf Occasional   MUCUS THREADS  Occasional*     Results from last 7 days   Lab Units 04/06/21  1634 04/06/21  1601   BLOOD CULTURE  No Growth at 24 hrs  No Growth at 24 hrs       Vital Signs:   04/08/21 0916  --  --  --  100/54  --  --  --  --  --  --  --  --   04/08/21 0250  --  --  --  --  --  95 %  60  --  10 L/min  Mid flow nasal cannula  --  --   04/08/21 0047  98 °F (36 7 °C)  70  24Abnormal   96/57  71  96 %  60  --  10 L/min  Mid flow nasal cannula           Contact airborne isolation  Oxygen 10L mid flow  Self proning  Is  cmp cbc plt  D dimer   procalcitonin  Medications:   Scheduled Medications:  acetaminophen, 975 mg, Oral, Q8H KAYA  ascorbic acid, 1,000 mg, Oral, Q12H [address]  atorvastatin, 40 mg, Oral, HS  calcium carbonate, 1 tablet, Oral, Daily With Lunch  cefTRIAXone, 1,000 mg, Intravenous, Q24H  cholecalciferol, 2,000 Units, Oral, Daily  cyanocobalamin, 1,000 mcg, Oral, Daily  dexamethasone, 6 mg, Intravenous, Q24H  doxycycline hyclate, 100 mg, Oral, Q12H  enoxaparin, 40 mg, Subcutaneous, Q12H KAYA  famotidine, 20 mg, Oral, BID  ferrous sulfate, 325 mg, Oral, Daily With Breakfast  guaiFENesin, 600 mg, Oral, Q12H [address]  levothyroxine, 137 mcg, Oral, Early Morning  zinc sulfate, 220 mg, Oral, Daily With Lunch    Followed by  [START ON 4/14/2021] multivitamin-minerals, 1 tablet, Oral, Daily  remdesivir, 100 mg, Intravenous, Q24H  senna-docusate sodium, 1 tablet, Oral, HS  thiamine, 50 mg, Oral, Daily      Continuous IV Infusions:     PRN Meds:  albuterol, 2 puff, Inhalation, Q6H PRN  benzonatate, 100 mg, Oral, TID PRN  polyethylene glycol, 17 g, Oral, Daily PRN        Discharge Plan: d    Network Utilization Review Department  ATTENTION: Please call with any questions or concerns to [phone] and carefully listen to the prompts so that you are directed to the right person  All voicemails are confidential   Perri Downs all requests for admission clinical reviews, approved or denied determinations and any other requests to dedicated fax number below belonging to the campus where the patient is receiving treatment   List of dedicated fax numbers for the Facilities:  [address] [place] [address] DENIALS (Administrative/Medical Necessity) [phone]   [address] (Maternity/NICU/Pediatrics) [phone]   [address] [address] [facility] Sarah Chery 4532 (  Ailyn Dang "[place]) [address] [facility] [address] Kaitlyn Pyle 1481 [address] [place]) [facility] [phone]

## 2021-04-08 NOTE — PLAN OF CARE
Problem: Potential for Falls  Goal: Patient will remain free of falls  Description: INTERVENTIONS:  - Assess patient frequently for physical needs  -  Identify cognitive and physical deficits and behaviors that affect risk of falls    -  [place] fall precautions as indicated by assessment   - Educate patient/family on patient safety including physical limitations  - Instruct patient to call for assistance with activity based on assessment  - Modify environment to reduce risk of injury  - Consider OT/PT consult to assist with strengthening/mobility  Outcome: Progressing     Problem: RESPIRATORY - ADULT  Goal: Achieves optimal ventilation and oxygenation  Description: INTERVENTIONS:  - Assess for changes in respiratory status  - Assess for changes in mentation and behavior  - Position to facilitate oxygenation and minimize respiratory effort  - Oxygen administered by appropriate delivery if ordered  - Initiate smoking cessation education as indicated  - Encourage broncho-pulmonary hygiene including cough, deep breathe, Incentive Spirometry  - Assess the need for suctioning and aspirate as needed  - Assess and instruct to report SOB or any respiratory difficulty  - Respiratory Therapy support as indicated  Outcome: Progressing

## 2021-04-08 NOTE — QUICK NOTE
Notified by RN that patient is satting mid [de-identified] on 15 L at rest   Discussed with attending  Plan to place non-rebreather over mid flow 15 L  Patient now saturating 94%  Pulmonology evaluated  Attending discussed with Critical Care, plan to transition to step-down when ICU bed available, may need high-flow nasal cannula

## 2021-04-09 ENCOUNTER — TELEPHONE (OUTPATIENT)
Dept: FAMILY MEDICINE CLINIC | Facility: CLINIC | Age: 46
End: 2021-04-09

## 2021-04-09 LAB
ALBUMIN SERPL BCP-MCNC: 2.4 G/DL (ref 3.5–5)
ALP SERPL-CCNC: 108 U/L (ref 46–116)
ALT SERPL W P-5'-P-CCNC: 18 U/L (ref 12–78)
ANION GAP SERPL CALCULATED.3IONS-SCNC: 10 MMOL/L (ref 4–13)
AST SERPL W P-5'-P-CCNC: 26 U/L (ref 5–45)
BASOPHILS # BLD AUTO: 0 THOUSANDS/ΜL (ref 0–0.1)
BASOPHILS NFR BLD AUTO: 0 % (ref 0–1)
BILIRUB SERPL-MCNC: 0.2 MG/DL (ref 0.2–1)
BUN SERPL-MCNC: 11 MG/DL (ref 5–25)
CALCIUM ALBUM COR SERPL-MCNC: 9.8 MG/DL (ref 8.3–10.1)
CALCIUM SERPL-MCNC: 8.5 MG/DL (ref 8.3–10.1)
CHLORIDE SERPL-SCNC: 104 MMOL/L (ref 100–108)
CO2 SERPL-SCNC: 23 MMOL/L (ref 21–32)
CREAT SERPL-MCNC: 0.8 MG/DL (ref 0.6–1.3)
CRP SERPL QL: 51.5 MG/L
D DIMER PPP FEU-MCNC: 0.81 UG/ML FEU
EOSINOPHIL # BLD AUTO: 0 THOUSAND/ΜL (ref 0–0.61)
EOSINOPHIL NFR BLD AUTO: 0 % (ref 0–6)
ERYTHROCYTE [DISTWIDTH] IN BLOOD BY AUTOMATED COUNT: 21.3 % (ref 11.6–15.1)
GFR SERPL CREATININE-BSD FRML MDRD: 91 ML/MIN/1.73SQ M
GLUCOSE SERPL-MCNC: 126 MG/DL (ref 65–140)
HCT VFR BLD AUTO: 35.3 % (ref 34.8–46.1)
HGB BLD-MCNC: 10.2 G/DL (ref 11.5–15.4)
IL6 SERPL-MCNC: 144.7 PG/ML (ref 0–13)
IMM GRANULOCYTES # BLD AUTO: 0.05 THOUSAND/UL (ref 0–0.2)
IMM GRANULOCYTES NFR BLD AUTO: 1 % (ref 0–2)
LYMPHOCYTES # BLD AUTO: 0.64 THOUSANDS/ΜL (ref 0.6–4.47)
LYMPHOCYTES NFR BLD AUTO: 13 % (ref 14–44)
MCH RBC QN AUTO: 21.3 PG (ref 26.8–34.3)
MCHC RBC AUTO-ENTMCNC: 28.9 G/DL (ref 31.4–37.4)
MCV RBC AUTO: 74 FL (ref 82–98)
MONOCYTES # BLD AUTO: 0.23 THOUSAND/ΜL (ref 0.17–1.22)
MONOCYTES NFR BLD AUTO: 5 % (ref 4–12)
NEUTROPHILS # BLD AUTO: 4.16 THOUSANDS/ΜL (ref 1.85–7.62)
NEUTS SEG NFR BLD AUTO: 81 % (ref 43–75)
NRBC BLD AUTO-RTO: 0 /100 WBCS
PLATELET # BLD AUTO: 498 THOUSANDS/UL (ref 149–390)
PMV BLD AUTO: 9 FL (ref 8.9–12.7)
POTASSIUM SERPL-SCNC: 3.9 MMOL/L (ref 3.5–5.3)
PROCALCITONIN SERPL-MCNC: <0.05 NG/ML
PROT SERPL-MCNC: 6.6 G/DL (ref 6.4–8.2)
RBC # BLD AUTO: 4.8 MILLION/UL (ref 3.81–5.12)
SODIUM SERPL-SCNC: 137 MMOL/L (ref 136–145)
WBC # BLD AUTO: 5.08 THOUSAND/UL (ref 4.31–10.16)

## 2021-04-09 PROCEDURE — 94760 N-INVAS EAR/PLS OXIMETRY 1: CPT

## 2021-04-09 PROCEDURE — 94003 VENT MGMT INPAT SUBQ DAY: CPT

## 2021-04-09 PROCEDURE — 85379 FIBRIN DEGRADATION QUANT: CPT | Performed by: PHYSICIAN ASSISTANT

## 2021-04-09 PROCEDURE — 85025 COMPLETE CBC W/AUTO DIFF WBC: CPT | Performed by: PHYSICIAN ASSISTANT

## 2021-04-09 PROCEDURE — 86140 C-REACTIVE PROTEIN: CPT | Performed by: PHYSICIAN ASSISTANT

## 2021-04-09 PROCEDURE — 84145 PROCALCITONIN (PCT): CPT | Performed by: PHYSICIAN ASSISTANT

## 2021-04-09 PROCEDURE — 99232 SBSQ HOSP IP/OBS MODERATE 35: CPT | Performed by: ANESTHESIOLOGY

## 2021-04-09 PROCEDURE — 80053 COMPREHEN METABOLIC PANEL: CPT | Performed by: PHYSICIAN ASSISTANT

## 2021-04-09 RX ORDER — FUROSEMIDE 10 MG/ML
20 INJECTION INTRAMUSCULAR; INTRAVENOUS ONCE
Status: COMPLETED | OUTPATIENT
Start: 2021-04-09 | End: 2021-04-09

## 2021-04-09 RX ORDER — FUROSEMIDE 10 MG/ML
20 INJECTION INTRAMUSCULAR; INTRAVENOUS ONCE
Status: DISCONTINUED | OUTPATIENT
Start: 2021-04-09 | End: 2021-04-09

## 2021-04-09 RX ORDER — FUROSEMIDE 10 MG/ML
20 INJECTION INTRAMUSCULAR; INTRAVENOUS DAILY
Status: DISCONTINUED | OUTPATIENT
Start: 2021-04-09 | End: 2021-04-09

## 2021-04-09 RX ADMIN — Medication 2000 UNITS: at 08:35

## 2021-04-09 RX ADMIN — THIAMINE HCL TAB 100 MG 50 MG: 100 TAB at 08:35

## 2021-04-09 RX ADMIN — DEXAMETHASONE SODIUM PHOSPHATE 12.6 MG: 10 INJECTION, SOLUTION INTRAMUSCULAR; INTRAVENOUS at 04:30

## 2021-04-09 RX ADMIN — FAMOTIDINE 20 MG: 20 TABLET ORAL at 17:10

## 2021-04-09 RX ADMIN — FAMOTIDINE 20 MG: 20 TABLET ORAL at 08:35

## 2021-04-09 RX ADMIN — CALCIUM 1 TABLET: 500 TABLET ORAL at 12:10

## 2021-04-09 RX ADMIN — OXYCODONE HYDROCHLORIDE AND ACETAMINOPHEN 1000 MG: 500 TABLET ORAL at 21:27

## 2021-04-09 RX ADMIN — DEXAMETHASONE SODIUM PHOSPHATE 12.6 MG: 10 INJECTION, SOLUTION INTRAMUSCULAR; INTRAVENOUS at 17:10

## 2021-04-09 RX ADMIN — CYANOCOBALAMIN TAB 500 MCG 1000 MCG: 500 TAB at 08:35

## 2021-04-09 RX ADMIN — ZINC SULFATE 220 MG (50 MG) CAPSULE 220 MG: CAPSULE at 12:10

## 2021-04-09 RX ADMIN — GUAIFENESIN 600 MG: 600 TABLET, EXTENDED RELEASE ORAL at 08:34

## 2021-04-09 RX ADMIN — DOXYCYCLINE 100 MG: 100 CAPSULE ORAL at 08:35

## 2021-04-09 RX ADMIN — REMDESIVIR 100 MG: 100 INJECTION, POWDER, LYOPHILIZED, FOR SOLUTION INTRAVENOUS at 21:27

## 2021-04-09 RX ADMIN — ENOXAPARIN SODIUM 40 MG: 40 INJECTION SUBCUTANEOUS at 21:27

## 2021-04-09 RX ADMIN — GUAIFENESIN 600 MG: 600 TABLET, EXTENDED RELEASE ORAL at 21:27

## 2021-04-09 RX ADMIN — FUROSEMIDE 20 MG: 10 INJECTION, SOLUTION INTRAMUSCULAR; INTRAVENOUS at 15:41

## 2021-04-09 RX ADMIN — Medication 325 MG: at 08:42

## 2021-04-09 RX ADMIN — ENOXAPARIN SODIUM 40 MG: 40 INJECTION SUBCUTANEOUS at 08:35

## 2021-04-09 RX ADMIN — OXYCODONE HYDROCHLORIDE AND ACETAMINOPHEN 1000 MG: 500 TABLET ORAL at 08:34

## 2021-04-09 RX ADMIN — LEVOTHYROXINE SODIUM 137 MCG: 25 TABLET ORAL at 05:40

## 2021-04-09 NOTE — ASSESSMENT & PLAN NOTE
· Initial symptoms on 3/29, positive on 3/30  · Continue severe disease pathway  · Continue Dexamethasone to 0 1mg/kg BID  · Complete remdesevir as already started  · Patient is out of the window for convalescent plasma  · Lovenox DVT ppx b i d, monitor D-Dimer q3 days   · DC antibiotics seems protocol is negative x2  · HIV/Hepatitis panel negative  · Encourage proning and I/S  · Received actemra x 1 on 4/7  · Aggressive bowel regimen until bowel movement achieved  · Last BM 4/6  · Continue to monitor inflammatory markers q3 days

## 2021-04-09 NOTE — PHYSICAL THERAPY NOTE
04/09/21 1145   PT Last Visit   PT Visit Date 04/09/21   Note Type   Note type Evaluation   Cancel Reasons Medical status  (pt proning;cx by RN)   Licensure   [place] License Number  [place], [place] [id number]

## 2021-04-09 NOTE — ACP (ADVANCE CARE PLANNING)
Lengthy discussion with pt and Georgian speaking  regarding pts disease, overall status and code status  She vehemently stated that she did not wish CPR or Mechanical ventilation  It was explained to her that if she became ill enough to require mechanical ventilation - she could die without medical intervention of intubation and MV -  She reiterated that she did not wish CPR and Mechanical ventilation - even if it resulted in her death  She stated "I have my Cookie Boots, my asad is I him"  Code status will be updated to reflect DNR level 3

## 2021-04-09 NOTE — PROGRESS NOTES
[address] 47  Progress Note - Phyllis Umaña 1975, 39 y o  female MRN: [id number]  Unit/Bed#: ICU 02 Encounter: [id number]  Primary Care Provider: THOMAS Nelson   Date and time admitted to hospital: 4/7/2021 11:38 AM    * Respiratory failure with hypoxia ([facility] )  Assessment & Plan  · Secondary to COVID-19  · Continue HFNC   · current settings 35L/90%  · Wean as tolerated  · encouarge proning  · Encourage I/S  · Continue to diurese as tolerated  · Continue medications as below    Pneumonia due to COVID-19 virus  Assessment & Plan  · Initial symptoms on 3/29, positive on 3/30  · Continue severe disease pathway  · Continue Dexamethasone to 0 1mg/kg BID  · Complete remdesevir as already started  · Patient is out of the window for convalescent plasma  · Lovenox DVT ppx b i d, monitor D-Dimer q3 days   · DC antibiotics seems protocol is negative x2  · HIV/Hepatitis panel negative  · Encourage proning and I/S  · Received actemra x 1 on 4/7  · Aggressive bowel regimen until bowel movement achieved  · Last BM 4/6  · Continue to monitor inflammatory markers q3 days    SIRS (systemic inflammatory response syndrome) ([facility] )  Assessment & Plan  · Patient tachypneic, tachycardic, febrile, but all likely secondary to COVID-19  · Procalcitonin <0 05 - 0 12- 0 05  · Monitor off abx    Hypothyroidism  Assessment & Plan  · Continue synthroid  · Normal TSH    Morbid obesity ([facility] )  Assessment & Plan  · Nutrition consult when appropriate  · Reinforced on the importance of losing weight      Anemia  Assessment & Plan  · Continue Vit B 12 and iron  · Hemoglobin stable      ----------------------------------------------------------------------------------------  HPI/24hr events: No overnight events, pt maintained well on HFNC through the night periodically proning      Disposition: Continue Stepdown Level 1 level of care   Code Status: Level 3 - DNAR and DNI  ---------------------------------------------------------------------------------------  SUBJECTIVE      Review of Systems   Respiratory: Negative for chest tightness and shortness of breath  Cardiovascular: Negative for chest pain  Gastrointestinal: Negative for abdominal pain and anal bleeding  All other systems reviewed and are negative  Review of systems was reviewed and negative unless stated above in HPI/24-hour events   ---------------------------------------------------------------------------------------  OBJECTIVE    Vitals   Vitals:    21 2100 21 2200 21 2300 04/10/21 0000   BP:  125/71  109/68   BP Location:    Left arm   Pulse: 68 58 57 58   Resp: (!) 32 (!) 28 (!) 28 (!) 24   Temp:    97 8 °F (36 6 °C)   TempSrc:    Temporal   SpO2: 97% 97% 98% 95%   Weight:       Height:         Temp (24hrs), Av °F (36 7 °C), Min:97 8 °F (36 6 °C), Max:98 4 °F (36 9 °C)  Current: Temperature: 97 8 °F (36 6 °C)          Respiratory:  SpO2: SpO2: 95 %  Nasal Cannula O2 Flow Rate (L/min): 35 L/min    Invasive/non-invasive ventilation settings   Respiratory    Lab Data (Last 4 hours)    None         O2/Vent Data (Last 4 hours)       2340          Non-Invasive Ventilation Mode HFNC (High flow)                   Physical Exam  Constitutional:       General: She is not in acute distress  Appearance: Normal appearance  She is obese  She is not ill-appearing or toxic-appearing  HENT:      Head: Normocephalic and atraumatic  Nose: Nose normal       Mouth/Throat:      Mouth: Mucous membranes are moist       Pharynx: Oropharynx is clear  Eyes:      Conjunctiva/sclera: Conjunctivae normal       Pupils: Pupils are equal, round, and reactive to light  Neck:      Musculoskeletal: Neck supple  Cardiovascular:      Rate and Rhythm: Normal rate and regular rhythm  Pulses: Normal pulses  Heart sounds: Normal heart sounds     Pulmonary:      Effort: Pulmonary effort is normal  No respiratory distress  Breath sounds: Normal breath sounds  No rales  Abdominal:      General: Abdomen is flat  Bowel sounds are normal  There is no distension  Palpations: Abdomen is soft  Tenderness: There is no abdominal tenderness  Musculoskeletal:      Right lower leg: No edema  Left lower leg: No edema  Skin:     General: Skin is warm and dry  Capillary Refill: Capillary refill takes less than 2 seconds  Neurological:      General: No focal deficit present  Mental Status: She is alert and oriented to person, place, and time  Mental status is at baseline           Laboratory and Diagnostics:  Results from last 7 days   Lab Units 04/09/21  0550 04/08/21  0710 04/07/21  1414 04/07/21  0914 04/06/21  1601   WBC Thousand/uL 5 08 4 54  --  5 30 4 30*   HEMOGLOBIN g/dL 10 2* 10 5* 11 0* 9 8* 9 6*   HEMATOCRIT % 35 3 36 6 38 1 31 6* 31 9*   PLATELETS Thousands/uL 498* 466*  --  386 369   NEUTROS PCT % 81*  --   --   --  81*   MONOS PCT % 5  --   --   --  5     Results from last 7 days   Lab Units 04/09/21  0550 04/08/21  0710 04/07/21  0619 04/06/21  1601   SODIUM mmol/L 137 137 137 136*   POTASSIUM mmol/L 3 9 4 3 4 0 3 5*   CHLORIDE mmol/L 104 103 105 103   CO2 mmol/L 23 25 23 26   ANION GAP mmol/L 10 9 9 7   BUN mg/dL 11 11 7 6   CREATININE mg/dL 0 80 0 89 0 70 0 76   CALCIUM mg/dL 8 5 8 8 8 2* 8 3*   GLUCOSE RANDOM mg/dL 126 111 123* 97   ALT U/L 18 18 14 12   AST U/L 26 41 34 31   ALK PHOS U/L 108 115 134* 129*   ALBUMIN g/dL 2 4* 2 4* 3 5 3 5   TOTAL BILIRUBIN mg/dL 0 20 0 22 0 20 0 20          Results from last 7 days   Lab Units 04/06/21  1601   INR  1 02   PTT seconds 36      Results from last 7 days   Lab Units 04/08/21  0710 04/06/21  1601   TROPONIN I ng/mL <0 02 <0 01     Results from last 7 days   Lab Units 04/06/21  1601   LACTIC ACID mmol/L 1 1     ABG:    VBG:  Results from last 7 days   Lab Units 04/06/21  1601   PH NOLAN  7 350   PCO2 NOLAN mm Hg 43 0   PO2 NOLAN mm Hg 32 0*   HCO3 NOLAN mmol/L 23 7   BASE EXC NOLAN mmol/L -1 9     Results from last 7 days   Lab Units 04/09/21  0550 04/08/21  0710 04/07/21  0626 04/06/21  1601   PROCALCITONIN ng/ml <0 05 0 05 0 12 <0 05       Micro  Results from last 7 days   Lab Units 04/06/21  1634 04/06/21  1601   BLOOD CULTURE  No Growth at 72 hrs  No Growth at 72 hrs  EKG: NSR  Imaging: I have personally reviewed pertinent reports  and I have personally reviewed pertinent films in PACS    Intake and Output  I/O       04/08 0701 - 04/09 0700 04/09 0701 - 04/10 0700    P  O  480 700    I V  (mL/kg)      IV Piggyback 350 50    Total Intake(mL/kg) 830 (6 8) 750 (6 1)    Urine (mL/kg/hr) 650 (0 2) 950 (0 6)    Stool  0    Total Output 650 950    Net +180 -200          Unmeasured Urine Occurrence 1 x 1 x    Unmeasured Stool Occurrence  1 x          Height and Weights   Height: 5' 6" (167 6 cm)  IBW: 59 3 kg  Body mass index is 43 48 kg/m²  Weight (last 2 days)     Date/Time   Weight    04/08/21 1836   122 (269 4)                Nutrition       Diet Orders   (From admission, onward)             Start     Ordered    04/09/21 1242  Dietary nutrition supplements  Once     Question Answer Comment   Select Supplement: Ensure Max - Milk Chocolate    Frequency Breakfast, Dinner        04/09/21 1241    04/07/21 1719  Room Service  Once     Question:  Type of Service  Answer:  Room Service-Appropriate    04/07/21 1719    04/07/21 1205  Diet Bariatric; Bariatric Maintenance; Lo Fat  Diet effective now     Question Answer Comment   Diet Type Bariatric    Bariatric Bariatric Maintenance    Other Restriction(s): Lo Fat    Special Instructions Disposable Meal    RD to adjust diet per protocol?  Yes        04/07/21 1205                  Active Medications  Scheduled Meds:  Current Facility-Administered Medications   Medication Dose Route Frequency Provider Last Rate    acetaminophen  975 mg Oral Q8H [facility] Debbie Carr PA-C      albuterol  2 puff Inhalation Q6H PRN Abdullahi YUSEF Dc      ascorbic acid  1,000 mg Oral Q12H [address] Debbie Carr PA-C      atorvastatin  40 mg Oral HS Abdullahi Dc PA-C      benzonatate  100 mg Oral TID PRN Abdullahi Dc PA-C      calcium carbonate  1 tablet Oral Daily With Lunch Abdullahi Dc PA-C      cholecalciferol  2,000 Units Oral Daily Debbie Carr PA-C      cyanocobalamin  1,000 mcg Oral Daily Debbie Carr PA-C      dexamethasone  0 1 mg/kg Intravenous Q12H Abdullahi Dc PA-C Stopped (04/09/21 1740)    enoxaparin  40 mg Subcutaneous Q12H [address] Debbie Carr PA-C      famotidine  20 mg Oral BID Debbie Carr PA-C      ferrous sulfate  325 mg Oral Daily With Breakfast Abdullahi Dc PA-C      guaiFENesin  600 mg Oral Q12H [address] Abdullahi Dc PA-C      levothyroxine  137 mcg Oral Early Morning Debbie Carr PA-C      zinc sulfate  220 mg Oral Daily With Lunch Debbie Carr PA-C      Followed by   Leana Sebastian ON 4/14/2021] multivitamin-minerals  1 tablet Oral Daily Debbie Carr PA-C      polyethylene glycol  17 g Oral Daily PRN Abdullahi Dc PA-C      remdesivir  100 mg Intravenous Q24H Debbie Carr PA-C Stopped (04/09/21 2200)    senna-docusate sodium  1 tablet Oral HS Debbie Carr PA-C      thiamine  50 mg Oral Daily Debbie Carr PA-C       Continuous Infusions:     PRN Meds:   albuterol, 2 puff, Q6H PRN  benzonatate, 100 mg, TID PRN  polyethylene glycol, 17 g, Daily PRN        Invasive Devices Review  Invasive Devices     Peripheral Intravenous Line            Peripheral IV 04/08/21 Right Forearm 1 day    Peripheral IV 04/08/21 Right Forearm 1 day          Drain            External Urinary Catheter less than 1 day                Rationale for remaining devices: n/a  ---------------------------------------------------------------------------------------  Advance Directive and Living Will:      Power of :    POLST: ---------------------------------------------------------------------------------------  Care Time Delivered:   No Critical Care time spent       YUSEF Sandoval      Portions of the record may have been created with voice recognition software  Occasional wrong word or "sound a like" substitutions may have occurred due to the inherent limitations of voice recognition software    Read the chart carefully and recognize, using context, where substitutions have occurred

## 2021-04-09 NOTE — ASSESSMENT & PLAN NOTE
· Secondary to COVID-19  · Continue HFNC   · current settings 35L/90%  · Wean as tolerated  · encouarge proning  · Encourage I/S  · Continue to diurese as tolerated  · Continue medications as below

## 2021-04-09 NOTE — CASE MANAGEMENT
LOS: 2 DAYS  UNPLANNED RA RISK SCORE: 13  RA: NO  BUNDLE: NO    CM spoke with patient and discuused dc planning, available services and the role of CM  Patient states she lives in an apartment by herself  There are 22 steps to her apartment  She was independent of all ADL's prior to admission and works FT and drives  She owns no DME  She has never had HHS or been to  SNF in the past  Patient would like to have HHS at dc  She was provided with choice and states she has no preference  A referral was sent to [facility] in [facility]  Patient states she she has a h/o anxiety in the past and was on medication prescribed by her PCP  She has never seen a psychiatrist  She denies any h/o D&A abuse  Patient states she has medication coverage and uses the [facility] in [place]  Her PCP is Dr Nataly Sarmiento  She drives herself to all appointments and to work  No difficultes obtaining medications or affording them  Patient does not have a POA or a LW and is not interested in any information at this time  Patient will have a ride home with her daughter Kaylie Maravilla at [employer]  CM follow

## 2021-04-09 NOTE — CASE MANAGEMENT
Patient lives in [facility]  Referral was sent to [address] [place] [address] to follow

## 2021-04-09 NOTE — PLAN OF CARE
Problem: RESPIRATORY - ADULT  Goal: Achieves optimal ventilation and oxygenation  Description: INTERVENTIONS:  - Assess for changes in respiratory status  - Assess for changes in mentation and behavior  - Position to facilitate oxygenation and minimize respiratory effort  - Oxygen administered by appropriate delivery if ordered  - Initiate smoking cessation education as indicated  - Encourage broncho-pulmonary hygiene including cough, deep breathe, Incentive Spirometry  - Assess the need for suctioning and aspirate as needed  - Assess and instruct to report SOB or any respiratory difficulty  - Respiratory Therapy support as indicated  4/9/2021 1811 by Demetria Lovell, RT  Outcome: Not Progressing  4/9/2021 1811 by Demetria Lovell, RT  Outcome: Not Progressing

## 2021-04-09 NOTE — ASSESSMENT & PLAN NOTE
· Patient tachypneic, tachycardic, febrile, but all likely secondary to COVID-19  · Procalcitonin <0 05 - 0 12- 0 05  · Monitor off abx

## 2021-04-10 PROBLEM — R65.10 SIRS (SYSTEMIC INFLAMMATORY RESPONSE SYNDROME) (HCC): Status: RESOLVED | Noted: 2021-04-06 | Resolved: 2021-04-10

## 2021-04-10 LAB
ANION GAP SERPL CALCULATED.3IONS-SCNC: 11 MMOL/L (ref 4–13)
BASOPHILS # BLD AUTO: 0.01 THOUSANDS/ΜL (ref 0–0.1)
BASOPHILS NFR BLD AUTO: 0 % (ref 0–1)
BUN SERPL-MCNC: 11 MG/DL (ref 5–25)
CALCIUM SERPL-MCNC: 8.7 MG/DL (ref 8.3–10.1)
CHLORIDE SERPL-SCNC: 104 MMOL/L (ref 100–108)
CO2 SERPL-SCNC: 23 MMOL/L (ref 21–32)
CREAT SERPL-MCNC: 0.78 MG/DL (ref 0.6–1.3)
EOSINOPHIL # BLD AUTO: 0 THOUSAND/ΜL (ref 0–0.61)
EOSINOPHIL NFR BLD AUTO: 0 % (ref 0–6)
ERYTHROCYTE [DISTWIDTH] IN BLOOD BY AUTOMATED COUNT: 21.3 % (ref 11.6–15.1)
GFR SERPL CREATININE-BSD FRML MDRD: 93 ML/MIN/1.73SQ M
GLUCOSE SERPL-MCNC: 133 MG/DL (ref 65–140)
HCT VFR BLD AUTO: 35.4 % (ref 34.8–46.1)
HGB BLD-MCNC: 10.4 G/DL (ref 11.5–15.4)
IMM GRANULOCYTES # BLD AUTO: 0.09 THOUSAND/UL (ref 0–0.2)
IMM GRANULOCYTES NFR BLD AUTO: 1 % (ref 0–2)
LYMPHOCYTES # BLD AUTO: 0.73 THOUSANDS/ΜL (ref 0.6–4.47)
LYMPHOCYTES NFR BLD AUTO: 8 % (ref 14–44)
MAGNESIUM SERPL-MCNC: 2.3 MG/DL (ref 1.6–2.6)
MCH RBC QN AUTO: 21.6 PG (ref 26.8–34.3)
MCHC RBC AUTO-ENTMCNC: 29.4 G/DL (ref 31.4–37.4)
MCV RBC AUTO: 74 FL (ref 82–98)
MONOCYTES # BLD AUTO: 0.49 THOUSAND/ΜL (ref 0.17–1.22)
MONOCYTES NFR BLD AUTO: 5 % (ref 4–12)
MRSA NOSE QL CULT: NORMAL
NEUTROPHILS # BLD AUTO: 7.93 THOUSANDS/ΜL (ref 1.85–7.62)
NEUTS SEG NFR BLD AUTO: 86 % (ref 43–75)
NRBC BLD AUTO-RTO: 0 /100 WBCS
PHOSPHATE SERPL-MCNC: 3.8 MG/DL (ref 2.7–4.5)
PLATELET # BLD AUTO: 483 THOUSANDS/UL (ref 149–390)
PMV BLD AUTO: 9.2 FL (ref 8.9–12.7)
POTASSIUM SERPL-SCNC: 3.7 MMOL/L (ref 3.5–5.3)
RBC # BLD AUTO: 4.81 MILLION/UL (ref 3.81–5.12)
SODIUM SERPL-SCNC: 138 MMOL/L (ref 136–145)
WBC # BLD AUTO: 9.25 THOUSAND/UL (ref 4.31–10.16)

## 2021-04-10 PROCEDURE — 83735 ASSAY OF MAGNESIUM: CPT | Performed by: PHYSICIAN ASSISTANT

## 2021-04-10 PROCEDURE — 99232 SBSQ HOSP IP/OBS MODERATE 35: CPT | Performed by: ANESTHESIOLOGY

## 2021-04-10 PROCEDURE — 94760 N-INVAS EAR/PLS OXIMETRY 1: CPT

## 2021-04-10 PROCEDURE — 84100 ASSAY OF PHOSPHORUS: CPT | Performed by: PHYSICIAN ASSISTANT

## 2021-04-10 PROCEDURE — 80048 BASIC METABOLIC PNL TOTAL CA: CPT | Performed by: PHYSICIAN ASSISTANT

## 2021-04-10 PROCEDURE — 85025 COMPLETE CBC W/AUTO DIFF WBC: CPT | Performed by: PHYSICIAN ASSISTANT

## 2021-04-10 PROCEDURE — 94003 VENT MGMT INPAT SUBQ DAY: CPT

## 2021-04-10 RX ORDER — GUAIFENESIN/DEXTROMETHORPHAN 100-10MG/5
10 SYRUP ORAL EVERY 4 HOURS PRN
Status: DISCONTINUED | OUTPATIENT
Start: 2021-04-10 | End: 2021-04-16 | Stop reason: HOSPADM

## 2021-04-10 RX ORDER — POTASSIUM CHLORIDE 20 MEQ/1
20 TABLET, EXTENDED RELEASE ORAL ONCE
Status: COMPLETED | OUTPATIENT
Start: 2021-04-10 | End: 2021-04-10

## 2021-04-10 RX ORDER — FUROSEMIDE 10 MG/ML
40 INJECTION INTRAMUSCULAR; INTRAVENOUS ONCE
Status: COMPLETED | OUTPATIENT
Start: 2021-04-10 | End: 2021-04-10

## 2021-04-10 RX ADMIN — ATORVASTATIN CALCIUM 40 MG: 40 TABLET, FILM COATED ORAL at 21:07

## 2021-04-10 RX ADMIN — ENOXAPARIN SODIUM 40 MG: 40 INJECTION SUBCUTANEOUS at 08:31

## 2021-04-10 RX ADMIN — FUROSEMIDE 40 MG: 10 INJECTION, SOLUTION INTRAMUSCULAR; INTRAVENOUS at 12:54

## 2021-04-10 RX ADMIN — FAMOTIDINE 20 MG: 20 TABLET ORAL at 17:24

## 2021-04-10 RX ADMIN — GUAIFENESIN 600 MG: 600 TABLET, EXTENDED RELEASE ORAL at 21:07

## 2021-04-10 RX ADMIN — REMDESIVIR 100 MG: 100 INJECTION, POWDER, LYOPHILIZED, FOR SOLUTION INTRAVENOUS at 21:07

## 2021-04-10 RX ADMIN — ZINC SULFATE 220 MG (50 MG) CAPSULE 220 MG: CAPSULE at 11:56

## 2021-04-10 RX ADMIN — ACETAMINOPHEN 975 MG: 325 TABLET ORAL at 15:10

## 2021-04-10 RX ADMIN — DEXAMETHASONE SODIUM PHOSPHATE 12.6 MG: 10 INJECTION, SOLUTION INTRAMUSCULAR; INTRAVENOUS at 03:58

## 2021-04-10 RX ADMIN — Medication 2000 UNITS: at 08:31

## 2021-04-10 RX ADMIN — THIAMINE HCL TAB 100 MG 50 MG: 100 TAB at 08:32

## 2021-04-10 RX ADMIN — ENOXAPARIN SODIUM 40 MG: 40 INJECTION SUBCUTANEOUS at 21:06

## 2021-04-10 RX ADMIN — POTASSIUM CHLORIDE 20 MEQ: 1500 TABLET, EXTENDED RELEASE ORAL at 07:27

## 2021-04-10 RX ADMIN — DEXAMETHASONE SODIUM PHOSPHATE 12.6 MG: 10 INJECTION, SOLUTION INTRAMUSCULAR; INTRAVENOUS at 15:11

## 2021-04-10 RX ADMIN — OXYCODONE HYDROCHLORIDE AND ACETAMINOPHEN 1000 MG: 500 TABLET ORAL at 21:07

## 2021-04-10 RX ADMIN — GUAIFENESIN 600 MG: 600 TABLET, EXTENDED RELEASE ORAL at 08:31

## 2021-04-10 RX ADMIN — CYANOCOBALAMIN TAB 500 MCG 1000 MCG: 500 TAB at 08:31

## 2021-04-10 RX ADMIN — LEVOTHYROXINE SODIUM 137 MCG: 25 TABLET ORAL at 05:44

## 2021-04-10 RX ADMIN — FAMOTIDINE 20 MG: 20 TABLET ORAL at 08:31

## 2021-04-10 RX ADMIN — Medication 325 MG: at 08:31

## 2021-04-10 RX ADMIN — OXYCODONE HYDROCHLORIDE AND ACETAMINOPHEN 1000 MG: 500 TABLET ORAL at 08:31

## 2021-04-10 RX ADMIN — CALCIUM 1 TABLET: 500 TABLET ORAL at 11:56

## 2021-04-10 NOTE — ASSESSMENT & PLAN NOTE
· Secondary to COVID-19  · Continue HFNC   · current settings 35L/70%  · Wean as tolerated  · encouarge proning  · Encourage I/S  · Continue to diurese as tolerated  · Continue medications as below

## 2021-04-10 NOTE — ASSESSMENT & PLAN NOTE
· Initial symptoms on 3/29, positive on 3/30  · Continue severe disease pathway  · Continue Dexamethasone to 0 1mg/kg BID  · Complete remdesevir as already started  · Patient is out of the window for convalescent plasma  · Lovenox DVT ppx b i d, monitor D-Dimer q3 days   · Continue to monitor off abx  · HIV/Hepatitis panel negative  · Encourage proning and I/S  · Received actemra x 1 on 4/7  · Aggressive bowel regimen until bowel movement achieved  · Last BM 4/9  · Continue to monitor inflammatory markers q3 days

## 2021-04-10 NOTE — PROGRESS NOTES
[address]law 47  Progress Note - [place] Umaña 1975, 39 y o  female MRN: [id number]  Unit/Bed#: ICU 02 Encounter: [id number]  Primary Care Provider: TOHMAS Hooper   Date and time admitted to hospital: 4/7/2021 11:38 AM    * Pneumonia due to COVID-19 virus  Assessment & Plan  · Initial symptoms on 3/29, positive on 3/30  · Continue severe disease pathway  · Continue Dexamethasone to 0 1mg/kg BID  · Complete remdesevir as already started  · Patient is out of the window for convalescent plasma  · Lovenox DVT ppx b i d, monitor D-Dimer q3 days   · Continue to monitor off abx  · HIV/Hepatitis panel negative  · Encourage proning and I/S  · Received actemra x 1 on 4/7  · Aggressive bowel regimen until bowel movement achieved  · Last BM 4/9  · Continue to monitor inflammatory markers q3 days    Respiratory failure with hypoxia ([facility] )  Assessment & Plan  · Secondary to COVID-19  · Continue HFNC   · current settings 35L/70%  · Wean as tolerated  · encouarge proning  · Encourage I/S  · Continue to diurese as tolerated  · Continue medications as below    SIRS (systemic inflammatory response syndrome) (HCC)-resolved as of 4/10/2021  Assessment & Plan  · Patient tachypneic, tachycardic, febrile, but all likely secondary to COVID-19  · Procalcitonin <0 05 - 0 12- 0 05  · Monitor off abx    Hypothyroidism  Assessment & Plan  · Continue synthroid  · Normal TSH    Morbid obesity ([facility] )  Assessment & Plan  · Nutrition consult when appropriate  · Reinforced on the importance of losing weight        ----------------------------------------------------------------------------------------  HPI/24hr events: no overnight events    Disposition: Continue Stepdown Level 1 level of care   Code Status: Level 3 - DNAR and DNI  ---------------------------------------------------------------------------------------  SUBJECTIVE      Review of Systems   All other systems reviewed and are negative      Review of systems was reviewed and negative unless stated above in HPI/24-hour events   ---------------------------------------------------------------------------------------  OBJECTIVE    Vitals   Vitals:    04/10/21 1952 04/10/21 2200 04/10/21 2325 04/10/21 2353   BP: 99/55 99/61     BP Location: Left arm      Pulse: 60 66  58   Resp: (!) 35 (!) 54  (!) 31   Temp: 97 6 °F (36 4 °C)   97 9 °F (36 6 °C)   TempSrc: Temporal      SpO2: 96% (!) 89% 94% 92%   Weight:       Height:         Temp (24hrs), Av 8 °F (36 6 °C), Min:97 6 °F (36 4 °C), Max:98 2 °F (36 8 °C)  Current: Temperature: 97 9 °F (36 6 °C)          Respiratory:  SpO2: SpO2: 92 %  Nasal Cannula O2 Flow Rate (L/min): 35 L/min    Invasive/non-invasive ventilation settings   Respiratory    Lab Data (Last 4 hours)    None         O2/Vent Data (Last 4 hours)      04/10 2325          Non-Invasive Ventilation Mode HFNC (High flow)                   Physical Exam  Vitals signs and nursing note reviewed  Constitutional:       General: She is not in acute distress  Appearance: Normal appearance  She is obese  She is not ill-appearing or toxic-appearing  HENT:      Head: Normocephalic and atraumatic  Nose: Nose normal       Mouth/Throat:      Mouth: Mucous membranes are moist       Pharynx: Oropharynx is clear  Eyes:      Conjunctiva/sclera: Conjunctivae normal       Pupils: Pupils are equal, round, and reactive to light  Pulmonary:      Effort: Pulmonary effort is normal  No respiratory distress  Breath sounds: Normal breath sounds  No wheezing  Abdominal:      General: Bowel sounds are normal  There is no distension  Palpations: Abdomen is soft  Tenderness: There is no abdominal tenderness  Neurological:      Mental Status: She is alert           Laboratory and Diagnostics:  Results from last 7 days   Lab Units 04/10/21  0551 21  0550 21  0710 21  1414 21  0914 21  1601   WBC Thousand/uL 9 25 5 08 4 54 --  5 30 4 30*   HEMOGLOBIN g/dL 10 4* 10 2* 10 5* 11 0* 9 8* 9 6*   HEMATOCRIT % 35 4 35 3 36 6 38 1 31 6* 31 9*   PLATELETS Thousands/uL 483* 498* 466*  --  386 369   NEUTROS PCT % 86* 81*  --   --   --  81*   MONOS PCT % 5 5  --   --   --  5     Results from last 7 days   Lab Units 04/10/21  0551 04/09/21  0550 04/08/21  0710 04/07/21  0619 04/06/21  1601   SODIUM mmol/L 138 137 137 137 136*   POTASSIUM mmol/L 3 7 3 9 4 3 4 0 3 5*   CHLORIDE mmol/L 104 104 103 105 103   CO2 mmol/L 23 23 25 23 26   ANION GAP mmol/L 11 10 9 9 7   BUN mg/dL 11 11 11 7 6   CREATININE mg/dL 0 78 0 80 0 89 0 70 0 76   CALCIUM mg/dL 8 7 8 5 8 8 8 2* 8 3*   GLUCOSE RANDOM mg/dL 133 126 111 123* 97   ALT U/L  --  18 18 14 12   AST U/L  --  26 41 34 31   ALK PHOS U/L  --  108 115 134* 129*   ALBUMIN g/dL  --  2 4* 2 4* 3 5 3 5   TOTAL BILIRUBIN mg/dL  --  0 20 0 22 0 20 0 20     Results from last 7 days   Lab Units 04/10/21  0551   MAGNESIUM mg/dL 2 3   PHOSPHORUS mg/dL 3 8      Results from last 7 days   Lab Units 04/06/21  1601   INR  1 02   PTT seconds 36      Results from last 7 days   Lab Units 04/08/21  0710 04/06/21  1601   TROPONIN I ng/mL <0 02 <0 01     Results from last 7 days   Lab Units 04/06/21  1601   LACTIC ACID mmol/L 1 1     ABG:    VBG:  Results from last 7 days   Lab Units 04/06/21  1601   PH NOLAN  7 350   PCO2 NOLAN mm Hg 43 0   PO2 NOLAN mm Hg 32 0*   HCO3 NOLAN mmol/L 23 7   BASE EXC NOLAN mmol/L -1 9     Results from last 7 days   Lab Units 04/09/21  0550 04/08/21  0710 04/07/21  0626 04/06/21  1601   PROCALCITONIN ng/ml <0 05 0 05 0 12 <0 05       Micro  Results from last 7 days   Lab Units 04/08/21  1850 04/06/21  1634 04/06/21  1601   BLOOD CULTURE   --  No Growth After 4 Days  No Growth After 4 Days  MRSA CULTURE ONLY  No Methicillin Resistant Staphlyococcus aureus (MRSA) isolated  --   --        EKG: NSR  Imaging: I have personally reviewed pertinent reports     and I have personally reviewed pertinent films in PACS    Intake and Output  I/O       04/09 0701 - 04/10 0700 04/10 0701 - 04/11 0700    P  O  1590 600    I V  (mL/kg) 100 (0 8) 10 (0 1)    IV Piggyback 350 50    Total Intake(mL/kg) 2040 (16 7) 660 (5 4)    Urine (mL/kg/hr) 1450 (0 5) 1150 (0 7)    Stool 0     Total Output 1450 1150    Net +590 -490          Unmeasured Urine Occurrence 1 x 1 x    Unmeasured Stool Occurrence 1 x           Height and Weights   Height: 5' 6" (167 6 cm)  IBW: 59 3 kg  Body mass index is 43 48 kg/m²  Weight (last 2 days)     None            Nutrition       Diet Orders   (From admission, onward)             Start     Ordered    04/10/21 1519  Diet Regular; Regular House  Diet effective now     Question Answer Comment   Diet Type Regular    Regular Regular House    RD to adjust diet per protocol?  Yes        04/10/21 1519    04/09/21 1242  Dietary nutrition supplements  Once     Question Answer Comment   Select Supplement: Ensure Max - Milk Chocolate    Frequency Breakfast, Dinner        04/09/21 1241    04/07/21 1719  Room Service  Once     Question:  Type of Service  Answer:  Room Service-Appropriate    04/07/21 1719                  Active Medications  Scheduled Meds:  Current Facility-Administered Medications   Medication Dose Route Frequency Provider Last Rate    acetaminophen  975 mg Oral Q8H [address] Debbie Carr PA-C      albuterol  2 puff Inhalation Q6H PRN Kyaw Collier PA-C      ascorbic acid  1,000 mg Oral Q12H [address] Debbie Carr PA-C      atorvastatin  40 mg Oral HS Kyaw Collier PA-C      benzonatate  100 mg Oral TID PRN Kyaw Collier PA-C      calcium carbonate  1 tablet Oral Daily With Lunch Kyaw Collier PA-C      cholecalciferol  2,000 Units Oral Daily Debbie Carr PA-C      cyanocobalamin  1,000 mcg Oral Daily Debbie Carr PA-C      dexamethasone  0 1 mg/kg Intravenous Q12H Kyaw Collier PA-C Stopped (04/10/21 1801)    dextromethorphan-guaiFENesin  10 mL Oral Q4H PRN YUSEF Sandoval      enoxaparin  40 mg Subcutaneous Q12H [address] Debbie Carr PA-C      famotidine  20 mg Oral BID Debbie Carr PA-C      ferrous sulfate  325 mg Oral Daily With Breakfast Vern Levin PA-C      levothyroxine  137 mcg Oral Early Morning Debbie Carr PA-C      zinc sulfate  220 mg Oral Daily With Lunch Debbie Carr PA-C      Followed by   Benita Ortiz ON 4/14/2021] multivitamin-minerals  1 tablet Oral Daily Debbie Carr PA-C      polyethylene glycol  17 g Oral Daily PRN Vern Levin PA-C      senna-docusate sodium  1 tablet Oral HS Debbie Carr PA-C      thiamine  50 mg Oral Daily Debbie Carr PA-C       Continuous Infusions:     PRN Meds:   albuterol, 2 puff, Q6H PRN  benzonatate, 100 mg, TID PRN  dextromethorphan-guaiFENesin, 10 mL, Q4H PRN  polyethylene glycol, 17 g, Daily PRN        Invasive Devices Review  Invasive Devices     Peripheral Intravenous Line            Peripheral IV 04/08/21 Right Forearm 3 days    Peripheral IV 04/08/21 Right Forearm 2 days          Drain            External Urinary Catheter 1 day                Rationale for remaining devices: n/a  ---------------------------------------------------------------------------------------  Advance Directive and Living Will:      Power of :    POLST:    ---------------------------------------------------------------------------------------  Care Time Delivered:   No Critical Care time spent       YUSEF Sandoval      Portions of the record may have been created with voice recognition software  Occasional wrong word or "sound a like" substitutions may have occurred due to the inherent limitations of voice recognition software    Read the chart carefully and recognize, using context, where substitutions have occurred

## 2021-04-11 LAB
ANION GAP SERPL CALCULATED.3IONS-SCNC: 10 MMOL/L (ref 4–13)
BACTERIA BLD CULT: NORMAL
BACTERIA BLD CULT: NORMAL
BUN SERPL-MCNC: 16 MG/DL (ref 5–25)
CALCIUM SERPL-MCNC: 8.5 MG/DL (ref 8.3–10.1)
CHLORIDE SERPL-SCNC: 103 MMOL/L (ref 100–108)
CO2 SERPL-SCNC: 26 MMOL/L (ref 21–32)
CREAT SERPL-MCNC: 0.96 MG/DL (ref 0.6–1.3)
CRP SERPL QL: 13.2 MG/L
ERYTHROCYTE [DISTWIDTH] IN BLOOD BY AUTOMATED COUNT: 21.5 % (ref 11.6–15.1)
GFR SERPL CREATININE-BSD FRML MDRD: 73 ML/MIN/1.73SQ M
GLUCOSE SERPL-MCNC: 123 MG/DL (ref 65–140)
HCT VFR BLD AUTO: 37.8 % (ref 34.8–46.1)
HGB BLD-MCNC: 11.2 G/DL (ref 11.5–15.4)
MAGNESIUM SERPL-MCNC: 2.4 MG/DL (ref 1.6–2.6)
MCH RBC QN AUTO: 21.5 PG (ref 26.8–34.3)
MCHC RBC AUTO-ENTMCNC: 29.6 G/DL (ref 31.4–37.4)
MCV RBC AUTO: 72 FL (ref 82–98)
PHOSPHATE SERPL-MCNC: 4 MG/DL (ref 2.7–4.5)
PLATELET # BLD AUTO: 584 THOUSANDS/UL (ref 149–390)
PMV BLD AUTO: 9 FL (ref 8.9–12.7)
POTASSIUM SERPL-SCNC: 3.6 MMOL/L (ref 3.5–5.3)
RBC # BLD AUTO: 5.22 MILLION/UL (ref 3.81–5.12)
SODIUM SERPL-SCNC: 139 MMOL/L (ref 136–145)
WBC # BLD AUTO: 14.48 THOUSAND/UL (ref 4.31–10.16)

## 2021-04-11 PROCEDURE — 94003 VENT MGMT INPAT SUBQ DAY: CPT

## 2021-04-11 PROCEDURE — 80048 BASIC METABOLIC PNL TOTAL CA: CPT | Performed by: NURSE PRACTITIONER

## 2021-04-11 PROCEDURE — 86140 C-REACTIVE PROTEIN: CPT | Performed by: NURSE PRACTITIONER

## 2021-04-11 PROCEDURE — 99232 SBSQ HOSP IP/OBS MODERATE 35: CPT | Performed by: ANESTHESIOLOGY

## 2021-04-11 PROCEDURE — 94760 N-INVAS EAR/PLS OXIMETRY 1: CPT

## 2021-04-11 PROCEDURE — 84100 ASSAY OF PHOSPHORUS: CPT | Performed by: NURSE PRACTITIONER

## 2021-04-11 PROCEDURE — 85027 COMPLETE CBC AUTOMATED: CPT | Performed by: NURSE PRACTITIONER

## 2021-04-11 PROCEDURE — 83735 ASSAY OF MAGNESIUM: CPT | Performed by: NURSE PRACTITIONER

## 2021-04-11 RX ORDER — POTASSIUM CHLORIDE 20 MEQ/1
40 TABLET, EXTENDED RELEASE ORAL ONCE
Status: COMPLETED | OUTPATIENT
Start: 2021-04-11 | End: 2021-04-11

## 2021-04-11 RX ADMIN — OXYCODONE HYDROCHLORIDE AND ACETAMINOPHEN 1000 MG: 500 TABLET ORAL at 08:39

## 2021-04-11 RX ADMIN — ZINC SULFATE 220 MG (50 MG) CAPSULE 220 MG: CAPSULE at 13:00

## 2021-04-11 RX ADMIN — ENOXAPARIN SODIUM 40 MG: 40 INJECTION SUBCUTANEOUS at 08:38

## 2021-04-11 RX ADMIN — OXYCODONE HYDROCHLORIDE AND ACETAMINOPHEN 1000 MG: 500 TABLET ORAL at 20:44

## 2021-04-11 RX ADMIN — Medication 325 MG: at 08:30

## 2021-04-11 RX ADMIN — BENZONATATE 100 MG: 100 CAPSULE ORAL at 20:44

## 2021-04-11 RX ADMIN — FAMOTIDINE 20 MG: 20 TABLET ORAL at 08:39

## 2021-04-11 RX ADMIN — DOCUSATE SODIUM AND SENNOSIDES 1 TABLET: 8.6; 5 TABLET, FILM COATED ORAL at 22:18

## 2021-04-11 RX ADMIN — THIAMINE HCL TAB 100 MG 50 MG: 100 TAB at 08:39

## 2021-04-11 RX ADMIN — CALCIUM 1 TABLET: 500 TABLET ORAL at 13:00

## 2021-04-11 RX ADMIN — LEVOTHYROXINE SODIUM 137 MCG: 25 TABLET ORAL at 06:34

## 2021-04-11 RX ADMIN — DEXAMETHASONE SODIUM PHOSPHATE 12.6 MG: 10 INJECTION, SOLUTION INTRAMUSCULAR; INTRAVENOUS at 16:30

## 2021-04-11 RX ADMIN — FAMOTIDINE 20 MG: 20 TABLET ORAL at 18:51

## 2021-04-11 RX ADMIN — ATORVASTATIN CALCIUM 40 MG: 40 TABLET, FILM COATED ORAL at 22:17

## 2021-04-11 RX ADMIN — ENOXAPARIN SODIUM 40 MG: 40 INJECTION SUBCUTANEOUS at 20:45

## 2021-04-11 RX ADMIN — BENZONATATE 100 MG: 100 CAPSULE ORAL at 00:00

## 2021-04-11 RX ADMIN — CYANOCOBALAMIN TAB 500 MCG 1000 MCG: 500 TAB at 08:39

## 2021-04-11 RX ADMIN — POTASSIUM CHLORIDE 40 MEQ: 1500 TABLET, EXTENDED RELEASE ORAL at 08:30

## 2021-04-11 RX ADMIN — GUAIFENESIN AND DEXTROMETHORPHAN 10 ML: 100; 10 SYRUP ORAL at 23:05

## 2021-04-11 RX ADMIN — Medication 2000 UNITS: at 08:39

## 2021-04-11 NOTE — PLAN OF CARE
Problem: Potential for Falls  Goal: Patient will remain free of falls  Description: INTERVENTIONS:  - Assess patient frequently for physical needs  -  Identify cognitive and physical deficits and behaviors that affect risk of falls    -  [place] fall precautions as indicated by assessment   - Educate patient/family on patient safety including physical limitations  - Instruct patient to call for assistance with activity based on assessment  - Modify environment to reduce risk of injury  - Consider OT/PT consult to assist with strengthening/mobility  Outcome: Progressing     Problem: RESPIRATORY - ADULT  Goal: Achieves optimal ventilation and oxygenation  Description: INTERVENTIONS:  - Assess for changes in respiratory status  - Assess for changes in mentation and behavior  - Position to facilitate oxygenation and minimize respiratory effort  - Oxygen administered by appropriate delivery if ordered  - Initiate smoking cessation education as indicated  - Encourage broncho-pulmonary hygiene including cough, deep breathe, Incentive Spirometry  - Assess the need for suctioning and aspirate as needed  - Assess and instruct to report SOB or any respiratory difficulty  - Respiratory Therapy support as indicated  Outcome: Progressing     Problem: Prexisting or High Potential for Compromised Skin Integrity  Goal: Skin integrity is maintained or improved  Description: INTERVENTIONS:  - Identify patients at risk for skin breakdown  - Assess and monitor skin integrity  - Assess and monitor nutrition and hydration status  - Monitor labs   - Assess for incontinence   - Turn and reposition patient  - Assist with mobility/ambulation  - Relieve pressure over bony prominences  - Avoid friction and shearing  - Provide appropriate hygiene as needed including keeping skin clean and dry  - Evaluate need for skin moisturizer/barrier cream  - Collaborate with interdisciplinary team   - Patient/family teaching  - Consider wound care consult   Outcome: Progressing     Problem: Nutrition/Hydration-ADULT  Goal: Nutrient/Hydration intake appropriate for improving, restoring or maintaining nutritional needs  Description: Monitor and assess patient's nutrition/hydration status for malnutrition  Collaborate with interdisciplinary team and initiate plan and interventions as ordered  Monitor patient's weight and dietary intake as ordered or per policy  Utilize nutrition screening tool and intervene as necessary  Determine patient's food preferences and provide high-protein, high-caloric foods as appropriate  INTERVENTIONS:  - Monitor oral intake, urinary output, labs, and treatment plans  - Assess nutrition and hydration status and recommend course of action  - Evaluate amount of meals eaten  - Assist patient with eating if necessary   - Allow adequate time for meals  - Recommend/ encourage appropriate diets, oral nutritional supplements, and vitamin/mineral supplements  - Order, calculate, and assess calorie counts as needed  - Recommend, monitor, and adjust tube feedings and TPN/PPN based on assessed needs  - Assess need for intravenous fluids  - Provide specific nutrition/hydration education as appropriate  - Include patient/family/caregiver in decisions related to nutrition  Outcome: Progressing   Care plan ongoing

## 2021-04-11 NOTE — ASSESSMENT & PLAN NOTE
· Secondary to COVID-19  · Continue HFNC   · current settings 35L/50%  · Wean as tolerated  · encouarge proning  · Encourage I/S  · Continue to diurese as tolerated  · Continue medications as below

## 2021-04-11 NOTE — PROGRESS NOTES
Stanislaw 47  Progress Note - Ned Diana Umaña 1975, 39 y o  female MRN: [id number]  Unit/Bed#: ICU 02 Encounter: [id number]  Primary Care Provider: THOMAS Mauricio   Date and time admitted to hospital: 4/7/2021 11:38 AM    * Pneumonia due to COVID-19 virus  Assessment & Plan  · Initial symptoms on 3/29, positive on 3/30  · Continue severe disease pathway  · Continue Dexamethasone to 0 1mg/kg BID  · Complete remdesevir as already started  · Patient is out of the window for convalescent plasma  · Lovenox DVT ppx b i d, monitor D-Dimer q3 days   · Continue to monitor off abx  · HIV/Hepatitis panel negative  · Encourage proning and I/S  · Received actemra x 1 on 4/7  · Aggressive bowel regimen until bowel movement achieved  · Last BM 4/9  · Continue to monitor inflammatory markers q3 days    Respiratory failure with hypoxia (HCC)  Assessment & Plan  · Secondary to COVID-19  · Continue HFNC   · current settings 35L/50%  · Wean as tolerated  · encouarge proning  · Encourage I/S  · Continue to diurese as tolerated  · Continue medications as below    Hypothyroidism  Assessment & Plan  · Continue synthroid  · Normal TSH    Morbid obesity ([facility] [address] )  Assessment & Plan  · Nutrition consult when appropriate  · Reinforced on the importance of losing weight      Anemia  Assessment & Plan  · Continue Vit B 12 and iron  · Hemoglobin stable    ----------------------------------------------------------------------------------------  HPI/24hr events: no overnight events    Disposition: Continue Stepdown Level 1 level of care   Code Status: Level 3 - DNAR and DNI  ---------------------------------------------------------------------------------------  SUBJECTIVE      Review of Systems   Respiratory: Negative for chest tightness and shortness of breath  Cardiovascular: Negative for chest pain  Gastrointestinal: Negative for abdominal distention and abdominal pain     All other systems reviewed and are negative  Review of systems was reviewed and negative unless stated above in HPI/24-hour events   ---------------------------------------------------------------------------------------  OBJECTIVE    Vitals   Vitals:    21 0100 21 0340 21 04021 0502   BP: 104/61   120/77   BP Location:       Pulse: 65  (!) 53 64   Resp: (!) 32  19 (!) 27   Temp:    97 7 °F (36 5 °C)   TempSrc:       SpO2: 98% 97% 95% 97%   Weight:       Height:         Temp (24hrs), Av 8 °F (36 6 °C), Min:97 1 °F (36 2 °C), Max:98 6 °F (37 °C)  Current: Temperature: 97 7 °F (36 5 °C)          Respiratory:  SpO2: SpO2: 97 %  Nasal Cannula O2 Flow Rate (L/min): 25 L/min    Invasive/non-invasive ventilation settings   Respiratory    Lab Data (Last 4 hours)    None         O2/Vent Data (Last 4 hours)      340          Non-Invasive Ventilation Mode HFNC (High flow)                   Physical Exam  Vitals signs and nursing note reviewed  Constitutional:       General: She is not in acute distress  Appearance: Normal appearance  She is obese  She is not ill-appearing or toxic-appearing  HENT:      Head: Normocephalic and atraumatic  Mouth/Throat:      Mouth: Mucous membranes are moist       Pharynx: Oropharynx is clear  Eyes:      Conjunctiva/sclera: Conjunctivae normal       Pupils: Pupils are equal, round, and reactive to light  Neck:      Musculoskeletal: Neck supple  Cardiovascular:      Rate and Rhythm: Normal rate and regular rhythm  Pulses: Normal pulses  Heart sounds: Normal heart sounds  Pulmonary:      Effort: Pulmonary effort is normal  No respiratory distress  Breath sounds: Normal breath sounds  No wheezing or rhonchi  Abdominal:      General: Bowel sounds are normal  There is no distension  Palpations: Abdomen is soft  Tenderness: There is no abdominal tenderness  Musculoskeletal:      Right lower leg: No edema  Left lower leg: No edema     Skin: General: Skin is warm and dry  Capillary Refill: Capillary refill takes less than 2 seconds  Neurological:      General: No focal deficit present  Mental Status: She is alert and oriented to person, place, and time  Mental status is at baseline           Laboratory and Diagnostics:  Results from last 7 days   Lab Units 04/12/21  0517 04/11/21  0617 04/10/21  0551 04/09/21  0550 04/08/21  0710 04/07/21  1414 04/07/21  0914 04/06/21  1601   WBC Thousand/uL 15 93* 14 48* 9 25 5 08 4 54  --  5 30 4 30*   HEMOGLOBIN g/dL 11 3* 11 2* 10 4* 10 2* 10 5* 11 0* 9 8* 9 6*   HEMATOCRIT % 38 5 37 8 35 4 35 3 36 6 38 1 31 6* 31 9*   PLATELETS Thousands/uL 564* 584* 483* 498* 466*  --  386 369   NEUTROS PCT %  --   --  86* 81*  --   --   --  81*   MONOS PCT %  --   --  5 5  --   --   --  5     Results from last 7 days   Lab Units 04/11/21  0617 04/10/21  0551 04/09/21  0550 04/08/21  0710 04/07/21  0619 04/06/21  1601   SODIUM mmol/L 139 138 137 137 137 136*   POTASSIUM mmol/L 3 6 3 7 3 9 4 3 4 0 3 5*   CHLORIDE mmol/L 103 104 104 103 105 103   CO2 mmol/L 26 23 23 25 23 26   ANION GAP mmol/L 10 11 10 9 9 7   BUN mg/dL 16 11 11 11 7 6   CREATININE mg/dL 0 96 0 78 0 80 0 89 0 70 0 76   CALCIUM mg/dL 8 5 8 7 8 5 8 8 8 2* 8 3*   GLUCOSE RANDOM mg/dL 123 133 126 111 123* 97   ALT U/L  --   --  18 18 14 12   AST U/L  --   --  26 41 34 31   ALK PHOS U/L  --   --  108 115 134* 129*   ALBUMIN g/dL  --   --  2 4* 2 4* 3 5 3 5   TOTAL BILIRUBIN mg/dL  --   --  0 20 0 22 0 20 0 20     Results from last 7 days   Lab Units 04/11/21  0617 04/10/21  0551   MAGNESIUM mg/dL 2 4 2 3   PHOSPHORUS mg/dL 4 0 3 8      Results from last 7 days   Lab Units 04/06/21  1601   INR  1 02   PTT seconds 36      Results from last 7 days   Lab Units 04/08/21  0710 04/06/21  1601   TROPONIN I ng/mL <0 02 <0 01     Results from last 7 days   Lab Units 04/06/21  1601   LACTIC ACID mmol/L 1 1     ABG:    VBG:  Results from last 7 days   Lab Units 04/06/21  1601   PH NOLAN  7 350   PCO2 NOLAN mm Hg 43 0   PO2 NOLAN mm Hg 32 0*   HCO3 NOLAN mmol/L 23 7   BASE EXC NOLAN mmol/L -1 9     Results from last 7 days   Lab Units 04/09/21  0550 04/08/21  0710 04/07/21  0626 04/06/21  1601   PROCALCITONIN ng/ml <0 05 0 05 0 12 <0 05       Micro  Results from last 7 days   Lab Units 04/08/21  1850 04/06/21  1634 04/06/21  1601   BLOOD CULTURE   --  No Growth After 5 Days  No Growth After 5 Days  MRSA CULTURE ONLY  No Methicillin Resistant Staphlyococcus aureus (MRSA) isolated  --   --        EKG: NSR  Imaging: I have personally reviewed pertinent reports  and I have personally reviewed pertinent films in PACS    Intake and Output  I/O       04/10 0701 - 04/11 0700 04/11 0701 - 04/12 0700    P  O  1260 800    I V  (mL/kg) 130 (1 1)     IV Piggyback 350     Total Intake(mL/kg) 1740 (14 3) 800 (6 6)    Urine (mL/kg/hr) 2400 (0 8) 250 (0 2)    Stool      Total Output 2400 250    Net -660 +550          Unmeasured Urine Occurrence 1 x           Height and Weights   Height: 5' 6" (167 6 cm)  IBW: 59 3 kg  Body mass index is 43 48 kg/m²  Weight (last 2 days)     None            Nutrition       Diet Orders   (From admission, onward)             Start     Ordered    04/10/21 1519  Diet Regular; Regular House  Diet effective now     Question Answer Comment   Diet Type Regular    Regular Regular House    RD to adjust diet per protocol?  Yes        04/10/21 1519    04/09/21 1242  Dietary nutrition supplements  Once     Question Answer Comment   Select Supplement: Ensure Max - Milk Chocolate    Frequency Breakfast, Dinner        04/09/21 1241    04/07/21 1719  Room Service  Once     Question:  Type of Service  Answer:  Room Service-Appropriate    04/07/21 1719                  Active Medications  Scheduled Meds:  Current Facility-Administered Medications   Medication Dose Route Frequency Provider Last Rate    acetaminophen  975 mg Oral Q8H [address] Debbie REVA Carr PA-C      albuterol  2 puff Inhalation Q6H PRN Ravin Lackey PA-C      ascorbic acid  1,000 mg Oral Q12H [address] Dbebie Carr PA-C      atorvastatin  40 mg Oral HS Ravin Lackey PA-C      benzonatate  100 mg Oral TID PRN Ravin Lackey PA-C      calcium carbonate  1 tablet Oral Daily With Lunch Ravin Lackey PA-C      cholecalciferol  2,000 Units Oral Daily Debbie Carr PA-C      cyanocobalamin  1,000 mcg Oral Daily Ravin Lackey PA-C      dexamethasone  0 1 mg/kg Intravenous Q12H Debbie Carr PA-C 12 6 mg (04/12/21 0458)    dextromethorphan-guaiFENesin  10 mL Oral Q4H PRN Mariela Gallardo PA-C      enoxaparin  40 mg Subcutaneous Q12H [address] Debbie Carr PA-C      famotidine  20 mg Oral BID Debbie Carr PA-C      ferrous sulfate  325 mg Oral Daily With Breakfast Ravin Lackey PA-C      levothyroxine  137 mcg Oral Early Morning Debbie Carr PA-C      zinc sulfate  220 mg Oral Daily With Lunch Debbie Carr PA-C      Followed by   Madelyn Romero ON 4/14/2021] multivitamin-minerals  1 tablet Oral Daily eDbbie Carr PA-C      polyethylene glycol  17 g Oral Daily PRN Debbie Carr PA-C      senna-docusate sodium  1 tablet Oral HS Debbie Carr PA-C      thiamine  50 mg Oral Daily Debbie Carr PA-C       Continuous Infusions:     PRN Meds:   albuterol, 2 puff, Q6H PRN  benzonatate, 100 mg, TID PRN  dextromethorphan-guaiFENesin, 10 mL, Q4H PRN  polyethylene glycol, 17 g, Daily PRN        Invasive Devices Review  Invasive Devices     Peripheral Intravenous Line            Peripheral IV 04/08/21 Right Forearm 3 days                Rationale for remaining devices: n/a  ---------------------------------------------------------------------------------------  Advance Directive and Living Will:      Power of :    POLST:    ---------------------------------------------------------------------------------------  Care Time Delivered:   No Critical Care time spent       Mariela Gallardo PA-C      Portions of the record may have been created with voice recognition software  Occasional wrong word or "sound a like" substitutions may have occurred due to the inherent limitations of voice recognition software    Read the chart carefully and recognize, using context, where substitutions have occurred

## 2021-04-11 NOTE — UTILIZATION REVIEW
Continued Stay Review    Date: 4/11/21                        Current Patient Class: inpatient  Current Level of Care: level 1 stepdown  HPI:45 y o  female initially admitted on 4/7/21  Assessment/Plan:   Pneumonia 2nd COVID 23  Remains on med/tx pathway  O2 requirements currently @ 35ltr/50% FiO2 via HFNC  Persistently NOWAK, lungs with diminished breath sounds  IV steroids continued  Pertinent Labs/Diagnostic Results:   Results from last 7 days   Lab Units 04/11/21  0617 04/10/21  0551 04/09/21  0550 04/08/21  0710 04/07/21  1414  04/06/21  1601   WBC Thousand/uL 14 48* 9 25 5 08 4 54  --    < > 4 30*   HEMOGLOBIN g/dL 11 2* 10 4* 10 2* 10 5* 11 0*   < > 9 6*   HEMATOCRIT % 37 8 35 4 35 3 36 6 38 1   < > 31 9*   PLATELETS Thousands/uL 584* 483* 498* 466*  --    < > 369   NEUTROS ABS Thousands/µL  --  7 93* 4 16  --   --   --  3 50    < > = values in this interval not displayed       Results from last 7 days   Lab Units 04/11/21  0617 04/10/21  0551 04/09/21  0550 04/08/21  0710 04/07/21  0619   SODIUM mmol/L 139 138 137 137 137   POTASSIUM mmol/L 3 6 3 7 3 9 4 3 4 0   CHLORIDE mmol/L 103 104 104 103 105   CO2 mmol/L 26 23 23 25 23   ANION GAP mmol/L 10 11 10 9 9   BUN mg/dL 16 11 11 11 7   CREATININE mg/dL 0 96 0 78 0 80 0 89 0 70   EGFR ml/min/1 73sq m 73 93 91 80 106   CALCIUM mg/dL 8 5 8 7 8 5 8 8 8 2*   MAGNESIUM mg/dL 2 4 2 3  --   --   --    PHOSPHORUS mg/dL 4 0 3 8  --   --   --      Results from last 7 days   Lab Units 04/09/21  0550 04/08/21  0710 04/07/21  0619 04/06/21  1601   AST U/L 26 41 34 31   ALT U/L 18 18 14 12   ALK PHOS U/L 108 115 134* 129*   TOTAL PROTEIN g/dL 6 6 7 2 7 3 7 3   ALBUMIN g/dL 2 4* 2 4* 3 5 3 5   TOTAL BILIRUBIN mg/dL 0 20 0 22 0 20 0 20     Results from last 7 days   Lab Units 04/11/21  0617 04/10/21  0551 04/09/21  0550 04/08/21  0710 04/07/21  0619 04/06/21  1601   GLUCOSE RANDOM mg/dL 123 133 126 111 123* 97     Results from last 7 days   Lab Units 04/06/21  1601   PH NOLAN 7 350   PCO2 NOLAN mm Hg 43 0   PO2 NOLAN mm Hg 32 0*   HCO3 NOLAN mmol/L 23 7   BASE EXC NOLAN mmol/L -1 9   O2 CONTENT NOLAN ml/dL 8 5   O2 HGB, VENOUS % 58 9*     Results from last 7 days   Lab Units 04/06/21  1601   CK TOTAL U/L 84     Results from last 7 days   Lab Units 04/08/21  0710 04/06/21  1601   TROPONIN I ng/mL <0 02 <0 01     Results from last 7 days   Lab Units 04/09/21  0550 04/08/21  0710 04/07/21  0619 04/06/21  1601   D-DIMER QUANTITATIVE ug/ml FEU 0 81* 0 99* 0 99* 0 79*     Results from last 7 days   Lab Units 04/06/21  1601   PROTIME seconds 13 6   INR  1 02   PTT seconds 36     Results from last 7 days   Lab Units 04/07/21  0619   TSH 3RD GENERATON uIU/mL 0 987     Results from last 7 days   Lab Units 04/09/21  0550 04/08/21  0710 04/07/21  0626 04/06/21  1601   PROCALCITONIN ng/ml <0 05 0 05 0 12 <0 05     Results from last 7 days   Lab Units 04/06/21  1601   LACTIC ACID mmol/L 1 1     Results from last 7 days   Lab Units 04/06/21  1601   NT-PRO BNP pg/mL 52 5     Results from last 7 days   Lab Units 04/08/21  0710 04/07/21  0619 04/06/21  1601   FERRITIN ng/mL 150 90 79     Results from last 7 days   Lab Units 04/06/21  2300   HEP B S AG  Non-reactive   HEP C AB  Non-reactive   HEP B C IGM  Non-reactive   HEP B C TOTAL AB  Non-reactive     Results from last 7 days   Lab Units 04/11/21  0617 04/09/21  0550 04/08/21  0710 04/07/21  0619 04/06/21  1601   CRP mg/L 13 2* 51 5* 106 8* 251 9* 168 9*   SED RATE mm/hour  --   --   --   --  118*     Results from last 7 days   Lab Units 04/07/21  1952   CLARITY UA  Clear   COLOR UA  Light Yellow   SPEC GRAV UA  1 015   PH UA  6 0   GLUCOSE UA mg/dl Negative   KETONES UA mg/dl Negative   BLOOD UA  Large*   PROTEIN UA mg/dl Negative   NITRITE UA  Negative   BILIRUBIN UA  Negative   UROBILINOGEN UA E U /dl 0 2   LEUKOCYTES UA  Negative   WBC UA /hpf 0-1*   RBC UA /hpf Innumerable*   BACTERIA UA /hpf Occasional   EPITHELIAL CELLS WET PREP /hpf Occasional   MUCUS THREADS  Occasional*     Results from last 7 days   Lab Units 04/06/21  1634 04/06/21  1601   BLOOD CULTURE  No Growth After 4 Days  No Growth After 4 Days  Vital Signs: /71   Pulse 68   Temp (!) 97 1 °F (36 2 °C) (Temporal)   Resp 17   Ht 5' 6" (1 676 m)   Wt 122 kg (269 lb 6 4 oz)   LMP 03/18/2021   SpO2 97%   BMI 43 48 kg/m²     Medications:   acetaminophen, 975 mg, Oral, Q8H [address]  ascorbic acid, 1,000 mg, Oral, Q12H KAYA  atorvastatin, 40 mg, Oral, HS  calcium carbonate, 1 tablet, Oral, Daily With Lunch  cholecalciferol, 2,000 Units, Oral, Daily  cyanocobalamin, 1,000 mcg, Oral, Daily  dexamethasone, 0 1 mg/kg, Intravenous, Q12H  enoxaparin, 40 mg, Subcutaneous, Q12H KAYA  famotidine, 20 mg, Oral, BID  ferrous sulfate, 325 mg, Oral, Daily With Breakfast  levothyroxine, 137 mcg, Oral, Early Morning  zinc sulfate, 220 mg, Oral, Daily With Lunch    Followed by  [START ON 4/14/2021] multivitamin-minerals, 1 tablet, Oral, Daily  senna-docusate sodium, 1 tablet, Oral, HS  thiamine, 50 mg, Oral, Daily    PRN Meds:  albuterol, 2 puff, Inhalation, Q6H PRN  benzonatate, 100 mg, Oral, TID PRN  dextromethorphan-guaiFENesin, 10 mL, Oral, Q4H PRN  polyethylene glycol, 17 g, Oral, Daily PRN    Discharge Plan: d    Network Utilization Review Department  ATTENTION: Please call with any questions or concerns to [phone] and carefully listen to the prompts so that you are directed to the right person  All voicemails are confidential   Yesi Lopez all requests for admission clinical reviews, approved or denied determinations and any other requests to dedicated fax number below belonging to the campus where the patient is receiving treatment   List of dedicated fax numbers for the Facilities:  [address] DENIALS (Administrative/Medical Necessity) [phone]   [address] (Maternity/NICU/Pediatrics) 261 [facility],[address] Floor [phone]   [facility] [address]  [phone]   [address] ([address]) [address] [place] [address] [facility] [phone]   [address] [phone]

## 2021-04-12 ENCOUNTER — APPOINTMENT (INPATIENT)
Dept: RADIOLOGY | Facility: HOSPITAL | Age: 46
DRG: 177 | End: 2021-04-12
Payer: COMMERCIAL

## 2021-04-12 LAB
ANION GAP SERPL CALCULATED.3IONS-SCNC: 10 MMOL/L (ref 4–13)
BUN SERPL-MCNC: 16 MG/DL (ref 5–25)
CALCIUM SERPL-MCNC: 8.3 MG/DL (ref 8.3–10.1)
CHLORIDE SERPL-SCNC: 103 MMOL/L (ref 100–108)
CO2 SERPL-SCNC: 25 MMOL/L (ref 21–32)
CREAT SERPL-MCNC: 0.81 MG/DL (ref 0.6–1.3)
ERYTHROCYTE [DISTWIDTH] IN BLOOD BY AUTOMATED COUNT: 21.3 % (ref 11.6–15.1)
GFR SERPL CREATININE-BSD FRML MDRD: 89 ML/MIN/1.73SQ M
GLUCOSE SERPL-MCNC: 114 MG/DL (ref 65–140)
HCT VFR BLD AUTO: 38.5 % (ref 34.8–46.1)
HGB BLD-MCNC: 11.3 G/DL (ref 11.5–15.4)
MAGNESIUM SERPL-MCNC: 2.5 MG/DL (ref 1.6–2.6)
MCH RBC QN AUTO: 21.2 PG (ref 26.8–34.3)
MCHC RBC AUTO-ENTMCNC: 29.4 G/DL (ref 31.4–37.4)
MCV RBC AUTO: 72 FL (ref 82–98)
PLATELET # BLD AUTO: 564 THOUSANDS/UL (ref 149–390)
PMV BLD AUTO: 8.9 FL (ref 8.9–12.7)
POTASSIUM SERPL-SCNC: 3.7 MMOL/L (ref 3.5–5.3)
RBC # BLD AUTO: 5.32 MILLION/UL (ref 3.81–5.12)
SODIUM SERPL-SCNC: 138 MMOL/L (ref 136–145)
WBC # BLD AUTO: 15.93 THOUSAND/UL (ref 4.31–10.16)

## 2021-04-12 PROCEDURE — 99291 CRITICAL CARE FIRST HOUR: CPT | Performed by: INTERNAL MEDICINE

## 2021-04-12 PROCEDURE — 85027 COMPLETE CBC AUTOMATED: CPT | Performed by: NURSE PRACTITIONER

## 2021-04-12 PROCEDURE — 83735 ASSAY OF MAGNESIUM: CPT | Performed by: NURSE PRACTITIONER

## 2021-04-12 PROCEDURE — 94760 N-INVAS EAR/PLS OXIMETRY 1: CPT

## 2021-04-12 PROCEDURE — 97163 PT EVAL HIGH COMPLEX 45 MIN: CPT

## 2021-04-12 PROCEDURE — 97110 THERAPEUTIC EXERCISES: CPT

## 2021-04-12 PROCEDURE — 80048 BASIC METABOLIC PNL TOTAL CA: CPT | Performed by: NURSE PRACTITIONER

## 2021-04-12 PROCEDURE — 71045 X-RAY EXAM CHEST 1 VIEW: CPT

## 2021-04-12 RX ORDER — POTASSIUM CHLORIDE 20 MEQ/1
20 TABLET, EXTENDED RELEASE ORAL ONCE
Status: COMPLETED | OUTPATIENT
Start: 2021-04-12 | End: 2021-04-12

## 2021-04-12 RX ORDER — FUROSEMIDE 10 MG/ML
20 INJECTION INTRAMUSCULAR; INTRAVENOUS ONCE
Status: COMPLETED | OUTPATIENT
Start: 2021-04-12 | End: 2021-04-12

## 2021-04-12 RX ADMIN — Medication 325 MG: at 08:04

## 2021-04-12 RX ADMIN — DEXAMETHASONE SODIUM PHOSPHATE 12.6 MG: 10 INJECTION, SOLUTION INTRAMUSCULAR; INTRAVENOUS at 16:30

## 2021-04-12 RX ADMIN — FAMOTIDINE 20 MG: 20 TABLET ORAL at 08:06

## 2021-04-12 RX ADMIN — CALCIUM 1 TABLET: 500 TABLET ORAL at 12:39

## 2021-04-12 RX ADMIN — OXYCODONE HYDROCHLORIDE AND ACETAMINOPHEN 1000 MG: 500 TABLET ORAL at 08:04

## 2021-04-12 RX ADMIN — GUAIFENESIN AND DEXTROMETHORPHAN 10 ML: 100; 10 SYRUP ORAL at 08:16

## 2021-04-12 RX ADMIN — ENOXAPARIN SODIUM 40 MG: 40 INJECTION SUBCUTANEOUS at 21:27

## 2021-04-12 RX ADMIN — DEXAMETHASONE SODIUM PHOSPHATE 12.6 MG: 10 INJECTION, SOLUTION INTRAMUSCULAR; INTRAVENOUS at 04:58

## 2021-04-12 RX ADMIN — ATORVASTATIN CALCIUM 40 MG: 40 TABLET, FILM COATED ORAL at 21:28

## 2021-04-12 RX ADMIN — BENZONATATE 100 MG: 100 CAPSULE ORAL at 08:16

## 2021-04-12 RX ADMIN — OXYCODONE HYDROCHLORIDE AND ACETAMINOPHEN 1000 MG: 500 TABLET ORAL at 21:27

## 2021-04-12 RX ADMIN — DOCUSATE SODIUM AND SENNOSIDES 1 TABLET: 8.6; 5 TABLET, FILM COATED ORAL at 21:28

## 2021-04-12 RX ADMIN — ZINC SULFATE 220 MG (50 MG) CAPSULE 220 MG: CAPSULE at 12:39

## 2021-04-12 RX ADMIN — FUROSEMIDE 20 MG: 10 INJECTION INTRAMUSCULAR; INTRAVENOUS at 09:16

## 2021-04-12 RX ADMIN — POTASSIUM CHLORIDE 20 MEQ: 1500 TABLET, EXTENDED RELEASE ORAL at 09:16

## 2021-04-12 RX ADMIN — Medication 2000 UNITS: at 08:04

## 2021-04-12 RX ADMIN — THIAMINE HCL TAB 100 MG 50 MG: 100 TAB at 08:17

## 2021-04-12 RX ADMIN — LEVOTHYROXINE SODIUM 137 MCG: 25 TABLET ORAL at 05:02

## 2021-04-12 RX ADMIN — CYANOCOBALAMIN TAB 500 MCG 1000 MCG: 500 TAB at 08:05

## 2021-04-12 RX ADMIN — ENOXAPARIN SODIUM 40 MG: 40 INJECTION SUBCUTANEOUS at 08:06

## 2021-04-12 RX ADMIN — FAMOTIDINE 20 MG: 20 TABLET ORAL at 17:56

## 2021-04-12 NOTE — ASSESSMENT & PLAN NOTE
· Secondary to COVID-19  · Oxygenation improving - now tolerating 8LNC  · encouarge proning  · Encourage I/S  · Continue to diurese as tolerated   · Continue medications as below

## 2021-04-12 NOTE — PHYSICAL THERAPY NOTE
PT EVALUATION     04/12/21 5295   Note Type   Note type Evaluation   Pain Assessment   Pain Assessment Tool Atkinson-Baker FACES   Atkinson-Baker FACES Pain Rating 6  (Nasal passages)   Home Living   Type of [address] Two level;Stairs to enter with rails  (15 stairs to enter)   Home Equipment   (None)   Additional Comments Patient independent and working prior to admission   Prior Function   Level of [place] Independent with ADLs and functional mobility   Lives With Daughter   Receives Help From Family   ADL Assistance Independent   IADLs Independent   Vocational Full time employment   Comments Patient works as a fork  in a [employer]   Restrictions/Precautions   Other Precautions Contact/isolation; Airborne/isolation; Chair Alarm; Bed Alarm; Fall Risk   General   Additional Pertinent History Chart reviewed, patient admitted with pneumonia due to COVID-19  Patient now presents as generally weak deconditioned and slow to verbally respond at times   Family/Caregiver Present No   Cognition   Overall Cognitive Status WFL   Arousal/Participation Cooperative   Orientation Level Oriented X4   Following Commands Follows multistep commands with increased time or repetition   Comments Patient distracted at times, slow to respond but cooperative   RLE Assessment   RLE Assessment   (ROM WFL, strength 3+/ 4-)   LLE Assessment   LLE Assessment   (ROM WFL, strength 3+ /4-)   Coordination   Movements are Fluid and Coordinated 0   Bed Mobility   Supine to Sit 4  Minimal assistance   Additional items Assist x 1;Verbal cues;LE management   Transfers   Sit to Stand 4  Minimal assistance   Additional items Assist x 1;Verbal cues   Stand to Sit 5  Supervision   Additional items Verbal cues   Ambulation/Elevation   Gait Assistance 4  Minimal assist   Additional items Assist x 1; Tactile cues; Verbal cues   Assistive Device   (Handhold assist)   Distance 10 ft from bed to commode to chair   Balance   Static Sitting Fair +   Dynamic Sitting Fair   Static Standing Fair   Dynamic Standing Fair -   Ambulatory Fair -   Activity Tolerance   Activity Tolerance Patient limited by fatigue  (Multiple lines in ICU)   Nurse Made Aware Yes   Assessment   Prognosis Good   Problem List Decreased strength;Decreased range of motion;Decreased endurance; Impaired balance;Decreased mobility; Decreased coordination   Assessment Patient seen for Physical Therapy evaluation  Patient admitted with Pneumonia due to COVID-19 virus  Comorbidities affecting patient's physical performance include:  Obesity, hypothyroidism, anemia, history of gastric bypass  Personal factors affecting patient at time of initial evaluation include: lives in two story house, inability to ambulate household distances, inability to navigate community distances, inability to navigate level surfaces without external assistance, inability to perform dynamic tasks in community, inability to perform current job functions, inability to perform caregiver support/tasks, inability to perform physical activity, inability to perform ADLS and inability to perform IADLS   Prior to admission, patient was independent with functional mobility without assistive device, independent with ADLS, independent with IADLS, living in a multi-level home, ambulating household distance, ambulating community distances, works full time and home with family assist   Please find objective findings from Physical Therapy assessment regarding body systems outlined above with impairments and limitations including weakness, decreased ROM, impaired balance, decreased endurance, impaired coordination, gait deviations, pain, decreased activity tolerance, decreased functional mobility tolerance, fall risk and SOB upon exertion  The Barthel Index was used as a functional outcome tool presenting with a score of 45 today indicating marked limitations of functional mobility and ADLS    Patient's clinical presentation is currently unstable/unpredictable as seen in patient's presentation of vital sign response, changing level of pain, increased fall risk, new onset of impairment of functional mobility, decreased endurance and new onset of weakness  Pt would benefit from continued Physical Therapy treatment to address deficits as defined above and maximize level of functional mobility  As demonstrated by objective findings, the assigned level of complexity for this evaluation is high  The patient's AM-PAC Basic Mobility Inpatient Short Form Raw Score is 17, Standardized Score is 39 67  A standardized score less than 42 9 suggests the patient may benefit from discharge to post-acute rehabilitation services, although, patient independent and working prior to admission and will improve with functional mobility as medical status improves and patient has family assist in home   Please also refer to the recommendation of the Physical Therapist for safe discharge    Goals   Patient Goals To go home   STG Expiration Date 04/19/21   Short Term Goal #1 Transfers and gait with supervision   Short Term Goal #2 Gait endurance to functional household distances, strength bilateral lower extremities 4/5 all to meet patient goal going home   LTG Expiration Date 04/26/21   Long Term Goal #1 Transfers and gait independently   Long Term Goal #2 Return to independent functional mobility   Recommendation   PT Discharge Recommendation Return to previous environment with social support   Additional Comments Patient independent and working full-time prior to admission and appears will improve with functional mobility as medical status improves   AM-PAC Basic Mobility Inpatient   Turning in Bed Without Bedrails 3   Lying on Back to Sitting on Edge of Flat Bed 3   Moving Bed to Chair 3   Standing Up From Chair 3   Walk in Room 3   Climb 3-5 Stairs 2   Basic Mobility Inpatient Raw Score 17   Basic Mobility Standardized Score 39 67   Barthel Index   Feeding 10 Bathing 0   Grooming Score 0   Dressing Score 5   Bladder Score 0   Bowels Score 10   Toilet Use Score 5   Transfers (Bed/Chair) Score 10   Mobility (Level Surface) Score 0   Stairs Score 5   Barthel Index Score 39   Licensure   [place] License Number  David Dumont PT [id number]     PT TREATMENT  Time In:1430  Time KSN:7062  Total Time: 15min      S:  Patient with pain and nasal passages, 6/10  O:  -gait with supervision to Min assist at times verbal cuing for direction, 15 ft with restrictions due to multiple lines in ICU  -patient out of bed in chair at end of session  -bilateral lower extremity exercise completed sitting on bed edge without lumbar support: LAQs, ankle pumps, hip flexion all 10 reps   A:  Patient slow to follow direction at times and verbally respond although cooperative and able to walk short distances    Cognition improved as patient was mobilized and patient will benefit from continued physical therapy for return to independent function  P:  Home with family assist    Desirae Rosario, PT

## 2021-04-12 NOTE — UTILIZATION REVIEW
Continued Stay Review    Date: 4/12/21                          Current Patient Class: inpatient    Current Level of Care: ICU     Assessment/Plan:   COVID 19 pneumonia with hypoxic respiratory failure  Continue Severe Disease pathway , remains on HFNC 35L/50%, IS< encourage proning  Dexamethasone bid, complete remdesevir ,out of window for convalescent plasma  lovenox , continue  Monitor off abx  Aggressive bowel regimen until BM , continue monitor inflammatory markers    Pertinent Labs/Diagnostic Results:       Results from last 7 days   Lab Units 04/12/21  0517 04/11/21  0617 04/10/21  0551 04/09/21  0550 04/08/21  0710  04/06/21  1601   WBC Thousand/uL 15 93* 14 48* 9 25 5 08 4 54   < > 4 30*   HEMOGLOBIN g/dL 11 3* 11 2* 10 4* 10 2* 10 5*   < > 9 6*   HEMATOCRIT % 38 5 37 8 35 4 35 3 36 6   < > 31 9*   PLATELETS Thousands/uL 564* 584* 483* 498* 466*   < > 369   NEUTROS ABS Thousands/µL  --   --  7 93* 4 16  --   --  3 50    < > = values in this interval not displayed       Results from last 7 days   Lab Units 04/12/21  0517 04/11/21  0617 04/10/21  0551 04/09/21  0550 04/08/21  0710   SODIUM mmol/L 138 139 138 137 137   POTASSIUM mmol/L 3 7 3 6 3 7 3 9 4 3   CHLORIDE mmol/L 103 103 104 104 103   CO2 mmol/L 25 26 23 23 25   ANION GAP mmol/L 10 10 11 10 9   BUN mg/dL 16 16 11 11 11   CREATININE mg/dL 0 81 0 96 0 78 0 80 0 89   EGFR ml/min/1 73sq m 89 73 93 91 80   CALCIUM mg/dL 8 3 8 5 8 7 8 5 8 8   MAGNESIUM mg/dL 2 5 2 4 2 3  --   --    PHOSPHORUS mg/dL  --  4 0 3 8  --   --      Results from last 7 days   Lab Units 04/09/21  0550 04/08/21  0710 04/07/21  0619 04/06/21  1601   AST U/L 26 41 34 31   ALT U/L 18 18 14 12   ALK PHOS U/L 108 115 134* 129*   TOTAL PROTEIN g/dL 6 6 7 2 7 3 7 3   ALBUMIN g/dL 2 4* 2 4* 3 5 3 5   TOTAL BILIRUBIN mg/dL 0 20 0 22 0 20 0 20     Results from last 7 days   Lab Units 04/12/21  0517 04/11/21  0617 04/10/21  0551 04/09/21  0550 04/08/21  0710 04/07/21  1756 04/06/21  1601 GLUCOSE RANDOM mg/dL 114 123 133 126 111 123* 97     Results from last 7 days   Lab Units 04/11/21  0617 04/09/21  0550 04/08/21  0710 04/07/21  0619 04/06/21  1601   CRP mg/L 13 2* 51 5* 106 8* 251 9* 168 9*   SED RATE mm/hour  --   --   --   --  118*     Vitals:   04/12/21 0502  97 7 °F (36 5 °C)  64  27Abnormal   120/77  --  97 %  50  120  --  25 L/min  High flow nasal cannula  --  --   04/12/21 0400  --  53Abnormal   19  --  --  95 %  --  --  --  --  --  --  --   04/12/21 0340  --  --  --  --  --  97 %  50  120  25 L/min  25 L/min  High flow nasal cannula  HFNC prongs  --   04/12/21 0100  --  65  32Abnormal   104/61  77  98 %  --  --  --  --  --  --  --   04/12/21 0000  --  54Abnormal   28Abnormal   --  --  96 %  50  120  --  25 L/min               ICU  Cbc diff cmp  HFNC oxygen 30L  IS  Self proning  Medications:   Scheduled Medications:  acetaminophen, 975 mg, Oral, Q8H [address]  ascorbic acid, 1,000 mg, Oral, Q12H KAYA  atorvastatin, 40 mg, Oral, HS  calcium carbonate, 1 tablet, Oral, Daily With Lunch  cholecalciferol, 2,000 Units, Oral, Daily  cyanocobalamin, 1,000 mcg, Oral, Daily  dexamethasone, 0 1 mg/kg, Intravenous, Q12H  enoxaparin, 40 mg, Subcutaneous, Q12H KAYA  famotidine, 20 mg, Oral, BID  ferrous sulfate, 325 mg, Oral, Daily With Breakfast  levothyroxine, 137 mcg, Oral, Early Morning  zinc sulfate, 220 mg, Oral, Daily With Lunch    Followed by  [START ON 4/14/2021] multivitamin-minerals, 1 tablet, Oral, Daily  senna-docusate sodium, 1 tablet, Oral, HS  thiamine, 50 mg, Oral, Daily      Continuous IV Infusions:     PRN Meds:  albuterol, 2 puff, Inhalation, Q6H PRN  benzonatate, 100 mg, Oral, TID PRN  dextromethorphan-guaiFENesin, 10 mL, Oral, Q4H PRN  polyethylene glycol, 17 g, Oral, Daily PRN        Discharge Plan: tbd  Network Utilization Review Department  ATTENTION: Please call with any questions or concerns to [phone] and carefully listen to the prompts so that you are directed to the right person  All voicemails are confidential   Harlene Pair all requests for admission clinical reviews, approved or denied determinations and any other requests to dedicated fax number below belonging to the campus where the patient is receiving treatment   List of dedicated fax numbers for the Facilities:  [address] DENIALS (Administrative/Medical Necessity) [phone]   [address] (Maternity/NICU/Pediatrics) [phone]   [address] [place] [address] [address] [facility] [address] Geetha Chery 4854 (  Ailyn Mooney [facility] [address]) [address] [place] [address] Kaitlyn Pyle 14[address] [place] [address] [phone]

## 2021-04-13 ENCOUNTER — TELEPHONE (OUTPATIENT)
Dept: FAMILY MEDICINE CLINIC | Facility: CLINIC | Age: 46
End: 2021-04-13

## 2021-04-13 LAB
ALBUMIN SERPL BCP-MCNC: 2.7 G/DL (ref 3.5–5)
ALP SERPL-CCNC: 99 U/L (ref 46–116)
ALT SERPL W P-5'-P-CCNC: 33 U/L (ref 12–78)
ANION GAP SERPL CALCULATED.3IONS-SCNC: 9 MMOL/L (ref 4–13)
ANISOCYTOSIS BLD QL SMEAR: PRESENT
AST SERPL W P-5'-P-CCNC: 19 U/L (ref 5–45)
BASOPHILS # BLD MANUAL: 0 THOUSAND/UL (ref 0–0.1)
BASOPHILS NFR MAR MANUAL: 0 % (ref 0–1)
BILIRUB SERPL-MCNC: 0.39 MG/DL (ref 0.2–1)
BUN SERPL-MCNC: 15 MG/DL (ref 5–25)
CALCIUM ALBUM COR SERPL-MCNC: 9.3 MG/DL (ref 8.3–10.1)
CALCIUM SERPL-MCNC: 8.3 MG/DL (ref 8.3–10.1)
CHLORIDE SERPL-SCNC: 102 MMOL/L (ref 100–108)
CO2 SERPL-SCNC: 27 MMOL/L (ref 21–32)
CREAT SERPL-MCNC: 0.89 MG/DL (ref 0.6–1.3)
EOSINOPHIL # BLD MANUAL: 0 THOUSAND/UL (ref 0–0.4)
EOSINOPHIL NFR BLD MANUAL: 0 % (ref 0–6)
ERYTHROCYTE [DISTWIDTH] IN BLOOD BY AUTOMATED COUNT: 21.4 % (ref 11.6–15.1)
GFR SERPL CREATININE-BSD FRML MDRD: 80 ML/MIN/1.73SQ M
GLUCOSE SERPL-MCNC: 121 MG/DL (ref 65–140)
HCT VFR BLD AUTO: 37.8 % (ref 34.8–46.1)
HGB BLD-MCNC: 11.2 G/DL (ref 11.5–15.4)
LG PLATELETS BLD QL SMEAR: PRESENT
LYMPHOCYTES # BLD AUTO: 1.55 THOUSAND/UL (ref 0.6–4.47)
LYMPHOCYTES # BLD AUTO: 8 % (ref 14–44)
MCH RBC QN AUTO: 21.6 PG (ref 26.8–34.3)
MCHC RBC AUTO-ENTMCNC: 29.6 G/DL (ref 31.4–37.4)
MCV RBC AUTO: 73 FL (ref 82–98)
MONOCYTES # BLD AUTO: 0.78 THOUSAND/UL (ref 0–1.22)
MONOCYTES NFR BLD: 4 % (ref 4–12)
NEUTROPHILS # BLD MANUAL: 17.07 THOUSAND/UL (ref 1.85–7.62)
NEUTS SEG NFR BLD AUTO: 88 % (ref 43–75)
NRBC BLD AUTO-RTO: 0 /100 WBCS
PLATELET # BLD AUTO: 500 THOUSANDS/UL (ref 149–390)
PLATELET BLD QL SMEAR: ABNORMAL
PMV BLD AUTO: 9 FL (ref 8.9–12.7)
POLYCHROMASIA BLD QL SMEAR: PRESENT
POTASSIUM SERPL-SCNC: 3.6 MMOL/L (ref 3.5–5.3)
PROT SERPL-MCNC: 6.1 G/DL (ref 6.4–8.2)
RBC # BLD AUTO: 5.19 MILLION/UL (ref 3.81–5.12)
RBC MORPH BLD: PRESENT
SODIUM SERPL-SCNC: 138 MMOL/L (ref 136–145)
TOTAL CELLS COUNTED SPEC: 100
WBC # BLD AUTO: 19.4 THOUSAND/UL (ref 4.31–10.16)

## 2021-04-13 PROCEDURE — 85027 COMPLETE CBC AUTOMATED: CPT | Performed by: INTERNAL MEDICINE

## 2021-04-13 PROCEDURE — 99232 SBSQ HOSP IP/OBS MODERATE 35: CPT | Performed by: INTERNAL MEDICINE

## 2021-04-13 PROCEDURE — 80053 COMPREHEN METABOLIC PANEL: CPT | Performed by: INTERNAL MEDICINE

## 2021-04-13 PROCEDURE — 85007 BL SMEAR W/DIFF WBC COUNT: CPT | Performed by: INTERNAL MEDICINE

## 2021-04-13 PROCEDURE — 94760 N-INVAS EAR/PLS OXIMETRY 1: CPT

## 2021-04-13 RX ORDER — POTASSIUM CHLORIDE 20 MEQ/1
40 TABLET, EXTENDED RELEASE ORAL ONCE
Status: COMPLETED | OUTPATIENT
Start: 2021-04-13 | End: 2021-04-13

## 2021-04-13 RX ORDER — FUROSEMIDE 10 MG/ML
20 INJECTION INTRAMUSCULAR; INTRAVENOUS ONCE
Status: COMPLETED | OUTPATIENT
Start: 2021-04-13 | End: 2021-04-13

## 2021-04-13 RX ADMIN — LEVOTHYROXINE SODIUM 137 MCG: 25 TABLET ORAL at 05:28

## 2021-04-13 RX ADMIN — FAMOTIDINE 20 MG: 20 TABLET ORAL at 09:12

## 2021-04-13 RX ADMIN — FUROSEMIDE 20 MG: 10 INJECTION INTRAMUSCULAR; INTRAVENOUS at 12:26

## 2021-04-13 RX ADMIN — ZINC SULFATE 220 MG (50 MG) CAPSULE 220 MG: CAPSULE at 12:26

## 2021-04-13 RX ADMIN — OXYCODONE HYDROCHLORIDE AND ACETAMINOPHEN 1000 MG: 500 TABLET ORAL at 21:26

## 2021-04-13 RX ADMIN — ENOXAPARIN SODIUM 40 MG: 40 INJECTION SUBCUTANEOUS at 09:11

## 2021-04-13 RX ADMIN — DEXAMETHASONE SODIUM PHOSPHATE 12.6 MG: 10 INJECTION, SOLUTION INTRAMUSCULAR; INTRAVENOUS at 17:20

## 2021-04-13 RX ADMIN — ACETAMINOPHEN 975 MG: 325 TABLET ORAL at 15:00

## 2021-04-13 RX ADMIN — ACETAMINOPHEN 975 MG: 325 TABLET ORAL at 05:29

## 2021-04-13 RX ADMIN — Medication 2000 UNITS: at 09:12

## 2021-04-13 RX ADMIN — POLYETHYLENE GLYCOL 3350 17 G: 17 POWDER, FOR SOLUTION ORAL at 09:32

## 2021-04-13 RX ADMIN — OXYCODONE HYDROCHLORIDE AND ACETAMINOPHEN 1000 MG: 500 TABLET ORAL at 09:12

## 2021-04-13 RX ADMIN — DEXAMETHASONE SODIUM PHOSPHATE 12.6 MG: 10 INJECTION, SOLUTION INTRAMUSCULAR; INTRAVENOUS at 03:41

## 2021-04-13 RX ADMIN — CYANOCOBALAMIN TAB 500 MCG 1000 MCG: 500 TAB at 09:13

## 2021-04-13 RX ADMIN — BENZONATATE 100 MG: 100 CAPSULE ORAL at 21:26

## 2021-04-13 RX ADMIN — Medication 325 MG: at 08:30

## 2021-04-13 RX ADMIN — DOCUSATE SODIUM AND SENNOSIDES 1 TABLET: 8.6; 5 TABLET, FILM COATED ORAL at 21:27

## 2021-04-13 RX ADMIN — CALCIUM 1 TABLET: 500 TABLET ORAL at 12:26

## 2021-04-13 RX ADMIN — ATORVASTATIN CALCIUM 40 MG: 40 TABLET, FILM COATED ORAL at 21:26

## 2021-04-13 RX ADMIN — ACETAMINOPHEN 975 MG: 325 TABLET ORAL at 21:26

## 2021-04-13 RX ADMIN — THIAMINE HCL TAB 100 MG 50 MG: 100 TAB at 09:12

## 2021-04-13 RX ADMIN — ENOXAPARIN SODIUM 40 MG: 40 INJECTION SUBCUTANEOUS at 21:27

## 2021-04-13 RX ADMIN — FAMOTIDINE 20 MG: 20 TABLET ORAL at 18:31

## 2021-04-13 RX ADMIN — POTASSIUM CHLORIDE 40 MEQ: 1500 TABLET, EXTENDED RELEASE ORAL at 12:26

## 2021-04-13 NOTE — UTILIZATION REVIEW
Continued Stay Review    Date:   4/13/21                          Current Patient Class:   Inpatient  Current Level of Care:    ICU    HPI:45 y o  female initially admitted on    4/7/21 with Pneumonia  2nd to [place] [address]  Assessment/Plan:   4/13  Continue  IV decadron  Completed  Course IV  Remdesivir  Monitor  Off antibiotics  Encourage self proning and incentive spirometry  Oxygen now  8 L NC  Continue to monitor labs and respiratory status  Continue lovenox      Pertinent Labs/Diagnostic Results:       Lab Units 04/13/21  0452   WBC Thousand/uL 19 40*   HEMOGLOBIN g/dL 11 2*   HEMATOCRIT % 37 8   PLATELETS Thousands/uL 500*   NEUTROS ABS Thousands/µL  --          Lab Units 04/13/21  0452   SODIUM mmol/L 138   POTASSIUM mmol/L 3 6   CHLORIDE mmol/L 102   CO2 mmol/L 27   ANION GAP mmol/L 9   BUN mg/dL 15   CREATININE mg/dL 0 89   EGFR ml/min/1 73sq m 80   CALCIUM mg/dL 8 3   MAGNESIUM mg/dL  --    PHOSPHORUS mg/dL  --      Lab Units 04/13/21  0452   AST U/L 19   ALT U/L 33   ALK PHOS U/L 99   TOTAL PROTEIN g/dL 6 1*   ALBUMIN g/dL 2 7*   TOTAL BILIRUBIN mg/dL 0 39         Lab Units 04/13/21  0452   GLUCOSE RANDOM mg/dL 121                       Vital Signs:   04/13/21 0900  --  91  36Abnormal   102/68  80  --  --  --  --  --  --  --  --   04/13/21 0750  --  --  --  --  --  97 %  --  48  8 L/min  7 L/min  Mid flow nasal cannula  --  --   04/13/21 0400  --  55  26Abnormal   100/53  72  91 %  --  48  --  7 L/min  Mid flow nasal cannula  --  --   04/13/21 0349  --  56  30Abnormal   100/53  72  97 %  --  52  --  8 L/min  Mid flow nasal cannula  --  --   04/13/21 0326  97 6 °F (36 4 °C)  --  --  --  --  --  --  --  --  --  --  --  --   04/13/21 0100  --  67  28Abnormal   99/59  74  92 %  --  --  --  --  --  --  --   04/13/21 0052  --  --  --  99/59  74  --  --  --  --  --  --  --  --   04/13/21 0044  --  61  28Abnormal   86/56Abnormal   65  94 %  --  --  --  --  --  --  --   04/13/21 0000  --  56  33Abnormal   -- --  93 %  --  52  --  8 L/min  --           Medications:   Scheduled Medications:  acetaminophen, 975 mg, Oral, Q8H KAYA  ascorbic acid, 1,000 mg, Oral, Q12H KAYA  atorvastatin, 40 mg, Oral, HS  calcium carbonate, 1 tablet, Oral, Daily With Lunch  cholecalciferol, 2,000 Units, Oral, Daily  cyanocobalamin, 1,000 mcg, Oral, Daily  dexamethasone, 0 1 mg/kg, Intravenous, Q12H  enoxaparin, 40 mg, Subcutaneous, Q12H KAYA  famotidine, 20 mg, Oral, BID  ferrous sulfate, 325 mg, Oral, Daily With Breakfast  levothyroxine, 137 mcg, Oral, Early Morning  [START ON 4/14/2021] multivitamin-minerals, 1 tablet, Oral, Daily  senna-docusate sodium, 1 tablet, Oral, HS  thiamine, 50 mg, Oral, Daily      Continuous IV Infusions:     PRN Meds:  albuterol, 2 puff, Inhalation, Q6H PRN  benzonatate, 100 mg, Oral, TID PRN  dextromethorphan-guaiFENesin, 10 mL, Oral, Q4H PRN  polyethylene glycol, 17 g, Oral, Daily PRN        Discharge Plan:    D    Network Utilization Review Department  ATTENTION: Please call with any questions or concerns to [phone] and carefully listen to the prompts so that you are directed to the right person  All voicemails are confidential   Lynette Kussmaul all requests for admission clinical reviews, approved or denied determinations and any other requests to dedicated fax number below belonging to the campus where the patient is receiving treatment   List of dedicated fax numbers for the Facilities:  [address] DENIALS (Administrative/Medical Necessity) [phone]   [address] (Maternity/NICU/Pediatrics) [phone]   [address] [address] [facility] Sarah Chery 8199 (Caity Hmaeed) [address] [facility] [place] [address] Kaitlyn Pyle 1481 [address] [facility]) [facility] [phone]

## 2021-04-13 NOTE — OCCUPATIONAL THERAPY NOTE
OT EVALUATION       04/13/21 1528   Note Type   Note type Evaluation   Cancel Reasons Other  (pt refused due to phone call )   Grayson Hamm [place] 89 OTR/L [id number]

## 2021-04-13 NOTE — PROGRESS NOTES
[address] 47  Progress Note - Chip Joseteresa DouglasScalfjin 1975, 39 y o  female MRN: [id number]  Unit/Bed#: ICU 02 Encounter: [id number]  Primary Care Provider: THOMAS Desouza   Date and time admitted to hospital: 4/7/2021 11:38 AM    * Pneumonia due to COVID-19 virus  Assessment & Plan  · Initial symptoms on 3/29, positive on 3/30  · Continue severe disease pathway  · Continue Dexamethasone to 0 1mg/kg BID for 10 days- day 5/10  · Remdesivir completed  · Patient is out of the window for convalescent plasma  · Lovenox DVT ppx b i d, DDimer elevated at 0 88  · Continue to monitor off abx  · HIV/Hepatitis panel negative  · Encourage proning and I/S  · Received actemra x 1 on 4/7      Results from last 7 days   Lab Units 04/11/21  0617 04/09/21  0550 04/08/21  0710 04/07/21  0619 04/06/21  1601   D-DIMER QUANTITATIVE ug/ml FEU  --  0 81* 0 99* 0 99* 0 79*   CRP mg/L 13 2* 51 5* 106 8* 251 9* 168 9*   SED RATE mm/hour  --   --   --   --  118*   FERRITIN ng/mL  --   --  150 90 79   LD U/L  --   --   --   --  1,060*     Results from last 7 days   Lab Units 04/09/21  0550 04/08/21  0710 04/07/21  0626 04/06/21  1601   PROCALCITONIN ng/ml <0 05 0 05 0 12 <0 05         Respiratory failure with hypoxia (HCC)  Assessment & Plan  · Secondary to COVID-19  · Oxygenation improving - now tolerating 8LNC  · encouarge proning  · Encourage I/S  · Continue to diurese as tolerated   · Continue medications as below    Hypothyroidism  Assessment & Plan  · Continue synthroid  · Normal TSH    Morbid obesity ([facility] [address] )  Assessment & Plan  · Nutrition consult when appropriate  · Reinforced on the importance of losing weight      Anemia  Assessment & Plan  · Continue Vit B 12 and iron  · Hemoglobin stable      ----------------------------------------------------------------------------------------  HPI/24hr events: no acute overnight events    Tolerating 8LNC     Disposition: Transfer to Med-Surg   Code Status: Level 3 - DNAR and DNI  ---------------------------------------------------------------------------------------  SUBJECTIVE  None offered - pt sleeping - rouses to voice      ---------------------------------------------------------------------------------------  OBJECTIVE    Vitals   Vitals:    21 0052 21 0100 21 0326 21 0349   BP: 99/59 99/59  100/53   Pulse:  67  56   Resp:  (!) 28  (!) 30   Temp:   97 6 °F (36 4 °C)    TempSrc:   Temporal    SpO2:  92%  97%   Weight:       Height:         Temp (24hrs), Av 6 °F (36 4 °C), Min:97 5 °F (36 4 °C), Max:98 1 °F (36 7 °C)  Current: Temperature: 97 6 °F (36 4 °C)          Respiratory:  SpO2: SpO2: 97 %, SpO2 Activity: SpO2 Activity: At Rest, SpO2 Device: O2 Device: Mid flow nasal cannula  Nasal Cannula O2 Flow Rate (L/min): 8 L/min    Invasive/non-invasive ventilation settings   Respiratory    Lab Data (Last 4 hours)    None         O2/Vent Data (Last 4 hours)    None                Physical Exam  Vitals signs and nursing note reviewed  Constitutional:       General: She is not in acute distress  HENT:      Head: Normocephalic and atraumatic  Mouth/Throat:      Mouth: Mucous membranes are moist    Eyes:      General: No scleral icterus  Pupils: Pupils are equal, round, and reactive to light  Cardiovascular:      Rate and Rhythm: Normal rate and regular rhythm  Pulses: Normal pulses  Pulmonary:      Effort: Pulmonary effort is normal       Breath sounds: Normal breath sounds  Abdominal:      General: Bowel sounds are normal       Palpations: Abdomen is soft  Musculoskeletal:      Right lower leg: No edema  Left lower leg: No edema  Skin:     General: Skin is warm and dry  Capillary Refill: Capillary refill takes less than 2 seconds  Coloration: Skin is not pale  Neurological:      General: No focal deficit present  Mental Status: She is alert and oriented to person, place, and time           Laboratory and Diagnostics:  Results from last 7 days   Lab Units 04/12/21  0517 04/11/21  0617 04/10/21  0551 04/09/21  0550 04/08/21  0710 04/07/21  1414 04/07/21  0914 04/06/21  1601   WBC Thousand/uL 15 93* 14 48* 9 25 5 08 4 54  --  5 30 4 30*   HEMOGLOBIN g/dL 11 3* 11 2* 10 4* 10 2* 10 5* 11 0* 9 8* 9 6*   HEMATOCRIT % 38 5 37 8 35 4 35 3 36 6 38 1 31 6* 31 9*   PLATELETS Thousands/uL 564* 584* 483* 498* 466*  --  386 369   NEUTROS PCT %  --   --  86* 81*  --   --   --  81*   MONOS PCT %  --   --  5 5  --   --   --  5     Results from last 7 days   Lab Units 04/12/21 0517 04/11/21  0617 04/10/21  0551 04/09/21  0550 04/08/21  0710 04/07/21  0619 04/06/21  1601   SODIUM mmol/L 138 139 138 137 137 137 136*   POTASSIUM mmol/L 3 7 3 6 3 7 3 9 4 3 4 0 3 5*   CHLORIDE mmol/L 103 103 104 104 103 105 103   CO2 mmol/L 25 26 23 23 25 23 26   ANION GAP mmol/L 10 10 11 10 9 9 7   BUN mg/dL 16 16 11 11 11 7 6   CREATININE mg/dL 0 81 0 96 0 78 0 80 0 89 0 70 0 76   CALCIUM mg/dL 8 3 8 5 8 7 8 5 8 8 8 2* 8 3*   GLUCOSE RANDOM mg/dL 114 123 133 126 111 123* 97   ALT U/L  --   --   --  18 18 14 12   AST U/L  --   --   --  26 41 34 31   ALK PHOS U/L  --   --   --  108 115 134* 129*   ALBUMIN g/dL  --   --   --  2 4* 2 4* 3 5 3 5   TOTAL BILIRUBIN mg/dL  --   --   --  0 20 0 22 0 20 0 20     Results from last 7 days   Lab Units 04/12/21 0517 04/11/21 0617 04/10/21  0551   MAGNESIUM mg/dL 2 5 2 4 2 3   PHOSPHORUS mg/dL  --  4 0 3 8      Results from last 7 days   Lab Units 04/06/21  1601   INR  1 02   PTT seconds 36      Results from last 7 days   Lab Units 04/08/21  0710 04/06/21  1601   TROPONIN I ng/mL <0 02 <0 01     Results from last 7 days   Lab Units 04/06/21  1601   LACTIC ACID mmol/L 1 1     ABG:    VBG:  Results from last 7 days   Lab Units 04/06/21  1601   PH NOLAN  7 350   PCO2 NOLAN mm Hg 43 0   PO2 NOLAN mm Hg 32 0*   HCO3 NOLAN mmol/L 23 7   BASE EXC NOLAN mmol/L -1 9     Results from last 7 days   Lab Units 04/09/21  0550 04/08/21  0710 04/07/21  0626 04/06/21  1601   PROCALCITONIN ng/ml <0 05 0 05 0 12 <0 05       Micro  Results from last 7 days   Lab Units 04/08/21  1850 04/06/21  1634 04/06/21  1601   BLOOD CULTURE   --  No Growth After 5 Days  No Growth After 5 Days  MRSA CULTURE ONLY  No Methicillin Resistant Staphlyococcus aureus (MRSA) isolated  --   --        EKG: NSR on tele  Imaging:   XR chest portable   Final Result      Moderate bilateral groundglass opacity due to Covid 19, slightly improved from 4/7/2021  Workstation performed: [id number]            I have personally reviewed pertinent reports  and I have personally reviewed pertinent films in PACS    Intake and Output  I/O       04/11 0701 - 04/12 0700 04/12 0701 - 04/13 0700    P  O  1350 720    I V  (mL/kg)  30 (0 2)    IV Piggyback  50    Total Intake(mL/kg) 1350 (11 1) 800 (6 6)    Urine (mL/kg/hr) 1050 (0 4) 1400 (0 5)    Stool 0     Total Output 1050 1400    Net +300 -600          Unmeasured Urine Occurrence  1 x    Unmeasured Stool Occurrence 1 x           Height and Weights   Height: 5' 6" (167 6 cm)  IBW: 59 3 kg  Body mass index is 43 48 kg/m²  Weight (last 2 days)     None            Nutrition       Diet Orders   (From admission, onward)             Start     Ordered    04/10/21 1519  Diet Regular; Regular House  Diet effective now     Question Answer Comment   Diet Type Regular    Regular Regular House    RD to adjust diet per protocol?  Yes        04/10/21 1519    04/09/21 1242  Dietary nutrition supplements  Once     Question Answer Comment   Select Supplement: Ensure Max - Milk Chocolate    Frequency Breakfast, Dinner        04/09/21 1241    04/07/21 1719  Room Service  Once     Question:  Type of Service  Answer:  Room Service-Appropriate    04/07/21 1719                  Active Medications  Scheduled Meds:  Current Facility-Administered Medications   Medication Dose Route Frequency Provider Last Rate    acetaminophen  975 mg Oral Q8H [facility] Debbie Carr PA-C      albuterol  2 puff Inhalation Q6H PRN Yung Pair, YUSEF      ascorbic acid  1,000 mg Oral Q12H [facility] Debbie Carr PA-C      atorvastatin  40 mg Oral HS Yung Pair, YUSEF      benzonatate  100 mg Oral TID PRN Yung Pair, YUSEF      calcium carbonate  1 tablet Oral Daily With Lunch Yung Pair, YUSEF      cholecalciferol  2,000 Units Oral Daily Debbie Carr PA-C      cyanocobalamin  1,000 mcg Oral Daily Yung Emilie, YUSEF      dexamethasone  0 1 mg/kg Intravenous Q12H Debbie Carr PA-C 12 6 mg (04/13/21 0341)    dextromethorphan-guaiFENesin  10 mL Oral Q4H PRN Lydia Erazo PA-C      enoxaparin  40 mg Subcutaneous Q12H [facility] Debbie Carr PA-C      famotidine  20 mg Oral BID Debbie Carr PA-C      ferrous sulfate  325 mg Oral Daily With Breakfast Yung Emilie, YUSEF      levothyroxine  137 mcg Oral Early Morning Debbie Carr PA-C      zinc sulfate  220 mg Oral Daily With Lunch Debbie Carr PA-C      Followed by   Tobias Holden ON 4/14/2021] multivitamin-minerals  1 tablet Oral Daily Debbie Carr PA-C      polyethylene glycol  17 g Oral Daily PRN Yung YUSEF Phan      senna-docusate sodium  1 tablet Oral HS Debbie Carr PA-C      thiamine  50 mg Oral Daily Debbie Carr PA-C       Continuous Infusions:     PRN Meds:   albuterol, 2 puff, Q6H PRN  benzonatate, 100 mg, TID PRN  dextromethorphan-guaiFENesin, 10 mL, Q4H PRN  polyethylene glycol, 17 g, Daily PRN        Invasive Devices Review  Invasive Devices     Peripheral Intravenous Line            Peripheral IV 04/12/21 Left Forearm less than 1 day                ---------------------------------------------------------------------------------------  Advance Directive and Living Will:      Power of :    POLST:    ---------------------------------------------------------------------------------------        Damian Paulino PA-C      Portions of the record may have been created with voice recognition software  Occasional wrong word or "sound a like" substitutions may have occurred due to the inherent limitations of voice recognition software    Read the chart carefully and recognize, using context, where substitutions have occurred

## 2021-04-13 NOTE — ASSESSMENT & PLAN NOTE
· Initial symptoms on 3/29, positive on 3/30  · Continue severe disease pathway  · Continue Dexamethasone to 0 1mg/kg BID for 10 days- day 5/10  · Remdesivir completed  · Patient is out of the window for convalescent plasma  · Lovenox DVT ppx b i d, DDimer elevated at 0 88  · Continue to monitor off abx  · HIV/Hepatitis panel negative  · Encourage proning and I/S  · Received actemra x 1 on 4/7      Results from last 7 days   Lab Units 04/11/21  0617 04/09/21  0550 04/08/21  0710 04/07/21  0619 04/06/21  1601   D-DIMER QUANTITATIVE ug/ml FEU  --  0 81* 0 99* 0 99* 0 79*   CRP mg/L 13 2* 51 5* 106 8* 251 9* 168 9*   SED RATE mm/hour  --   --   --   --  118*   FERRITIN ng/mL  --   --  150 90 79   LD U/L  --   --   --   --  1,060*     Results from last 7 days   Lab Units 04/09/21  0550 04/08/21  0710 04/07/21  0626 04/06/21  1601   PROCALCITONIN ng/ml <0 05 0 05 0 12 <0 05

## 2021-04-14 PROBLEM — J96.01 ACUTE RESPIRATORY FAILURE WITH HYPOXIA (HCC): Status: ACTIVE | Noted: 2021-04-08

## 2021-04-14 PROCEDURE — 99232 SBSQ HOSP IP/OBS MODERATE 35: CPT | Performed by: INTERNAL MEDICINE

## 2021-04-14 PROCEDURE — 94760 N-INVAS EAR/PLS OXIMETRY 1: CPT

## 2021-04-14 RX ADMIN — FAMOTIDINE 20 MG: 20 TABLET ORAL at 17:02

## 2021-04-14 RX ADMIN — Medication 1 TABLET: at 12:30

## 2021-04-14 RX ADMIN — THIAMINE HCL TAB 100 MG 50 MG: 100 TAB at 09:38

## 2021-04-14 RX ADMIN — FAMOTIDINE 20 MG: 20 TABLET ORAL at 09:37

## 2021-04-14 RX ADMIN — ACETAMINOPHEN 975 MG: 325 TABLET ORAL at 21:04

## 2021-04-14 RX ADMIN — Medication 2000 UNITS: at 09:37

## 2021-04-14 RX ADMIN — ACETAMINOPHEN 975 MG: 325 TABLET ORAL at 15:00

## 2021-04-14 RX ADMIN — ATORVASTATIN CALCIUM 40 MG: 40 TABLET, FILM COATED ORAL at 21:04

## 2021-04-14 RX ADMIN — ENOXAPARIN SODIUM 40 MG: 40 INJECTION SUBCUTANEOUS at 09:38

## 2021-04-14 RX ADMIN — LEVOTHYROXINE SODIUM 137 MCG: 25 TABLET ORAL at 06:48

## 2021-04-14 RX ADMIN — Medication 325 MG: at 08:30

## 2021-04-14 RX ADMIN — CYANOCOBALAMIN TAB 500 MCG 1000 MCG: 500 TAB at 09:38

## 2021-04-14 RX ADMIN — CALCIUM 1 TABLET: 500 TABLET ORAL at 12:30

## 2021-04-14 RX ADMIN — ENOXAPARIN SODIUM 40 MG: 40 INJECTION SUBCUTANEOUS at 21:04

## 2021-04-14 RX ADMIN — DOCUSATE SODIUM AND SENNOSIDES 1 TABLET: 8.6; 5 TABLET, FILM COATED ORAL at 21:04

## 2021-04-14 RX ADMIN — DEXAMETHASONE SODIUM PHOSPHATE 12.6 MG: 10 INJECTION, SOLUTION INTRAMUSCULAR; INTRAVENOUS at 05:28

## 2021-04-14 RX ADMIN — ACETAMINOPHEN 975 MG: 325 TABLET ORAL at 06:48

## 2021-04-14 RX ADMIN — DEXAMETHASONE SODIUM PHOSPHATE 12.6 MG: 10 INJECTION, SOLUTION INTRAMUSCULAR; INTRAVENOUS at 17:02

## 2021-04-14 NOTE — ASSESSMENT & PLAN NOTE
Continue wean of oxygen   Currently 96% 3 L   Weaned to 1 5 L NC today  Downward titrate as tolerated   Home 02 eval prior to d/c > tomorrow

## 2021-04-14 NOTE — PLAN OF CARE
Problem: Potential for Falls  Goal: Patient will remain free of falls  Description: INTERVENTIONS:  - Assess patient frequently for physical needs  -  Identify cognitive and physical deficits and behaviors that affect risk of falls    -  [place] fall precautions as indicated by assessment   - Educate patient/family on patient safety including physical limitations  - Instruct patient to call for assistance with activity based on assessment  - Modify environment to reduce risk of injury  - Consider OT/PT consult to assist with strengthening/mobility  Outcome: Progressing     Problem: RESPIRATORY - ADULT  Goal: Achieves optimal ventilation and oxygenation  Description: INTERVENTIONS:  - Assess for changes in respiratory status  - Assess for changes in mentation and behavior  - Position to facilitate oxygenation and minimize respiratory effort  - Oxygen administered by appropriate delivery if ordered  - Initiate smoking cessation education as indicated  - Encourage broncho-pulmonary hygiene including cough, deep breathe, Incentive Spirometry  - Assess the need for suctioning and aspirate as needed  - Assess and instruct to report SOB or any respiratory difficulty  - Respiratory Therapy support as indicated  Outcome: Progressing     Problem: Prexisting or High Potential for Compromised Skin Integrity  Goal: Skin integrity is maintained or improved  Description: INTERVENTIONS:  - Identify patients at risk for skin breakdown  - Assess and monitor skin integrity  - Assess and monitor nutrition and hydration status  - Monitor labs   - Assess for incontinence   - Turn and reposition patient  - Assist with mobility/ambulation  - Relieve pressure over bony prominences  - Avoid friction and shearing  - Provide appropriate hygiene as needed including keeping skin clean and dry  - Evaluate need for skin moisturizer/barrier cream  - Collaborate with interdisciplinary team   - Patient/family teaching  - Consider wound care consult   Outcome: Progressing     Problem: Nutrition/Hydration-ADULT  Goal: Nutrient/Hydration intake appropriate for improving, restoring or maintaining nutritional needs  Description: Monitor and assess patient's nutrition/hydration status for malnutrition  Collaborate with interdisciplinary team and initiate plan and interventions as ordered  Monitor patient's weight and dietary intake as ordered or per policy  Utilize nutrition screening tool and intervene as necessary  Determine patient's food preferences and provide high-protein, high-caloric foods as appropriate  INTERVENTIONS:  - Monitor oral intake, urinary output, labs, and treatment plans  - Assess nutrition and hydration status and recommend course of action  - Evaluate amount of meals eaten  - Assist patient with eating if necessary   - Allow adequate time for meals  - Recommend/ encourage appropriate diets, oral nutritional supplements, and vitamin/mineral supplements  - Order, calculate, and assess calorie counts as needed  - Recommend, monitor, and adjust tube feedings and TPN/PPN based on assessed needs  - Assess need for intravenous fluids  - Provide specific nutrition/hydration education as appropriate  - Include patient/family/caregiver in decisions related to nutrition  Outcome: Progress  Care plan ongoing

## 2021-04-14 NOTE — PROGRESS NOTES
[address] 47  Progress Note - Yuly Umaña 1975, 39 y o  female MRN: [id number]  Unit/Bed#: ICU 02 Encounter: [id number]  Primary Care Provider: THOMAS Ac   Date and time admitted to hospital: 4/7/2021 11:38 AM    Acute respiratory failure with hypoxia ([address] )  Assessment & Plan  · Secondary to COVID pneumonia  · Patient was up to 30 liters of oxygen  Patient currently stable on 6 liters oxygen  · CTA of the chest on admission without any evidence of pulmonary embolism  · Continue treatment for COVID-19 pneumonia  · Continue to titrate down oxygen as tolerated  * Pneumonia due to COVID-19 virus  Assessment & Plan  · Presented with increasing shortness of breath and cough  · Initial symptoms started on 03/29 and COVID-19 positive 03/30   · Patient initially started as moderate disease but then proceeded to severe disease  · CXR and CTA- evidence of extensive multifocal pneumonia  No evidence of PE  · Patient was initially on Decadron 6 milligram IV daily and currently on 0 1 milligram/kg b i d  dose  Day 5 of 10  · Patient completed 5 days of remdesivir  · Patient did not receive convalescent plasma as she was out of window  · Lovenox for DVT prophylaxis  · Patient was initially on doxycycline and ceftriaxone which were later discontinued as procalcitonin level was negative  · S/p Actemra 4/7/21  Continue bowel regimen  · Consider IV lasix  PO intake just improving today however  · HIV and hepatitis serology negative  · Inflammatory markers continued to improve  · Monitor Daily CBC, CMP  · Patient was educated and encouraged self proning, Increase activity and mobilization as tolerated, PT/OT as needed    Encourage incentive spirometry  · Pulmonary evaluation appreciated   · Continue to maintain O2 sats >/= 90%    Anemia  Assessment & Plan  · Microcytic, appears chronic   · Patient does reports some back stool but on iron supplementation  · Hemoglobin has been stable    Morbid obesity (HCC)  Assessment & Plan  · History of gastric bypass  · Dietary and lifestyle modification    Hypothyroidism  Assessment & Plan  · Continue levothyroxine 137 micrograms daily        VTE Pharmacologic Prophylaxis:   Pharmacologic: Enoxaparin (Lovenox)  Mechanical VTE Prophylaxis in Place: Yes    Patient Centered Rounds: I have performed bedside rounds with nursing staff today  Discussions with Specialists or Other Care Team Provider: Waqar Olea    Education and Discussions with Family / Patient: yes    Time Spent for Care: 45 minutes  More than 50% of total time spent on counseling and coordination of care as described above  Current Length of Stay: 7 day(s)    Current Patient Status: Inpatient   Certification Statement: The patient will continue to require additional inpatient hospital stay due to Acute hypoxic respiratory failure and COVID-19 pneumonia    Discharge Plan:  Pending course    Code Status: Level 3 - DNAR and DNI      Subjective:   Patient has improved shortness of breath  Occasional cough productive of mucoid phlegm  Denies any chest pain, abdominal pain, nausea or vomiting  Improving appetite  Objective:     Vitals:   Temp (24hrs), Av °F (36 7 °C), Min:97 7 °F (36 5 °C), Max:98 2 °F (36 8 °C)    Temp:  [97 7 °F (36 5 °C)-98 2 °F (36 8 °C)] 98 2 °F (36 8 °C)  HR:  [61-87] 65  Resp:  [19-22] 19  BP: ()/(54-59) 100/59  SpO2:  [92 %-99 %] 93 %  Body mass index is 43 48 kg/m²  Input and Output Summary (last 24 hours): Intake/Output Summary (Last 24 hours) at 2021 1846  Last data filed at 2021 0901  Gross per 24 hour   Intake 200 ml   Output 600 ml   Net -400 ml       Physical Exam:     Physical Exam  Constitutional:       General: She is not in acute distress  HENT:      Head: Normocephalic and atraumatic        Nose: Nose normal    Eyes:      Conjunctiva/sclera: Conjunctivae normal       Pupils: Pupils are equal, round, and reactive to light    Neck:      Musculoskeletal: Normal range of motion and neck supple  Cardiovascular:      Rate and Rhythm: Normal rate and regular rhythm  Heart sounds: Normal heart sounds  Pulmonary:      Effort: Pulmonary effort is normal  No respiratory distress  Breath sounds: No wheezing or rales  Comments: Decreased breath sounds bilaterally  Abdominal:      General: Bowel sounds are normal  There is no distension  Palpations: Abdomen is soft  Tenderness: There is no abdominal tenderness  There is no guarding or rebound  Skin:     General: Skin is warm and dry  Findings: No rash  Neurological:      Mental Status: She is alert  Cranial Nerves: No cranial nerve deficit  Additional Data:     Labs:    Results from last 7 days   Lab Units 04/13/21  0452  04/10/21  0551   WBC Thousand/uL 19 40*   < > 9 25   HEMOGLOBIN g/dL 11 2*   < > 10 4*   HEMATOCRIT % 37 8   < > 35 4   PLATELETS Thousands/uL 500*   < > 483*   NEUTROS PCT %  --   --  86*   LYMPHS PCT %  --   --  8*   LYMPHO PCT % 8*  --   --    MONOS PCT %  --   --  5   MONO PCT % 4  --   --    EOS PCT % 0  --  0    < > = values in this interval not displayed  Results from last 7 days   Lab Units 04/13/21  0452   POTASSIUM mmol/L 3 6   CHLORIDE mmol/L 102   CO2 mmol/L 27   BUN mg/dL 15   CREATININE mg/dL 0 89   CALCIUM mg/dL 8 3   ALK PHOS U/L 99   ALT U/L 33   AST U/L 19           * I Have Reviewed All Lab Data Listed Above  * Additional Pertinent Lab Tests Reviewed:  [address] Admission Reviewed    Imaging:    Imaging Reports Reviewed Today Include:  Chest x-ray, CT of the chest      Recent Cultures (last 7 days):           Last 24 Hours Medication List:   Current Facility-Administered Medications   Medication Dose Route Frequency Provider Last Rate    acetaminophen  975 mg Oral Q8H [facility] Debbie Carr PA-C      albuterol  2 puff Inhalation Q6H PRN Raymundo Tom PA-C      atorvastatin  40 mg Oral HS Jakob Huffman PA-C      benzonatate  100 mg Oral TID PRN Jakob Huffman PA-C      calcium carbonate  1 tablet Oral Daily With Lunch Jakob Huffman PA-C      cholecalciferol  2,000 Units Oral Daily Debbie Carr PA-C      cyanocobalamin  1,000 mcg Oral Daily Debbie Carr PA-C      dexamethasone  0 1 mg/kg Intravenous Q12H Debbie Carr PA-C 12 6 mg (04/14/21 1702)    dextromethorphan-guaiFENesin  10 mL Oral Q4H PRN Josh Michelle PA-C      enoxaparin  40 mg Subcutaneous Q12H [address] Debbie Carr PA-C      famotidine  20 mg Oral BID Debbie Carr PA-C      ferrous sulfate  325 mg Oral Daily With Breakfast Jakob Huffman PA-C      levothyroxine  137 mcg Oral Early Morning Jakob Huffman PA-C      multivitamin-minerals  1 tablet Oral Daily Debbie Carr PA-C      polyethylene glycol  17 g Oral Daily PRN Jakob Huffman PA-C      senna-docusate sodium  1 tablet Oral HS Debbie Carr PA-C      thiamine  50 mg Oral Daily Jakob Huffman PA-C          Today, Patient Was Seen By: Sonya Rivas MD    ** Please Note: Dictation voice to text software may have been used in the creation of this document   **

## 2021-04-14 NOTE — PROGRESS NOTES
Progress Note - Pulmonary   Ada Donya Umaña 39 y o  female MRN: [id number]  Unit/Bed#: ICU 02 Encounter: [id number]  Code Status: Level 3 - DNAR and DNI    Nick Pace is a 39 y o     Past Medical History:   Diagnosis Date    Abscess of labia     Anemia 4/6/2021    Anxiety     Bipolar disorder (HCC)     Breast lump     Frequent headaches     Hemorrhoids     Hypothyroidism 7/1/2013    Migraine     Morbid obesity (HCC) 7/1/2013    Obesity     Psychotic disorder (HCC)        Assessment/Plan:   Respiratory failure with hypoxia (HCC)  Assessment & Plan  Continue wean of oxygen   Currently 95% on 6 L   Weaned to 5 L NC   Downward titrate as tolerated   Home 02 eval prior to d/c    * Pneumonia due to COVID-19 virus  Assessment & Plan  Covid Treatment Plan:  -continue steroids  -continue VTE ppx  -continue pepcid  -wean 02     +COVID 19 Date :        Has Moderate  Severity Range infection /  Currently on  6 > 5  L NC     Steroid day # 6  Remdesivir day #  Completed 4 /10   Abx #:  NA   Results from last 7 days   Lab Units 04/09/21  0550 04/08/21  0710   PROCALCITONIN ng/ml <0 05 0 05     Vitamin Therapy:  Pepcid therapy   VTE PPX: lovenox    Results from last 7 days   Lab Units 04/11/21  0617 04/09/21  0550 04/08/21  0710   D-DIMER QUANTITATIVE ug/ml FEU  --  0 81* 0 99*   CRP mg/L 13 2* 51 5* 106 8*   FERRITIN ng/mL  --   --  150       Screen for Convalescent Plasma: was out of window / did not receive plasma   Actemra:4/7                 _________________________________________________    Subjective: Pt seen and examined at bedside    Chief Complaint: "I'm tired"     Labs Reviewed: yes   Imaging Reviewed: yes     Tele Events:     Vitals:   Temp:  [97 2 °F (36 2 °C)-98 1 °F (36 7 °C)] 97 9 °F (36 6 °C)  HR:  [61-81] 61  Resp:  [19-22] 22  BP: ()/(54-73) 107/57  Weight (last 2 days)     None        Vitals:    04/13/21 2126 04/14/21 0000 04/14/21 0818 04/14/21 0934   BP: 92/54 107/57     BP Location: Pulse: 61 61     Resp:    Temp:  98 1 °F (36 7 °C)  97 9 °F (36 6 °C)   TempSrc:    Temporal   SpO2: 95% 99% 96%    Weight:       Height:         Temp (24hrs), Av 7 °F (36 5 °C), Min:97 2 °F (36 2 °C), Max:98 1 °F (36 7 °C)  Current: Temperature: 97 9 °F (36 6 °C)        SpO2: SpO2: 96 %, SpO2 Activity: SpO2 Activity: At Rest      IV Infusions:       Nutrition:        Diet Orders   (From admission, onward)             Start     Ordered    04/10/21 1519  Diet Regular; Regular House  Diet effective now     Question Answer Comment   Diet Type Regular    Regular Regular House    RD to adjust diet per protocol? Yes        04/10/21 1519    21 1242  Dietary nutrition supplements  Once     Question Answer Comment   Select Supplement: Ensure Max - Milk Chocolate    Frequency Breakfast, Dinner        21 1241    21 1719  Room Service  Once     Question:  Type of Service  Answer:  Room Service-Appropriate    21 1719                Ins/Outs:   I/O        07 -  0700  07 -  0700 701 - 04/15 0700    P  O  720 1920 200    I V  (mL/kg) 30 (0 2) 25 (0 2)     IV Piggyback 50 50     Total Intake(mL/kg) 800 (6 6) 1995 (16 4) 200 (1 6)    Urine (mL/kg/hr) 1400 (0 5) 2000 (0 7)     Stool  0     Total Output 1400 2000     Net -600 -5 +200           Unmeasured Urine Occurrence 1 x 1 x     Unmeasured Stool Occurrence  1 x           Lines/Drains:  Invasive Devices     Peripheral Intravenous Line            Peripheral IV 21 Left Hand 1 day                 Active medications:  Scheduled Meds:  Current Facility-Administered Medications   Medication Dose Route Frequency Provider Last Rate    acetaminophen  975 mg Oral Q8H [address] Debbie A Bruno, PA-C      albuterol  2 puff Inhalation Q6H PRN Marlyce Henri, PA-C      atorvastatin  40 mg Oral HS Debbie A [place], PA-C      benzonatate  100 mg Oral TID PRN Marlyce Henri, PA-C      calcium carbonate  1 tablet Oral Daily With [employer] Awanda Left, YUSEF      cholecalciferol  2,000 Units Oral Daily Awanda Left, YUSEF      cyanocobalamin  1,000 mcg Oral Daily Awanda Left, YUSEF      dexamethasone  0 1 mg/kg Intravenous Q12H Awanda Left, YUSEF Stopped (04/14/21 0600)    dextromethorphan-guaiFENesin  10 mL Oral Q4H PRN Ghassan Patino PA-C      enoxaparin  40 mg Subcutaneous Q12H [address] Debbie Carr, YUSEF      famotidine  20 mg Oral BID Debbie Carr, YUSEF      ferrous sulfate  325 mg Oral Daily With Breakfast Awanda Left, YUSEF      levothyroxine  137 mcg Oral Early Morning Awanda Left, YUSEF      multivitamin-minerals  1 tablet Oral Daily Debbie Carr, YUSEF      polyethylene glycol  17 g Oral Daily PRN Debbie Carr PA-C      senna-docusate sodium  1 tablet Oral HS Debbie Carr, YUSEF      thiamine  50 mg Oral Daily Debbie Carr PA-C       PRN Meds:albuterol, 2 puff, Q6H PRN  benzonatate, 100 mg, TID PRN  dextromethorphan-guaiFENesin, 10 mL, Q4H PRN  polyethylene glycol, 17 g, Daily PRN      ____________________________________________________________________    Physical Exam  Constitutional:       Appearance: She is well-developed  She is obese  HENT:      Head: Normocephalic and atraumatic  Eyes:      Conjunctiva/sclera: Conjunctivae normal       Pupils: Pupils are equal, round, and reactive to light  Neck:      Musculoskeletal: Normal range of motion and neck supple  Cardiovascular:      Rate and Rhythm: Normal rate and regular rhythm  Heart sounds: Normal heart sounds  Pulmonary:      Effort: Pulmonary effort is normal  No respiratory distress  Breath sounds: Examination of the right-lower field reveals rales  Examination of the left-lower field reveals rales  Rales present  No wheezing  Comments: Scant bibasilar rales    96% on 6 L   Weaned to 5 L NC   Chest:      Chest wall: No tenderness  Abdominal:      General: Bowel sounds are normal       Palpations: Abdomen is soft     Musculoskeletal: Normal range of motion  Right lower leg: No edema  Left lower leg: No edema  Skin:     General: Skin is warm and dry  Neurological:      Mental Status: She is alert and oriented to person, place, and time         ____________________________________________________________________    Invasive/non-invasive ventilation settings   Respiratory    Lab Data (Last 4 hours)    None         O2/Vent Data (Last 4 hours)    None                Laboratory and Diagnostics:  Results from last 7 days   Lab Units 04/13/21 0452 04/12/21  0517 04/11/21  0617 04/10/21  0551 04/09/21  0550 04/08/21  0710 04/07/21  1414   WBC Thousand/uL 19 40* 15 93* 14 48* 9 25 5 08 4 54  --    HEMOGLOBIN g/dL 11 2* 11 3* 11 2* 10 4* 10 2* 10 5* 11 0*   HEMATOCRIT % 37 8 38 5 37 8 35 4 35 3 36 6 38 1   PLATELETS Thousands/uL 500* 564* 584* 483* 498* 466*  --    NEUTROS PCT %  --   --   --  86* 81*  --   --    MONOS PCT %  --   --   --  5 5  --   --    MONO PCT % 4  --   --   --   --   --   --      Results from last 7 days   Lab Units 04/13/21 0452 04/12/21  0517 04/11/21  0617 04/10/21  0551 04/09/21  0550 04/08/21  0710   SODIUM mmol/L 138 138 139 138 137 137   POTASSIUM mmol/L 3 6 3 7 3 6 3 7 3 9 4 3   CHLORIDE mmol/L 102 103 103 104 104 103   CO2 mmol/L 27 25 26 23 23 25   ANION GAP mmol/L 9 10 10 11 10 9   BUN mg/dL 15 16 16 11 11 11   CREATININE mg/dL 0 89 0 81 0 96 0 78 0 80 0 89   CALCIUM mg/dL 8 3 8 3 8 5 8 7 8 5 8 8   GLUCOSE RANDOM mg/dL 121 114 123 133 126 111   ALT U/L 33  --   --   --  18 18   AST U/L 19  --   --   --  26 41   ALK PHOS U/L 99  --   --   --  108 115   ALBUMIN g/dL 2 7*  --   --   --  2 4* 2 4*   TOTAL BILIRUBIN mg/dL 0 39  --   --   --  0 20 0 22     Results from last 7 days   Lab Units 04/12/21  0517 04/11/21  0617 04/10/21  0551   MAGNESIUM mg/dL 2 5 2 4 2 3   PHOSPHORUS mg/dL  --  4 0 3 8           Results from last 7 days   Lab Units 04/08/21  0710   TROPONIN I ng/mL <0 02         ABG:    VBG:    Results from last 7 days Lab Units 04/09/21  0550 04/08/21  0710   PROCALCITONIN ng/ml <0 05 0 05       Micro  Results from last 7 days   Lab Units 04/08/21  1850   MRSA CULTURE ONLY  No Methicillin Resistant Staphlyococcus aureus (MRSA) isolated       Imaging:   XR chest portable   Final Result by Sarah Torres MD (04/12 1731)      Moderate bilateral groundglass opacity due to Covid 19, slightly improved from 4/7/2021  Workstation performed: [id number]             Micro:   Lab Results   Component Value Date    BLOODCX No Growth After 5 Days  04/06/2021    BLOODCX No Growth After 5 Days   04/06/2021    MRSACULTURE  04/08/2021     No Methicillin Resistant Staphlyococcus aureus (MRSA) isolated            Invalid input(s): Rommel Maya

## 2021-04-14 NOTE — ASSESSMENT & PLAN NOTE
· Microcytic, appears chronic   · Patient does reports some back stool but on iron supplementation  · Hemoglobin has been stable

## 2021-04-14 NOTE — ASSESSMENT & PLAN NOTE
· Presented with increasing shortness of breath and cough  · Initial symptoms started on 03/29 and COVID-19 positive 03/30   · Patient initially started as moderate disease but then proceeded to severe disease  · CXR and CTA- evidence of extensive multifocal pneumonia  No evidence of PE  · Patient was initially on Decadron 6 milligram IV daily and currently on 0 1 milligram/kg b i d  dose  Day 5 of 10  · Patient completed 5 days of remdesivir  · Patient did not receive convalescent plasma as she was out of window  · Lovenox for DVT prophylaxis  · Patient was initially on doxycycline and ceftriaxone which were later discontinued as procalcitonin level was negative  · S/p Actemra 4/7/21  Continue bowel regimen  · Consider IV lasix  PO intake just improving today however  · HIV and hepatitis serology negative  · Inflammatory markers continued to improve  · Monitor Daily CBC, CMP  · Patient was educated and encouraged self proning, Increase activity and mobilization as tolerated, PT/OT as needed    Encourage incentive spirometry  · Pulmonary evaluation appreciated   · Continue to maintain O2 sats >/= 90%

## 2021-04-14 NOTE — UTILIZATION REVIEW
Continued Stay Review    Date: 4/14/2021                          Current Patient Class:  IP Current Level of Care: [facility]    HPI:45 y o  female initially admitted on   4/7/21 with Pneumonia  2nd to COVID  19  Assessment/Plan:  Mild SOB  Rales lower lobes bilat  Continues on O2 @ 6L NC  Will continue to wean O2 as able  Continue steroids  Downgraded to [facility] 4/13  Rec'd Lasix 20 IV X 1 4/13      Pertinent Labs/Diagnostic Results:       Results from last 7 days   Lab Units 04/13/21  0452 04/12/21  0517 04/11/21  0617 04/10/21  0551 04/09/21  0550   WBC Thousand/uL 19 40* 15 93* 14 48* 9 25 5 08   HEMOGLOBIN g/dL 11 2* 11 3* 11 2* 10 4* 10 2*   HEMATOCRIT % 37 8 38 5 37 8 35 4 35 3   PLATELETS Thousands/uL 500* 564* 584* 483* 498*   NEUTROS ABS Thousands/µL  --   --   --  7 93* 4 16     Results from last 7 days   Lab Units 04/13/21  0452 04/12/21  0517 04/11/21  0617 04/10/21  0551 04/09/21  0550   SODIUM mmol/L 138 138 139 138 137   POTASSIUM mmol/L 3 6 3 7 3 6 3 7 3 9   CHLORIDE mmol/L 102 103 103 104 104   CO2 mmol/L 27 25 26 23 23   ANION GAP mmol/L 9 10 10 11 10   BUN mg/dL 15 16 16 11 11   CREATININE mg/dL 0 89 0 81 0 96 0 78 0 80   EGFR ml/min/1 73sq m 80 89 73 93 91   CALCIUM mg/dL 8 3 8 3 8 5 8 7 8 5   MAGNESIUM mg/dL  --  2 5 2 4 2 3  --    PHOSPHORUS mg/dL  --   --  4 0 3 8  --      Results from last 7 days   Lab Units 04/13/21  0452 04/09/21  0550 04/08/21  0710   AST U/L 19 26 41   ALT U/L 33 18 18   ALK PHOS U/L 99 108 115   TOTAL PROTEIN g/dL 6 1* 6 6 7 2   ALBUMIN g/dL 2 7* 2 4* 2 4*   TOTAL BILIRUBIN mg/dL 0 39 0 20 0 22     Results from last 7 days   Lab Units 04/13/21  0452 04/12/21  0517 04/11/21  0617 04/10/21  0551 04/09/21  0550 04/08/21  0710   GLUCOSE RANDOM mg/dL 121 114 123 133 126 111     Results from last 7 days   Lab Units 04/08/21  0710   TROPONIN I ng/mL <0 02     Results from last 7 days   Lab Units 04/09/21  0550 04/08/21  0710   D-DIMER QUANTITATIVE ug/ml FEU 0 81* 0 99* Results from last 7 days   Lab Units 04/09/21  0550 04/08/21  0710   PROCALCITONIN ng/ml <0 05 0 05       Results from last 7 days   Lab Units 04/08/21  0710   FERRITIN ng/mL 150     Results from last 7 days   Lab Units 04/11/21  0617 04/09/21  0550 04/08/21  0710   CRP mg/L 13 2* 51 5* 106 8*     Results from last 7 days   Lab Units 04/07/21  1952   CLARITY UA  Clear   COLOR UA  Light Yellow   SPEC GRAV UA  1 015   PH UA  6 0   GLUCOSE UA mg/dl Negative   KETONES UA mg/dl Negative   BLOOD UA  Large*   PROTEIN UA mg/dl Negative   NITRITE UA  Negative   BILIRUBIN UA  Negative   UROBILINOGEN UA E U /dl 0 2   LEUKOCYTES UA  Negative   WBC UA /hpf 0-1*   RBC UA /hpf Innumerable*   BACTERIA UA /hpf Occasional   EPITHELIAL CELLS WET PREP /hpf Occasional   MUCUS THREADS  Occasional*     Vital Signs:   04/14/21 1245  --  --  --  --  --  --  --  44  6 L/min  6 L/min  Mid flow nasal cannula  --  --   04/14/21 0934  97 9 °F (36 6 °C)  87  22  102/59  74  96 %  --  44  --  6 L/min  Mid flow nasal cannula  --  Lying   04/14/21 0818  --  --  --  --  --  96 %  --  44  6 L/min  6 L/min  Mid flow nasal cannula  --  --   04/14/21 0000  98 1 °F (36 7 °C)  61  20  107/57  75  99 %  --  44  --  6 L/min  --  --  --   04/13/21 2126  --  61  22  92/54  68  95 %  --  44  --  6 L/min  Mid flow nasal cannula  --           Medications:   Scheduled Medications:  acetaminophen, 975 mg, Oral, Q8H KAYA  atorvastatin, 40 mg, Oral, HS  calcium carbonate, 1 tablet, Oral, Daily With Lunch  cholecalciferol, 2,000 Units, Oral, Daily  cyanocobalamin, 1,000 mcg, Oral, Daily  dexamethasone, 0 1 mg/kg, Intravenous, Q12H  enoxaparin, 40 mg, Subcutaneous, Q12H KAYA  famotidine, 20 mg, Oral, BID  ferrous sulfate, 325 mg, Oral, Daily With Breakfast  levothyroxine, 137 mcg, Oral, Early Morning  multivitamin-minerals, 1 tablet, Oral, Daily  senna-docusate sodium, 1 tablet, Oral, HS  thiamine, 50 mg, Oral, Daily    Continuous IV Infusions:     PRN Meds:  albuterol, 2 puff, Inhalation, Q6H PRN  benzonatate, 100 mg, Oral, TID PRN  dextromethorphan-guaiFENesin, 10 mL, Oral, Q4H PRN  polyethylene glycol, 17 g, Oral, Daily PRN    Discharge Plan: D    Network Utilization Review Department  ATTENTION: Please call with any questions or concerns to [phone] and carefully listen to the prompts so that you are directed to the right person  All voicemails are confidential   Lynette Kussmaul all requests for admission clinical reviews, approved or denied determinations and any other requests to dedicated fax number below belonging to the campus where the patient is receiving treatment   List of dedicated fax numbers for the Facilities:  [address] DENIALS (Administrative/Medical Necessity) [phone]   [address] (Maternity/NICU/Pediatrics) [phone]   [address] [place] [address] Dr Geetha Chery 2699 (  Ailyn Dang "[place]" 103) [address] [place] [address] Kaitlyn Pyle 14[address] [place]) [address] [place] [address] [phone]

## 2021-04-14 NOTE — ASSESSMENT & PLAN NOTE
Covid Treatment Plan:  -continue steroids  -continue VTE ppx  -continue pepcid  -wean 02 as hector  Home 02 eval today / tomorrow  -finish 10 days steroids  -f/u in office 72-96 hrs s/p D/C     +COVID 19 Date :  3/30        Has Moderate > Improved to Mild Severity Range infection /  Currently on  3 > 1 5 L NC     Steroid day # 7   Remdesivir day #  Completed 4 /10   Abx #:  NA   Results from last 7 days   Lab Units 04/09/21  0550   PROCALCITONIN ng/ml <0 05     Vitamin Therapy  Pepcid therapy   VTE PPX: lovenox    Results from last 7 days   Lab Units 04/15/21  0534 04/11/21  0617 04/09/21  0550   D-DIMER QUANTITATIVE ug/ml FEU  --   --  0 81*   CRP mg/L <0 5 13 2* 51 5*       Screen for Convalescent Plasma: was out of window / did not receive plasma   Actemra:4/7     Patient is stable for D/C from a Pulmonary standpoint  Pulmonary Signing off  Please call with questions, concerns, or need for reevaluation

## 2021-04-14 NOTE — ASSESSMENT & PLAN NOTE
· Secondary to COVID pneumonia  · Patient was up to 30 liters of oxygen  Patient currently stable on 6 liters oxygen  · CTA of the chest on admission without any evidence of pulmonary embolism  · Continue treatment for COVID-19 pneumonia  · Continue to titrate down oxygen as tolerated

## 2021-04-15 LAB
ALBUMIN SERPL BCP-MCNC: 2.7 G/DL (ref 3.5–5)
ALP SERPL-CCNC: 95 U/L (ref 46–116)
ALT SERPL W P-5'-P-CCNC: 36 U/L (ref 12–78)
ANION GAP SERPL CALCULATED.3IONS-SCNC: 7 MMOL/L (ref 4–13)
AST SERPL W P-5'-P-CCNC: 15 U/L (ref 5–45)
BASOPHILS # BLD AUTO: 0.04 THOUSANDS/ΜL (ref 0–0.1)
BASOPHILS NFR BLD AUTO: 0 % (ref 0–1)
BILIRUB SERPL-MCNC: 0.42 MG/DL (ref 0.2–1)
BUN SERPL-MCNC: 15 MG/DL (ref 5–25)
CALCIUM ALBUM COR SERPL-MCNC: 9.2 MG/DL (ref 8.3–10.1)
CALCIUM SERPL-MCNC: 8.2 MG/DL (ref 8.3–10.1)
CHLORIDE SERPL-SCNC: 103 MMOL/L (ref 100–108)
CO2 SERPL-SCNC: 28 MMOL/L (ref 21–32)
CREAT SERPL-MCNC: 0.82 MG/DL (ref 0.6–1.3)
CRP SERPL QL: <0.5 MG/L
EOSINOPHIL # BLD AUTO: 0 THOUSAND/ΜL (ref 0–0.61)
EOSINOPHIL NFR BLD AUTO: 0 % (ref 0–6)
ERYTHROCYTE [DISTWIDTH] IN BLOOD BY AUTOMATED COUNT: 21.5 % (ref 11.6–15.1)
GFR SERPL CREATININE-BSD FRML MDRD: 88 ML/MIN/1.73SQ M
GLUCOSE SERPL-MCNC: 106 MG/DL (ref 65–140)
HCT VFR BLD AUTO: 38.6 % (ref 34.8–46.1)
HGB BLD-MCNC: 11.3 G/DL (ref 11.5–15.4)
IMM GRANULOCYTES # BLD AUTO: 0.45 THOUSAND/UL (ref 0–0.2)
IMM GRANULOCYTES NFR BLD AUTO: 2 % (ref 0–2)
LYMPHOCYTES # BLD AUTO: 0.97 THOUSANDS/ΜL (ref 0.6–4.47)
LYMPHOCYTES NFR BLD AUTO: 4 % (ref 14–44)
MCH RBC QN AUTO: 21.5 PG (ref 26.8–34.3)
MCHC RBC AUTO-ENTMCNC: 29.3 G/DL (ref 31.4–37.4)
MCV RBC AUTO: 73 FL (ref 82–98)
MONOCYTES # BLD AUTO: 1.03 THOUSAND/ΜL (ref 0.17–1.22)
MONOCYTES NFR BLD AUTO: 5 % (ref 4–12)
NEUTROPHILS # BLD AUTO: 20.65 THOUSANDS/ΜL (ref 1.85–7.62)
NEUTS SEG NFR BLD AUTO: 89 % (ref 43–75)
NRBC BLD AUTO-RTO: 0 /100 WBCS
PLATELET # BLD AUTO: 464 THOUSANDS/UL (ref 149–390)
PMV BLD AUTO: 9.6 FL (ref 8.9–12.7)
POTASSIUM SERPL-SCNC: 3.9 MMOL/L (ref 3.5–5.3)
PROT SERPL-MCNC: 6.1 G/DL (ref 6.4–8.2)
RBC # BLD AUTO: 5.26 MILLION/UL (ref 3.81–5.12)
SODIUM SERPL-SCNC: 138 MMOL/L (ref 136–145)
WBC # BLD AUTO: 23.14 THOUSAND/UL (ref 4.31–10.16)

## 2021-04-15 PROCEDURE — 80053 COMPREHEN METABOLIC PANEL: CPT | Performed by: INTERNAL MEDICINE

## 2021-04-15 PROCEDURE — 94760 N-INVAS EAR/PLS OXIMETRY 1: CPT

## 2021-04-15 PROCEDURE — 99232 SBSQ HOSP IP/OBS MODERATE 35: CPT | Performed by: INTERNAL MEDICINE

## 2021-04-15 PROCEDURE — 86140 C-REACTIVE PROTEIN: CPT | Performed by: INTERNAL MEDICINE

## 2021-04-15 PROCEDURE — 85025 COMPLETE CBC W/AUTO DIFF WBC: CPT | Performed by: INTERNAL MEDICINE

## 2021-04-15 PROCEDURE — 94761 N-INVAS EAR/PLS OXIMETRY MLT: CPT

## 2021-04-15 PROCEDURE — 97167 OT EVAL HIGH COMPLEX 60 MIN: CPT

## 2021-04-15 PROCEDURE — 97110 THERAPEUTIC EXERCISES: CPT

## 2021-04-15 RX ADMIN — CALCIUM 1 TABLET: 500 TABLET ORAL at 12:11

## 2021-04-15 RX ADMIN — Medication 1 TABLET: at 08:11

## 2021-04-15 RX ADMIN — ENOXAPARIN SODIUM 40 MG: 40 INJECTION SUBCUTANEOUS at 21:23

## 2021-04-15 RX ADMIN — ACETAMINOPHEN 975 MG: 325 TABLET ORAL at 21:22

## 2021-04-15 RX ADMIN — FAMOTIDINE 20 MG: 20 TABLET ORAL at 08:11

## 2021-04-15 RX ADMIN — Medication 2000 UNITS: at 08:11

## 2021-04-15 RX ADMIN — LEVOTHYROXINE SODIUM 137 MCG: 25 TABLET ORAL at 06:48

## 2021-04-15 RX ADMIN — ACETAMINOPHEN 975 MG: 325 TABLET ORAL at 06:48

## 2021-04-15 RX ADMIN — CYANOCOBALAMIN TAB 500 MCG 1000 MCG: 500 TAB at 08:11

## 2021-04-15 RX ADMIN — FAMOTIDINE 20 MG: 20 TABLET ORAL at 17:22

## 2021-04-15 RX ADMIN — ATORVASTATIN CALCIUM 40 MG: 40 TABLET, FILM COATED ORAL at 21:22

## 2021-04-15 RX ADMIN — DOCUSATE SODIUM AND SENNOSIDES 1 TABLET: 8.6; 5 TABLET, FILM COATED ORAL at 21:22

## 2021-04-15 RX ADMIN — ENOXAPARIN SODIUM 40 MG: 40 INJECTION SUBCUTANEOUS at 08:11

## 2021-04-15 RX ADMIN — THIAMINE HCL TAB 100 MG 50 MG: 100 TAB at 08:11

## 2021-04-15 RX ADMIN — DEXAMETHASONE SODIUM PHOSPHATE 12.6 MG: 10 INJECTION, SOLUTION INTRAMUSCULAR; INTRAVENOUS at 05:00

## 2021-04-15 RX ADMIN — DEXAMETHASONE SODIUM PHOSPHATE 12.6 MG: 10 INJECTION, SOLUTION INTRAMUSCULAR; INTRAVENOUS at 17:22

## 2021-04-15 RX ADMIN — ACETAMINOPHEN 975 MG: 325 TABLET ORAL at 13:47

## 2021-04-15 NOTE — ASSESSMENT & PLAN NOTE
· Presented with increasing shortness of breath and cough  · Initial symptoms started on 03/29 and COVID-19 positive 03/30   · Patient initially started as moderate disease but then proceeded to severe disease  · CXR and CTA- evidence of extensive multifocal pneumonia  No evidence of PE  · Patient was initially on Decadron 6 milligram IV daily and currently on 0 1 milligram/kg b i d  dose  Day 7 of 10  · Patient completed 5 days of remdesivir  · Patient did not receive convalescent plasma as she was out of window  · Lovenox for DVT prophylaxis  · Patient was initially on doxycycline and ceftriaxone which were later discontinued as procalcitonin level was negative  · S/p Actemra 4/7/21  Continue bowel regimen  · Consider IV lasix  PO intake just improving today however  · HIV and hepatitis serology negative  · Inflammatory markers continued to improve  · Monitor Daily CBC, CMP  · Patient was educated and encouraged self proning, Increase activity and mobilization as tolerated, PT/OT as needed  Encourage incentive spirometry  · Pulmonary evaluation appreciated   · Continue oxygen to maintain O2 sats >/= 90%  · Patient to be discharged in a    And prednisone taper

## 2021-04-15 NOTE — PLAN OF CARE
Problem: Potential for Falls  Goal: Patient will remain free of falls  Description: INTERVENTIONS:  - Assess patient frequently for physical needs  -  Identify cognitive and physical deficits and behaviors that affect risk of falls    -  [place] fall precautions as indicated by assessment   - Educate patient/family on patient safety including physical limitations  - Instruct patient to call for assistance with activity based on assessment  - Modify environment to reduce risk of injury  - Consider OT/PT consult to assist with strengthening/mobility  Outcome: Progressing     Problem: RESPIRATORY - ADULT  Goal: Achieves optimal ventilation and oxygenation  Description: INTERVENTIONS:  - Assess for changes in respiratory status  - Assess for changes in mentation and behavior  - Position to facilitate oxygenation and minimize respiratory effort  - Oxygen administered by appropriate delivery if ordered  - Initiate smoking cessation education as indicated  - Encourage broncho-pulmonary hygiene including cough, deep breathe, Incentive Spirometry  - Assess the need for suctioning and aspirate as needed  - Assess and instruct to report SOB or any respiratory difficulty  - Respiratory Therapy support as indicated  Outcome: Progressing     Problem: Prexisting or High Potential for Compromised Skin Integrity  Goal: Skin integrity is maintained or improved  Description: INTERVENTIONS:  - Identify patients at risk for skin breakdown  - Assess and monitor skin integrity  - Assess and monitor nutrition and hydration status  - Monitor labs   - Assess for incontinence   - Turn and reposition patient  - Assist with mobility/ambulation  - Relieve pressure over bony prominences  - Avoid friction and shearing  - Provide appropriate hygiene as needed including keeping skin clean and dry  - Evaluate need for skin moisturizer/barrier cream  - Collaborate with interdisciplinary team   - Patient/family teaching  - Consider wound care consult   Outcome: Progressing     Problem: Nutrition/Hydration-ADULT  Goal: Nutrient/Hydration intake appropriate for improving, restoring or maintaining nutritional needs  Description: Monitor and assess patient's nutrition/hydration status for malnutrition  Collaborate with interdisciplinary team and initiate plan and interventions as ordered  Monitor patient's weight and dietary intake as ordered or per policy  Utilize nutrition screening tool and intervene as necessary  Determine patient's food preferences and provide high-protein, high-caloric foods as appropriate       INTERVENTIONS:  - Monitor oral intake, urinary output, labs, and treatment plans  - Assess nutrition and hydration status and recommend course of action  - Evaluate amount of meals eaten  - Assist patient with eating if necessary   - Allow adequate time for meals  - Recommend/ encourage appropriate diets, oral nutritional supplements, and vitamin/mineral supplements  - Order, calculate, and assess calorie counts as needed  - Recommend, monitor, and adjust tube feedings and TPN/PPN based on assessed needs  - Assess need for intravenous fluids  - Provide specific nutrition/hydration education as appropriate  - Include patient/family/caregiver in decisions related to nutrition  Outcome: Progressing

## 2021-04-15 NOTE — UTILIZATION REVIEW
Continued Stay Review    Date:4/15/21                           Current Patient Class: inaptient    Current Level of Care: acute gmf    Assessment/Plan:   COVID 19 Pneumonia with hypoxic respiratory failure,requiring oxygen 3L nc continue dyspnea with exertion diminished breath sounds ,desaturation to 83% with ambulation  Covid Treatment Plan:  -continue steroids  -continue VTE ppx  -continue pepcid  -wean 02 as hector  Home 02 eval today / tomorrow  -finish 10 days steroids      Vital Signs:   04/15/21 1253  --  --  --  --  --  83 %Abnormal   --  --  --  None (Room air)  --  --   04/15/21 0802  98 1 °F (36 7 °C)  71  19  92/52  67  97 %  32  3 L/min  3 L/min  High flow nasal cannula             Pertinent Labs/Diagnostic Results:       Results from last 7 days   Lab Units 04/15/21  0534 04/13/21  0452 04/12/21  0517 04/11/21  0617 04/10/21  0551 04/09/21  0550   WBC Thousand/uL 23 14* 19 40* 15 93* 14 48* 9 25 5 08   HEMOGLOBIN g/dL 11 3* 11 2* 11 3* 11 2* 10 4* 10 2*   HEMATOCRIT % 38 6 37 8 38 5 37 8 35 4 35 3   PLATELETS Thousands/uL 464* 500* 564* 584* 483* 498*   NEUTROS ABS Thousands/µL 20 65*  --   --   --  7 93* 4 16     Results from last 7 days   Lab Units 04/15/21  0534 04/13/21  0452 04/12/21  0517 04/11/21  0617 04/10/21  0551   SODIUM mmol/L 138 138 138 139 138   POTASSIUM mmol/L 3 9 3 6 3 7 3 6 3 7   CHLORIDE mmol/L 103 102 103 103 104   CO2 mmol/L 28 27 25 26 23   ANION GAP mmol/L 7 9 10 10 11   BUN mg/dL 15 15 16 16 11   CREATININE mg/dL 0 82 0 89 0 81 0 96 0 78   EGFR ml/min/1 73sq m 88 80 89 73 93   CALCIUM mg/dL 8 2* 8 3 8 3 8 5 8 7   MAGNESIUM mg/dL  --   --  2 5 2 4 2 3   PHOSPHORUS mg/dL  --   --   --  4 0 3 8     Results from last 7 days   Lab Units 04/15/21  0534 04/13/21  0452 04/09/21  0550   AST U/L 15 19 26   ALT U/L 36 33 18   ALK PHOS U/L 95 99 108   TOTAL PROTEIN g/dL 6 1* 6 1* 6 6   ALBUMIN g/dL 2 7* 2 7* 2 4*   TOTAL BILIRUBIN mg/dL 0 42 0 39 0 20     Results from last 7 days   Lab Units 04/15/21  0534 04/13/21  0452 04/12/21  0517 04/11/21  0617 04/10/21  0551 04/09/21  0550   GLUCOSE RANDOM mg/dL 106 121 114 123 133 126     Results from last 7 days   Lab Units 04/09/21  0550   D-DIMER QUANTITATIVE ug/ml FEU 0 81*     Results from last 7 days   Lab Units 04/09/21  0550   PROCALCITONIN ng/ml <0 05     Results from last 7 days   Lab Units 04/15/21  0534 04/11/21  0617 04/09/21  0550   CRP mg/L <0 5 13 2* 51 5*     Results from last 7 days   Lab Units 04/13/21  0452   TOTAL COUNTED  100       gmf  Pt ot  Contact airborne isolation  Medications:   Scheduled Medications:  acetaminophen, 975 mg, Oral, Q8H [facility]  atorvastatin, 40 mg, Oral, HS  calcium carbonate, 1 tablet, Oral, Daily With Lunch  cholecalciferol, 2,000 Units, Oral, Daily  cyanocobalamin, 1,000 mcg, Oral, Daily  dexamethasone, 0 1 mg/kg, Intravenous, Q12H  enoxaparin, 40 mg, Subcutaneous, Q12H KAYA  famotidine, 20 mg, Oral, BID  ferrous sulfate, 325 mg, Oral, Daily With Breakfast  levothyroxine, 137 mcg, Oral, Early Morning  multivitamin-minerals, 1 tablet, Oral, Daily  senna-docusate sodium, 1 tablet, Oral, HS  thiamine, 50 mg, Oral, Daily      Continuous IV Infusions:     PRN Meds:  albuterol, 2 puff, Inhalation, Q6H PRN  benzonatate, 100 mg, Oral, TID PRN  dextromethorphan-guaiFENesin, 10 mL, Oral, Q4H PRN  polyethylene glycol, 17 g, Oral, Daily PRN        Discharge Plan: tbd    Network Utilization Review Department  ATTENTION: Please call with any questions or concerns to [phone] and carefully listen to the prompts so that you are directed to the right person  All voicemails are confidential   Karlene Farfan all requests for admission clinical reviews, approved or denied determinations and any other requests to dedicated fax number below belonging to the campus where the patient is receiving treatment   List of dedicated fax numbers for the Facilities:  FACILITY NAME UR FAX NUMBER   ADMISSION DENIALS (Administrative/Medical Necessity) [facility] (Maternity/NICU/Pediatrics) [address] [place] [address],[address]  [phone]   Shaheen Chery 4492 (  Ailyn Dang "[place]" 103) [address] [place] [address] [facility] [phone]   [place] [address] [phone]

## 2021-04-15 NOTE — PROGRESS NOTES
Pt made aware of transfer to 3N  Verbalizes understanding  REPORT GIVEN TO 3n   fROM icu IN NO DISTRESS

## 2021-04-15 NOTE — RESPIRATORY THERAPY NOTE
Home Oxygen Qualifying Test       Patient name: Jennifer Campbell        : 1975   Date of Test:  April 15, 2021  Diagnosis:      Home Oxygen Test:    **Medicare Guidelines require item(s) 1-5 on all ambulatory patients or 1 and 2 on non-ambulatory patients  1   Baseline SPO2 on Room Air at rest 89 %  2   SPO2 during exercise on Room Air 83%  During exercise monitor SpO2  If SPO2 increases >=89% with ambulation do not add supplemental             oxygen  If <= 88% on room air add O2 via NC and titrate patient  Patient must be ambulated with O2 and titrated to > 88% with exertion  3   SPO2 on Oxygen at rest 93 % 1 lpm     4   SPO2 during exercise on Oxygen  90% a liter flow of 3 lpm     5   Exercise performed:          duration 6 (min)          [x]  Supplemental Home Oxygen is indicated  []  Client does not qualify for home oxygen  Respiratory Additional Notes- Patient required 3L of oxygen to maintain O2 sats above 89% while ambulating      [place], [place]

## 2021-04-15 NOTE — PROGRESS NOTES
Progress Note - Pulmonary   Ada Donya Umaña 39 y o  female MRN: [id number]  Unit/Bed#: [address] [place] [address]02 Encounter: [id number]  Code Status: Level 3 - DNAR and DNI    Porfirio Ludwig is a 39 y o  Past Medical History:   Diagnosis Date    Abscess of labia     Acute and chronic respiratory failure with hypoxia ([address] )     Anemia 4/6/2021    Anxiety     Bipolar disorder ([facility])     Breast lump     COVID-19     Frequent headaches     Hemorrhoids     Hypothyroidism 7/1/2013    Migraine     Morbid obesity (HCC) 7/1/2013    Obesity     Psychotic disorder ([facility])        Assessment/Plan:   * Pneumonia due to COVID-19 virus  Assessment & Plan  Covid Treatment Plan:  -continue steroids  -continue VTE ppx  -continue pepcid  -wean 02 as hector  Home 02 eval today / tomorrow  -finish 10 days steroids  -f/u in office 72-96 hrs s/p D/C     +COVID 19 Date :  3/30        Has Moderate > Improved to Mild Severity Range infection /  Currently on  3 > 1 5 L NC     Steroid day # 7   Remdesivir day #  Completed 4 /10   Abx #:  NA   Results from last 7 days   Lab Units 04/09/21  0550   PROCALCITONIN ng/ml <0 05     Vitamin Therapy  Pepcid therapy   VTE PPX: lovenox    Results from last 7 days   Lab Units 04/15/21  0534 04/11/21  0617 04/09/21  0550   D-DIMER QUANTITATIVE ug/ml FEU  --   --  0 81*   CRP mg/L <0 5 13 2* 51 5*       Screen for Convalescent Plasma: was out of window / did not receive plasma   Actemra:4/7     Patient is stable for D/C from a Pulmonary standpoint  Pulmonary Signing off  Please call with questions, concerns, or need for reevaluation  Acute respiratory failure with hypoxia (HCC)  Assessment & Plan  Continue wean of oxygen   Currently 96% 3 L   Weaned to 1 5 L NC today  Downward titrate as tolerated   Home 02 eval prior to d/c > tomorrow      _________________________________________________    Subjective: Pt seen and examined at bedside  Chief Complaint: "Am I doing better today?   Is my oxygen better today?"    Labs Reviewed: WBC elev  Imaging Reviewed: yes   Collaborative Discussion: case dw/ IM team  Tele Events:     Vitals:   Temp:  [97 5 °F (36 4 °C)-98 2 °F (36 8 °C)] 98 1 °F (36 7 °C)  HR:  [63-71] 71  Resp:  [18-20] 19  BP: ()/(50-74) 92/52  Weight (last 2 days)     None        Vitals:    21 2042 04/15/21 0000 04/15/21 0342 04/15/21 0802   BP:   105/74 92/52   BP Location:   Left arm    Pulse:   69 71   Resp:   20 19   Temp:  97 5 °F (36 4 °C) 97 8 °F (36 6 °C) 98 1 °F (36 7 °C)   TempSrc:  Temporal Temporal Oral   SpO2: 98%  96% 97%   Weight:       Height:         Temp (24hrs), Av 9 °F (36 6 °C), Min:97 5 °F (36 4 °C), Max:98 2 °F (36 8 °C)  Current: Temperature: 98 1 °F (36 7 °C)        SpO2: SpO2: 97 %, SpO2 Activity: SpO2 Activity: At Rest, SpO2 Device: O2 Device: High flow nasal cannula      IV Infusions:       Nutrition:        Diet Orders   (From admission, onward)             Start     Ordered    04/10/21 151  Diet Regular; Regular House  Diet effective now     Question Answer Comment   Diet Type Regular    Regular Regular House    RD to adjust diet per protocol? Yes        04/10/21 1519    21 1242  Dietary nutrition supplements  Once     Question Answer Comment   Select Supplement: Ensure Max - Milk Chocolate    Frequency Breakfast, Dinner        21 1241    21 171  Room Service  Once     Question:  Type of Service  Answer:  Room Service-Appropriate    21                Ins/Outs:   I/O       701 -  0700 701 - 04/15 0700 04/15 0701 -  0700    P  O  1920 200     I V  (mL/kg) 25 (0 2)      IV Piggyback 50      Total Intake(mL/kg)  (16 4) 200 (1 6)     Urine (mL/kg/hr)  (0 7) 300 (0 1)     Stool 0      Total Output  300     Net -5 -100            Unmeasured Urine Occurrence 1 x      Unmeasured Stool Occurrence 1 x            Lines/Drains:  Invasive Devices     Peripheral Intravenous Line            Peripheral IV 21 Left Hand 2 days                 Active medications:  Scheduled Meds:  Current Facility-Administered Medications   Medication Dose Route Frequency Provider Last Rate    acetaminophen  975 mg Oral [facility] Azul Umana MD      albuterol  2 puff Inhalation Q6H PRN Azul Umana MD      atorvastatin  40 mg Oral HS Azul Umana MD      benzonatate  100 mg Oral TID PRN Azul Umana MD      calcium carbonate  1 tablet Oral Daily With Apolinar Reyes MD      cholecalciferol  2,000 Units Oral Daily Azul Umana MD      cyanocobalamin  1,000 mcg Oral Daily Azul Umana MD      dexamethasone  0 1 mg/kg Intravenous Q12H Azul Umana MD 12 6 mg (04/15/21 0500)    dextromethorphan-guaiFENesin  10 mL Oral Q4H PRN Azul Umana MD      enoxaparin  40 mg Subcutaneous Q12H Brianna Bernal MD      famotidine  20 mg Oral BID Azul Umana MD      ferrous sulfate  325 mg Oral Daily With Breakfast Azul Umana MD      levothyroxine  137 mcg Oral Early Morning Azul Umana MD      multivitamin-minerals  1 tablet Oral Daily Azul Umana MD      polyethylene glycol  17 g Oral Daily PRN Azul Umana MD      senna-docusate sodium  1 tablet Oral HS Azul Umana MD      thiamine  50 mg Oral Daily Azul Umana MD       PRN Meds:albuterol, 2 puff, Q6H PRN  benzonatate, 100 mg, TID PRN  dextromethorphan-guaiFENesin, 10 mL, Q4H PRN  polyethylene glycol, 17 g, Daily PRN      ____________________________________________________________________    Physical Exam  Constitutional:       Appearance: She is well-developed  She is morbidly obese  HENT:      Head: Normocephalic and atraumatic  Eyes:      Conjunctiva/sclera: Conjunctivae normal       Pupils: Pupils are equal, round, and reactive to light  Neck:      Musculoskeletal: Normal range of motion and neck supple  Cardiovascular:      Rate and Rhythm: Normal rate and regular rhythm  Heart sounds: Normal heart sounds  Pulmonary:      Effort: Pulmonary effort is normal  No respiratory distress  Breath sounds: Normal breath sounds  No wheezing or rales  Comments: 96% on 3 L     94% on 1 5 L  Chest:      Chest wall: No tenderness  Abdominal:      General: Bowel sounds are normal       Palpations: Abdomen is soft  Musculoskeletal: Normal range of motion  Right lower leg: No edema  Left lower leg: No edema  Skin:     General: Skin is warm and dry  Neurological:      Mental Status: She is alert and oriented to person, place, and time         ____________________________________________________________________    Invasive/non-invasive ventilation settings   Respiratory    Lab Data (Last 4 hours)    None         O2/Vent Data (Last 4 hours)    None                Laboratory and Diagnostics:  Results from last 7 days   Lab Units 04/15/21  0534 04/13/21  0452 04/12/21  0517 04/11/21  0617 04/10/21  0551 04/09/21  0550   WBC Thousand/uL 23 14* 19 40* 15 93* 14 48* 9 25 5 08   HEMOGLOBIN g/dL 11 3* 11 2* 11 3* 11 2* 10 4* 10 2*   HEMATOCRIT % 38 6 37 8 38 5 37 8 35 4 35 3   PLATELETS Thousands/uL 464* 500* 564* 584* 483* 498*   NEUTROS PCT % 89*  --   --   --  86* 81*   MONOS PCT % 5  --   --   --  5 5   MONO PCT %  --  4  --   --   --   --      Results from last 7 days   Lab Units 04/15/21  0534 04/13/21  0452 04/12/21  0517 04/11/21  0617 04/10/21  0551 04/09/21  0550   SODIUM mmol/L 138 138 138 139 138 137   POTASSIUM mmol/L 3 9 3 6 3 7 3 6 3 7 3 9   CHLORIDE mmol/L 103 102 103 103 104 104   CO2 mmol/L 28 27 25 26 23 23   ANION GAP mmol/L 7 9 10 10 11 10   BUN mg/dL 15 15 16 16 11 11   CREATININE mg/dL 0 82 0 89 0 81 0 96 0 78 0 80   CALCIUM mg/dL 8 2* 8 3 8 3 8 5 8 7 8 5   GLUCOSE RANDOM mg/dL 106 121 114 123 133 126   ALT U/L 36 33  --   --   --  18   AST U/L 15 19  --   --   --  26   ALK PHOS U/L 95 99  --   --   --  108   ALBUMIN g/dL 2 7* 2 7*  --   --   --  2 4*   TOTAL BILIRUBIN mg/dL 0 42 0 39  --   -- --  0 20     Results from last 7 days   Lab Units 04/12/21  0517 04/11/21  0617 04/10/21  0551   MAGNESIUM mg/dL 2 5 2 4 2 3   PHOSPHORUS mg/dL  --  4 0 3 8                   ABG:    VBG:    Results from last 7 days   Lab Units 04/09/21  0550   PROCALCITONIN ng/ml <0 05       Micro  Results from last 7 days   Lab Units 04/08/21  1850   MRSA CULTURE ONLY  No Methicillin Resistant Staphlyococcus aureus (MRSA) isolated       Imaging:   XR chest portable   Final Result by Kiley Urias MD (04/12 1731)      Moderate bilateral groundglass opacity due to Covid 19, slightly improved from 4/7/2021  Workstation performed: [id number]             Micro:   Lab Results   Component Value Date    BLOODCX No Growth After 5 Days  04/06/2021    BLOODCX No Growth After 5 Days   04/06/2021    MRSACULTURE  04/08/2021     No Methicillin Resistant Staphlyococcus aureus (MRSA) isolated            Invalid input(s): Mando Melendez

## 2021-04-15 NOTE — ASSESSMENT & PLAN NOTE
· Secondary to COVID pneumonia  · Patient was up to 30 liters of oxygen  Patient currently stable 3 liters oxygen  · CTA of the chest on admission without any evidence of pulmonary embolism  · Continue treatment for COVID-19 pneumonia  · Continue to titrate down oxygen as tolerated    · Patient did have home O2 assessment-patient is needing 1 liter oxygen at rest and 3 liters on exertion

## 2021-04-15 NOTE — PHYSICAL THERAPY NOTE
PT TREATMENT     04/15/21 1000   Note Type   Note Type Treatment   Pain Assessment   Pain Assessment Tool Pain Assessment not indicated - pt denies pain   Restrictions/Precautions   Other Precautions Contact/isolation; Airborne/isolation; Fall Risk   General   Chart Reviewed Yes   Family/Caregiver Present No   Cognition   Arousal/Participation Cooperative   Subjective   Subjective patient tearful at times stating that she is happy to be alive   Bed Mobility   Supine to Sit 7  Independent   Transfers   Sit to Stand 5  Supervision   Stand to Sit 7  Independent   Additional Comments patient ambulated to bathroom for urination and demonstrated independence with hygiene, SOB on room air and SpO2 at 86%, quickly recovered to 96% on 3 liters O2   Ambulation/Elevation   Gait Assistance 4  Minimal assist   Additional items Assist x 1;Verbal cues; Tactile cues   Assistive Device   (none)   Distance 10 feet with unsteady gait, narrow base of support and excessively slow gait speed  Second gait with roller walker with supervision to independent, 40 feet with numerous changes in direction and no balance loss noted  Patient requesting roller walker for home   Exercises   Hip Flexion Sitting;20 reps;Bilateral   Knee AROM Long Arc Quad Sitting;20 reps;Bilateral   Ankle Pumps Sitting;20 reps;Bilateral   Marching Standing;20 reps;Bilateral   Balance training  side stepping and backward walking completed for balance and strengthening   Assessment   Assessment Patient cooperative and motivated to return home  Tolerated gait well with roller walker and will benefit from walker for home  Patient also encouraged to increase functional mobility throughout today in room with use of roller walker for safety with gait  The patient's AM-PAC Basic Mobility Inpatient Short Form Raw Score is 22, Standardized Score is 47 4  A standardized score greater than 42 9 suggests the patient may benefit from discharge to home   Please also refer to the recommendation of the Physical Therapist for safe discharge planning  Plan   Treatment/Interventions ADL retraining;Functional transfer training;LE strengthening/ROM; Therapeutic exercise; Endurance training;Patient/family training;Equipment eval/education;Gait training; Compensatory technique education   PT Frequency 5x/wk   Recommendation   PT Discharge Recommendation Home with home health rehabilitation   [address] [place] [address] Mobility Inpatient   Turning in Bed Without Bedrails 4   Lying on Back to Sitting on Edge of Flat Bed 4   Moving Bed to Chair 4   Standing Up From Chair 4   Walk in Room 3   Climb 3-5 Stairs 3   Basic Mobility Inpatient Raw Score 22   Basic Mobility Standardized Score 32 6   Licensure   [place] License Number  Bonnie PT [id number]

## 2021-04-15 NOTE — CASE MANAGEMENT
CM spoke with Dr Dev Jerome and Taylor Donis with PT  Anticipated dc is for tomorrow  Patient will most likely need home O2 at dc  Home O2 protocol was ordered and patient was seen  Awaiting documentation  Patient will also need HHS and a RW  CM called patient in her room and discussed the above  She is agreeable to all 3  She has no preference for which agency or DME company is used  A referral was sent to [facility] in [place] prior and they responded they could not take last week  CM messaged them back that patient is for dc tomorrow  Waiting to hear back on availability  Another referral was sent to [facility]  A referral/order for a RW was sent to [facility] in [place]  O2 will need to be ordered in the am     RW ordered-must deliver in the am     Patient says she will have a ride home with family at dc

## 2021-04-15 NOTE — PROGRESS NOTES
Stanislaw 47  Progress Note - Jared Umaña 1975, 39 y o  female MRN: [id number]  Unit/Bed#: [address] [place] [address] Encounter: [id number]  Primary Care Provider: THOMAS Babin   Date and time admitted to hospital: 4/7/2021 11:38 AM    Acute respiratory failure with hypoxia [facility])  Assessment & Plan  · Secondary to COVID pneumonia  · Patient was up to 30 liters of oxygen  Patient currently stable 3 liters oxygen  · CTA of the chest on admission without any evidence of pulmonary embolism  · Continue treatment for COVID-19 pneumonia  · Continue to titrate down oxygen as tolerated  · Patient did have home O2 assessment-patient is needing 1 liter oxygen at rest and 3 liters on exertion    * Pneumonia due to COVID-19 virus  Assessment & Plan  · Presented with increasing shortness of breath and cough  · Initial symptoms started on 03/29 and COVID-19 positive 03/30   · Patient initially started as moderate disease but then proceeded to severe disease  · CXR and CTA- evidence of extensive multifocal pneumonia  No evidence of PE  · Patient was initially on Decadron 6 milligram IV daily and currently on 0 1 milligram/kg b i d  dose  Day 7 of 10  · Patient completed 5 days of remdesivir  · Patient did not receive convalescent plasma as she was out of window  · Lovenox for DVT prophylaxis  · Patient was initially on doxycycline and ceftriaxone which were later discontinued as procalcitonin level was negative  · S/p Actemra 4/7/21  Continue bowel regimen  · Consider IV lasix  PO intake just improving today however  · HIV and hepatitis serology negative  · Inflammatory markers continued to improve  · Monitor Daily CBC, CMP  · Patient was educated and encouraged self proning, Increase activity and mobilization as tolerated, PT/OT as needed  Encourage incentive spirometry  · Pulmonary evaluation appreciated   · Continue oxygen to maintain O2 sats >/= 90%  · Patient to be discharged in a   And prednisone taper    Anemia  Assessment & Plan  · Microcytic, appears chronic   · Patient does reports some back stool but on iron supplementation  · Hemoglobin has been stable    Morbid obesity (HCC)  Assessment & Plan  · History of gastric bypass  · Dietary and lifestyle modification    Hypothyroidism  Assessment & Plan  · Continue levothyroxine 137 micrograms daily        VTE Pharmacologic Prophylaxis:   Pharmacologic: Enoxaparin (Lovenox)  Mechanical VTE Prophylaxis in Place: Yes    Patient Centered Rounds: I have performed bedside rounds with nursing staff today  Discussions with Specialists or Other Care Team Provider: Nguyễn Dorsey    Education and Discussions with Family / Patient: yes    Time Spent for Care: 45 minutes  More than 50% of total time spent on counseling and coordination of care as described above  Current Length of Stay: 8 day(s)    Current Patient Status: Inpatient   Certification Statement: The patient will continue to require additional inpatient hospital stay due to Acute respiratory failure and COVID-19 pneumonia    Discharge Plan:  Home    Code Status: Level 3 - DNAR and DNI      Subjective:   Patient complained of some chest tightness this morning  Occasional cough  Denies any shortness of breath abdominal pain, nausea vomiting  Patient is eating well  Objective:     Vitals:   Temp (24hrs), Av 7 °F (36 5 °C), Min:97 5 °F (36 4 °C), Max:98 1 °F (36 7 °C)    Temp:  [97 5 °F (36 4 °C)-98 1 °F (36 7 °C)] 97 5 °F (36 4 °C)  HR:  [57-71] 57  Resp:  [18-20] 18  BP: ()/(50-74) 98/55  SpO2:  [83 %-98 %] 95 %  Body mass index is 43 48 kg/m²  Input and Output Summary (last 24 hours): Intake/Output Summary (Last 24 hours) at 4/15/2021 1941  Last data filed at 4/15/2021 1759  Gross per 24 hour   Intake 840 ml   Output 300 ml   Net 540 ml       Physical Exam:     Physical Exam  Constitutional:       General: She is not in acute distress    HENT:      Head: Normocephalic and atraumatic  Nose: Nose normal    Eyes:      Conjunctiva/sclera: Conjunctivae normal       Pupils: Pupils are equal, round, and reactive to light  Neck:      Musculoskeletal: Normal range of motion and neck supple  Cardiovascular:      Rate and Rhythm: Normal rate and regular rhythm  Heart sounds: Normal heart sounds  Pulmonary:      Effort: Pulmonary effort is normal  No respiratory distress  Breath sounds: Normal breath sounds  No wheezing or rales  Abdominal:      General: Bowel sounds are normal  There is no distension  Palpations: Abdomen is soft  Tenderness: There is no abdominal tenderness  There is no guarding or rebound  Skin:     General: Skin is warm and dry  Findings: No rash  Neurological:      Mental Status: She is alert  Cranial Nerves: No cranial nerve deficit  Additional Data:     Labs:    Results from last 7 days   Lab Units 04/15/21  0534   WBC Thousand/uL 23 14*   HEMOGLOBIN g/dL 11 3*   HEMATOCRIT % 38 6   PLATELETS Thousands/uL 464*   NEUTROS PCT % 89*   LYMPHS PCT % 4*   MONOS PCT % 5   EOS PCT % 0     Results from last 7 days   Lab Units 04/15/21  0534   POTASSIUM mmol/L 3 9   CHLORIDE mmol/L 103   CO2 mmol/L 28   BUN mg/dL 15   CREATININE mg/dL 0 82   CALCIUM mg/dL 8 2*   ALK PHOS U/L 95   ALT U/L 36   AST U/L 15           * I Have Reviewed All Lab Data Listed Above  * Additional Pertinent Lab Tests Reviewed:  [facility]        Recent Cultures (last 7 days):           Last 24 Hours Medication List:   Current Facility-Administered Medications   Medication Dose Route Frequency Provider Last Rate    acetaminophen  975 mg Oral [facility] Lilly Bynum MD      albuterol  2 puff Inhalation Q6H PRN Lilly Bynum MD      atorvastatin  40 mg Oral HS Lilly Bynum MD      benzonatate  100 mg Oral TID PRN Lilly Bynum MD      calcium carbonate  1 tablet Oral Daily With [place] Vee García MD      cholecalciferol  2,000 Units Oral Daily Carlos Mann MD      cyanocobalamin  1,000 mcg Oral Daily Carlos Mann MD      dexamethasone  0 1 mg/kg Intravenous Q12H Carlos Mann MD 12 6 mg (04/15/21 2422)    dextromethorphan-guaiFENesin  10 mL Oral Q4H PRN Cralos Mann MD      enoxaparin  40 mg Subcutaneous Q12H Manjula Mallory MD      famotidine  20 mg Oral BID Carlos Mann MD      ferrous sulfate  325 mg Oral Daily With Breakfast Carlos Mann MD      levothyroxine  137 mcg Oral Early Morning Carlos Mann MD      multivitamin-minerals  1 tablet Oral Daily Carlos Mann MD      polyethylene glycol  17 g Oral Daily PRN Carlos Mann MD      senna-docusate sodium  1 tablet Oral HS Carlos Mann MD      thiamine  50 mg Oral Daily Carlos Mann MD          Today, Patient Was Seen By: Marcia Becerra MD    ** Please Note: Dictation voice to text software may have been used in the creation of this document   **

## 2021-04-16 ENCOUNTER — TELEPHONE (OUTPATIENT)
Dept: FAMILY MEDICINE CLINIC | Facility: CLINIC | Age: 46
End: 2021-04-16

## 2021-04-16 VITALS
RESPIRATION RATE: 19 BRPM | WEIGHT: 269.4 LBS | OXYGEN SATURATION: 99 % | TEMPERATURE: 98.3 F | HEART RATE: 68 BPM | SYSTOLIC BLOOD PRESSURE: 102 MMHG | DIASTOLIC BLOOD PRESSURE: 63 MMHG | HEIGHT: 66 IN | BODY MASS INDEX: 43.3 KG/M2

## 2021-04-16 LAB
ALBUMIN SERPL BCP-MCNC: 2.7 G/DL (ref 3.5–5)
ALP SERPL-CCNC: 95 U/L (ref 46–116)
ALT SERPL W P-5'-P-CCNC: 42 U/L (ref 12–78)
ANION GAP SERPL CALCULATED.3IONS-SCNC: 8 MMOL/L (ref 4–13)
AST SERPL W P-5'-P-CCNC: 23 U/L (ref 5–45)
BASOPHILS # BLD MANUAL: 0 THOUSAND/UL (ref 0–0.1)
BASOPHILS NFR MAR MANUAL: 0 % (ref 0–1)
BILIRUB SERPL-MCNC: 0.43 MG/DL (ref 0.2–1)
BUN SERPL-MCNC: 18 MG/DL (ref 5–25)
CALCIUM ALBUM COR SERPL-MCNC: 9.1 MG/DL (ref 8.3–10.1)
CALCIUM SERPL-MCNC: 8.1 MG/DL (ref 8.3–10.1)
CHLORIDE SERPL-SCNC: 102 MMOL/L (ref 100–108)
CO2 SERPL-SCNC: 26 MMOL/L (ref 21–32)
CREAT SERPL-MCNC: 0.88 MG/DL (ref 0.6–1.3)
D DIMER PPP FEU-MCNC: <0.27 UG/ML FEU
DME PARACHUTE DELIVERY DATE ACTUAL: NORMAL
DME PARACHUTE DELIVERY DATE EXPECTED: NORMAL
DME PARACHUTE DELIVERY DATE REQUESTED: NORMAL
DME PARACHUTE DELIVERY DATE REQUESTED: NORMAL
DME PARACHUTE ITEM DESCRIPTION: NORMAL
DME PARACHUTE ORDER STATUS: NORMAL
DME PARACHUTE ORDER STATUS: NORMAL
DME PARACHUTE SUPPLIER NAME: NORMAL
DME PARACHUTE SUPPLIER NAME: NORMAL
DME PARACHUTE SUPPLIER PHONE: NORMAL
DME PARACHUTE SUPPLIER PHONE: NORMAL
EOSINOPHIL # BLD MANUAL: 0 THOUSAND/UL (ref 0–0.4)
EOSINOPHIL NFR BLD MANUAL: 0 % (ref 0–6)
ERYTHROCYTE [DISTWIDTH] IN BLOOD BY AUTOMATED COUNT: 22.1 % (ref 11.6–15.1)
GFR SERPL CREATININE-BSD FRML MDRD: 81 ML/MIN/1.73SQ M
GLUCOSE SERPL-MCNC: 153 MG/DL (ref 65–140)
HCT VFR BLD AUTO: 37.3 % (ref 34.8–46.1)
HGB BLD-MCNC: 11 G/DL (ref 11.5–15.4)
LYMPHOCYTES # BLD AUTO: 1.05 THOUSAND/UL (ref 0.6–4.47)
LYMPHOCYTES # BLD AUTO: 5 % (ref 14–44)
MCH RBC QN AUTO: 21.9 PG (ref 26.8–34.3)
MCHC RBC AUTO-ENTMCNC: 29.5 G/DL (ref 31.4–37.4)
MCV RBC AUTO: 74 FL (ref 82–98)
MICROCYTES BLD QL AUTO: PRESENT
MONOCYTES # BLD AUTO: 0.42 THOUSAND/UL (ref 0–1.22)
MONOCYTES NFR BLD: 2 % (ref 4–12)
NEUTROPHILS # BLD MANUAL: 19.57 THOUSAND/UL (ref 1.85–7.62)
NEUTS BAND NFR BLD MANUAL: 2 % (ref 0–8)
NEUTS SEG NFR BLD AUTO: 91 % (ref 43–75)
NRBC BLD AUTO-RTO: 0 /100 WBCS
PLATELET # BLD AUTO: 517 THOUSANDS/UL (ref 149–390)
PLATELET BLD QL SMEAR: ABNORMAL
PMV BLD AUTO: 9.2 FL (ref 8.9–12.7)
POTASSIUM SERPL-SCNC: 3.5 MMOL/L (ref 3.5–5.3)
PROT SERPL-MCNC: 6 G/DL (ref 6.4–8.2)
RBC # BLD AUTO: 5.03 MILLION/UL (ref 3.81–5.12)
RBC MORPH BLD: PRESENT
SODIUM SERPL-SCNC: 136 MMOL/L (ref 136–145)
TOTAL CELLS COUNTED SPEC: 100
WBC # BLD AUTO: 21.04 THOUSAND/UL (ref 4.31–10.16)

## 2021-04-16 PROCEDURE — 85379 FIBRIN DEGRADATION QUANT: CPT | Performed by: INTERNAL MEDICINE

## 2021-04-16 PROCEDURE — 85027 COMPLETE CBC AUTOMATED: CPT | Performed by: INTERNAL MEDICINE

## 2021-04-16 PROCEDURE — 99239 HOSP IP/OBS DSCHRG MGMT >30: CPT | Performed by: INTERNAL MEDICINE

## 2021-04-16 PROCEDURE — 85007 BL SMEAR W/DIFF WBC COUNT: CPT | Performed by: INTERNAL MEDICINE

## 2021-04-16 PROCEDURE — 80053 COMPREHEN METABOLIC PANEL: CPT | Performed by: INTERNAL MEDICINE

## 2021-04-16 RX ORDER — PREDNISONE 10 MG/1
TABLET ORAL
Qty: 30 TABLET | Refills: 0 | Status: SHIPPED | OUTPATIENT
Start: 2021-04-16 | End: 2021-04-17 | Stop reason: SDUPTHER

## 2021-04-16 RX ADMIN — LEVOTHYROXINE SODIUM 137 MCG: 25 TABLET ORAL at 06:20

## 2021-04-16 RX ADMIN — Medication 1 TABLET: at 10:30

## 2021-04-16 RX ADMIN — DEXAMETHASONE SODIUM PHOSPHATE 12.6 MG: 10 INJECTION, SOLUTION INTRAMUSCULAR; INTRAVENOUS at 18:00

## 2021-04-16 RX ADMIN — CALCIUM 1 TABLET: 500 TABLET ORAL at 12:59

## 2021-04-16 RX ADMIN — FAMOTIDINE 20 MG: 20 TABLET ORAL at 10:30

## 2021-04-16 RX ADMIN — ENOXAPARIN SODIUM 40 MG: 40 INJECTION SUBCUTANEOUS at 10:30

## 2021-04-16 RX ADMIN — Medication 2000 UNITS: at 10:30

## 2021-04-16 RX ADMIN — ACETAMINOPHEN 975 MG: 325 TABLET ORAL at 06:23

## 2021-04-16 RX ADMIN — FAMOTIDINE 20 MG: 20 TABLET ORAL at 18:00

## 2021-04-16 RX ADMIN — DEXAMETHASONE SODIUM PHOSPHATE 12.6 MG: 10 INJECTION, SOLUTION INTRAMUSCULAR; INTRAVENOUS at 06:20

## 2021-04-16 RX ADMIN — ACETAMINOPHEN 975 MG: 325 TABLET ORAL at 13:30

## 2021-04-16 RX ADMIN — THIAMINE HCL TAB 100 MG 50 MG: 100 TAB at 10:30

## 2021-04-16 RX ADMIN — Medication 325 MG: at 08:30

## 2021-04-16 RX ADMIN — CYANOCOBALAMIN TAB 500 MCG 1000 MCG: 500 TAB at 10:30

## 2021-04-16 NOTE — ASSESSMENT & PLAN NOTE
· Secondary to COVID pneumonia  · Patient was up to 30 liters of oxygen  Patient currently stable 3 liters oxygen  · CTA of the chest on admission without any evidence of pulmonary embolism  · Continue treatment for COVID-19 pneumonia  · Continue to titrate down oxygen as tolerated  · Patient did have home O2 assessment-patient is needing 1 liter oxygen at rest and 3 liters on exertion  · Patient to be discharged with home O2

## 2021-04-16 NOTE — TELEPHONE ENCOUNTER
Needs [facility] paperwork end date 7/26/2021  Given to [facility] to sign

## 2021-04-16 NOTE — CASE MANAGEMENT
Patient will be discharged home on O2, RW and services w/[facility]  Per Kait Villalta has accepted patient  Orders were sent to Dr Liz Rangel for home O2 and a RW  To be delivered at bedside  Patient will have a ride home w family at [facility] to follow

## 2021-04-16 NOTE — CASE MANAGEMENT
Patient is written for discharge  Home O2 delivery is scheduled for 7 PM tonight  Patient and her nurse were made aware  Patient will have a ride home with her friend Yassine Donavon [phone] at [facility] called Yassine Raphael and left a message regarding O2 delivery

## 2021-04-16 NOTE — DISCHARGE INSTRUCTIONS

## 2021-04-16 NOTE — ASSESSMENT & PLAN NOTE
· Presented with increasing shortness of breath and cough  · Initial symptoms started on 03/29 and COVID-19 positive 03/30   · Patient initially started as moderate disease but then proceeded to severe disease  · CXR and CTA- evidence of extensive multifocal pneumonia  No evidence of PE  · Patient was initially on Decadron 6 milligram IV daily and currently on 0 1 milligram/kg b i d  dose  Day 8 of 10  Patient to be discharged on prednisone taper  · Patient completed 5 days of remdesivir  · Patient did not receive convalescent plasma as she was out of window  · Patient was on Lovenox for DVT prophylaxis  Patient has improved score was 2 with D-dimer less than 2  Patient will not need anticoagulation upon discharge  · Patient was initially on doxycycline and ceftriaxone which were later discontinued as procalcitonin level was negative  · S/p Actemra 4/7/21  Continue bowel regimen  · Consider IV lasix  PO intake just improving today however  · HIV and hepatitis serology negative  · Inflammatory markers improved significantly  · Patient was educated and encouraged self proning, Increase activity and mobilization as tolerated, PT/OT as needed    Encourage incentive spirometry  · Pulmonary evaluation appreciated   · Continue oxygen to maintain O2 sats >/= 90%

## 2021-04-16 NOTE — DISCHARGE SUMMARY
Stanislaw 47  [facility] 1975[facility] 39 [facility]  [facility] MRN: [id number]  Unit/Bed#: [address] [place] [address] Encounter: [id number]  Primary Care Provider: THOMAS Hernández   Date and time admitted to hospital: 4/7/2021 11:38 AM    Acute respiratory failure with hypoxia [facility])  Assessment & Plan  · Secondary to COVID pneumonia  · Patient was up to 30 liters of oxygen  Patient currently stable 3 liters oxygen  · CTA of the chest on admission without any evidence of pulmonary embolism  · Continue treatment for COVID-19 pneumonia  · Continue to titrate down oxygen as tolerated  · Patient did have home O2 assessment-patient is needing 1 liter oxygen at rest and 3 liters on exertion  · Patient to be discharged with home O2  * Pneumonia due to COVID-19 virus  Assessment & Plan  · Presented with increasing shortness of breath and cough  · Initial symptoms started on 03/29 and COVID-19 positive 03/30   · Patient initially started as moderate disease but then proceeded to severe disease  · CXR and CTA- evidence of extensive multifocal pneumonia  No evidence of PE  · Patient was initially on Decadron 6 milligram IV daily and currently on 0 1 milligram/kg b i d  dose  Day 8 of 10  Patient to be discharged on prednisone taper  · Patient completed 5 days of remdesivir  · Patient did not receive convalescent plasma as she was out of window  · Patient was on Lovenox for DVT prophylaxis  Patient has improved score was 2 with D-dimer less than 2  Patient will not need anticoagulation upon discharge  · Patient was initially on doxycycline and ceftriaxone which were later discontinued as procalcitonin level was negative  · S/p Actemra 4/7/21  Continue bowel regimen  · Consider IV lasix  PO intake just improving today however  · HIV and hepatitis serology negative    · Inflammatory markers improved significantly  · Patient was educated and encouraged self proning, Increase activity and mobilization as tolerated, PT/OT as needed  Encourage incentive spirometry  · Pulmonary evaluation appreciated   · Continue oxygen to maintain O2 sats >/= 90%    Anemia  Assessment & Plan  · Microcytic, appears chronic   · Patient does reports some back stool but on iron supplementation  · Hemoglobin has been stable    Morbid obesity (HCC)  Assessment & Plan  · History of gastric bypass  · Dietary and lifestyle modification    Hypothyroidism  Assessment & Plan  · Continue levothyroxine 137 micrograms daily      Discharging Physician / Practitioner: Bren Ruth MD  PCP: THOMAS Hooper  Admission Date:   Admission Orders (From admission, onward)     Ordered        04/07/21 1204  Inpatient Admission  Once                   Discharge Date: 04/16/21    Resolved Problems  Date Reviewed: 4/16/2021          Resolved    SIRS (systemic inflammatory response syndrome) ([facility] [address] ) 4/10/2021     Resolved by  Marcello Eddy PA-C          Consultations During Hospital Stay:  · Pulmonary/critical care    Outpatient Tests Requested:  · Follow-up with PCP in 3 days    Complications:  None    Reason for Admission:  Shortness of breath    Hospital Course:     Viraj Davis is a 39 y o  female patient with past medical history morbid obesity, hypothyroidism, gastric bypass, anemia, dyslipidemia who originally presented to the hospital on 4/7/2021 due to worsening shortness of breath  Patient was diagnosed with COVID on March 30, 2021  Patient was evaluated and seen in [facility] where patient was having increasing oxygen requirement and patient was transferred to [facility]   Patient was initially treated as moderate pathway with Decadron 6 milligram IV daily, remdesivir, Lovenox  Patient also antibiotics which were later discontinued  Later patient noted to have worsening oxygen requirements and patient went up to 30 liters on high-flow oxygen  Patient also received Actemra    Later steroid dose was increased  Patient slowly started to improve with improving inflammatory markers  Patient continued to improve and had home O2 assessment  Patient qualified for oxygen and patient will be discharged home with outpatient follow-up with PCP        Please see above list of diagnoses and related plan for additional information  Condition at Discharge: stable     Discharge Day Visit / Exam:     Subjective:  Patient is feeling better  Denies any chest pain, shortness of breath, abdominal pain, nausea vomiting  Patient did have a bowel movement  Vitals: Blood Pressure: 102/63 (04/16/21 0715)  Pulse: 68 (04/16/21 0715)  Temperature: 98 3 °F (36 8 °C) (04/16/21 0715)  Temp Source: Oral (04/16/21 0715)  Respirations: 19 (04/16/21 0715)  Height: 5' 6" (167 6 cm) (04/07/21 1230)  Weight - Scale: 122 kg (269 lb 6 4 oz) (04/08/21 1836)  SpO2: 99 % (04/16/21 0715)  Exam:   Physical Exam  Constitutional:       Appearance: Normal appearance  HENT:      Head: Normocephalic and atraumatic  Nose: Nose normal       Mouth/Throat:      Mouth: Mucous membranes are moist       Pharynx: Oropharynx is clear  Eyes:      Extraocular Movements: Extraocular movements intact  Pupils: Pupils are equal, round, and reactive to light  Neck:      Musculoskeletal: Normal range of motion and neck supple  Cardiovascular:      Rate and Rhythm: Normal rate and regular rhythm  Pulmonary:      Effort: Pulmonary effort is normal       Breath sounds: Normal breath sounds  Abdominal:      General: Bowel sounds are normal  There is no distension  Palpations: Abdomen is soft  Tenderness: There is no abdominal tenderness  Musculoskeletal:         General: No swelling  Skin:     General: Skin is warm and dry  Neurological:      General: No focal deficit present  Mental Status: She is alert           Discharge instructions/Information to patient and family:   See after visit summary for information provided to patient and family  Provisions for Follow-Up Care:  See after visit summary for information related to follow-up care and any pertinent home health orders  Disposition:     Home with [facility] Services (Reminder: Complete face to face encounter)        Planned Readmission: No     Discharge Statement:  I spent 40minutes discharging the patient  This time was spent on the day of discharge  I had direct contact with the patient on the day of discharge  Greater than 50% of the total time was spent examining patient, answering all patient questions, arranging and discussing plan of care with patient as well as directly providing post-discharge instructions  Additional time then spent on discharge activities  Discharge Medications:  See after visit summary for reconciled discharge medications provided to patient and family        ** Please Note: This note has been constructed using a voice recognition system **

## 2021-04-16 NOTE — PLAN OF CARE
Problem: Potential for Falls  Goal: Patient will remain free of falls  Description: INTERVENTIONS:  - Assess patient frequently for physical needs  -  Identify cognitive and physical deficits and behaviors that affect risk of falls    -  Anderson fall precautions as indicated by assessment   - Educate patient/family on patient safety including physical limitations  - Instruct patient to call for assistance with activity based on assessment  - Modify environment to reduce risk of injury  - Consider OT/PT consult to assist with strengthening/mobility  Outcome: Progressing     Problem: RESPIRATORY - ADULT  Goal: Achieves optimal ventilation and oxygenation  Description: INTERVENTIONS:  - Assess for changes in respiratory status  - Assess for changes in mentation and behavior  - Position to facilitate oxygenation and minimize respiratory effort  - Oxygen administered by appropriate delivery if ordered  - Initiate smoking cessation education as indicated  - Encourage broncho-pulmonary hygiene including cough, deep breathe, Incentive Spirometry  - Assess the need for suctioning and aspirate as needed  - Assess and instruct to report SOB or any respiratory difficulty  - Respiratory Therapy support as indicated  Outcome: Progressing     Problem: Prexisting or High Potential for Compromised Skin Integrity  Goal: Skin integrity is maintained or improved  Description: INTERVENTIONS:  - Identify patients at risk for skin breakdown  - Assess and monitor skin integrity  - Assess and monitor nutrition and hydration status  - Monitor labs   - Assess for incontinence   - Turn and reposition patient  - Assist with mobility/ambulation  - Relieve pressure over bony prominences  - Avoid friction and shearing  - Provide appropriate hygiene as needed including keeping skin clean and dry  - Evaluate need for skin moisturizer/barrier cream  - Collaborate with interdisciplinary team   - Patient/family teaching  - Consider wound care consult   Outcome: Progressing     Problem: Nutrition/Hydration-ADULT  Goal: Nutrient/Hydration intake appropriate for improving, restoring or maintaining nutritional needs  Description: Monitor and assess patient's nutrition/hydration status for malnutrition  Collaborate with interdisciplinary team and initiate plan and interventions as ordered  Monitor patient's weight and dietary intake as ordered or per policy  Utilize nutrition screening tool and intervene as necessary  Determine patient's food preferences and provide high-protein, high-caloric foods as appropriate       INTERVENTIONS:  - Monitor oral intake, urinary output, labs, and treatment plans  - Assess nutrition and hydration status and recommend course of action  - Evaluate amount of meals eaten  - Assist patient with eating if necessary   - Allow adequate time for meals  - Recommend/ encourage appropriate diets, oral nutritional supplements, and vitamin/mineral supplements  - Order, calculate, and assess calorie counts as needed  - Recommend, monitor, and adjust tube feedings and TPN/PPN based on assessed needs  - Assess need for intravenous fluids  - Provide specific nutrition/hydration education as appropriate  - Include patient/family/caregiver in decisions related to nutrition  Outcome: Progressing

## 2021-04-17 RX ORDER — PREDNISONE 10 MG/1
TABLET ORAL
Qty: 30 TABLET | Refills: 0 | Status: SHIPPED | OUTPATIENT
Start: 2021-04-17 | End: 2021-04-29

## 2021-04-17 NOTE — NURSING NOTE
Discharge instructions reviewed with patient using teach back method  Education provided on use of portable O2 tank  Information given on how to access medication records  IV access removed  All personal belongings taken with patient

## 2021-04-17 NOTE — NURSING NOTE
Patient discharged from the floor with her belongings  Transferred via wheel chair accompanied by RN  Oxygen at 3 LPM in place   Discharge instructions reviewed with day shift nurse

## 2021-04-19 NOTE — UTILIZATION REVIEW
Notification of Discharge   This is a Notification of Discharge from our facility [address]  Please be advised that this patient has been discharge from our facility  Below you will find the admission and discharge date and time including the patients disposition  UTILIZATION REVIEW CONTACT:  Bennett Baton  Utilization   Network Utilization Review Department  Phone: [phone] x carefully listen to the prompts  All voicemails are confidential   Email: [email or web address]     PHYSICIAN ADVISORY SERVICES:  FOR [id number] REVIEW - MEDICAL NECESSITY DENIAL  Phone: [phone]  Fax: [phone]  Email: [email or web address]     PRESENTATION DATE: 4/7/2021 11:38 AM  OBERVATION ADMISSION DATE:   CHANGE FROM OBSERVATION TO INPATIENT: [unfilled]   INPATIENT ADMISSION DATE: 4/7/21 1138   DISCHARGE DATE: 4/16/2021  8:21 PM  DISPOSITION: Home with [place] with [address] INFORMATION:  Send all requests for admission clinical reviews, approved or denied determinations and any other requests to dedicated fax number below belonging to the campus where the patient is receiving treatment   List of dedicated fax numbers:  [address] [place] [address] DENIALS (Administrative/Medical Necessity) [phone]   [address]  (Maternity/NICU/Pediatrics) [phone]   Sirena Handing [phone]   Lul Sheppard [phone]   Brandy Munroe [phone]   Naomi Crook [facility] [address] [facility] [phone]   [facility]  [phone]   [address] [place] [address], [address]  [address] [facility] [address] [facility] [phone]

## 2021-04-21 ENCOUNTER — TELEPHONE (OUTPATIENT)
Dept: FAMILY MEDICINE CLINIC | Facility: CLINIC | Age: 46
End: 2021-04-21

## 2021-04-21 NOTE — TELEPHONE ENCOUNTER
He did get a call from the visiting nurse who have been in [facility]  She did try to get into her house yesterday or day before and states that she never answered the door  The door was open  She did discuss this with they did, that if she is having services she needs to be there and available to open the door  That she cannot be out because she is homebound at this point which is what qualifies her for the VNA  She states several issues  800 was requesting whether she could go to the pharmacy and  an over-the-counter diuretic  She states that she is having swelling in her feet  She also states that she is getting short of breath and is afraid to go to bed at night because she is afraid that she is going to wake up and not be able to breathe  After talking to the nurse, she states that the is trace to a +1 edema but she does not feel it is anything terrible  She also states that the patient's lung sounds are very good  She was initially on 4 L of oxygen and satting at 98%  She did drop her down to 2 L while she was there and sats remained at above 94  She does not feel she needs it quite that high  When she did get up to weigh her, she notes noted that she did have rapid breathing at that point  She is not quite sure whether it was we will anxiety or an act  She states that when she redirected her with conversation the breathing changed and went back to normal   The sats did not drop at that point  At no time while she was in the house did she or her sats drop below 92  The nurse did explain to her about oxygen levels and the lungs and how COVID reacts in those and explaining how important it is refer her to keep moving and taking deep breaths  She also stated that she is concerned that maybe she should be on a blood thinner  She states that she has heard that everybody should be on the blood thinner if they have had COVID    The nurse explained to her about her D-dimer and how was in normal range when she was discharged and they did not feel it was necessary to do so  She is agreeable to setting up a 2 week appointment with me which we will do so  At that point we will discuss her anxiety in all the other issues of concern  She did already discuss with her the low-sodium diet and elevating her legs  She is due to have physical therapy come out also  She is requesting to have  come out and help her with finances could she is concerned about the cost of all this

## 2021-04-22 ENCOUNTER — DOCUMENTATION (OUTPATIENT)
Dept: SOCIAL WORK | Facility: HOSPITAL | Age: 46
End: 2021-04-22

## 2021-04-22 NOTE — PROGRESS NOTES
Pt was seen for home physical therapy on 04/22/21 for home PT assessment and evaluation  Pts POC starting week of 04/25/21  2wk2 and 1wk2  Pts short term goals are to feel better  to get stronger  to catch her breath  to be able to enjoy life like before  to not think about her breath all of the time  to dec nervousness and anxiety  to get back to work and to be normal again  Pt needs minAx1 for gait training and CG for transfer training  Pt fatigues and becomes very SOB and weak during limited mobility  Pt able to ambulate 25 feet household distances with inc fatigue  weakness  dec endurance  dec activity tolerance  labored breathing  inc coughing and SOB  Pt currently using 2 L NC home O2 and SpO2 WNL of 95% and post mobility and activity  Pt expresses inc anxiety and nervousness recently secondary to being sick and not being able to breath  Pts daugther asked about being able to speak with a doctor that specializes in psychology and behavior in order for the pt to be taken care of and to talk to someone  Education and instruction to pt and pts daughter to speak with PCP concerning anxiety and nervousness and they would be able to make appropriate recommendations  Pt and pts daughter agreed  Pt would cont to benefit from skilled home PT services to maximize functional independent  dec caregiver burden and to A pt to achieve short term goals  Pt is currently declining recommendation for home OT services      Thank you,  Emily BOOTHT

## 2021-04-27 ENCOUNTER — TELEPHONE (OUTPATIENT)
Dept: FAMILY MEDICINE CLINIC | Facility: CLINIC | Age: 46
End: 2021-04-27

## 2021-04-27 NOTE — TELEPHONE ENCOUNTER
Advise due to symptoms to call 911 and go to the hospital   She had talked wit nurse prior and she suggested ER   She is concerned with blood clot and I advised her a home blood draw will not answer that question    Any concern with this needs ER eval

## 2021-04-27 NOTE — TELEPHONE ENCOUNTER
Patient is  S/P COVID   PT spoke with patient said patient sounded bad PT then called Neo Dan [facility] nurse) who said Chay Motta is SOB Neo Dan called patient  Chay Motta states she is weak, SOB, 94% O2, dyspneic, complaining of bilateral lung pain, right flank pain, abdominal pain  She has a lot of anxiety from fear of being intubated   Neo Dan is scheduled to see patient tomorrow in her home [address] Neo Dan suggested Ada call 911 she refused   She ask Neo Dan to call Allyson Gloria thinks she has some sort of infection or a PE   Chay Motta is requesting blood work to be drawn by [facility]   [phone]

## 2021-04-28 ENCOUNTER — TELEPHONE (OUTPATIENT)
Dept: FAMILY MEDICINE CLINIC | Facility: CLINIC | Age: 46
End: 2021-04-28

## 2021-04-28 DIAGNOSIS — R35.0 URINARY FREQUENCY: Primary | ICD-10-CM

## 2021-04-28 PROCEDURE — 81001 URINALYSIS AUTO W/SCOPE: CPT | Performed by: NURSE PRACTITIONER

## 2021-04-28 PROCEDURE — 81003 URINALYSIS AUTO W/O SCOPE: CPT | Performed by: NURSE PRACTITIONER

## 2021-04-28 NOTE — TELEPHONE ENCOUNTER
Talked with Silvino Britton   Was in ER yesterday   Good air exchange     Sats ok   Urine ordered   Having anxiety

## 2021-04-28 NOTE — TELEPHONE ENCOUNTER
Blanca Finnegan visiting nurse  At patient's home now   Needs O2   95%   On 2 1/2 liters    18 at rest  Coughing talking  High 20's   Afebrile   Heart rate 102   pressure  Low 100's  Over 70   Doreen calling because Ada   Pain lower back   Feels like she can't empty bladder  Goes frequently but only a little bit    puritis on breast rash   No new detergent  No new foods    In no distress   but when coughs gets distressed and anxious    Chandler Valdes [phone] PA Initiation    Medication: azathioprine, prednisone  Insurance Company: [employer] - Phone [phone] Fax [phone]  Pharmacy Filling the Rx:    Filling Pharmacy Phone:    Filling Pharmacy Fax:    Start Date: 2/19/2019    [facility] Authorization Team   Phone: [phone]  Fax: [phone]

## 2021-04-29 ENCOUNTER — LAB REQUISITION (OUTPATIENT)
Dept: LAB | Facility: HOSPITAL | Age: 46
End: 2021-04-29
Payer: COMMERCIAL

## 2021-04-29 DIAGNOSIS — N39.0 URINARY TRACT INFECTION, SITE NOT SPECIFIED: ICD-10-CM

## 2021-04-29 LAB
BACTERIA UR QL AUTO: ABNORMAL /HPF
BILIRUB UR QL STRIP: NEGATIVE
CLARITY UR: CLEAR
COLOR UR: ABNORMAL
GLUCOSE UR STRIP-MCNC: NEGATIVE MG/DL
HGB UR QL STRIP.AUTO: NEGATIVE
HYALINE CASTS #/AREA URNS LPF: ABNORMAL /LPF
KETONES UR STRIP-MCNC: NEGATIVE MG/DL
LEUKOCYTE ESTERASE UR QL STRIP: 25
MUCOUS THREADS UR QL AUTO: ABNORMAL
NITRITE UR QL STRIP: NEGATIVE
NON-SQ EPI CELLS URNS QL MICRO: ABNORMAL /HPF
PH UR STRIP.AUTO: 5 [PH]
PROT UR STRIP-MCNC: ABNORMAL MG/DL
RBC #/AREA URNS AUTO: ABNORMAL /HPF
SP GR UR STRIP.AUTO: 1.02 (ref 1–1.04)
UROBILINOGEN UA: NEGATIVE MG/DL
WBC #/AREA URNS AUTO: ABNORMAL /HPF

## 2021-05-07 ENCOUNTER — TELEMEDICINE (OUTPATIENT)
Dept: FAMILY MEDICINE CLINIC | Facility: CLINIC | Age: 46
End: 2021-05-07
Payer: COMMERCIAL

## 2021-05-07 ENCOUNTER — TELEPHONE (OUTPATIENT)
Dept: FAMILY MEDICINE CLINIC | Facility: CLINIC | Age: 46
End: 2021-05-07

## 2021-05-07 VITALS
RESPIRATION RATE: 18 BRPM | SYSTOLIC BLOOD PRESSURE: 118 MMHG | DIASTOLIC BLOOD PRESSURE: 82 MMHG | HEIGHT: 67 IN | HEART RATE: 88 BPM | WEIGHT: 272 LBS | OXYGEN SATURATION: 98 % | BODY MASS INDEX: 42.69 KG/M2 | TEMPERATURE: 97 F

## 2021-05-07 DIAGNOSIS — F32.1 CURRENT MODERATE EPISODE OF MAJOR DEPRESSIVE DISORDER, UNSPECIFIED WHETHER RECURRENT (HCC): ICD-10-CM

## 2021-05-07 DIAGNOSIS — R53.82 CHRONIC FATIGUE: ICD-10-CM

## 2021-05-07 DIAGNOSIS — R00.2 PALPITATIONS: ICD-10-CM

## 2021-05-07 DIAGNOSIS — Z86.16 PERSONAL HISTORY OF COVID-19: ICD-10-CM

## 2021-05-07 DIAGNOSIS — E66.01 MORBID OBESITY WITH BMI OF 40.0-44.9, ADULT (HCC): Primary | ICD-10-CM

## 2021-05-07 DIAGNOSIS — R21 RASH: ICD-10-CM

## 2021-05-07 DIAGNOSIS — F41.9 ANXIETY: ICD-10-CM

## 2021-05-07 DIAGNOSIS — R06.02 SHORTNESS OF BREATH: ICD-10-CM

## 2021-05-07 DIAGNOSIS — U09.9 POST-COVID SYNDROME: ICD-10-CM

## 2021-05-07 DIAGNOSIS — G47.01 INSOMNIA DUE TO MEDICAL CONDITION: ICD-10-CM

## 2021-05-07 PROCEDURE — 3725F SCREEN DEPRESSION PERFORMED: CPT | Performed by: NURSE PRACTITIONER

## 2021-05-07 PROCEDURE — 99214 OFFICE O/P EST MOD 30 MIN: CPT | Performed by: NURSE PRACTITIONER

## 2021-05-07 RX ORDER — TRAZODONE HYDROCHLORIDE 50 MG/1
50 TABLET ORAL
Qty: 30 TABLET | Refills: 2 | Status: SHIPPED | OUTPATIENT
Start: 2021-05-07 | End: 2021-07-21 | Stop reason: ALTCHOICE

## 2021-05-07 RX ORDER — ESCITALOPRAM OXALATE 10 MG/1
10 TABLET ORAL DAILY
Qty: 30 TABLET | Refills: 3 | Status: SHIPPED | OUTPATIENT
Start: 2021-05-07 | End: 2021-07-21 | Stop reason: SDUPTHER

## 2021-05-07 NOTE — PROGRESS NOTES
Assessment/Plan:         Diagnoses and all orders for this visit:    Morbid obesity with BMI of 40 0-44 9, adult (HCC)    Rash  -     triamcinolone (KENALOG) 0 1 % ointment; Apply topically 2 (two) times a day    Post-COVID syndrome  -     escitalopram (LEXAPRO) 10 mg tablet; Take 1 tablet (10 mg total) by mouth daily  -     traZODone (DESYREL) 50 mg tablet; Take 1 tablet (50 mg total) by mouth daily at bedtime  -     NT-BNP PRO; Future  -     D-dimer, quantitative; Future  -     C-reactive protein (CRP); Future  -     Ambulatory referral to Pulmonology; Future  -     Ambulatory referral to Cardiology; Future    Anxiety  -     escitalopram (LEXAPRO) 10 mg tablet; Take 1 tablet (10 mg total) by mouth daily  -     traZODone (DESYREL) 50 mg tablet; Take 1 tablet (50 mg total) by mouth daily at bedtime    Current moderate episode of major depressive disorder, unspecified whether recurrent (HCC)  -     escitalopram (LEXAPRO) 10 mg tablet; Take 1 tablet (10 mg total) by mouth daily  -     traZODone (DESYREL) 50 mg tablet; Take 1 tablet (50 mg total) by mouth daily at bedtime    Palpitations  -     Ambulatory referral to Cardiology; Future    Personal history of COVID-19  -     CBC and differential; Future    Shortness of breath  -     CBC and differential; Future  -     Basic metabolic panel; Future  -     NT-BNP PRO; Future  -     D-dimer, quantitative; Future  -     C-reactive protein (CRP); Future  -     Ambulatory referral to Pulmonology; Future    Chronic fatigue  -     Basic metabolic panel; Future    Insomnia due to medical condition          Subjective:      Patient ID: Cherie Webster is a 39 y o  female      HPI  Here for follow up on covid    Having issues and has home care and PT     Having issues with breathing and anxiety      Has rash on chest and back    Itchy rash    Using robitussin for cough and inhaler    ON 3rd bottle   Explained thought that the dry cough from the covid    Advised that I doubt that the robitussin will cure this   Needs pulmonary eval   Due to having the testing while admitted in hospital will send her the cardiologist and the pulmonologist because she is having trouble with getting around and to go for multiple tests would be very difficult           The following portions of the patient's history were reviewed and updated as appropriate: allergies, current medications, past family history, past medical history, past social history, past surgical history and problem list     Review of Systems   Constitutional: Positive for activity change, appetite change and fatigue  Negative for chills and fever  HENT: Positive for congestion  Negative for sneezing and sore throat  Respiratory: Positive for cough, chest tightness, shortness of breath and wheezing  Cardiovascular: Positive for palpitations  Negative for chest pain and leg swelling  Gastrointestinal: Positive for constipation  Negative for abdominal pain, diarrhea and nausea  Genitourinary: Negative for frequency and urgency  Neurological: Positive for dizziness and light-headedness  Negative for headaches  Psychiatric/Behavioral: The patient is nervous/anxious  Objective:  Vitals:    05/07/21 1007   BP: 118/82   BP Location: Left arm   Patient Position: Sitting   Cuff Size: Large   Pulse: 88   Resp: 18   Temp: (!) 97 °F (36 1 °C)   TempSrc: Tympanic   SpO2: 98%   Weight: 123 kg (272 lb)   Height: 5' 7" (1 702 m)      Physical Exam  Vitals signs reviewed  Constitutional:       Appearance: She is obese  She is ill-appearing  HENT:      Right Ear: Tympanic membrane, ear canal and external ear normal       Left Ear: Tympanic membrane, ear canal and external ear normal    Eyes:      Conjunctiva/sclera: Conjunctivae normal    Pulmonary:      Effort: Tachypnea present        Comments: Rapid breathing when she talks   Has oxygen on   3L/M  Sats ok     Abdominal:      General: Bowel sounds are normal    Skin: Neurological:      Mental Status: She is alert  BMI Counseling: Body mass index is 42 6 kg/m²  The BMI is above normal  Nutrition recommendations include reducing portion sizes, decreasing overall calorie intake, 3-5 servings of fruits/vegetables daily, reducing fast food intake, consuming healthier snacks, decreasing soda and/or juice intake, moderation in carbohydrate intake, increasing intake of lean protein, reducing intake of saturated fat and trans fat and reducing intake of cholesterol  Exercise recommendations include moderate aerobic physical activity for 150 minutes/week, exercising 3-5 times per week and strength training exercises

## 2021-05-07 NOTE — TELEPHONE ENCOUNTER
----- Message from Bjorn Daily, [address] sent at 5/7/2021 12:06 PM EDT -----  Call and advise I was wrong about the [facility] clinic   I am sending her to cardiology and pulmonology   Once they have been done I can send her to outpt PT for the covid    See if has VNA    If they do they can do labs at home    If not [facility]  Thanks   Set up for virtual with me in 2-3 weeks to see how doing on meds

## 2021-05-11 ENCOUNTER — TELEPHONE (OUTPATIENT)
Dept: FAMILY MEDICINE CLINIC | Facility: CLINIC | Age: 46
End: 2021-05-11

## 2021-05-11 DIAGNOSIS — E03.9 HYPOTHYROIDISM, UNSPECIFIED TYPE: Primary | ICD-10-CM

## 2021-05-14 ENCOUNTER — LAB REQUISITION (OUTPATIENT)
Dept: LAB | Facility: HOSPITAL | Age: 46
End: 2021-05-14
Payer: COMMERCIAL

## 2021-05-14 DIAGNOSIS — B94.8 SEQUELAE OF OTHER SPECIFIED INFECTIOUS AND PARASITIC DISEASES: ICD-10-CM

## 2021-05-14 DIAGNOSIS — R06.02 SHORTNESS OF BREATH: ICD-10-CM

## 2021-05-14 LAB
ANION GAP SERPL CALCULATED.3IONS-SCNC: 8 MMOL/L (ref 5–14)
BUN SERPL-MCNC: 10 MG/DL (ref 5–25)
CALCIUM SERPL-MCNC: 8.9 MG/DL (ref 8.4–10.2)
CHLORIDE SERPL-SCNC: 108 MMOL/L (ref 97–108)
CO2 SERPL-SCNC: 22 MMOL/L (ref 22–30)
CREAT SERPL-MCNC: 0.65 MG/DL (ref 0.6–1.2)
CRP SERPL QL: 10.1 MG/L
D DIMER PPP FEU-MCNC: 0.48 UG/ML FEU
ERYTHROCYTE [DISTWIDTH] IN BLOOD BY AUTOMATED COUNT: 28.4 %
GFR SERPL CREATININE-BSD FRML MDRD: 109 ML/MIN/1.73SQ M
GLUCOSE SERPL-MCNC: 62 MG/DL (ref 70–99)
HCT VFR BLD AUTO: 34.4 % (ref 36–46)
HGB BLD-MCNC: 10.9 G/DL (ref 12–16)
MCH RBC QN AUTO: 24.7 PG (ref 26–34)
MCHC RBC AUTO-ENTMCNC: 31.7 G/DL (ref 31–36)
MCV RBC AUTO: 78 FL (ref 80–100)
NT-PROBNP SERPL-MCNC: 70.2 PG/ML (ref 0–299)
PLATELET # BLD AUTO: 662 THOUSANDS/UL (ref 150–450)
PMV BLD AUTO: 7.1 FL (ref 8.9–12.7)
POTASSIUM SERPL-SCNC: 4 MMOL/L (ref 3.6–5)
RBC # BLD AUTO: 4.41 MILLION/UL (ref 4–5.2)
SODIUM SERPL-SCNC: 138 MMOL/L (ref 137–147)
TSH SERPL DL<=0.05 MIU/L-ACNC: 2.66 UIU/ML (ref 0.47–4.68)
WBC # BLD AUTO: 10.9 THOUSAND/UL (ref 4.5–11)

## 2021-05-14 PROCEDURE — 86140 C-REACTIVE PROTEIN: CPT | Performed by: NURSE PRACTITIONER

## 2021-05-14 PROCEDURE — 85379 FIBRIN DEGRADATION QUANT: CPT | Performed by: NURSE PRACTITIONER

## 2021-05-14 PROCEDURE — 84443 ASSAY THYROID STIM HORMONE: CPT | Performed by: NURSE PRACTITIONER

## 2021-05-14 PROCEDURE — 85027 COMPLETE CBC AUTOMATED: CPT | Performed by: NURSE PRACTITIONER

## 2021-05-14 PROCEDURE — 80048 BASIC METABOLIC PNL TOTAL CA: CPT | Performed by: NURSE PRACTITIONER

## 2021-05-14 PROCEDURE — 83880 ASSAY OF NATRIURETIC PEPTIDE: CPT | Performed by: NURSE PRACTITIONER

## 2021-05-17 ENCOUNTER — TELEPHONE (OUTPATIENT)
Dept: FAMILY MEDICINE CLINIC | Facility: CLINIC | Age: 46
End: 2021-05-17

## 2021-05-17 DIAGNOSIS — D64.9 ANEMIA, UNSPECIFIED TYPE: Primary | ICD-10-CM

## 2021-05-17 DIAGNOSIS — R79.82 ELEVATED C-REACTIVE PROTEIN (CRP): ICD-10-CM

## 2021-05-26 ENCOUNTER — CONSULT (OUTPATIENT)
Dept: PULMONOLOGY | Facility: CLINIC | Age: 46
End: 2021-05-26
Payer: COMMERCIAL

## 2021-05-26 VITALS
HEIGHT: 67 IN | BODY MASS INDEX: 43.54 KG/M2 | RESPIRATION RATE: 16 BRPM | WEIGHT: 277.4 LBS | DIASTOLIC BLOOD PRESSURE: 82 MMHG | TEMPERATURE: 97.8 F | OXYGEN SATURATION: 96 % | SYSTOLIC BLOOD PRESSURE: 112 MMHG | HEART RATE: 82 BPM

## 2021-05-26 DIAGNOSIS — F41.9 ANXIETY: ICD-10-CM

## 2021-05-26 DIAGNOSIS — E66.01 MORBID OBESITY (HCC): ICD-10-CM

## 2021-05-26 DIAGNOSIS — J96.11 CHRONIC RESPIRATORY FAILURE WITH HYPOXIA (HCC): Primary | ICD-10-CM

## 2021-05-26 DIAGNOSIS — U09.9 POST-COVID SYNDROME: ICD-10-CM

## 2021-05-26 DIAGNOSIS — R05.3 CHRONIC COUGH: ICD-10-CM

## 2021-05-26 DIAGNOSIS — D50.9 IRON DEFICIENCY ANEMIA, UNSPECIFIED IRON DEFICIENCY ANEMIA TYPE: ICD-10-CM

## 2021-05-26 DIAGNOSIS — R06.02 SHORTNESS OF BREATH: ICD-10-CM

## 2021-05-26 DIAGNOSIS — G47.33 OSA (OBSTRUCTIVE SLEEP APNEA): ICD-10-CM

## 2021-05-26 PROCEDURE — 94618 PULMONARY STRESS TESTING: CPT | Performed by: INTERNAL MEDICINE

## 2021-05-26 PROCEDURE — 99215 OFFICE O/P EST HI 40 MIN: CPT | Performed by: INTERNAL MEDICINE

## 2021-05-26 RX ORDER — FLUTICASONE PROPIONATE AND SALMETEROL 232; 14 UG/1; UG/1
1 POWDER, METERED RESPIRATORY (INHALATION) 2 TIMES DAILY
Qty: 1 EACH | Refills: 2 | Status: CANCELLED | OUTPATIENT
Start: 2021-05-26

## 2021-05-26 RX ORDER — BENZONATATE 100 MG/1
100 CAPSULE ORAL 3 TIMES DAILY PRN
Qty: 60 CAPSULE | Refills: 3 | Status: SHIPPED | OUTPATIENT
Start: 2021-05-26 | End: 2022-03-09 | Stop reason: ALTCHOICE

## 2021-05-26 RX ORDER — FLUTICASONE FUROATE AND VILANTEROL 200; 25 UG/1; UG/1
1 POWDER RESPIRATORY (INHALATION) DAILY
Qty: 1 EACH | Refills: 2 | Status: SHIPPED | OUTPATIENT
Start: 2021-05-26 | End: 2021-08-09

## 2021-05-26 NOTE — PROGRESS NOTES
Pulmonary Consultation   Ilan Umaña 42 y o  female MRN: [id number]  5/26/2021      Reason for Consultation:    Dyspnea/shortness of breath, cough    Requested by:  THOMAS Marquez    Assessment/Plan:    1  Chronic hypoxic respiratory -   Resolved  - patient requiring supplemental oxygen post COVID hospitalization in April 2021  - reports requiring 2-4 L with exertion at home  - 6 minutes walk test today's visit: patient had resting heart rate of 80 beats per minute and resting  Oxygen saturation of 99% on room air  Patient was only able to complete 4 minutes of exercise due to knee pain  During 4 minutes walking her heart rate varied between 79 to 99 beats per minute  Her oxygen saturation ranged between 95 to  97% on room air  Patient does not require any supplemental oxygen    2  Shortness of breath /dyspnea on exertion  - multifactorial:  COVID-19, deconditioning, iron deficiency anemia, obesity, obstructive sleep apnea, possible heart failure, component of anxiety /psych  - advised patient to continue physical therapy as it is necessary for her deconditioning and improvement of her lung function and exercise tolerance  - obtain chest x-ray  - obtain echocardiogram  - obtain PFTs  - start Breo daily  - continue albuterol p r n     3   Post COVID syndrome  - plan as above and below    4  Chronic cough  - likely secondary to COVID  Also suspect component of GERD and possible heart failure  - Moderately controlled with over-the-counter Robitussin  - continue Protonix  - continue Robitussin p r n   - prescribe Tessalon Perles    5  Obstructive sleep apnea  - patient scores 5 on stop Bang questionnaire  - likely contributing to her shortness of breath and dyspnea on exertion  - Obtain sleep study    6   Morbid obesity -  BMI 43 45  - status post gastric bypass  - likely contributing to deconditioning and difficulty with respiration  - advised patient diet and exercise  - advised patient to exercise minimum 150 minutes per week with goal to decrease weight by 10%  - follow-up with PCP    7  Iron deficiency anemia  - likely contributing to dyspnea on exertion shortness of breath  - continue iron supplementation  - follow-up with PCP    8  Anxiety  - likely contributing to shortness of breath and dyspnea on exertion  - denies any suicidal homicidal ideations  - continue Lexapro  - follow-up with PCP    History of Present Illness   HPI:  Kitty Junior is a 42 y o  female with morbid obesity, anxiety, hypothyroidism, gastric bypass, anemia, dyslipidemia presenting for evaluation dyspnea/shortness of breath post COVID  Patient reports that since her hospitalization she continues to have dyspnea on exertion  She states that while she is at rest she does not have much symptoms and her oxygen levels are in the high 90s  When she exerts herself or moves around her oxygen levels dropped home as low as 87-89%  She also reports fatigue and tachycardia  Patient reports that she is limited with mostly all of her activities  She is unable to leave the house without assistance  She gets dyspnea and fatigue with minimal exertion  She is able to perform activities of daily living without much issues  Patient does report she has intermittent cough episodes  She states her cough is dry and rarely will produce some clear phlegm  She uses Robitussin to control her cough which is helping moderately  Additionally the patient reports feeling dizziness and nervousness/anxiety  Also reports leg swelling towards the end of the evening    Denies fever, chills, headaches, lightheadedness, visual disturbances, hemoptysis, sore throat, chest pain, abdominal pain, nausea, vomiting, or trouble urinating/ defecating  Patient reports that she has difficulty with sleeping  She is constantly up in the middle of the night  She feels unrefreshed in the morning  She feels as if she can take multiple naps during the day    She does have mental fogginess/cloudiness upon waking up  Patient also has underlying anxiety which she takes medication for  Patient also has underlying anemia and reports heavy menstrual periods  She does take iron supplementation  Patient is also using albuterol as needed  She states that she uses 2 puffs approximately 4 times daily  She usually uses it when when she has a coughing episode  Patient is compliant with all her medications denies side effects from the medications  Hospital course ( [facility] 4/7/21 to 4/16/21) per Dr Dev Jerome:    " Claudia Watson is a 39 y o  female patient with past medical history morbid obesity, hypothyroidism, gastric bypass, anemia, dyslipidemia who originally presented to the hospital on 4/7/2021 due to worsening shortness of breath  Patient was diagnosed with COVID on March 30, 2021  Patient was evaluated and seen in [facility] where patient was having increasing oxygen requirement and patient was transferred to [facility]   Patient was initially treated as moderate pathway with Decadron 6 milligram IV daily, remdesivir, Lovenox  Patient also antibiotics which were later discontinued  Later patient noted to have worsening oxygen requirements and patient went up to 30 liters on high-flow oxygen  Patient also received Actemra  Later steroid dose was increased  Patient slowly started to improve with improving inflammatory markers  Patient continued to improve and had home O2 assessment  Patient qualified for oxygen and patient will be discharged home with outpatient follow-up with PCP"      Occupational History:  Previous occupation as   Currently not working due to her symptoms  Social History:  Denies tobacco use or alcohol use  Lives in an [place] setting  Electric/gas heating  Malignancy History:  Colon cancer in the father  Mother had liver cancer  Pets/animal exposure:  No pets    No exotic animals or birds  Inhalers: Albuterol      Review of systems:  12 point review of systems was completed and was otherwise negative except as listed in HPI      Historical Information   Past Medical History:   Diagnosis Date    Abscess of labia     Acute and chronic respiratory failure with hypoxia ([address] )     Anemia 4/6/2021    Anxiety     Bipolar disorder (HCC)     Breast lump     COVID-19     Frequent headaches     Hemorrhoids     Hypothyroidism 7/1/2013    Migraine     Morbid obesity (HCC) 7/1/2013    Obesity     Psychotic disorder (HCC)      Past Surgical History:   Procedure Laterality Date    BARIATRIC SURGERY      BREAST BIOPSY Right 1999    BREAST LUMPECTOMY Left     benign    CHOLECYSTECTOMY      GALLBLADDER SURGERY       Family History   Problem Relation Age of Onset    Liver cancer Mother     Prostate cancer Father     Cancer Family     No Known Problems Sister     No Known Problems Maternal Grandmother     No Known Problems Maternal Grandfather     No Known Problems Paternal Grandmother     No Known Problems Paternal Grandfather     No Known Problems Sister     No Known Problems Sister     No Known Problems Sister     No Known Problems Sister     No Known Problems Sister     No Known Problems Sister     No Known Problems Sister     No Known Problems Sister     No Known Problems Sister     Cancer Maternal Aunt     Cancer Maternal Aunt     Cancer Maternal Aunt     Cancer Maternal Aunt     Cancer Maternal Aunt     Cancer Maternal Aunt     Cancer Maternal Aunt     No Known Problems Paternal Aunt     No Known Problems Paternal Aunt     No Known Problems Paternal Aunt     No Known Problems Paternal Aunt     Cancer Paternal Aunt     Cancer Paternal Aunt          Meds/Allergies     Current Outpatient Medications:     albuterol (PROVENTIL HFA,VENTOLIN HFA) 90 mcg/act inhaler, Inhale 2 puffs every 6 (six) hours as needed for wheezing or shortness of breath, Disp: 1 Inhaler, Rfl: 5    Calcium Citrate 200 MG TABS, 1 tablet 3 (three) times a day, Disp: , Rfl:     Cyanocobalamin ER 1000 MCG TBCR, take one tab daily, Disp: , Rfl:     escitalopram (LEXAPRO) 10 mg tablet, Take 1 tablet (10 mg total) by mouth daily, Disp: 30 tablet, Rfl: 3    ferrous sulfate 324 (65 Fe) mg, Take 1 tablet (324 mg total) by mouth daily before breakfast, Disp: , Rfl: 0    levothyroxine 137 mcg tablet, TAKE ONE TABLET BY MOUTH EVERY MORNING ON EMPTY STOMACH, Disp: , Rfl: 1    Multiple Vitamins-Minerals (MULTIVITAMIN ADULT PO), every 24 hours, Disp: , Rfl:     pantoprazole (PROTONIX) 40 mg tablet, Take 1 tablet (40 mg total) by mouth every 24 hours, Disp: 90 tablet, Rfl: 3    traZODone (DESYREL) 50 mg tablet, Take 1 tablet (50 mg total) by mouth daily at bedtime, Disp: 30 tablet, Rfl: 2    triamcinolone (KENALOG) 0 1 % ointment, Apply topically 2 (two) times a day, Disp: 30 g, Rfl: 2    atorvastatin (LIPITOR) 20 mg tablet, TK 1 T PO QHS, Disp: , Rfl:     benzonatate (TESSALON PERLES) 100 mg capsule, Take 1 capsule (100 mg total) by mouth 3 (three) times a day as needed for cough, Disp: 60 capsule, Rfl: 3    fluticasone-vilanterol (BREO ELLIPTA) 200-25 MCG/INH inhaler, Inhale 1 puff daily Rinse mouth after use , Disp: 1 each, Rfl: 2  Allergies   Allergen Reactions    No Active Allergies        Vitals: Blood pressure 112/82, pulse 82, temperature 97 8 °F (36 6 °C), temperature source Tympanic, resp  rate 16, height 5' 7" (1 702 m), weight 126 kg (277 lb 6 4 oz), SpO2 96 %  , Body mass index is 43 45 kg/m²  Oxygen Therapy  SpO2: 96 %  Oxygen Therapy: None (Room air)    Physical Exam  Physical Exam  Vitals signs and nursing note reviewed  Constitutional:       General: She is not in acute distress  Appearance: She is obese  She is not ill-appearing, toxic-appearing or diaphoretic  HENT:      Head: Normocephalic and atraumatic        Right Ear: External ear normal       Left Ear: External ear normal    Eyes:      General: No scleral icterus  Right eye: No discharge  Left eye: No discharge  Conjunctiva/sclera: Conjunctivae normal    Cardiovascular:      Rate and Rhythm: Normal rate and regular rhythm  Pulses: Normal pulses  Heart sounds: Normal heart sounds  No murmur  No friction rub  No gallop  Pulmonary:      Effort: Pulmonary effort is normal  No respiratory distress  Breath sounds: No stridor  No wheezing, rhonchi or rales  Comments: Diminished breath sounds bilateral lung fields  Abdominal:      General: Bowel sounds are normal  There is no distension  Palpations: Abdomen is soft  Tenderness: There is no abdominal tenderness  There is no guarding or rebound  Musculoskeletal:      Right lower leg: No edema  Left lower leg: No edema  Skin:     General: Skin is warm and dry  Neurological:      Mental Status: She is alert and oriented to person, place, and time  Labs: I have personally reviewed pertinent lab results  Lab Results   Component Value Date    WBC 10 90 05/14/2021    HGB 10 9 (L) 05/14/2021    HCT 34 4 (L) 05/14/2021    MCV 78 (L) 05/14/2021     (H) 05/14/2021     Lab Results   Component Value Date    GLUCOSE 89 10/14/2015    CALCIUM 8 9 05/14/2021     10/14/2015    K 4 0 05/14/2021    CO2 22 05/14/2021     05/14/2021    BUN 10 05/14/2021    CREATININE 0 65 05/14/2021     No results found for: IGE  Lab Results   Component Value Date    ALT 42 04/16/2021    AST 23 04/16/2021    ALKPHOS 95 04/16/2021    BILITOT 0 33 10/14/2015         Imaging and other studies: I have personally reviewed pertinent reports  and I have personally reviewed pertinent films in PACS    Chest x-ray 4/12/21:  Bilateral ground-glass opacity slightly improved as compared to chest x-ray on 4/7/21    CTA pulmonary embolism study 4/6/21:  multifocal ground-glass opacities bilaterally    No pulmonary embolism    Pulmonary function testing: n/a    EKG, Pathology, and Other Studies: I have personally reviewed pertinent reports     and I have personally reviewed pertinent films in PACS      [address], DO  [facility]

## 2021-06-01 ENCOUNTER — HOSPITAL ENCOUNTER (OUTPATIENT)
Dept: RADIOLOGY | Facility: HOSPITAL | Age: 46
Discharge: HOME | End: 2021-06-01

## 2021-06-01 ENCOUNTER — TRANSCRIBE ORDERS (OUTPATIENT)
Dept: RADIOLOGY | Facility: HOSPITAL | Age: 46
End: 2021-06-01

## 2021-06-01 DIAGNOSIS — R06.02 SHORTNESS OF BREATH: ICD-10-CM

## 2021-06-01 DIAGNOSIS — U09.9 POST-COVID SYNDROME: ICD-10-CM

## 2021-06-03 ENCOUNTER — LAB REQUISITION (OUTPATIENT)
Dept: LAB | Facility: HOSPITAL | Age: 46
End: 2021-06-03
Payer: COMMERCIAL

## 2021-06-03 ENCOUNTER — TELEPHONE (OUTPATIENT)
Dept: FAMILY MEDICINE CLINIC | Facility: CLINIC | Age: 46
End: 2021-06-03

## 2021-06-03 DIAGNOSIS — R79.82 ELEVATED C-REACTIVE PROTEIN (CRP): ICD-10-CM

## 2021-06-03 DIAGNOSIS — D64.9 ANEMIA, UNSPECIFIED: ICD-10-CM

## 2021-06-03 LAB
CRP SERPL QL: 6.1 MG/L
FERRITIN SERPL-MCNC: 14 NG/ML (ref 8–388)
IRON SERPL-MCNC: 25 UG/DL (ref 50–170)

## 2021-06-03 PROCEDURE — 86140 C-REACTIVE PROTEIN: CPT | Performed by: NURSE PRACTITIONER

## 2021-06-03 PROCEDURE — 82728 ASSAY OF FERRITIN: CPT | Performed by: NURSE PRACTITIONER

## 2021-06-03 PROCEDURE — 83540 ASSAY OF IRON: CPT | Performed by: NURSE PRACTITIONER

## 2021-06-03 NOTE — TELEPHONE ENCOUNTER
----- Message from Leah [place], [place] sent at 6/3/2021  2:37 PM EDT -----  Call and advise blood work much better    Showing no inflammation

## 2021-06-04 ENCOUNTER — TELEPHONE (OUTPATIENT)
Dept: SLEEP CENTER | Facility: CLINIC | Age: 46
End: 2021-06-04

## 2021-06-04 NOTE — TELEPHONE ENCOUNTER
----- Message from Lenore Bernal MD sent at 5/27/2021 10:18 AM EDT -----  approved  ----- Message -----  From: Sloane Ferrera  Sent: 5/54/1075   8:52 AM EDT  To: Sleep Medicine [facility] Provider    This sleep study needs approval      If approved please sign and return to clerical pool  If denied please include reasons why  Also provide alternative testing if warranted  Please sign and return to clerical pool

## 2021-06-08 ENCOUNTER — TELEPHONE (OUTPATIENT)
Dept: FAMILY MEDICINE CLINIC | Facility: CLINIC | Age: 46
End: 2021-06-08

## 2021-06-08 ENCOUNTER — CONSULT (OUTPATIENT)
Dept: CARDIOLOGY CLINIC | Facility: CLINIC | Age: 46
End: 2021-06-08
Payer: COMMERCIAL

## 2021-06-08 VITALS
BODY MASS INDEX: 44.26 KG/M2 | SYSTOLIC BLOOD PRESSURE: 112 MMHG | HEART RATE: 72 BPM | OXYGEN SATURATION: 97 % | WEIGHT: 282 LBS | DIASTOLIC BLOOD PRESSURE: 80 MMHG | HEIGHT: 67 IN

## 2021-06-08 DIAGNOSIS — U09.9 POST-COVID SYNDROME: ICD-10-CM

## 2021-06-08 DIAGNOSIS — R00.2 PALPITATIONS: ICD-10-CM

## 2021-06-08 DIAGNOSIS — R68.89 ACTIVITY INTOLERANCE: ICD-10-CM

## 2021-06-08 DIAGNOSIS — R06.02 SOB (SHORTNESS OF BREATH): Primary | ICD-10-CM

## 2021-06-08 DIAGNOSIS — U09.9 POST-COVID SYNDROME: Primary | ICD-10-CM

## 2021-06-08 DIAGNOSIS — D50.9 IRON DEFICIENCY ANEMIA, UNSPECIFIED IRON DEFICIENCY ANEMIA TYPE: ICD-10-CM

## 2021-06-08 DIAGNOSIS — E78.5 HYPERLIPIDEMIA, UNSPECIFIED HYPERLIPIDEMIA TYPE: ICD-10-CM

## 2021-06-08 PROCEDURE — 99243 OFF/OP CNSLTJ NEW/EST LOW 30: CPT | Performed by: INTERNAL MEDICINE

## 2021-06-08 PROCEDURE — 1036F TOBACCO NON-USER: CPT | Performed by: INTERNAL MEDICINE

## 2021-06-08 PROCEDURE — 93000 ELECTROCARDIOGRAM COMPLETE: CPT | Performed by: INTERNAL MEDICINE

## 2021-06-08 PROCEDURE — 3008F BODY MASS INDEX DOCD: CPT | Performed by: INTERNAL MEDICINE

## 2021-06-08 RX ORDER — ATORVASTATIN CALCIUM 20 MG/1
20 TABLET, FILM COATED ORAL DAILY
Qty: 30 TABLET | Refills: 6 | Status: SHIPPED | OUTPATIENT
Start: 2021-06-08 | End: 2021-08-03 | Stop reason: SDUPTHER

## 2021-06-08 RX ORDER — FUROSEMIDE 20 MG/1
20 TABLET ORAL DAILY
Qty: 30 TABLET | Refills: 1 | Status: SHIPPED | OUTPATIENT
Start: 2021-06-08 | End: 2021-07-28

## 2021-06-08 NOTE — PROGRESS NOTES
Outpatient Consultation - General Cardiology   Juice Umaña 42 y o  female   MRN: [id number]  Encounter: [id number]      PCP: THOMAS Kan    History of Present Illness   Physician Requesting Consult: Consults  Reason for Consult / Principal Problem: SOB    HPI: Leydi Gary is a 42y o  year old female with a history of Anxiety, Anemia, Hypothyroidism, Obesity, Post-COVID Syndrome presents to cardiology office for evaluation of SOB  Patient had severe COVID in 04/2021 requiring up to 30L of oxygen and since being discharged she has felt extremely fatigued, weak, dyspneic with minimal exertion limiting her mobility, dry cough with some peripheral edema  She goes on to say that she has conversational dyspnea (which was obvious during our interview), she states when she walks short distances, her heart begins to race and she feels palpitations as if she ran 5 miles  These periods are associated with lightheadedness and dizziness but she denies every having passed out  Patient has been seeing pulmonary as an outpatient and is pending PFTs  Patient expresses increasing anxiety and nervousness recently secondary to being sick and not being able to breath  Says she has a fear of sleeping because she is not sure if she'll wake up sometimes because of her breathing  Besides her menstrual cycle, she denies any blood loss  No EtOH, tobacco (quit 2008) or drug use  No family history of heart disease to her knowledge    Review of Systems  ROS as noted above         Historical Information   Past Medical History:   Diagnosis Date    Abscess of labia     Acute and chronic respiratory failure with hypoxia ([facility] )     Anemia 4/6/2021    Anxiety     Bipolar disorder (HCC)     Breast lump     COVID-19     Frequent headaches     Hemorrhoids     Hypothyroidism 7/1/2013    Migraine     Morbid obesity ([address] ) 7/1/2013    Obesity     Psychotic disorder ([facility] )      Past Surgical History:   Procedure Laterality Date    BARIATRIC SURGERY      BREAST BIOPSY Right 1999    BREAST LUMPECTOMY Left     benign    CHOLECYSTECTOMY      GALLBLADDER SURGERY       Social History     Substance and Sexual Activity   Alcohol Use Never    Frequency: Never     Social History     Substance and Sexual Activity   Drug Use No     Social History     Tobacco Use   Smoking Status Former Smoker    Types: Cigarettes    Quit date:     Years since quittin 4   Smokeless Tobacco Former User   Tobacco Comment    smoker for 2-3 yrs and quit in , smoked about 3 cigarettes a day     Family History:   Family History   Problem Relation Age of Onset    Liver cancer Mother     Prostate cancer Father     Cancer Family     No Known Problems Sister     No Known Problems Maternal Grandmother     No Known Problems Maternal Grandfather     No Known Problems Paternal Grandmother     No Known Problems Paternal Grandfather     No Known Problems Sister     No Known Problems Sister     No Known Problems Sister     No Known Problems Sister     No Known Problems Sister     No Known Problems Sister     No Known Problems Sister     No Known Problems Sister     No Known Problems Sister     Cancer Maternal Aunt     Cancer Maternal Aunt     Cancer Maternal Aunt     Cancer Maternal Aunt     Cancer Maternal Aunt     Cancer Maternal Aunt     Cancer Maternal Aunt     No Known Problems Paternal Aunt     No Known Problems Paternal Aunt     No Known Problems Paternal Aunt     No Known Problems Paternal Aunt     Cancer Paternal Aunt     Cancer Paternal Aunt        Meds/Allergies   Home Medications:   Current Outpatient Medications:     albuterol (PROVENTIL HFA,VENTOLIN HFA) 90 mcg/act inhaler, Inhale 2 puffs every 6 (six) hours as needed for wheezing or shortness of breath, Disp: 1 Inhaler, Rfl: 5    atorvastatin (LIPITOR) 20 mg tablet, TK 1 T PO QHS, Disp: , Rfl:     benzonatate (TESSALON PERLES) 100 mg capsule, Take 1 capsule (100 mg total) by mouth 3 (three) times a day as needed for cough, Disp: 60 capsule, Rfl: 3    Calcium Citrate 200 MG TABS, 1 tablet 3 (three) times a day, Disp: , Rfl:     Cyanocobalamin ER 1000 MCG TBCR, take one tab daily, Disp: , Rfl:     escitalopram (LEXAPRO) 10 mg tablet, Take 1 tablet (10 mg total) by mouth daily, Disp: 30 tablet, Rfl: 3    ferrous sulfate 324 (65 Fe) mg, Take 1 tablet (324 mg total) by mouth daily before breakfast, Disp: , Rfl: 0    fluticasone-vilanterol (BREO ELLIPTA) 200-25 MCG/INH inhaler, Inhale 1 puff daily Rinse mouth after use , Disp: 1 each, Rfl: 2    levothyroxine 137 mcg tablet, TAKE ONE TABLET BY MOUTH EVERY MORNING ON EMPTY STOMACH, Disp: , Rfl: 1    Multiple Vitamins-Minerals (MULTIVITAMIN ADULT PO), every 24 hours, Disp: , Rfl:     pantoprazole (PROTONIX) 40 mg tablet, Take 1 tablet (40 mg total) by mouth every 24 hours, Disp: 90 tablet, Rfl: 3    traZODone (DESYREL) 50 mg tablet, Take 1 tablet (50 mg total) by mouth daily at bedtime, Disp: 30 tablet, Rfl: 2    triamcinolone (KENALOG) 0 1 % ointment, Apply topically 2 (two) times a day, Disp: 30 g, Rfl: 2    Allergies   Allergen Reactions    No Active Allergies          Objective   Vitals: There were no vitals taken for this visit        Physical Exam  GEN: Jose R Quinones appears well, alert and oriented x 3, pleasant and cooperative, conversational dyspnea   HEENT:  Normocephalic, atraumatic, anicteric, moist mucous membranes  NECK: No JVD or carotid bruits   HEART: reg rhythm, reg rate, normal S1 and S2, no murmurs, clicks, gallops or rubs   LUNGS: crackles on bilateral lung fields; respiration nonlabored   ABDOMEN:  Normoactive bowel sounds, soft, no tenderness, no distention  EXTREMITIES: peripheral pulses palpable; no edema  NEURO: no gross focal findings; cranial nerves grossly intact   SKIN:  Dry, intact, warm to touch    Lab Results:   CBC with diff:     CMP:       Invalid input(s): ALBUMIN  Troponin:   0   Lab Value Date/Time    TROPONINI <0 02 04/08/2021 0710    TROPONINI <0 01 04/06/2021 1601     BNP:     Coags:     TSH:     Magnesium:     Lipid Profile:     Lab Results   Component Value Date    CHOL 237 06/03/2014    TRIG 92 12/03/2020    HDL 60 12/03/2020    LDLCALC 133 (H) 12/03/2020     Lab Results   Component Value Date     10/14/2015    K 4 0 05/14/2021    CO2 22 05/14/2021     05/14/2021    BUN 10 05/14/2021    CREATININE 0 65 05/14/2021    ALT 42 04/16/2021    AST 23 04/16/2021     Lab Results   Component Value Date    WBC 10 90 05/14/2021    HGB 10 9 (L) 05/14/2021    HCT 34 4 (L) 05/14/2021    MCV 78 (L) 05/14/2021     (H) 05/14/2021     Lab Results   Component Value Date    INR 1 02 04/06/2021         Imaging: I have personally reviewed pertinent reports  04/2021 CT Chest with significant ground-glass opacities      Holter/Device Interrogation:   n/a    CATH:  n/a    STRESS TEST:  n/a    EKG:   Date: 6/8/21  Interpretation: NSR  Normal Axis  No obvious ischemic changes  ECHO: No results found for this or any previous visit  Assessment/Plan     Assessment:    1  SOB/Fatigue Multifactorial, Likely 2/2 Post-COVID Syndrome with component of profound TAYLOR, CAR, deconditioning, obesity ? Component of undiagnosed HF (less likely, proBNP 70 on 5/14/2021)  --> Ferritin 14 and Iron 25 but hemoglobin within normal limits  2  Anxiety   3  Hypothyroidism  --> TSH 2 66  4  Obesity  5  HLD  6  Palpitations    Plan:  - f/u TTE (ordered by pulm)  - f/u PFTs (ordered by pulm)  - f/u Sleep study (ordered by pulm)  - c/w Lipitor 20mg PO QHS  - c/w PT for deconditioning  - Hematology Referral to consider Iron transfusions given Ferritin of 14  - Zio Patch  - Lasix 20mg PO Daily  - Patient is set to return to work on July 26th she states, however at this time given patient significant deconditioning and symptoms she is unfit to return to work   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

## 2021-06-08 NOTE — TELEPHONE ENCOUNTER
Tiger text from [facility]   [facility] are stopping [facility]    PT stops next week but she is not back to baseline and tolerance    Needs further PT and best if goes to facility   Will refer to the covid PT program so they can monitor her

## 2021-06-09 ENCOUNTER — TELEPHONE (OUTPATIENT)
Dept: SURGICAL ONCOLOGY | Facility: CLINIC | Age: 46
End: 2021-06-09

## 2021-06-09 ENCOUNTER — HOSPITAL ENCOUNTER (OUTPATIENT)
Dept: RADIOLOGY | Facility: HOSPITAL | Age: 46
Discharge: HOME | End: 2021-06-09
Payer: COMMERCIAL

## 2021-06-09 PROCEDURE — 71046 X-RAY EXAM CHEST 2 VIEWS: CPT

## 2021-06-09 NOTE — TELEPHONE ENCOUNTER
New Patient Encounter    New Patient Intake Form   Patient Details:  Dimas Simon  1975  [id number]    Background Information:  [address] starts by opening a telephone encounter and gathering the following information   Who is calling to schedule? If not self, relationship to patient? Patient   Referring Provider Eleni Guardado   What is the diagnosis? Iron deficiency anemia, unspecified iron deficiency anemia type   Is this Cancer or Non-Cancer? Non-Cancer   Is this diagnosis confirmed? Yes   When was the diagnosis? ongoing   Is there a confirmed diagnosis from a biopsy/tissue reviewed by pathology? No   Were outside slides requested? No   Is patient aware of diagnosis? Yes   Is there a personal history and what kind? No   Is there a family history and what kind? No   Reason for visit? New Diagnosis   Have you had any imaging or labs done? If so: when, where? yes  [facility]   Are records in [facility]? yes   If patient has a prior history of cancer were old records obtained? NA   Was the patient told to bring a disk? No   Does the patient smoke or Vape? No   If yes, how many packs or cartridges per day? na   Scheduling Information:   Preferred [place]:  [facility]     Are there any dates/time the patient cannot be seen? na   Miscellaneous: na   After completing the above information, please route to Financial Counselor and the appropriate Nurse Navigator for review

## 2021-06-14 ENCOUNTER — TELEPHONE (OUTPATIENT)
Dept: CARDIOLOGY CLINIC | Facility: CLINIC | Age: 46
End: 2021-06-14

## 2021-06-14 NOTE — TELEPHONE ENCOUNTER
----- Message from Alek Astudillo sent at 6/10/2021  3:00 PM EDT -----     Bre Corral is not required for CPT 55306/ZIO per both [employer] and [employer] [place]  Per [employer], it is a valid and billable code     ----- Message -----  From: Adalgisa Hanna MA  Sent: 6/8/2021   2:24 PM EDT  To: Mary Curiel    Check prior auth   Thank you

## 2021-06-21 NOTE — PROGRESS NOTES
Pulmonary Follow Up Note   Jyoti Umaña 42 y o  female MRN: [id number]  6/23/2021      Assessment/Plan:     1  Shortness of breath /dyspnea on exertion  - multifactorial:  COVID-19, deconditioning, iron deficiency anemia, obesity, obstructive sleep apnea, possible heart failure, component of anxiety /psych  -  6 minutes walk test at previous visit:  Without any desaturations with exertion and ambulation  Did not meet criteria for require oxygen therapy  Supplemental oxygen discontinued at previous visit  - advised patient to continue physical therapy as it is necessary for her deconditioning and improvement of her lung function and exercise tolerance  - Chest x-ray 6/9/21: decreasing infiltrates bilaterally with increased reticular markings  - Echocardiogram pending  - patient evaluated by Cardiology and Has Zio patch monitoring in place  - PFTs as noted below  Poor effort /unable to fully complete   - Continue Breo daily  - continue albuterol p r n   - Obtain high-resolution CT scan to evaluate degree of post COVID fibrosis     2  Post COVID syndrome  - plan as above and below     3  Chronic cough  - likely secondary to COVID  Also suspect component of GERD and possible heart failure  - Moderately controlled with over-the-counter Robitussin  - continue Protonix  - continue Robitussin p r n   - continue Tessalon Perles     4  Obstructive sleep apnea  - patient scores 5 on stop Bang questionnaire  - likely contributing to her shortness of breath and dyspnea on exertion  - Sleep study pending     5  Morbid obesity -  BMI 44 95  - status post gastric bypass  - likely contributing to deconditioning and difficulty with respiration  - advised patient diet and exercise  - advised patient to exercise minimum 150 minutes per week with goal to decrease weight by 10%  - follow-up with PCP     6   Iron deficiency anemia  - likely contributing to dyspnea on exertion shortness of breath  - continue iron supplementation  - Repeat iron studies showing  Severe iron deficiency despite iron supplementation  - patient is scheduled for Venofer infusions  - follow-up with PCP     7  Anxiety  - likely contributing to shortness of breath and dyspnea on exertion  - Tearful at today's visit due to underlying diagnosis of post COVID fibrosis  - denies any suicidal homicidal ideations  - continue Lexapro  - follow-up with PCP    Return in about 3 months (around 9/23/2021) for Recheck  History of Present Illness   HPI:  Garo Barry is a 42 y o  female with morbid obesity, anxiety, hypothyroidism, gastric bypass, anemia, dyslipidemia presenting for evaluation dyspnea/shortness of breath post COVID  Patient reports that overall she is doing well  She states that her Meme Confucianism is helping and she is using albuterol maybe once daily if that  She still has nighttime symptoms which she is gasping for air and using supplemental oxygen at night  She has sleep study scheduled for today  She has adequate follow-up with Hematology/Oncology and PCP and is scheduled to have iron infusions for her iron deficiency anemia  She reports that she is able to go up and down the stairs with mild shortness of breath but her albuterol rescue inhaler is helping with the dyspnea on exertion  Patient states that she is trying to lose weight but due to inactivity and her dyspnea on exertion she has been limited in exercise  Denies side effects from any medications and is compliant with her medications  Patient also states that she is hesitant about vaccinations  Denies fever, chills, headaches, lightheadedness, dizziness, visual disturbances, sore throat, chest pain, palpitations, abdominal pain, nausea, vomiting, or trouble urinating/ defecating  Occupational History:  Previous occupation as   Currently not working due to her symptoms      Social History:  Denies tobacco use or alcohol use    Lives in an anfix setting  Electric/gas heating      Malignancy History:  Colon cancer in the father  Mother had liver cancer      Pets/animal exposure:  No pets  No exotic animals or birds      Inhalers: Albuterol and Breo    Review of systems:  12 point review of systems was completed and was otherwise negative except as listed in HPI      Historical Information   Past Medical History:   Diagnosis Date    Abscess of labia     Acute and chronic respiratory failure with hypoxia ([address] )     Anemia 4/6/2021    Anxiety     Bipolar disorder (HCC)     Breast lump     COVID-19     Frequent headaches     Hemorrhoids     Hypothyroidism 7/1/2013    Migraine     Morbid obesity (HCC) 7/1/2013    Obesity     Psychotic disorder (HCC)      Past Surgical History:   Procedure Laterality Date    BARIATRIC SURGERY      BREAST BIOPSY Right 1999    BREAST LUMPECTOMY Left     benign    CHOLECYSTECTOMY      GALLBLADDER SURGERY       Family History   Problem Relation Age of Onset    Liver cancer Mother     Prostate cancer Father     Cancer Family     No Known Problems Sister     No Known Problems Maternal Grandmother     No Known Problems Maternal Grandfather     No Known Problems Paternal Grandmother     No Known Problems Paternal Grandfather     No Known Problems Sister     No Known Problems Sister     No Known Problems Sister     No Known Problems Sister     No Known Problems Sister     No Known Problems Sister     No Known Problems Sister     No Known Problems Sister     No Known Problems Sister     Cancer Maternal Aunt     Cancer Maternal Aunt     Cancer Maternal Aunt     Cancer Maternal Aunt     Cancer Maternal Aunt     Cancer Maternal Aunt     Cancer Maternal Aunt     No Known Problems Paternal Aunt     No Known Problems Paternal Aunt     No Known Problems Paternal Aunt     No Known Problems Paternal Aunt     Cancer Paternal Aunt     Cancer Paternal Aunt          Meds/Allergies     Current Outpatient Medications:     albuterol (PROVENTIL HFA,VENTOLIN HFA) 90 mcg/act inhaler, Inhale 2 puffs every 6 (six) hours as needed for wheezing or shortness of breath, Disp: 1 Inhaler, Rfl: 5    atorvastatin (LIPITOR) 20 mg tablet, Take 1 tablet (20 mg total) by mouth daily, Disp: 30 tablet, Rfl: 6    benzonatate (TESSALON PERLES) 100 mg capsule, Take 1 capsule (100 mg total) by mouth 3 (three) times a day as needed for cough, Disp: 60 capsule, Rfl: 3    Calcium Citrate 200 MG TABS, 1 tablet 3 (three) times a day, Disp: , Rfl:     Cyanocobalamin ER 1000 MCG TBCR, take one tab daily, Disp: , Rfl:     diphenhydrAMINE-acetaminophen (TYLENOL PM)  MG TABS, Take 1 tablet by mouth daily at bedtime as needed for sleep, Disp: , Rfl:     escitalopram (LEXAPRO) 10 mg tablet, Take 1 tablet (10 mg total) by mouth daily, Disp: 30 tablet, Rfl: 3    ferrous sulfate 324 (65 Fe) mg, Take 1 tablet (324 mg total) by mouth daily before breakfast, Disp: , Rfl: 0    fluticasone-vilanterol (BREO ELLIPTA) 200-25 MCG/INH inhaler, Inhale 1 puff daily Rinse mouth after use , Disp: 1 each, Rfl: 2    furosemide (LASIX) 20 mg tablet, Take 1 tablet (20 mg total) by mouth daily, Disp: 30 tablet, Rfl: 1    levothyroxine 137 mcg tablet, TAKE ONE TABLET BY MOUTH EVERY MORNING ON EMPTY STOMACH, Disp: , Rfl: 1    Multiple Vitamins-Minerals (MULTIVITAMIN ADULT PO), every 24 hours, Disp: , Rfl:     pantoprazole (PROTONIX) 40 mg tablet, Take 1 tablet (40 mg total) by mouth every 24 hours, Disp: 90 tablet, Rfl: 3    traZODone (DESYREL) 50 mg tablet, Take 1 tablet (50 mg total) by mouth daily at bedtime (Patient not taking: Reported on 6/8/2021), Disp: 30 tablet, Rfl: 2    triamcinolone (KENALOG) 0 1 % ointment, Apply topically 2 (two) times a day (Patient not taking: Reported on 6/23/2021), Disp: 30 g, Rfl: 2  No current facility-administered medications for this visit      Facility-Administered Medications Ordered in Other Visits:     albuterol inhalation solution 2 5 mg, 2 5 mg, Nebulization, Once, Chaz Ching,   Allergies   Allergen Reactions    No Active Allergies        Vitals: Blood pressure 116/78, pulse 78, temperature 97 9 °F (36 6 °C), temperature source Tympanic, height 5' 7" (1 702 m), weight 130 kg (287 lb), SpO2 98 %  Body mass index is 44 95 kg/m²  Oxygen Therapy  SpO2: 98 %      Physical Exam  Physical Exam  Vitals reviewed  Constitutional:       General: She is not in acute distress  Appearance: She is normal weight  She is not ill-appearing, toxic-appearing or diaphoretic  HENT:      Head: Normocephalic and atraumatic  Right Ear: External ear normal       Left Ear: External ear normal       Nose: Nose normal    Eyes:      General: No scleral icterus  Right eye: No discharge  Left eye: No discharge  Conjunctiva/sclera: Conjunctivae normal    Cardiovascular:      Rate and Rhythm: Normal rate and regular rhythm  Pulses: Normal pulses  Heart sounds: Normal heart sounds  No murmur heard  No friction rub  No gallop  Pulmonary:      Effort: Pulmonary effort is normal  No respiratory distress  Breath sounds: Normal breath sounds  No stridor  No wheezing, rhonchi or rales  Abdominal:      General: Bowel sounds are normal  There is no distension  Palpations: Abdomen is soft  Tenderness: There is no abdominal tenderness  There is no guarding or rebound  Musculoskeletal:      Right lower leg: No edema  Left lower leg: No edema  Skin:     General: Skin is warm and dry  Neurological:      Mental Status: She is alert and oriented to person, place, and time  Labs: I have personally reviewed pertinent lab results    Lab Results   Component Value Date    WBC 10 90 05/14/2021    HGB 10 9 (L) 05/14/2021    HCT 34 4 (L) 05/14/2021    MCV 78 (L) 05/14/2021     (H) 05/14/2021     Lab Results   Component Value Date    GLUCOSE 89 10/14/2015    CALCIUM 8 9 05/14/2021    NA 139 10/14/2015    K 4 0 2021    CO2 22 2021     2021    BUN 10 2021    CREATININE 0 65 2021     No results found for: IGE  Lab Results   Component Value Date    ALT 42 2021    AST 23 2021    ALKPHOS 95 2021    BILITOT 0 33 10/14/2015           Imaging and other studies: I have personally reviewed pertinent reports  and I have personally reviewed pertinent films in PACS     Chest x-ray 21:  Bilateral ground-glass opacity slightly improved as compared to chest x-ray on 21     CTA pulmonary embolism study 21:  multifocal ground-glass opacities bilaterally  No pulmonary embolism    Chest x-ray 21: decreasing infiltrates bilaterally with increased reticular markings     Pulmonary function testin/22/21:    FEV1/FVC Ratio: 52 %  Forced Vital Capacity: 1 30 L    32 % predicted  FEV1: 0 98 L     21 % predicted  After administration of bronchodilator   FVC: 1 14 L, 28 % predicted, -12 % change  FEV1: 0 87 L, 27 % predicted, +27 % change   Unable to perform maneuvers for lung volumes and diffusion capacity     EKG, Pathology, and Other Studies: I have personally reviewed pertinent reports     and I have personally reviewed pertinent films in PACS    [address] [place] [address] [facility]

## 2021-06-22 ENCOUNTER — HOSPITAL ENCOUNTER (OUTPATIENT)
Dept: PULMONOLOGY | Facility: HOSPITAL | Age: 46
Discharge: HOME | End: 2021-06-22
Payer: COMMERCIAL

## 2021-06-22 DIAGNOSIS — R06.02 SHORTNESS OF BREATH: ICD-10-CM

## 2021-06-22 DIAGNOSIS — U09.9 POST-COVID SYNDROME: ICD-10-CM

## 2021-06-22 PROCEDURE — 94060 EVALUATION OF WHEEZING: CPT | Performed by: INTERNAL MEDICINE

## 2021-06-22 PROCEDURE — 94060 EVALUATION OF WHEEZING: CPT

## 2021-06-22 PROCEDURE — 94760 N-INVAS EAR/PLS OXIMETRY 1: CPT

## 2021-06-22 RX ORDER — ALBUTEROL SULFATE 2.5 MG/3ML
2.5 SOLUTION RESPIRATORY (INHALATION) ONCE
Status: DISCONTINUED | OUTPATIENT
Start: 2021-06-22 | End: 2021-06-26 | Stop reason: HOSPADM

## 2021-06-23 ENCOUNTER — HOSPITAL ENCOUNTER (OUTPATIENT)
Dept: SLEEP CENTER | Facility: CLINIC | Age: 46
Discharge: HOME | End: 2021-06-23
Payer: COMMERCIAL

## 2021-06-23 ENCOUNTER — OFFICE VISIT (OUTPATIENT)
Dept: PULMONOLOGY | Facility: CLINIC | Age: 46
End: 2021-06-23
Payer: COMMERCIAL

## 2021-06-23 VITALS
HEART RATE: 78 BPM | SYSTOLIC BLOOD PRESSURE: 116 MMHG | HEIGHT: 67 IN | BODY MASS INDEX: 45.04 KG/M2 | DIASTOLIC BLOOD PRESSURE: 78 MMHG | TEMPERATURE: 97.9 F | OXYGEN SATURATION: 98 % | WEIGHT: 287 LBS

## 2021-06-23 DIAGNOSIS — F41.9 ANXIETY: ICD-10-CM

## 2021-06-23 DIAGNOSIS — R06.02 SHORTNESS OF BREATH: ICD-10-CM

## 2021-06-23 DIAGNOSIS — E66.01 MORBID OBESITY (HCC): ICD-10-CM

## 2021-06-23 DIAGNOSIS — R05.3 CHRONIC COUGH: ICD-10-CM

## 2021-06-23 DIAGNOSIS — R06.02 SHORTNESS OF BREATH: Primary | ICD-10-CM

## 2021-06-23 DIAGNOSIS — G47.33 OSA (OBSTRUCTIVE SLEEP APNEA): ICD-10-CM

## 2021-06-23 DIAGNOSIS — D50.9 IRON DEFICIENCY ANEMIA, UNSPECIFIED IRON DEFICIENCY ANEMIA TYPE: ICD-10-CM

## 2021-06-23 DIAGNOSIS — U09.9 POST-COVID SYNDROME: ICD-10-CM

## 2021-06-23 PROCEDURE — 99215 OFFICE O/P EST HI 40 MIN: CPT | Performed by: INTERNAL MEDICINE

## 2021-06-23 PROCEDURE — 95810 POLYSOM 6/> YRS 4/> PARAM: CPT

## 2021-06-23 PROCEDURE — 95810 POLYSOM 6/> YRS 4/> PARAM: CPT | Performed by: INTERNAL MEDICINE

## 2021-06-23 RX ORDER — ACETAMINOPHEN,DIPHENHYDRAMINE HCL 500; 25 MG/1; MG/1
1 TABLET, FILM COATED ORAL
COMMUNITY

## 2021-06-24 NOTE — PROGRESS NOTES
Sleep Study Documentation    Pre-Sleep Study       Sleep testing procedure explained to patient:YES    Patient napped prior to study:NO    Caffeine:Dayshift worker after 12PM   Caffeine use:YES- coffee  6 ounces    Alcohol:Dayshift workers after 5PM: Alcohol use:NO    Typical day for patient:YES       Study Documentation    Sleep Study Indications: Snoring, EDS, Witnessed gasping,apneas    Sleep Study: Diagnostic   Snore: Moderate  Supplemental O2: no    O2 flow rate (L/min) range   O2 flow rate (L/min) final   Minimum SaO2 84%   Baseline SaO2 98%        Mode of Therapy:    EKG abnormalities: no     EEG abnormalities: no    Sleep Study Recorded < 2 hours: N/A    Sleep Study Recorded > 2 hours but incomplete study: N/A    Sleep Study Recorded 6 hours but no sleep obtained: NO    Patient classification: employed       Post-Sleep Study    Medication used at bedtime or during sleep study:NO    Patient reports time it took to fall asleep:greater than 60 minutes    Patient reports waking up during study:1 to 2 times  Patient reports returning to sleep without difficulty  Patient reports sleeping 2 to 4 hours without dreaming  Patient reports sleep during study:typical    Patient rated sleepiness: Somewhat sleepy or tired    PAP treatment:no

## 2021-06-25 ENCOUNTER — CONSULT (OUTPATIENT)
Dept: HEMATOLOGY ONCOLOGY | Facility: CLINIC | Age: 46
End: 2021-06-25
Payer: COMMERCIAL

## 2021-06-25 VITALS
RESPIRATION RATE: 22 BRPM | DIASTOLIC BLOOD PRESSURE: 82 MMHG | SYSTOLIC BLOOD PRESSURE: 122 MMHG | HEIGHT: 67 IN | OXYGEN SATURATION: 99 % | HEART RATE: 92 BPM | BODY MASS INDEX: 44.73 KG/M2 | WEIGHT: 285 LBS | TEMPERATURE: 97.8 F

## 2021-06-25 DIAGNOSIS — G47.33 OSA (OBSTRUCTIVE SLEEP APNEA): Primary | ICD-10-CM

## 2021-06-25 DIAGNOSIS — D50.9 IRON DEFICIENCY ANEMIA, UNSPECIFIED IRON DEFICIENCY ANEMIA TYPE: ICD-10-CM

## 2021-06-25 DIAGNOSIS — D50.9 MICROCYTIC ANEMIA: Primary | ICD-10-CM

## 2021-06-25 DIAGNOSIS — D75.838 REACTIVE THROMBOCYTOSIS: ICD-10-CM

## 2021-06-25 PROBLEM — D50.8 IRON DEFICIENCY ANEMIA SECONDARY TO INADEQUATE DIETARY IRON INTAKE: Status: ACTIVE | Noted: 2021-06-25

## 2021-06-25 PROCEDURE — 3008F BODY MASS INDEX DOCD: CPT | Performed by: NURSE PRACTITIONER

## 2021-06-25 PROCEDURE — 99244 OFF/OP CNSLTJ NEW/EST MOD 40: CPT | Performed by: NURSE PRACTITIONER

## 2021-06-25 PROCEDURE — 1036F TOBACCO NON-USER: CPT | Performed by: NURSE PRACTITIONER

## 2021-06-25 RX ORDER — SODIUM CHLORIDE 9 MG/ML
20 INJECTION, SOLUTION INTRAVENOUS ONCE
Status: CANCELLED | OUTPATIENT
Start: 2021-06-30

## 2021-06-25 NOTE — LETTER
June 25, 2021     Demetria Carbajal, [address]    Patient: Raquel Umaña   YOB: 1975   Date of Visit: 6/25/2021       Dear Dr Basilia Santana: Thank you for referring Billie Cardoso to me for evaluation  Below are my notes for this consultation  If you have questions, please do not hesitate to call me  I look forward to following your patient along with you  Sincerely,        THOMAS Bishop        CC: MD Jody Nguyen, [facility]  6/25/2021  2:14 PM  Cosign Needed  Hematology/Oncology Outpatient  Consultation  Raquel Umaña 42 y o  female 1975 [id number]    Date:  6/25/2021      Assessment and Plan:  1  Microcytic anemia  Patient noted to have microcytic anemia hemoglobin 10 9 with iron deficiency ferritin of  14  Etiology most likely due to malabsorption with her history of gastric bypass surgery, she also has heavy menstrual bleeding which is likely contributory as well  Advised her to follow-up with her gynecology team to discuss her menorrhagia  We will correct the iron deficiency with IV iron Feraheme 510 mg x2 doses within a 3-7 day period  Patient would like to get the iron as soon as possible as she is leaving for a trip 07/07/2021  I did advise her to continue to take her daily vitamin B12 supplement and consider switching to sublingual form which has better absorption for gastric bypass patients  Patient educated about the iron product, administration and common adverse effects including but not limited to: Anaphylaxis/allergic reaction, arthralgias/myalgias, nausea, blood pressure changes and phlebitis; agrees to proceed with treatment  The patient will be back for follow-up again in about 3 months with repeat laboratory studies prior  We will also pursue additional anemia workup to rule out other etiologies of microcytic anemia prior to follow-up        - CBC and differential; Future  - Comprehensive metabolic panel; Future  - C-reactive protein; Future  - Sedimentation rate, automated; Future  - Reticulocytes; Future  - Direct antiglobulin test; Future  - Folate; Future  - Ferritin; Future  - Haptoglobin; Future  - IgG, IgA, IgM; Future  - Immunoglobulin free LT chains blood; Future  - Iron Panel (Includes Ferritin, Iron Sat%, Iron, and TIBC); Future  - LD,Blood; Future  - Occult Blood, Fecal Immunochemical; Future  - Protein electrophoresis, serum; Future  - Vitamin B12; Future  - Copper Level; Future    2  Iron deficiency anemia, unspecified iron deficiency anemia type   as above  3  Reactive thrombocytosis  Likely due to iron deficiency and inflammatory process with her recent significant COVID-19 infection  Looking back it seems she has had thrombocytosis at least since 2016  For completeness sake we will check reflexive JAK2 mutational studies to rule out myeloproliferative disorder  I did recommend taking a baby aspirin daily until her platelet count resolves  - JAK2 V617F,Ql,W/RFL Exons 12,13 and MPL E545,E472; Future  - Erythropoietin; Future        HPI:  Patient is a 62-year-old female who presents today for further evaluation and treatment of her microcytic anemia/iron deficiency accompanied by her friend Antonio Dodd who kindly assisted with some Bengali translation  She has a past medical history of bipolar disorder, anxiety, hypothyroidism, obstructive sleep apnea, gastric bypass surgery 2017 and post COVID syndrome after she has significant COVID-19 infection April 2021  She mentions that ventilation was recommended when she had COVID-19 which she declined  She is being monitored by pulmonology and is on O2 therapy at 3 L nasal cannula since the event  Has also been having some heart palpitations and was seen by Cardiology; she is currently wearing a heart monitor (zio patch) for 2 weeks  Patient denies any bleeding from any site other than her menstrual cycles    She states that her menses have been heavy for quite some time typically 7 days per cycle  She is reporting significant fatigue / exhaustion and sleeping a lot  Admits to headaches, dizziness, leg cramps, restless legs, hair loss, cold sensitivity and Pica for large bags of ice  She states that she has been having some numbness in her fingertips  Is taking an oral B12 supplement and iron supplement daily  Patient's most recent CBC from over a month ago 05/14/2021 showed normal white cells 10 9, microcytic hypochromic anemia H&H 10 9/34 4, MCV 96, MCH 24 7 she has thrombocytosis platelet count 655549  BMP was essentially normal with the exception of a decreased glucose 62  She had additional laboratory studies done 06/03/2021 which showed low ferritin 14 and low serum iron 25 complete iron panel was not done  C-reactive protein was back to the normal range 6 1       ROS: Review of Systems   Constitutional: Positive for appetite change and fatigue  Negative for activity change, chills, fever and unexpected weight change  HENT: Negative for congestion, mouth sores, nosebleeds, sore throat and trouble swallowing  Eyes: Negative  Respiratory: Positive for cough (dry) and shortness of breath  Negative for chest tightness  Cardiovascular: Positive for palpitations  Negative for chest pain and leg swelling  Gastrointestinal: Positive for diarrhea  Negative for abdominal distention, abdominal pain, blood in stool, constipation, nausea and vomiting  Taste changes/metallic taste   Endocrine: Positive for cold intolerance  Genitourinary: Negative for difficulty urinating, dysuria, frequency, hematuria and urgency  Musculoskeletal: Positive for back pain  Negative for gait problem and joint swelling  Skin: Negative for color change, pallor and rash  Neurological: Positive for dizziness, weakness, numbness and headaches  Negative for light-headedness  Hematological: Negative for adenopathy   Does not bruise/bleed easily  Psychiatric/Behavioral: Positive for dysphoric mood and sleep disturbance  The patient is nervous/anxious  Past Medical History:   Diagnosis Date    Abscess of labia     Acute and chronic respiratory failure with hypoxia ([facility] [address] )     Anemia 2021    Anxiety     Bipolar disorder (HCC)     Breast lump     COVID-19     Frequent headaches     Hemorrhoids     Hypothyroidism 2013    Migraine     Morbid obesity (HCC) 2013    Obesity     Psychotic disorder (HCC)        Past Surgical History:   Procedure Laterality Date    BARIATRIC SURGERY      BREAST BIOPSY Right 1999    BREAST LUMPECTOMY Left     benign    CHOLECYSTECTOMY      GALLBLADDER SURGERY         Social History     Socioeconomic History    Marital status: Single     Spouse name: None    Number of children: 1    Years of education: None    Highest education level: None   Occupational History    None   Tobacco Use    Smoking status: Former Smoker     Types: Cigarettes     Quit date:      Years since quittin 4    Smokeless tobacco: Former User    Tobacco comment: smoker for 2-3 yrs and quit in , smoked about 3 cigarettes a day   Vaping Use    Vaping Use: Never used   Substance and Sexual Activity    Alcohol use: Never    Drug use: No    Sexual activity: Not Currently   Other Topics Concern    None   Social History Narrative    None     Social Determinants of Health     Financial Resource Strain:     Difficulty of Paying Living Expenses:    Food Insecurity:     Worried About Running Out of Food in the Last Year:     Ran Out of Food in the Last Year:    Transportation Needs:     Lack of Transportation (Medical):      Lack of Transportation (Non-Medical):    Physical Activity:     Days of Exercise per Week:     Minutes of Exercise per Session:    Stress:     Feeling of Stress :    Social Connections:     Frequency of Communication with Friends and Family:     Frequency of Social Gatherings with Friends and Family:     Attends Orthodox Services:     Active Member of Clubs or Organizations:     Attends Club or Organization Meetings:     Marital Status:    Intimate Partner Violence:     Fear of Current or Ex-Partner:     Emotionally Abused:     Physically Abused:     Sexually Abused:        Family History   Problem Relation Age of Onset    Liver cancer Mother     Prostate cancer Father     Cancer Family     No Known Problems Sister     No Known Problems Maternal Grandmother     No Known Problems Maternal Grandfather     No Known Problems Paternal Grandmother     No Known Problems Paternal Grandfather     No Known Problems Sister     No Known Problems Sister     No Known Problems Sister     No Known Problems Sister     No Known Problems Sister     No Known Problems Sister     No Known Problems Sister     No Known Problems Sister     No Known Problems Sister     Cancer Maternal Aunt     Cancer Maternal Aunt     Cancer Maternal Aunt     Cancer Maternal Aunt     Cancer Maternal Aunt     Cancer Maternal Aunt     Cancer Maternal Aunt     No Known Problems Paternal Aunt     No Known Problems Paternal Aunt     No Known Problems Paternal Aunt     No Known Problems Paternal Aunt     Cancer Paternal Aunt     Cancer Paternal Aunt        Allergies   Allergen Reactions    No Active Allergies          Current Outpatient Medications:     albuterol (PROVENTIL HFA,VENTOLIN HFA) 90 mcg/act inhaler, Inhale 2 puffs every 6 (six) hours as needed for wheezing or shortness of breath, Disp: 1 Inhaler, Rfl: 5    atorvastatin (LIPITOR) 20 mg tablet, Take 1 tablet (20 mg total) by mouth daily, Disp: 30 tablet, Rfl: 6    benzonatate (TESSALON PERLES) 100 mg capsule, Take 1 capsule (100 mg total) by mouth 3 (three) times a day as needed for cough, Disp: 60 capsule, Rfl: 3    Calcium Citrate 200 MG TABS, 1 tablet 3 (three) times a day, Disp: , Rfl:     Cyanocobalamin ER 1000 MCG TBCR, take one tab daily, Disp: , Rfl:     diphenhydrAMINE-acetaminophen (TYLENOL PM)  MG TABS, Take 1 tablet by mouth daily at bedtime as needed for sleep, Disp: , Rfl:     escitalopram (LEXAPRO) 10 mg tablet, Take 1 tablet (10 mg total) by mouth daily, Disp: 30 tablet, Rfl: 3    ferrous sulfate 324 (65 Fe) mg, Take 1 tablet (324 mg total) by mouth daily before breakfast, Disp: , Rfl: 0    fluticasone-vilanterol (BREO ELLIPTA) 200-25 MCG/INH inhaler, Inhale 1 puff daily Rinse mouth after use , Disp: 1 each, Rfl: 2    furosemide (LASIX) 20 mg tablet, Take 1 tablet (20 mg total) by mouth daily, Disp: 30 tablet, Rfl: 1    levothyroxine 137 mcg tablet, TAKE ONE TABLET BY MOUTH EVERY MORNING ON EMPTY STOMACH, Disp: , Rfl: 1    Multiple Vitamins-Minerals (MULTIVITAMIN ADULT PO), every 24 hours, Disp: , Rfl:     pantoprazole (PROTONIX) 40 mg tablet, Take 1 tablet (40 mg total) by mouth every 24 hours, Disp: 90 tablet, Rfl: 3    traZODone (DESYREL) 50 mg tablet, Take 1 tablet (50 mg total) by mouth daily at bedtime (Patient not taking: Reported on 6/8/2021), Disp: 30 tablet, Rfl: 2    triamcinolone (KENALOG) 0 1 % ointment, Apply topically 2 (two) times a day (Patient not taking: Reported on 6/23/2021), Disp: 30 g, Rfl: 2  No current facility-administered medications for this visit  Facility-Administered Medications Ordered in Other Visits:     albuterol inhalation solution 2 5 mg, 2 5 mg, Nebulization, Once, Gissell Ross,       Physical Exam:  /82 (BP Location: Left arm, Patient Position: Sitting, Cuff Size: Large)   Pulse 92   Temp 97 8 °F (36 6 °C) (Tympanic)   Resp 22   Ht 5' 7" (1 702 m)   Wt 129 kg (285 lb)   LMP 06/04/2021 (Exact Date)   SpO2 99%   BMI 44 64 kg/m²     Physical Exam  Vitals reviewed  Constitutional:       Appearance: She is well-developed  She is morbidly obese  She is ill-appearing  She is not diaphoretic  HENT:      Head: Normocephalic and atraumatic     Eyes: General: No scleral icterus  Conjunctiva/sclera: Conjunctivae normal       Pupils: Pupils are equal, round, and reactive to light  Neck:      Thyroid: No thyromegaly  Cardiovascular:      Rate and Rhythm: Normal rate and regular rhythm  Heart sounds: Normal heart sounds  No murmur heard  Pulmonary:      Effort: Tachypnea and accessory muscle usage present  Breath sounds: Decreased breath sounds present  Abdominal:      General: There is no distension  Palpations: Abdomen is soft  There is no hepatomegaly or splenomegaly  Tenderness: There is no abdominal tenderness  Musculoskeletal:         General: No swelling  Normal range of motion  Cervical back: Normal range of motion and neck supple  Lymphadenopathy:      Cervical: No cervical adenopathy  Upper Body:      Right upper body: No axillary adenopathy  Left upper body: No axillary adenopathy  Skin:     General: Skin is warm and dry  Coloration: Skin is pale  Findings: No erythema or rash  Neurological:      General: No focal deficit present  Mental Status: She is alert and oriented to person, place, and time  Psychiatric:         Mood and Affect: Mood and affect normal          Behavior: Behavior normal  Behavior is cooperative  Thought Content:  Thought content normal          Judgment: Judgment normal            Labs:  Lab Results   Component Value Date    WBC 10 90 05/14/2021    HGB 10 9 (L) 05/14/2021    HCT 34 4 (L) 05/14/2021    MCV 78 (L) 05/14/2021     (H) 05/14/2021     Lab Results   Component Value Date     10/14/2015    K 4 0 05/14/2021     05/14/2021    CO2 22 05/14/2021    ANIONGAP 7 10/14/2015    BUN 10 05/14/2021    CREATININE 0 65 05/14/2021    GLUCOSE 89 10/14/2015    GLUF 81 07/10/2020    CALCIUM 8 9 05/14/2021    CORRECTEDCA 9 1 04/16/2021    AST 23 04/16/2021    ALT 42 04/16/2021    ALKPHOS 95 04/16/2021    PROT 7 2 10/14/2015    BILITOT 0 33 10/14/2015    EGFR 109 05/14/2021       Patient voiced understanding and agreement in the above discussion  Aware to contact our office with questions/symptoms in the interim  This note has been generated by voice recognition software system  Therefore, there may be spelling, grammar, and or syntax errors  Please contact if questions arise

## 2021-06-25 NOTE — PROGRESS NOTES
Hematology/Oncology Outpatient  Consultation  Nunu Umaña 42 y o  female 1975 [id number]    Date:  6/25/2021      Assessment and Plan:  1  Microcytic anemia  Patient noted to have microcytic anemia hemoglobin 10 9 with iron deficiency ferritin of  14  Etiology most likely due to malabsorption with her history of gastric bypass surgery, she also has heavy menstrual bleeding which is likely contributory as well  Advised her to follow-up with her gynecology team to discuss her menorrhagia  We will correct the iron deficiency with IV iron Feraheme 510 mg x2 doses within a 3-7 day period  Patient would like to get the iron as soon as possible as she is leaving for a trip 07/07/2021  I did advise her to continue to take her daily vitamin B12 supplement and consider switching to sublingual form which has better absorption for gastric bypass patients  Patient educated about the iron product, administration and common adverse effects including but not limited to: Anaphylaxis/allergic reaction, arthralgias/myalgias, nausea, blood pressure changes and phlebitis; agrees to proceed with treatment  The patient will be back for follow-up again in about 3 months with repeat laboratory studies prior  We will also pursue additional anemia workup to rule out other etiologies of microcytic anemia prior to follow-up  - CBC and differential; Future  - Comprehensive metabolic panel; Future  - C-reactive protein; Future  - Sedimentation rate, automated; Future  - Reticulocytes; Future  - Direct antiglobulin test; Future  - Folate; Future  - Ferritin; Future  - Haptoglobin; Future  - IgG, IgA, IgM; Future  - Immunoglobulin free LT chains blood; Future  - Iron Panel (Includes Ferritin, Iron Sat%, Iron, and TIBC); Future  - LD,Blood; Future  - Occult Blood, Fecal Immunochemical; Future  - Protein electrophoresis, serum; Future  - Vitamin B12; Future  - Copper Level; Future    2   Iron deficiency anemia, unspecified iron deficiency anemia type   as above  3  Reactive thrombocytosis  Likely due to iron deficiency and inflammatory process with her recent significant COVID-19 infection  Looking back it seems she has had thrombocytosis at least since 2016  For completeness sake we will check reflexive JAK2 mutational studies to rule out myeloproliferative disorder  I did recommend taking a baby aspirin daily until her platelet count resolves  - JAK2 V617F,Ql,W/RFL Exons 12,13 and MPL S271,U767; Future  - Erythropoietin; Future        HPI:  Patient is a 17-year-old female who presents today for further evaluation and treatment of her microcytic anemia/iron deficiency accompanied by her friend Praveena Ames who kindly assisted with some French translation  She has a past medical history of bipolar disorder, anxiety, hypothyroidism, obstructive sleep apnea, gastric bypass surgery 2017 and post COVID syndrome after she has significant COVID-19 infection April 2021  She mentions that ventilation was recommended when she had COVID-19 which she declined  She is being monitored by pulmonology and is on O2 therapy at 3 L nasal cannula since the event  Has also been having some heart palpitations and was seen by Cardiology; she is currently wearing a heart monitor (zio patch) for 2 weeks  Patient denies any bleeding from any site other than her menstrual cycles  She states that her menses have been heavy for quite some time typically 7 days per cycle  She is reporting significant fatigue / exhaustion and sleeping a lot  Admits to headaches, dizziness, leg cramps, restless legs, hair loss, cold sensitivity and Pica for large bags of ice  She states that she has been having some numbness in her fingertips  Is taking an oral B12 supplement and iron supplement daily      Patient's most recent CBC from over a month ago 05/14/2021 showed normal white cells 10 9, microcytic hypochromic anemia H&H 10 9/34 4, MCV 96, MCH 24 7 she has thrombocytosis platelet count [id number]  BMP was essentially normal with the exception of a decreased glucose 62  She had additional laboratory studies done 06/03/2021 which showed low ferritin 14 and low serum iron 25 complete iron panel was not done  C-reactive protein was back to the normal range 6 1       ROS: Review of Systems   Constitutional: Positive for appetite change and fatigue  Negative for activity change, chills, fever and unexpected weight change  HENT: Negative for congestion, mouth sores, nosebleeds, sore throat and trouble swallowing  Eyes: Negative  Respiratory: Positive for cough (dry) and shortness of breath  Negative for chest tightness  Cardiovascular: Positive for palpitations  Negative for chest pain and leg swelling  Gastrointestinal: Positive for diarrhea  Negative for abdominal distention, abdominal pain, blood in stool, constipation, nausea and vomiting  Taste changes/metallic taste   Endocrine: Positive for cold intolerance  Genitourinary: Negative for difficulty urinating, dysuria, frequency, hematuria and urgency  Musculoskeletal: Positive for back pain  Negative for gait problem and joint swelling  Skin: Negative for color change, pallor and rash  Neurological: Positive for dizziness, weakness, numbness and headaches  Negative for light-headedness  Hematological: Negative for adenopathy  Does not bruise/bleed easily  Psychiatric/Behavioral: Positive for dysphoric mood and sleep disturbance  The patient is nervous/anxious          Past Medical History:   Diagnosis Date    Abscess of labia     Acute and chronic respiratory failure with hypoxia ([address] )     Anemia 4/6/2021    Anxiety     Bipolar disorder (HCC)     Breast lump     COVID-19     Frequent headaches     Hemorrhoids     Hypothyroidism 7/1/2013    Migraine     Morbid obesity (HCC) 7/1/2013    Obesity     Psychotic disorder ([address] )        Past Surgical History:   Procedure Laterality Date    BARIATRIC SURGERY      BREAST BIOPSY Right 1999    BREAST LUMPECTOMY Left     benign    CHOLECYSTECTOMY      GALLBLADDER SURGERY         Social History     Socioeconomic History    Marital status: Single     Spouse name: None    Number of children: 1    Years of education: None    Highest education level: None   Occupational History    None   Tobacco Use    Smoking status: Former Smoker     Types: Cigarettes     Quit date:      Years since quittin 4    Smokeless tobacco: Former User    Tobacco comment: smoker for 2-3 yrs and quit in , smoked about 3 cigarettes a day   Vaping Use    Vaping Use: Never used   Substance and Sexual Activity    Alcohol use: Never    Drug use: No    Sexual activity: Not Currently   Other Topics Concern    None   Social History Narrative    None     Social Determinants of Health     Financial Resource Strain:     Difficulty of Paying Living Expenses:    Food Insecurity:     Worried About Running Out of Food in the Last Year:     Ran Out of Food in the Last Year:    Transportation Needs:     Lack of Transportation (Medical):      Lack of Transportation (Non-Medical):    Physical Activity:     Days of Exercise per Week:     Minutes of Exercise per Session:    Stress:     Feeling of Stress :    Social Connections:     Frequency of Communication with Friends and Family:     Frequency of Social Gatherings with Friends and Family:     Attends Confucianist Services:     Active Member of Clubs or Organizations:     Attends Club or Organization Meetings:     Marital Status:    Intimate Partner Violence:     Fear of Current or Ex-Partner:     Emotionally Abused:     Physically Abused:     Sexually Abused:        Family History   Problem Relation Age of Onset    Liver cancer Mother     Prostate cancer Father     Cancer Family     No Known Problems Sister     No Known Problems Maternal Grandmother     No Known Problems Maternal Grandfather  No Known Problems Paternal Grandmother     No Known Problems Paternal Grandfather     No Known Problems Sister     No Known Problems Sister     No Known Problems Sister     No Known Problems Sister     No Known Problems Sister     No Known Problems Sister     No Known Problems Sister     No Known Problems Sister     No Known Problems Sister     Cancer Maternal Aunt     Cancer Maternal Aunt     Cancer Maternal Aunt     Cancer Maternal Aunt     Cancer Maternal Aunt     Cancer Maternal Aunt     Cancer Maternal Aunt     No Known Problems Paternal Aunt     No Known Problems Paternal Aunt     No Known Problems Paternal Aunt     No Known Problems Paternal Aunt     Cancer Paternal Aunt     Cancer Paternal Aunt        Allergies   Allergen Reactions    No Active Allergies          Current Outpatient Medications:     albuterol (PROVENTIL HFA,VENTOLIN HFA) 90 mcg/act inhaler, Inhale 2 puffs every 6 (six) hours as needed for wheezing or shortness of breath, Disp: 1 Inhaler, Rfl: 5    atorvastatin (LIPITOR) 20 mg tablet, Take 1 tablet (20 mg total) by mouth daily, Disp: 30 tablet, Rfl: 6    benzonatate (TESSALON PERLES) 100 mg capsule, Take 1 capsule (100 mg total) by mouth 3 (three) times a day as needed for cough, Disp: 60 capsule, Rfl: 3    Calcium Citrate 200 MG TABS, 1 tablet 3 (three) times a day, Disp: , Rfl:     Cyanocobalamin ER 1000 MCG TBCR, take one tab daily, Disp: , Rfl:     diphenhydrAMINE-acetaminophen (TYLENOL PM)  MG TABS, Take 1 tablet by mouth daily at bedtime as needed for sleep, Disp: , Rfl:     escitalopram (LEXAPRO) 10 mg tablet, Take 1 tablet (10 mg total) by mouth daily, Disp: 30 tablet, Rfl: 3    ferrous sulfate 324 (65 Fe) mg, Take 1 tablet (324 mg total) by mouth daily before breakfast, Disp: , Rfl: 0    fluticasone-vilanterol (BREO ELLIPTA) 200-25 MCG/INH inhaler, Inhale 1 puff daily Rinse mouth after use , Disp: 1 each, Rfl: 2    furosemide (LASIX) 20 mg tablet, Take 1 tablet (20 mg total) by mouth daily, Disp: 30 tablet, Rfl: 1    levothyroxine 137 mcg tablet, TAKE ONE TABLET BY MOUTH EVERY MORNING ON EMPTY STOMACH, Disp: , Rfl: 1    Multiple Vitamins-Minerals (MULTIVITAMIN ADULT PO), every 24 hours, Disp: , Rfl:     pantoprazole (PROTONIX) 40 mg tablet, Take 1 tablet (40 mg total) by mouth every 24 hours, Disp: 90 tablet, Rfl: 3    traZODone (DESYREL) 50 mg tablet, Take 1 tablet (50 mg total) by mouth daily at bedtime (Patient not taking: Reported on 6/8/2021), Disp: 30 tablet, Rfl: 2    triamcinolone (KENALOG) 0 1 % ointment, Apply topically 2 (two) times a day (Patient not taking: Reported on 6/23/2021), Disp: 30 g, Rfl: 2  No current facility-administered medications for this visit  Facility-Administered Medications Ordered in Other Visits:     albuterol inhalation solution 2 5 mg, 2 5 mg, Nebulization, Once, Ame Batista DO      Physical Exam:  /82 (BP Location: Left arm, Patient Position: Sitting, Cuff Size: Large)   Pulse 92   Temp 97 8 °F (36 6 °C) (Tympanic)   Resp 22   Ht 5' 7" (1 702 m)   Wt 129 kg (285 lb)   LMP 06/04/2021 (Exact Date)   SpO2 99%   BMI 44 64 kg/m²     Physical Exam  Vitals reviewed  Constitutional:       Appearance: She is well-developed  She is morbidly obese  She is ill-appearing  She is not diaphoretic  HENT:      Head: Normocephalic and atraumatic  Eyes:      General: No scleral icterus  Conjunctiva/sclera: Conjunctivae normal       Pupils: Pupils are equal, round, and reactive to light  Neck:      Thyroid: No thyromegaly  Cardiovascular:      Rate and Rhythm: Normal rate and regular rhythm  Heart sounds: Normal heart sounds  No murmur heard  Pulmonary:      Effort: Tachypnea and accessory muscle usage present  Breath sounds: Decreased breath sounds present  Abdominal:      General: There is no distension  Palpations: Abdomen is soft  There is no hepatomegaly or splenomegaly  Tenderness: There is no abdominal tenderness  Musculoskeletal:         General: No swelling  Normal range of motion  Cervical back: Normal range of motion and neck supple  Lymphadenopathy:      Cervical: No cervical adenopathy  Upper Body:      Right upper body: No axillary adenopathy  Left upper body: No axillary adenopathy  Skin:     General: Skin is warm and dry  Coloration: Skin is pale  Findings: No erythema or rash  Neurological:      General: No focal deficit present  Mental Status: She is alert and oriented to person, place, and time  Psychiatric:         Mood and Affect: Mood and affect normal          Behavior: Behavior normal  Behavior is cooperative  Thought Content: Thought content normal          Judgment: Judgment normal            Labs:  Lab Results   Component Value Date    WBC 10 90 05/14/2021    HGB 10 9 (L) 05/14/2021    HCT 34 4 (L) 05/14/2021    MCV 78 (L) 05/14/2021     (H) 05/14/2021     Lab Results   Component Value Date     10/14/2015    K 4 0 05/14/2021     05/14/2021    CO2 22 05/14/2021    ANIONGAP 7 10/14/2015    BUN 10 05/14/2021    CREATININE 0 65 05/14/2021    GLUCOSE 89 10/14/2015    GLUF 81 07/10/2020    CALCIUM 8 9 05/14/2021    CORRECTEDCA 9 1 04/16/2021    AST 23 04/16/2021    ALT 42 04/16/2021    ALKPHOS 95 04/16/2021    PROT 7 2 10/14/2015    BILITOT 0 33 10/14/2015    EGFR 109 05/14/2021       Patient voiced understanding and agreement in the above discussion  Aware to contact our office with questions/symptoms in the interim  This note has been generated by voice recognition software system  Therefore, there may be spelling, grammar, and or syntax errors  Please contact if questions arise

## 2021-06-29 ENCOUNTER — TELEPHONE (OUTPATIENT)
Dept: FAMILY MEDICINE CLINIC | Facility: CLINIC | Age: 46
End: 2021-06-29

## 2021-06-29 ENCOUNTER — TELEPHONE (OUTPATIENT)
Dept: SLEEP CENTER | Facility: CLINIC | Age: 46
End: 2021-06-29

## 2021-06-29 ENCOUNTER — HOSPITAL ENCOUNTER (EMERGENCY)
Facility: HOSPITAL | Age: 46
Discharge: HOME | End: 2021-06-29
Attending: EMERGENCY MEDICINE | Admitting: EMERGENCY MEDICINE
Payer: COMMERCIAL

## 2021-06-29 VITALS
RESPIRATION RATE: 18 BRPM | OXYGEN SATURATION: 95 % | DIASTOLIC BLOOD PRESSURE: 70 MMHG | WEIGHT: 287.19 LBS | SYSTOLIC BLOOD PRESSURE: 122 MMHG | BODY MASS INDEX: 44.98 KG/M2 | HEART RATE: 63 BPM | TEMPERATURE: 97.2 F

## 2021-06-29 DIAGNOSIS — N93.8 DYSFUNCTIONAL UTERINE BLEEDING: Primary | ICD-10-CM

## 2021-06-29 LAB
BACTERIA UR QL AUTO: ABNORMAL /HPF
BASOPHILS # BLD AUTO: 0.1 THOUSANDS/ΜL (ref 0–0.1)
BASOPHILS NFR BLD AUTO: 1 % (ref 0–1)
BILIRUB UR QL STRIP: NEGATIVE
CLARITY UR: ABNORMAL
COLOR UR: ABNORMAL
EOSINOPHIL # BLD AUTO: 0.1 THOUSAND/ΜL (ref 0–0.4)
EOSINOPHIL NFR BLD AUTO: 1 % (ref 0–6)
ERYTHROCYTE [DISTWIDTH] IN BLOOD BY AUTOMATED COUNT: 23.9 %
EXT PREG TEST URINE: NEGATIVE
EXT. CONTROL ED NAV: NORMAL
GLUCOSE UR STRIP-MCNC: NEGATIVE MG/DL
HCT VFR BLD AUTO: 40 % (ref 36–46)
HGB BLD-MCNC: 12.8 G/DL (ref 12–16)
HGB UR QL STRIP.AUTO: 250
KETONES UR STRIP-MCNC: NEGATIVE MG/DL
LEUKOCYTE ESTERASE UR QL STRIP: 100
LYMPHOCYTES # BLD AUTO: 2.2 THOUSANDS/ΜL (ref 0.5–4)
LYMPHOCYTES NFR BLD AUTO: 19 % (ref 25–45)
MCH RBC QN AUTO: 27.3 PG (ref 26–34)
MCHC RBC AUTO-ENTMCNC: 32 G/DL (ref 31–36)
MCV RBC AUTO: 85 FL (ref 80–100)
MONOCYTES # BLD AUTO: 1.1 THOUSAND/ΜL (ref 0.2–0.9)
MONOCYTES NFR BLD AUTO: 9 % (ref 1–10)
NEUTROPHILS # BLD AUTO: 8.1 THOUSANDS/ΜL (ref 1.8–7.8)
NEUTS SEG NFR BLD AUTO: 70 % (ref 45–65)
NITRITE UR QL STRIP: NEGATIVE
NON-SQ EPI CELLS URNS QL MICRO: ABNORMAL /HPF
PH UR STRIP.AUTO: 5 [PH]
PLATELET # BLD AUTO: 554 THOUSANDS/UL (ref 150–450)
PMV BLD AUTO: 7 FL (ref 8.9–12.7)
PROT UR STRIP-MCNC: >=500 MG/DL
RBC # BLD AUTO: 4.7 MILLION/UL (ref 4–5.2)
RBC #/AREA URNS AUTO: ABNORMAL /HPF
SP GR UR STRIP.AUTO: 1.02 (ref 1–1.04)
UROBILINOGEN UA: NEGATIVE MG/DL
WBC # BLD AUTO: 11.6 THOUSAND/UL (ref 4.5–11)
WBC #/AREA URNS AUTO: ABNORMAL /HPF

## 2021-06-29 PROCEDURE — 99284 EMERGENCY DEPT VISIT MOD MDM: CPT

## 2021-06-29 PROCEDURE — 81001 URINALYSIS AUTO W/SCOPE: CPT | Performed by: PHYSICIAN ASSISTANT

## 2021-06-29 PROCEDURE — 81003 URINALYSIS AUTO W/O SCOPE: CPT | Performed by: PHYSICIAN ASSISTANT

## 2021-06-29 PROCEDURE — 85025 COMPLETE CBC W/AUTO DIFF WBC: CPT | Performed by: PHYSICIAN ASSISTANT

## 2021-06-29 PROCEDURE — 99282 EMERGENCY DEPT VISIT SF MDM: CPT | Performed by: PHYSICIAN ASSISTANT

## 2021-06-29 PROCEDURE — 81025 URINE PREGNANCY TEST: CPT | Performed by: PHYSICIAN ASSISTANT

## 2021-06-29 PROCEDURE — 36415 COLL VENOUS BLD VENIPUNCTURE: CPT | Performed by: PHYSICIAN ASSISTANT

## 2021-06-29 NOTE — TELEPHONE ENCOUNTER
Left message for patient to call back to review sleep study results  Mild CAR  APAP ordered  Patient is a pulmonary patient of Dr Rachel Wang in the [facility] office  Will need to determine which DME company the patient would like to use and then send message to the pulmonary office to process the order  Patient has follow up with Dr Rachel Wang on 9/22/21

## 2021-06-29 NOTE — ED PROVIDER NOTES
History  Chief Complaint   Patient presents with    Vaginal Bleeding     states starts tomorrow with first infusion due to anemia from heavy vaginal bleed; states hemoglobin 2-weeks ago  Menstrual cycle started yesterday morning and has been very heavy-feeling dizzy and weak  Patient is a 57-year-old female with a past medical history of bipolar disorder, anxiety, hypothyroidism, obstructive sleep apnea, gastric bypass surgery , presenting to the ED for evaluation of heavy vaginal bleeding  The vaginal bleeding started yesterday and has been  much heavier than her usual menstrual cycle  She also reports passage of small clots  She has been soaking through 2-3 maxi pads per hour and reports associated dizziness and generalized weakness  She also reports headache this morning, relieved with Tylenol  Patient reports a hx of anemia and is due to start iron infusions tomorrow  Denies hx of ectopic pregnancy or chance of current pregnancy  She denies chest pain, SOB, abdominal pain, diarrhea, constipation or urinary symptoms  Prior to Admission Medications   Prescriptions Last Dose Informant Patient Reported? Taking?    Calcium Citrate 200 MG TABS  Self Yes No   Si tablet 3 (three) times a day   Cyanocobalamin ER 1000 MCG TBCR  Self Yes No   Sig: take one tab daily   Multiple Vitamins-Minerals (MULTIVITAMIN ADULT PO)  Self Yes No   Sig: every 24 hours   albuterol (PROVENTIL HFA,VENTOLIN HFA) 90 mcg/act inhaler  Self No No   Sig: Inhale 2 puffs every 6 (six) hours as needed for wheezing or shortness of breath   atorvastatin (LIPITOR) 20 mg tablet  Self No No   Sig: Take 1 tablet (20 mg total) by mouth daily   benzonatate (TESSALON PERLES) 100 mg capsule  Self No No   Sig: Take 1 capsule (100 mg total) by mouth 3 (three) times a day as needed for cough   diphenhydrAMINE-acetaminophen (TYLENOL PM)  MG TABS  Self Yes No   Sig: Take 1 tablet by mouth daily at bedtime as needed for sleep escitalopram (LEXAPRO) 10 mg tablet  Self No No   Sig: Take 1 tablet (10 mg total) by mouth daily   fluticasone-vilanterol (BREO ELLIPTA) 200-25 MCG/INH inhaler  Self No No   Sig: Inhale 1 puff daily Rinse mouth after use     furosemide (LASIX) 20 mg tablet  Self No No   Sig: Take 1 tablet (20 mg total) by mouth daily   levothyroxine 137 mcg tablet  Self Yes No   Sig: TAKE ONE TABLET BY MOUTH EVERY MORNING ON EMPTY STOMACH   pantoprazole (PROTONIX) 40 mg tablet  Self No No   Sig: Take 1 tablet (40 mg total) by mouth every 24 hours   traZODone (DESYREL) 50 mg tablet  Self No No   Sig: Take 1 tablet (50 mg total) by mouth daily at bedtime   Patient not taking: Reported on 6/8/2021   triamcinolone (KENALOG) 0 1 % ointment  Self No No   Sig: Apply topically 2 (two) times a day   Patient not taking: Reported on 6/23/2021      Facility-Administered Medications: None       Past Medical History:   Diagnosis Date    Abscess of labia     Acute and chronic respiratory failure with hypoxia (HCC)     Anemia 4/6/2021    Anxiety     Bipolar disorder (HCC)     Breast lump     COVID-19     Frequent headaches     Hemorrhoids     Hypothyroidism 7/1/2013    Migraine     Morbid obesity (HCC) 7/1/2013    Obesity     Psychotic disorder ([facility] [address] )        Past Surgical History:   Procedure Laterality Date    BARIATRIC SURGERY      BREAST BIOPSY Right 1999    BREAST LUMPECTOMY Left     benign    CHOLECYSTECTOMY      GALLBLADDER SURGERY         Family History   Problem Relation Age of Onset    Liver cancer Mother     Prostate cancer Father     Cancer Family     No Known Problems Sister     No Known Problems Maternal Grandmother     No Known Problems Maternal Grandfather     No Known Problems Paternal Grandmother     No Known Problems Paternal Grandfather     No Known Problems Sister     No Known Problems Sister     No Known Problems Sister     No Known Problems Sister     No Known Problems Sister     No Known Problems Sister     No Known Problems Sister     No Known Problems Sister     No Known Problems Sister     Cancer Maternal Aunt     Cancer Maternal Aunt     Cancer Maternal Aunt     Cancer Maternal Aunt     Cancer Maternal Aunt     Cancer Maternal Aunt     Cancer Maternal Aunt     No Known Problems Paternal Aunt     No Known Problems Paternal Aunt     No Known Problems Paternal Aunt     No Known Problems Paternal Aunt     Cancer Paternal Aunt     Cancer Paternal Aunt      I have reviewed and agree with the history as documented  E-Cigarette/Vaping    E-Cigarette Use Never User      E-Cigarette/Vaping Substances    Nicotine No     THC No     CBD No     Flavoring No     Other No     Unknown No      Social History     Tobacco Use    Smoking status: Former Smoker     Types: Cigarettes     Quit date:      Years since quittin 5    Smokeless tobacco: Former User    Tobacco comment: smoker for 2-3 yrs and quit in , smoked about 3 cigarettes a day   Vaping Use    Vaping Use: Never used   Substance Use Topics    Alcohol use: Never    Drug use: No       Review of Systems   Constitutional: Negative for appetite change, chills, fatigue and fever  HENT: Negative for congestion, rhinorrhea, sinus pressure, sinus pain and sore throat  Eyes: Negative for photophobia and visual disturbance  Respiratory: Negative for cough, shortness of breath and wheezing  Cardiovascular: Negative for chest pain, palpitations and leg swelling  Gastrointestinal: Negative for abdominal pain, blood in stool, constipation, diarrhea, nausea and vomiting  Genitourinary: Positive for vaginal bleeding  Negative for difficulty urinating, dysuria, flank pain, frequency, hematuria and urgency  Musculoskeletal: Negative for arthralgias, back pain, joint swelling, myalgias and neck pain  Skin: Negative for color change, pallor and rash  Neurological: Positive for headaches   Negative for dizziness, syncope, weakness and light-headedness  Psychiatric/Behavioral: Negative for confusion and sleep disturbance  All other systems reviewed and are negative  Physical Exam  Physical Exam  Vitals and nursing note reviewed  Constitutional:       General: She is awake  Appearance: Normal appearance  She is well-developed  She is not toxic-appearing or diaphoretic  HENT:      Head: Normocephalic and atraumatic  Right Ear: External ear normal       Left Ear: External ear normal       Nose: Nose normal       Mouth/Throat:      Lips: Pink  Mouth: Mucous membranes are moist    Eyes:      General: Lids are normal  No scleral icterus  Conjunctiva/sclera: Conjunctivae normal       Pupils: Pupils are equal, round, and reactive to light  Cardiovascular:      Rate and Rhythm: Normal rate and regular rhythm  Pulses: Normal pulses  Radial pulses are 2+ on the right side and 2+ on the left side  Heart sounds: Normal heart sounds, S1 normal and S2 normal    Pulmonary:      Effort: Pulmonary effort is normal  No accessory muscle usage  Breath sounds: Normal breath sounds  No stridor  No decreased breath sounds, wheezing, rhonchi or rales  Abdominal:      General: Abdomen is flat  Bowel sounds are normal  There is no distension  Palpations: Abdomen is soft  Tenderness: There is no abdominal tenderness  There is no right CVA tenderness, left CVA tenderness, guarding or rebound  Comments: No tenderness to palpation, no rebound or guarding  Genitourinary:     Comments: Patient declines pelvic exam   Musculoskeletal:      Cervical back: Full passive range of motion without pain and neck supple  No signs of trauma  No pain with movement  Right lower leg: No edema  Left lower leg: No edema  Lymphadenopathy:      Cervical: No cervical adenopathy  Skin:     General: Skin is warm and dry  Capillary Refill: Capillary refill takes less than 2 seconds  Coloration: Skin is not cyanotic, jaundiced or pale  Neurological:      Mental Status: She is alert and oriented to person, place, and time  GCS: GCS eye subscore is 4  GCS verbal subscore is 5  GCS motor subscore is 6  Gait: Gait normal    Psychiatric:         Mood and Affect: Mood normal          Speech: Speech normal          Behavior: Behavior is cooperative           Vital Signs  ED Triage Vitals [06/29/21 1445]   Temperature Pulse Respirations Blood Pressure SpO2   (!) 97 2 °F (36 2 °C) 72 16 135/90 98 %      Temp Source Heart Rate Source Patient Position - Orthostatic VS BP Location FiO2 (%)   Tympanic Monitor Sitting Left arm --      Pain Score       --           Vitals:    06/29/21 1445 06/29/21 1718   BP: 135/90 122/70   Pulse: 72 63   Patient Position - Orthostatic VS: Sitting Sitting         Visual Acuity      ED Medications  Medications - No data to display    Diagnostic Studies  Results Reviewed     Procedure Component Value Units Date/Time    Urine Microscopic [[id number]]  (Abnormal) Collected: 06/29/21 1537    Lab Status: Final result Specimen: Urine, Clean Catch Updated: 06/29/21 1706     RBC, UA Innumerable /hpf      WBC, UA 4-10 /hpf      Epithelial Cells None Seen /hpf      Bacteria, UA None Seen /hpf     CBC and differential [[id number]]  (Abnormal) Collected: 06/29/21 1555    Lab Status: Final result Specimen: Blood from Arm, Right Updated: 06/29/21 1608     WBC 11 60 Thousand/uL      RBC 4 70 Million/uL      Hemoglobin 12 8 g/dL      Hematocrit 40 0 %      MCV 85 fL      MCH 27 3 pg      MCHC 32 0 g/dL      RDW 23 9 %      MPV 7 0 fL      Platelets 314 Thousands/uL      Neutrophils Relative 70 %      Lymphocytes Relative 19 %      Monocytes Relative 9 %      Eosinophils Relative 1 %      Basophils Relative 1 %      Neutrophils Absolute 8 10 Thousands/µL      Lymphocytes Absolute 2 20 Thousands/µL      Monocytes Absolute 1 10 Thousand/µL      Eosinophils Absolute 0 10 Thousand/µL Basophils Absolute 0 10 Thousands/µL     UA w Reflex to Microscopic w Reflex to Culture [[id number]]  (Abnormal) Collected: 06/29/21 1537    Lab Status: Final result Specimen: Urine, Clean Catch Updated: 06/29/21 1604     Color, UA Red     Clarity, UA Bloody     Specific Gravity, UA 1 020     pH, UA 5 0     Leukocytes,  0     Nitrite, UA Negative     Protein, UA >=500 mg/dl      Glucose, UA Negative mg/dl      Ketones, UA Negative mg/dl      Bilirubin, UA Negative     Blood,  0     UROBILINOGEN UA Negative mg/dL     POCT pregnancy, urine [[id number]]  (Normal) Resulted: 06/29/21 1539    Lab Status: Final result Updated: 06/29/21 1540     EXT PREG TEST UR (Ref: Negative) negative     Control valid                 No orders to display              Procedures  Procedures         ED Course  ED Course as of Jun 29 2135   Tue Jun 29, 2021   1529 Patient has been on O2 3L /min 24/7 since having Covid pneumonia in April, requesting oxygen at this time; denies SOB or chest pain  MDM  Number of Diagnoses or Management Options  Dysfunctional uterine bleeding  Diagnosis management comments: Patient is a 82-year-old female with a past medical history of bipolar disorder, anxiety, hypothyroidism, obstructive sleep apnea, gastric bypass surgery 2017, presenting to the ED for evaluation of heavy vaginal bleeding  Hgb is stable 12 8 and improved from one month ago when it was 10 9  Pregnancy negative  No abdominal pain  Patient is concerned about taking any medications that may increase her risk of blood clots as she said she was told she is prone to them after Covid  She starts iron infusions tomorrow for iron deficiency anemia and wanted to make sure her blood levels were stable so that she could continue with this  Will hold Provera at this time  Advised patient to follow-up with her GYN as soon as possible to discuss the dysfunctional uterine bleeding     The management plan was discussed in detail with the patient at bedside and all questions were answered  Prior to discharge, verbal and written instructions provided  ED return precautions discussed in detail  The patient verbalized understanding of our discussion and plan of care, and agrees to return to the Emergency Department for concerns and progression of illness  Amount and/or Complexity of Data Reviewed  Clinical lab tests: ordered and reviewed    Patient Progress  Patient progress: stable      Disposition  Final diagnoses:   Dysfunctional uterine bleeding     Time reflects when diagnosis was documented in both MDM as applicable and the Disposition within this note     Time User Action Codes Description Comment    6/29/2021  5:13 PM Kenji Sagastume Add [N93 8] Dysfunctional uterine bleeding       ED Disposition     ED Disposition Condition Date/Time Comment    Discharge Stable Tue Jun 29, 2021  5:13 PM Shelli Umaña discharge to home/self care              Follow-up Information     Follow up With Specialties Details Why Contact Info Additional [address] [facility] Obstetrics and Gynecology Schedule an appointment as soon as possible for a visit   [address], [id number] [place] [address] [address], [address] [place] [address], [address] [place] [address] Heart Emergency Department Emergency Medicine  If symptoms worsen [address] [facility] [address] [address]  [facility] Heart Emergency Department          Discharge Medication List as of 6/29/2021  5:15 PM      CONTINUE these medications which have NOT CHANGED    Details   albuterol (PROVENTIL HFA,VENTOLIN HFA) 90 mcg/act inhaler Inhale 2 puffs every 6 (six) hours as needed for wheezing or shortness of breath, Starting Sun 4/4/2021, Normal      atorvastatin (LIPITOR) 20 mg tablet Take 1 tablet (20 mg total) by mouth daily, Starting Tue 6/8/2021, Normal      benzonatate (TESSALON PERLES) 100 mg capsule Take 1 capsule (100 mg total) by mouth 3 (three) times a day as needed for cough, Starting Wed 5/26/2021, Normal      Calcium Citrate 200 MG TABS 1 tablet 3 (three) times a day, Starting Wed 1/17/2018, Historical Med      Cyanocobalamin ER 1000 MCG TBCR take one tab daily, Historical Med      diphenhydrAMINE-acetaminophen (TYLENOL PM)  MG TABS Take 1 tablet by mouth daily at bedtime as needed for sleep, Historical Med      escitalopram (LEXAPRO) 10 mg tablet Take 1 tablet (10 mg total) by mouth daily, Starting Fri 5/7/2021, Normal      fluticasone-vilanterol (BREO ELLIPTA) 200-25 MCG/INH inhaler Inhale 1 puff daily Rinse mouth after use , Starting Wed 5/26/2021, Normal      furosemide (LASIX) 20 mg tablet Take 1 tablet (20 mg total) by mouth daily, Starting Tue 6/8/2021, Normal      levothyroxine 137 mcg tablet TAKE ONE TABLET BY MOUTH EVERY MORNING ON EMPTY STOMACH, Historical Med      Multiple Vitamins-Minerals (MULTIVITAMIN ADULT PO) every 24 hours, Starting Wed 1/17/2018, Historical Med      pantoprazole (PROTONIX) 40 mg tablet Take 1 tablet (40 mg total) by mouth every 24 hours, Starting Thu 12/3/2020, Normal      traZODone (DESYREL) 50 mg tablet Take 1 tablet (50 mg total) by mouth daily at bedtime, Starting Fri 5/7/2021, Normal      triamcinolone (KENALOG) 0 1 % ointment Apply topically 2 (two) times a day, Starting Fri 5/7/2021, Normal               PDMP Review     None          ED Provider  Electronically Signed by           Etelvina Mera PA-C  06/29/21 2756

## 2021-06-29 NOTE — TELEPHONE ENCOUNTER
patietnt daughter called and is looking for advice because patient has low iron and has a heavy menstrual cycle right now   Is losing a lot of blood

## 2021-06-29 NOTE — ED NOTES
Rachelle made aware that this RN was able to get the bloodwork but no IV but that CMP was clotted - was instructed to hold off on redrawing the [address] [place] [address], RN  06/29/21 7470

## 2021-06-30 ENCOUNTER — HOSPITAL ENCOUNTER (OUTPATIENT)
Dept: INFUSION CENTER | Facility: HOSPITAL | Age: 46
Discharge: HOME | End: 2021-06-30
Attending: INTERNAL MEDICINE
Payer: COMMERCIAL

## 2021-06-30 VITALS
RESPIRATION RATE: 20 BRPM | HEART RATE: 68 BPM | SYSTOLIC BLOOD PRESSURE: 99 MMHG | TEMPERATURE: 97.3 F | DIASTOLIC BLOOD PRESSURE: 64 MMHG

## 2021-06-30 DIAGNOSIS — D50.8 IRON DEFICIENCY ANEMIA SECONDARY TO INADEQUATE DIETARY IRON INTAKE: Primary | ICD-10-CM

## 2021-06-30 PROCEDURE — 96365 THER/PROPH/DIAG IV INF INIT: CPT

## 2021-06-30 RX ORDER — SODIUM CHLORIDE 9 MG/ML
20 INJECTION, SOLUTION INTRAVENOUS ONCE
Status: COMPLETED | OUTPATIENT
Start: 2021-06-30 | End: 2021-06-30

## 2021-06-30 RX ORDER — SODIUM CHLORIDE 9 MG/ML
20 INJECTION, SOLUTION INTRAVENOUS ONCE
Status: CANCELLED | OUTPATIENT
Start: 2021-07-06

## 2021-06-30 RX ADMIN — SODIUM CHLORIDE 20 ML/HR: 0.9 INJECTION, SOLUTION INTRAVENOUS at 13:30

## 2021-06-30 RX ADMIN — FERUMOXYTOL 510 MG: 510 INJECTION INTRAVENOUS at 13:31

## 2021-06-30 NOTE — PROGRESS NOTES
Pt tolerated treatment today with no adverse reactions  AVS Declined  Left unit ambulatory with a steady gait

## 2021-06-30 NOTE — PLAN OF CARE
Problem: Potential for Falls  Goal: Patient will remain free of falls  Description: INTERVENTIONS:  - Educate patient/family on patient safety including physical limitations  - Instruct patient to call for assistance with activity   - Consult OT/PT to assist with strengthening/mobility   - Keep Call bell within reach  - Keep bed low and locked with side rails adjusted as appropriate  - Keep care items and personal belongings within reach  - Initiate and maintain comfort rounds  - Make Fall Risk Sign visible to staff  - Offer Toileting every - Apply yellow socks and bracelet for high fall risk patients  - Consider moving patient to room near nurses station  Outcome: Progressing

## 2021-07-01 ENCOUNTER — HOSPITAL ENCOUNTER (OUTPATIENT)
Dept: CT IMAGING | Facility: HOSPITAL | Age: 46
Discharge: HOME | End: 2021-07-01
Payer: COMMERCIAL

## 2021-07-01 DIAGNOSIS — U09.9 POST-COVID SYNDROME: ICD-10-CM

## 2021-07-01 DIAGNOSIS — R06.02 SHORTNESS OF BREATH: ICD-10-CM

## 2021-07-01 PROCEDURE — G1004 CDSM NDSC: HCPCS

## 2021-07-01 PROCEDURE — 71250 CT THORAX DX C-: CPT

## 2021-07-02 ENCOUNTER — TELEPHONE (OUTPATIENT)
Dept: FAMILY MEDICINE CLINIC | Facility: CLINIC | Age: 46
End: 2021-07-02

## 2021-07-02 NOTE — TELEPHONE ENCOUNTER
Kayley Palomares had infusion on Tuesday and is scheduled for another for 7/6/2021   she does not have a gynecologist  will need referral

## 2021-07-06 ENCOUNTER — HOSPITAL ENCOUNTER (OUTPATIENT)
Dept: INFUSION CENTER | Facility: HOSPITAL | Age: 46
Discharge: HOME | End: 2021-07-06
Attending: INTERNAL MEDICINE
Payer: COMMERCIAL

## 2021-07-06 VITALS
DIASTOLIC BLOOD PRESSURE: 68 MMHG | TEMPERATURE: 98.2 F | HEART RATE: 65 BPM | SYSTOLIC BLOOD PRESSURE: 118 MMHG | RESPIRATION RATE: 18 BRPM

## 2021-07-06 DIAGNOSIS — D50.8 IRON DEFICIENCY ANEMIA SECONDARY TO INADEQUATE DIETARY IRON INTAKE: Primary | ICD-10-CM

## 2021-07-06 PROCEDURE — 96365 THER/PROPH/DIAG IV INF INIT: CPT

## 2021-07-06 RX ORDER — SODIUM CHLORIDE 9 MG/ML
20 INJECTION, SOLUTION INTRAVENOUS ONCE
Status: CANCELLED | OUTPATIENT
Start: 2021-07-07

## 2021-07-06 RX ORDER — SODIUM CHLORIDE 9 MG/ML
20 INJECTION, SOLUTION INTRAVENOUS ONCE
Status: COMPLETED | OUTPATIENT
Start: 2021-07-06 | End: 2021-07-06

## 2021-07-06 RX ADMIN — SODIUM CHLORIDE 20 ML/HR: 0.9 INJECTION, SOLUTION INTRAVENOUS at 13:25

## 2021-07-06 RX ADMIN — FERUMOXYTOL 510 MG: 510 INJECTION INTRAVENOUS at 13:36

## 2021-07-06 NOTE — PROGRESS NOTES
Pt  Tolerated treatment w/out adverse reaction  Pt  Has completed her Feraheme infusions at this time & has no further appts  Scheduled at this time   Pt  Declined AVS

## 2021-07-08 NOTE — TELEPHONE ENCOUNTER
Spoke with patient, advised above      Patient will use [facility]     Please send all information to [facility]    Patient already has follow up scheduled with Dr Rashaad Banuelos 9/22/2021

## 2021-07-09 NOTE — TELEPHONE ENCOUNTER
Patient called she would like to speak with you she didn't say what it is in regards to she can be reached at [phone]  Ty clinical seizures were reported or recorded on this day. IMPRESSION: This is an abnormal video EEG. Occasional spike and wave, and sharp and wave complexes were seen in the left and right temporal and occipital regions. 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 No clinical or electrographic seizures were recorded during the study. Digital spike and seizure detection analysis has been performed on this study.        Signed electronically:    Belkys Esquivel MD  Diplomate, American Board of Clinical Neurophysiology with added competency in Epilepsy monitoring

## 2021-07-21 ENCOUNTER — OFFICE VISIT (OUTPATIENT)
Dept: FAMILY MEDICINE CLINIC | Facility: CLINIC | Age: 46
End: 2021-07-21
Payer: COMMERCIAL

## 2021-07-21 VITALS
HEART RATE: 83 BPM | DIASTOLIC BLOOD PRESSURE: 84 MMHG | TEMPERATURE: 97.2 F | RESPIRATION RATE: 18 BRPM | OXYGEN SATURATION: 97 % | WEIGHT: 289.4 LBS | SYSTOLIC BLOOD PRESSURE: 122 MMHG | BODY MASS INDEX: 45.33 KG/M2

## 2021-07-21 DIAGNOSIS — F41.9 ANXIETY: ICD-10-CM

## 2021-07-21 DIAGNOSIS — E66.01 CLASS 3 SEVERE OBESITY WITH SERIOUS COMORBIDITY AND BODY MASS INDEX (BMI) OF 45.0 TO 49.9 IN ADULT, UNSPECIFIED OBESITY TYPE (HCC): ICD-10-CM

## 2021-07-21 DIAGNOSIS — Z00.00 ANNUAL PHYSICAL EXAM: Primary | ICD-10-CM

## 2021-07-21 DIAGNOSIS — D50.9 IRON DEFICIENCY ANEMIA, UNSPECIFIED IRON DEFICIENCY ANEMIA TYPE: ICD-10-CM

## 2021-07-21 DIAGNOSIS — U09.9 POST-COVID SYNDROME: ICD-10-CM

## 2021-07-21 DIAGNOSIS — R06.02 SHORTNESS OF BREATH: ICD-10-CM

## 2021-07-21 DIAGNOSIS — F32.1 CURRENT MODERATE EPISODE OF MAJOR DEPRESSIVE DISORDER, UNSPECIFIED WHETHER RECURRENT (HCC): ICD-10-CM

## 2021-07-21 DIAGNOSIS — G44.89 OTHER HEADACHE SYNDROME: ICD-10-CM

## 2021-07-21 PROCEDURE — 3725F SCREEN DEPRESSION PERFORMED: CPT | Performed by: NURSE PRACTITIONER

## 2021-07-21 PROCEDURE — 99396 PREV VISIT EST AGE 40-64: CPT | Performed by: NURSE PRACTITIONER

## 2021-07-21 RX ORDER — ESCITALOPRAM OXALATE 20 MG/1
20 TABLET ORAL DAILY
Qty: 90 TABLET | Refills: 2 | Status: SHIPPED | OUTPATIENT
Start: 2021-07-21 | End: 2022-03-03

## 2021-07-21 RX ORDER — TRAZODONE HYDROCHLORIDE 100 MG/1
100 TABLET ORAL
Qty: 90 TABLET | Refills: 1 | Status: SHIPPED | OUTPATIENT
Start: 2021-07-21 | End: 2021-12-20

## 2021-07-21 NOTE — PATIENT INSTRUCTIONS
Wellness Visit for Adults   AMBULATORY CARE:   A wellness visit  is when you see your healthcare provider to get screened for health problems  Your healthcare provider will also give you advice on how to stay healthy  Write down your questions so you remember to ask them  Ask your healthcare provider how often you should have a wellness visit  What happens at a wellness visit:  Your healthcare provider will ask about your health, and your family history of health problems  This includes high blood pressure, heart disease, and cancer  He or she will ask if you have symptoms that concern you, if you smoke, and about your mood  You may also be asked about your intake of medicines, supplements, food, and alcohol  Any of the following may be done:  · Your weight  will be checked  Your height may also be checked so your body mass index (BMI) can be calculated  Your BMI shows if you are at a healthy weight  · Your blood pressure  and heart rate will be checked  Your temperature may also be checked  · Blood and urine tests  may be done  Blood tests may be done to check your cholesterol levels  Abnormal cholesterol levels increase your risk for heart disease and stroke  You may also need a blood or urine test to check for diabetes if you are at increased risk  Urine tests may be done to look for signs of an infection or kidney disease  · A physical exam  includes checking your heartbeat and lungs with a stethoscope  Your healthcare provider may also check your skin to look for sun damage  · Screening tests  may be recommended  A screening test is done to check for diseases that may not cause symptoms  The screening tests you may need depend on your age, gender, family history, and lifestyle habits  For example, colorectal screening may be recommended if you are 48years old or older  Screening tests you need if you are a woman:   · A Pap smear  is used to screen for cervical cancer   Pap smears are usually done every 3 to 5 years depending on your age  You may need them more often if you have had abnormal Pap smear test results in the past  Ask your healthcare provider how often you should have a Pap smear  · A mammogram  is an x-ray of your breasts to screen for breast cancer  Experts recommend mammograms every 2 years starting at age 48 years  You may need a mammogram at age 52 years or younger if you have an increased risk for breast cancer  Talk to your healthcare provider about when you should start having mammograms and how often you need them  Vaccines you may need:   · Get an influenza vaccine  every year  The influenza vaccine protects you from the flu  Several types of viruses cause the flu  The viruses change over time, so new vaccines are made each year  · Get a tetanus-diphtheria (Td) booster vaccine  every 10 years  This vaccine protects you against tetanus and diphtheria  Tetanus is a severe infection that may cause painful muscle spasms and lockjaw  Diphtheria is a severe bacterial infection that causes a thick covering in the back of your mouth and throat  · Get a human papillomavirus (HPV) vaccine  if you are female and aged 23 to 32 or male 23 to 24 and never received it  This vaccine protects you from HPV infection  HPV is the most common infection spread by sexual contact  HPV may also cause vaginal, penile, and anal cancers  · Get a pneumococcal vaccine  if you are aged 72 years or older  The pneumococcal vaccine is an injection given to protect you from pneumococcal disease  Pneumococcal disease is an infection caused by pneumococcal bacteria  The infection may cause pneumonia, meningitis, or an ear infection  · Get a shingles vaccine  if you are 60 or older, even if you have had shingles before  The shingles vaccine is an injection to protect you from the varicella-zoster virus  This is the same virus that causes chickenpox   Shingles is a painful rash that develops in people who had chickenpox or have been exposed to the virus  How to eat healthy:  My Plate is a model for planning healthy meals  It shows the types and amounts of foods that should go on your plate  Fruits and vegetables make up about half of your plate, and grains and protein make up the other half  A serving of dairy is included on the side of your plate  The amount of calories and serving sizes you need depends on your age, gender, weight, and height  Examples of healthy foods are listed below:  · Eat a variety of vegetables  such as dark green, red, and orange vegetables  You can also include canned vegetables low in sodium (salt) and frozen vegetables without added butter or sauces  · Eat a variety of fresh fruits , canned fruit in 100% juice, frozen fruit, and dried fruit  · Include whole grains  At least half of the grains you eat should be whole grains  Examples include whole-wheat bread, wheat pasta, brown rice, and whole-grain cereals such as oatmeal     · Eat a variety of protein foods such as seafood (fish and shellfish), lean meat, and poultry without skin (turkey and chicken)  Examples of lean meats include pork leg, shoulder, or tenderloin, and beef round, sirloin, tenderloin, and extra lean ground beef  Other protein foods include eggs and egg substitutes, beans, peas, soy products, nuts, and seeds  · Choose low-fat dairy products such as skim or 1% milk or low-fat yogurt, cheese, and cottage cheese  · Limit unhealthy fats  such as butter, hard margarine, and shortening  Exercise:  Exercise at least 30 minutes per day on most days of the week  Some examples of exercise include walking, biking, dancing, and swimming  You can also fit in more physical activity by taking the stairs instead of the elevator or parking farther away from stores  Include muscle strengthening activities 2 days each week  Regular exercise provides many health benefits   It helps you manage your weight, and decreases your risk for type 2 diabetes, heart disease, stroke, and high blood pressure  Exercise can also help improve your mood  Ask your healthcare provider about the best exercise plan for you  General health and safety guidelines:   · Do not smoke  Nicotine and other chemicals in cigarettes and cigars can cause lung damage  Ask your healthcare provider for information if you currently smoke and need help to quit  E-cigarettes or smokeless tobacco still contain nicotine  Talk to your healthcare provider before you use these products  · Limit alcohol  A drink of alcohol is 12 ounces of beer, 5 ounces of wine, or 1½ ounces of liquor  · Lose weight, if needed  Being overweight increases your risk of certain health conditions  These include heart disease, high blood pressure, type 2 diabetes, and certain types of cancer  · Protect your skin  Do not sunbathe or use tanning beds  Use sunscreen with a SPF 15 or higher  Apply sunscreen at least 15 minutes before you go outside  Reapply sunscreen every 2 hours  Wear protective clothing, hats, and sunglasses when you are outside  · Drive safely  Always wear your seatbelt  Make sure everyone in your car wears a seatbelt  A seatbelt can save your life if you are in an accident  Do not use your cell phone when you are driving  This could distract you and cause an accident  Pull over if you need to make a call or send a text message  · Practice safe sex  Use latex condoms if are sexually active and have more than one partner  Your healthcare provider may recommend screening tests for sexually transmitted infections (STIs)  · Wear helmets, lifejackets, and protective gear  Always wear a helmet when you ride a bike or motorcycle, go skiing, or play sports that could cause a head injury  Wear protective equipment when you play sports  Wear a lifejacket when you are on a boat or doing water sports      © Copyright [address] 2021 Information is for End User's use only and may not be sold, redistributed or otherwise used for commercial purposes  All illustrations and images included in CareNotes® are the copyrighted property of [employer]  or [employer]  The above information is an  only  It is not intended as medical advice for individual conditions or treatments  Talk to your doctor, nurse or pharmacist before following any medical regimen to see if it is safe and effective for you  DASH Eating Plan   WHAT YOU NEED TO KNOW:   What is the DASH Eating Plan? The DASH (Dietary Approaches to Stop Hypertension) Eating Plan is designed to help prevent or lower high blood pressure  It can also help to lower LDL (bad) cholesterol and decrease your risk for heart disease  The plan is low in sodium, sugar, unhealthy fats, and total fat  It is high in potassium, calcium, magnesium, and fiber  These nutrients are added when you eat more fruits, vegetables, and whole grains  What is my sodium limit for each day? Your dietitian will tell you how much sodium is safe for you to have each day  People with high blood pressure should have no more than 1,500 to 2,300 mg of sodium in a day  A teaspoon (tsp) of salt has 2,300 mg of sodium  This may seem like a difficult goal, but small changes to the foods you eat can make a big difference  Your healthcare provider or dietitian can help you create a meal plan that follows your sodium limit  How do I limit sodium? · Read food labels  Food labels can help you choose foods that are low in sodium  The amount of sodium is listed in milligrams (mg)  The % Daily Value (DV) column tells you how much of your daily needs are met by 1 serving of the food for each nutrient listed  Choose foods that have less than 5% of the DV of sodium  These foods are considered low in sodium  Foods that have 20% or more of the DV of sodium are considered high in sodium   Avoid foods that have more than 300 mg of sodium in each serving  Choose foods that say low-sodium, reduced-sodium, or no salt added on the food label  · Avoid salt  Do not salt food at the table, and add very little salt to foods during cooking  Use herbs and spices, such as onions, garlic, and salt-free seasonings to add flavor to foods  Try lemon or lime juice or vinegar to give foods a tart flavor  Use hot peppers or a small amount of hot pepper sauce to add a spicy flavor to foods  · Ask about salt substitutes  Ask your healthcare provider if you may use salt substitutes  Some salt substitutes have ingredients that can be harmful if you have certain health conditions  · Choose foods carefully at restaurants  Meals from restaurants, especially fast food restaurants, are often high in sodium  Some restaurants have nutrition information that tells you the amount of sodium in their foods  Ask to have your food prepared with less, or no salt  What should I know about fats? · Include healthy fats  Examples are unsaturated fats and omega-3 fatty acids  Unsaturated fats are found in soybean, canola, olive, or sunflower oil, and liquid and soft tub margarines  Omega-3 fatty acids are found in fatty fish, such as salmon, tuna, mackerel, and sardines  It is also found in flaxseed oil and ground flaxseed  · Avoid unhealthy fats  Do not eat unhealthy fats, such as saturated fats and trans fats  Saturated fats are found in foods that contain fat from animals  Examples are fatty meats, whole milk, butter, cream, and other dairy foods  It is also found in shortening, stick margarine, palm oil, and coconut oil  Trans fats are found in fried foods, crackers, chips, and baked goods made with margarine or shortening  Which foods should I include? With the DASH eating plan, you need to eat a certain number of servings from each food group  This will help you get enough of certain nutrients and limit others   The amount of servings you should eat depends on how many calories you need  Your dietitian can tell you how many calories you need  The number of servings listed next to the food groups below are for people who need about 2,000 calories each day  · Grains:  6 to 8 servings (3 of these servings should be whole-grain foods)    ? 1 slice of whole-grain bread    ? 1 ounce of dry cereal    ? ½ cup of cooked cereal, pasta, or brown rice    · Vegetables and fruits:  4 to 5 servings of fruits and 4 to 5 servings of vegetables    ? 1 medium fruit    ? 1 cup of raw leafy vegetable    ? ½ cup of frozen, canned (no added salt), or chopped fresh vegetables    ? ½ cup of fresh, frozen, dried, or canned fruit (canned in light syrup or fruit juice)    ? ½ cup of vegetable or fruit juice    · Dairy:  2 to 3 servings    ? 1 cup of nonfat (skim) or 1% milk    ? 1½ ounces of fat-free or low-fat, low-sodium cheese    ? 6 ounces of nonfat or low-fat yogurt    · Lean meat, poultry, and fish:  6 ounces or less    ? Poultry (chicken, turkey) with no skin    ? Fish (especially fatty fish, such as salmon, fresh tuna, or mackerel)    ? Lean beef and pork (loin, round, extra lean hamburger)    ? Egg whites and egg substitutes    · Nuts, seeds, and legumes:  4 to 5 servings each week    ? ½ cup of cooked beans and peas    ? 1½ ounces of unsalted nuts    ? 2 tablespoons of peanut butter or seeds    · Sweets and added sugars:  5 or less each week    ? 1 tablespoon of sugar, jelly, or jam    ? ½ cup of sorbet or gelatin    ? 1 cup of lemonade    · Fats:  2 to 3 servings each week    ? 1 teaspoon of soft margarine or vegetable oil    ? 1 tablespoon of mayonnaise    ? 2 tablespoons of salad dressing    Which foods should I avoid? · Grains:      ? Baked goods, such as doughnuts, pastries, cookies, and biscuits (high in fat and sugar)    ?  Mixes for cornbread and biscuits, packaged foods, such as bread stuffing, rice and pasta mixes, macaroni and cheese, and instant cereals (high in sodium)    · Fruits and vegetables:      ? Regular, canned vegetables (high in sodium)    ? Sauerkraut, pickled vegetables, and other foods prepared in brine (high in sodium)    ? Fried vegetables or vegetables in butter or high-fat sauces    ? Fruit in cream or butter sauce (high in fat)    · Dairy:      ? Whole milk, 2% milk, and cream (high in fat)    ? Regular cheese and processed cheese (high in fat and sodium)    · Meats and protein foods:      ? Smoked or cured meat, such as corned beef, goodson, ham, hot dogs, and sausage (high in fat and sodium)    ? Canned beans and canned meats or spreads, such as potted meats, sardines, anchovies, and imitation seafood (high in sodium)    ? Deli or lunch meats, such as bologna, ham, turkey, and roast beef (high in sodium)    ? High-fat meat (T-bone steak, regular hamburger, and ribs)    ? Whole eggs and egg yolks (high in fat)    · Other:      ? Seasonings made with salt, such as garlic salt, celery salt, onion salt, seasoned salt, meat tenderizers, and monosodium glutamate (MSG)    ? Miso soup and canned or dried soup mixes (high in sodium)    ? Regular soy sauce, barbecue sauce, teriyaki sauce, steak sauce, Worcestershire sauce, and most flavored vinegars (high in sodium)    ? Regular condiments, such as mustard, ketchup, and salad dressings (high in sodium)    ? Gravy and sauces, such as Chuck or cheese sauces (high in sodium and fat)    ? Drinks high in sugar, such as soda or fruit drinks    ? Snack foods, such as salted chips, popcorn, pretzels, pork rinds, salted crackers, and salted nuts    ? Frozen foods, such as dinners, entrees, vegetables with sauces, and breaded meats (high in sodium)    What other guidelines should I follow? · Maintain a healthy weight  Your risk for heart disease is higher if you are overweight  Your healthcare provider may suggest that you lose weight if you are overweight   You can lose weight by eating fewer calories and foods that have added sugars and fat  The DASH meal plan can help you do this  Decrease calories by eating smaller portions at each meal and fewer snacks  Ask your healthcare provider for more information about how to lose weight  · Exercise regularly  Regular exercise can help you reach or maintain a healthy weight  Regular exercise can also help decrease your blood pressure and improve your cholesterol levels  Get 30 minutes or more of moderate exercise each day of the week  To lose weight, get at least 60 minutes of exercise  Talk to your healthcare provider about the best exercise program for you  · Limit alcohol  Women should limit alcohol to 1 drink a day  Men should limit alcohol to 2 drinks a day  A drink of alcohol is 12 ounces of beer, 5 ounces of wine, or 1½ ounces of liquor  Where can I find more information? · [facility] and [address]  [address] [address]  [place] [address]  Phone: 2- 630 - [phone]  Web Address: [facility] AGREEMENT:   You have the right to help plan your care  Discuss treatment options with your healthcare provider to decide what care you want to receive  You always have the right to refuse treatment  The above information is an  only  It is not intended as medical advice for individual conditions or treatments  Talk to your doctor, nurse or pharmacist before following any medical regimen to see if it is safe and effective for you  © Copyright [employer] 2021 Information is for End User's use only and may not be sold, redistributed or otherwise used for commercial purposes   All illustrations and images included in CareNotes® are the copyrighted property of [employer]  or [address] [place] [address]

## 2021-07-21 NOTE — PROGRESS NOTES
ADULT ANNUAL PHYSICAL  [employer]    NAME: Nitesh Umaña  AGE: 42 y o  SEX: female  : 1975     DATE: 2021     Assessment and Plan:     Problem List Items Addressed This Visit        Other    Post-COVID syndrome    Anxiety      Other Visit Diagnoses     Annual physical exam    -  Primary    Current moderate episode of major depressive disorder, unspecified whether recurrent ([address] )              Immunizations and preventive care screenings were discussed with patient today  Appropriate education was printed on patient's after visit summary  Counseling:  Alcohol/drug use: discussed moderation in alcohol intake, the recommendations for healthy alcohol use, and avoidance of illicit drug use  Sexual health: discussed sexually transmitted diseases, partner selection, use of condoms, avoidance of unintended pregnancy, and contraceptive alternatives  · Exercise: the importance of regular exercise/physical activity was discussed  Recommend exercise 3-5 times per week for at least 30 minutes  Depression Screening and Follow-up Plan: Patient's depression screening was positive with a PHQ-2 score of 4  Patient assessed for underlying major depression  Brief counseling provided and recommend additional follow-up/re-evaluation next office visit  No follow-ups on file  Chief Complaint:     Chief Complaint   Patient presents with    Physical Exam     [facility]  paperwork       History of Present Illness:     Adult Annual Physical   Patient here for a comprehensive physical exam  The patient reports problems - still with breathing issues  Still in the oxygen mostly     Still seeing pulmonology    Has seen cardiology    Getting CPAP  PT came to home and waiting until Sept to try again    Having anxiety and after reading about the reports and notes that this is also playing a role in the breathing          Diet and Physical Activity  · Diet/Nutrition: states eats small amount  · Exercise: no formal exercise  Depression Screening  PHQ-9 Depression Screening    PHQ-9:   Frequency of the following problems over the past two weeks:      Little interest or pleasure in doing things: 2 - more than half the days  Feeling down, depressed, or hopeless: 2 - more than half the days  Trouble falling or staying asleep, or sleeping too much: 3 - nearly every day  Feeling tired or having little energy: 3 - nearly every day  Poor appetite or overeatin - more than half the days  Feeling bad about yourself - or that you are a failure or have let yourself or your family down: 1 - several days  Trouble concentrating on things, such as reading the newspaper or watching television: 1 - several days  Moving or speaking so slowly that other people could have noticed  Or the opposite - being so fidgety or restless that you have been moving around a lot more than usual: 2 - more than half the days  Thoughts that you would be better off dead, or of hurting yourself in some way: 0 - not at all  PHQ-2 Score: 4  PHQ-9 Score: 16       General Health  · Sleep: sleeps poorly  · Hearing: normal - bilateral   · Vision: no vision problems  · Dental: regular dental visits  /GYN Health  · Patient is: premenopausal  ·   ·   Review of Systems:     Review of Systems   Constitutional: Negative for chills and fever  HENT: Negative for ear pain and sore throat  Eyes: Negative for pain and visual disturbance  Respiratory: Positive for cough and shortness of breath  Negative for chest tightness and wheezing  Cardiovascular: Negative for chest pain and palpitations  Gastrointestinal: Negative for abdominal pain and vomiting  Genitourinary: Negative for dysuria and hematuria  Musculoskeletal: Negative for arthralgias and back pain  Skin: Negative for color change and rash  Neurological: Negative for seizures and syncope  Psychiatric/Behavioral: Positive for sleep disturbance  The patient is nervous/anxious  All other systems reviewed and are negative  Past Medical History:     Past Medical History:   Diagnosis Date    Abscess of labia     Acute and chronic respiratory failure with hypoxia ([facility] [address] )     Anemia 2021    Anxiety     Bipolar disorder (HCC)     Breast lump     COVID-19     Frequent headaches     Hemorrhoids     Hypothyroidism 2013    Migraine     Morbid obesity (HCC) 2013    Obesity     Psychotic disorder (HCC)       Past Surgical History:     Past Surgical History:   Procedure Laterality Date    BARIATRIC SURGERY      BREAST BIOPSY Right 1999    BREAST LUMPECTOMY Left     benign    CHOLECYSTECTOMY      GALLBLADDER SURGERY        Social History:     Social History     Socioeconomic History    Marital status: Single     Spouse name: None    Number of children: 1    Years of education: None    Highest education level: None   Occupational History    None   Tobacco Use    Smoking status: Former Smoker     Types: Cigarettes     Quit date:      Years since quittin 5    Smokeless tobacco: Former User    Tobacco comment: smoker for 2-3 yrs and quit in , smoked about 3 cigarettes a day   Vaping Use    Vaping Use: Never used   Substance and Sexual Activity    Alcohol use: Never    Drug use: No    Sexual activity: Not Currently   Other Topics Concern    None   Social History Narrative    None     Social Determinants of Health     Financial Resource Strain:     Difficulty of Paying Living Expenses:    Food Insecurity:     Worried About Running Out of Food in the Last Year:     Ran Out of Food in the Last Year:    Transportation Needs:     Lack of Transportation (Medical):      Lack of Transportation (Non-Medical):    Physical Activity:     Days of Exercise per Week:     Minutes of Exercise per Session:    Stress:     Feeling of Stress :    Social Connections:     Frequency of Communication with Friends and Family:     Frequency of Social Gatherings with Friends and Family:     Attends [employer] Services:     Active Member of Clubs or Organizations:     Attends Club or Organization Meetings:     Marital Status:    Intimate Partner Violence:     Fear of Current or Ex-Partner:     Emotionally Abused:     Physically Abused:     Sexually Abused:       Family History:     Family History   Problem Relation Age of Onset    Liver cancer Mother     Prostate cancer Father     Cancer Family     No Known Problems Sister     No Known Problems Maternal Grandmother     No Known Problems Maternal Grandfather     No Known Problems Paternal Grandmother     No Known Problems Paternal Grandfather     No Known Problems Sister     No Known Problems Sister     No Known Problems Sister     No Known Problems Sister     No Known Problems Sister     No Known Problems Sister     No Known Problems Sister     No Known Problems Sister     No Known Problems Sister     Cancer Maternal Aunt     Cancer Maternal Aunt     Cancer Maternal Aunt     Cancer Maternal Aunt     Cancer Maternal Aunt     Cancer Maternal Aunt     Cancer Maternal Aunt     No Known Problems Paternal Aunt     No Known Problems Paternal Aunt     No Known Problems Paternal Aunt     No Known Problems Paternal Aunt     Cancer Paternal Aunt     Cancer Paternal Aunt       Current Medications:     Current Outpatient Medications   Medication Sig Dispense Refill    albuterol (PROVENTIL HFA,VENTOLIN HFA) 90 mcg/act inhaler Inhale 2 puffs every 6 (six) hours as needed for wheezing or shortness of breath 1 Inhaler 5    atorvastatin (LIPITOR) 20 mg tablet Take 1 tablet (20 mg total) by mouth daily 30 tablet 6    benzonatate (TESSALON PERLES) 100 mg capsule Take 1 capsule (100 mg total) by mouth 3 (three) times a day as needed for cough 60 capsule 3    Calcium Citrate 200 MG TABS 1 tablet 3 (three) times a day  Cyanocobalamin ER 1000 MCG TBCR take one tab daily      diphenhydrAMINE-acetaminophen (TYLENOL PM)  MG TABS Take 1 tablet by mouth daily at bedtime as needed for sleep      escitalopram (LEXAPRO) 10 mg tablet Take 1 tablet (10 mg total) by mouth daily 30 tablet 3    fluticasone-vilanterol (BREO ELLIPTA) 200-25 MCG/INH inhaler Inhale 1 puff daily Rinse mouth after use  1 each 2    furosemide (LASIX) 20 mg tablet Take 1 tablet (20 mg total) by mouth daily 30 tablet 1    levothyroxine 137 mcg tablet TAKE ONE TABLET BY MOUTH EVERY MORNING ON EMPTY STOMACH  1    Multiple Vitamins-Minerals (MULTIVITAMIN ADULT PO) every 24 hours      pantoprazole (PROTONIX) 40 mg tablet Take 1 tablet (40 mg total) by mouth every 24 hours 90 tablet 3     No current facility-administered medications for this visit  Allergies: Allergies   Allergen Reactions    No Active Allergies       Physical Exam:     /84 (BP Location: Left arm, Patient Position: Sitting, Cuff Size: Large)   Pulse 83   Temp (!) 97 2 °F (36 2 °C) (Temporal)   Resp 18   Wt 131 kg (289 lb 6 4 oz)   LMP 06/28/2021 (Exact Date)   SpO2 97%   BMI 45 33 kg/m²     Physical Exam  Vitals reviewed  Constitutional:       Appearance: Normal appearance  She is obese  Cardiovascular:      Rate and Rhythm: Normal rate and regular rhythm  Pulses: Normal pulses  Pulmonary:      Effort: Pulmonary effort is normal       Breath sounds: Normal breath sounds  Abdominal:      General: Bowel sounds are normal    Musculoskeletal:         General: Normal range of motion  Cervical back: Normal range of motion and neck supple  Skin:     General: Skin is warm  Neurological:      Mental Status: She is alert and oriented to person, place, and time  Psychiatric:         Mood and Affect: Mood is anxious and depressed           Cognition and Memory: Cognition normal         Patient Instructions       Wellness Visit for Adults   AMBULATORY CARE: A wellness visit  is when you see your healthcare provider to get screened for health problems  Your healthcare provider will also give you advice on how to stay healthy  Write down your questions so you remember to ask them  Ask your healthcare provider how often you should have a wellness visit  What happens at a wellness visit:  Your healthcare provider will ask about your health, and your family history of health problems  This includes high blood pressure, heart disease, and cancer  He or she will ask if you have symptoms that concern you, if you smoke, and about your mood  You may also be asked about your intake of medicines, supplements, food, and alcohol  Any of the following may be done:  · Your weight  will be checked  Your height may also be checked so your body mass index (BMI) can be calculated  Your BMI shows if you are at a healthy weight  · Your blood pressure  and heart rate will be checked  Your temperature may also be checked  · Blood and urine tests  may be done  Blood tests may be done to check your cholesterol levels  Abnormal cholesterol levels increase your risk for heart disease and stroke  You may also need a blood or urine test to check for diabetes if you are at increased risk  Urine tests may be done to look for signs of an infection or kidney disease  · A physical exam  includes checking your heartbeat and lungs with a stethoscope  Your healthcare provider may also check your skin to look for sun damage  · Screening tests  may be recommended  A screening test is done to check for diseases that may not cause symptoms  The screening tests you may need depend on your age, gender, family history, and lifestyle habits  For example, colorectal screening may be recommended if you are 48years old or older  Screening tests you need if you are a woman:   · A Pap smear  is used to screen for cervical cancer  Pap smears are usually done every 3 to 5 years depending on your age  You may need them more often if you have had abnormal Pap smear test results in the past  Ask your healthcare provider how often you should have a Pap smear  · A mammogram  is an x-ray of your breasts to screen for breast cancer  Experts recommend mammograms every 2 years starting at age 48 years  You may need a mammogram at age 52 years or younger if you have an increased risk for breast cancer  Talk to your healthcare provider about when you should start having mammograms and how often you need them  Vaccines you may need:   · Get an influenza vaccine  every year  The influenza vaccine protects you from the flu  Several types of viruses cause the flu  The viruses change over time, so new vaccines are made each year  · Get a tetanus-diphtheria (Td) booster vaccine  every 10 years  This vaccine protects you against tetanus and diphtheria  Tetanus is a severe infection that may cause painful muscle spasms and lockjaw  Diphtheria is a severe bacterial infection that causes a thick covering in the back of your mouth and throat  · Get a human papillomavirus (HPV) vaccine  if you are female and aged 23 to 32 or male 23 to 24 and never received it  This vaccine protects you from HPV infection  HPV is the most common infection spread by sexual contact  HPV may also cause vaginal, penile, and anal cancers  · Get a pneumococcal vaccine  if you are aged 72 years or older  The pneumococcal vaccine is an injection given to protect you from pneumococcal disease  Pneumococcal disease is an infection caused by pneumococcal bacteria  The infection may cause pneumonia, meningitis, or an ear infection  · Get a shingles vaccine  if you are 60 or older, even if you have had shingles before  The shingles vaccine is an injection to protect you from the varicella-zoster virus  This is the same virus that causes chickenpox   Shingles is a painful rash that develops in people who had chickenpox or have been exposed to the virus     How to eat healthy:  My Plate is a model for planning healthy meals  It shows the types and amounts of foods that should go on your plate  Fruits and vegetables make up about half of your plate, and grains and protein make up the other half  A serving of dairy is included on the side of your plate  The amount of calories and serving sizes you need depends on your age, gender, weight, and height  Examples of healthy foods are listed below:  · Eat a variety of vegetables  such as dark green, red, and orange vegetables  You can also include canned vegetables low in sodium (salt) and frozen vegetables without added butter or sauces  · Eat a variety of fresh fruits , canned fruit in 100% juice, frozen fruit, and dried fruit  · Include whole grains  At least half of the grains you eat should be whole grains  Examples include whole-wheat bread, wheat pasta, brown rice, and whole-grain cereals such as oatmeal     · Eat a variety of protein foods such as seafood (fish and shellfish), lean meat, and poultry without skin (turkey and chicken)  Examples of lean meats include pork leg, shoulder, or tenderloin, and beef round, sirloin, tenderloin, and extra lean ground beef  Other protein foods include eggs and egg substitutes, beans, peas, soy products, nuts, and seeds  · Choose low-fat dairy products such as skim or 1% milk or low-fat yogurt, cheese, and cottage cheese  · Limit unhealthy fats  such as butter, hard margarine, and shortening  Exercise:  Exercise at least 30 minutes per day on most days of the week  Some examples of exercise include walking, biking, dancing, and swimming  You can also fit in more physical activity by taking the stairs instead of the elevator or parking farther away from stores  Include muscle strengthening activities 2 days each week  Regular exercise provides many health benefits   It helps you manage your weight, and decreases your risk for type 2 diabetes, heart disease, stroke, and high blood pressure  Exercise can also help improve your mood  Ask your healthcare provider about the best exercise plan for you  General health and safety guidelines:   · Do not smoke  Nicotine and other chemicals in cigarettes and cigars can cause lung damage  Ask your healthcare provider for information if you currently smoke and need help to quit  E-cigarettes or smokeless tobacco still contain nicotine  Talk to your healthcare provider before you use these products  · Limit alcohol  A drink of alcohol is 12 ounces of beer, 5 ounces of wine, or 1½ ounces of liquor  · Lose weight, if needed  Being overweight increases your risk of certain health conditions  These include heart disease, high blood pressure, type 2 diabetes, and certain types of cancer  · Protect your skin  Do not sunbathe or use tanning beds  Use sunscreen with a SPF 15 or higher  Apply sunscreen at least 15 minutes before you go outside  Reapply sunscreen every 2 hours  Wear protective clothing, hats, and sunglasses when you are outside  · Drive safely  Always wear your seatbelt  Make sure everyone in your car wears a seatbelt  A seatbelt can save your life if you are in an accident  Do not use your cell phone when you are driving  This could distract you and cause an accident  Pull over if you need to make a call or send a text message  · Practice safe sex  Use latex condoms if are sexually active and have more than one partner  Your healthcare provider may recommend screening tests for sexually transmitted infections (STIs)  · Wear helmets, lifejackets, and protective gear  Always wear a helmet when you ride a bike or motorcycle, go skiing, or play sports that could cause a head injury  Wear protective equipment when you play sports  Wear a lifejacket when you are on a boat or doing water sports      © Copyright [employer] 2021 Information is for End User's use only and may not be sold, redistributed or otherwise used for commercial purposes  All illustrations and images included in CareNotes® are the copyrighted property of A D [employer] M [employer]  or [employer]el[employer]   The above information is an  only  It is not intended as medical advice for individual conditions or treatments  Talk to your doctor, nurse or pharmacist before following any medical regimen to see if it is safe and effective for you  DASH Eating Plan   WHAT YOU NEED TO KNOW:   What is the DASH Eating Plan? The DASH (Dietary Approaches to Stop Hypertension) Eating Plan is designed to help prevent or lower high blood pressure  It can also help to lower LDL (bad) cholesterol and decrease your risk for heart disease  The plan is low in sodium, sugar, unhealthy fats, and total fat  It is high in potassium, calcium, magnesium, and fiber  These nutrients are added when you eat more fruits, vegetables, and whole grains  What is my sodium limit for each day? Your dietitian will tell you how much sodium is safe for you to have each day  People with high blood pressure should have no more than 1,500 to 2,300 mg of sodium in a day  A teaspoon (tsp) of salt has 2,300 mg of sodium  This may seem like a difficult goal, but small changes to the foods you eat can make a big difference  Your healthcare provider or dietitian can help you create a meal plan that follows your sodium limit  How do I limit sodium? · Read food labels  Food labels can help you choose foods that are low in sodium  The amount of sodium is listed in milligrams (mg)  The % Daily Value (DV) column tells you how much of your daily needs are met by 1 serving of the food for each nutrient listed  Choose foods that have less than 5% of the DV of sodium  These foods are considered low in sodium  Foods that have 20% or more of the DV of sodium are considered high in sodium  Avoid foods that have more than 300 mg of sodium in each serving   Choose foods that say low-sodium, reduced-sodium, or no salt added on the food label  · Avoid salt  Do not salt food at the table, and add very little salt to foods during cooking  Use herbs and spices, such as onions, garlic, and salt-free seasonings to add flavor to foods  Try lemon or lime juice or vinegar to give foods a tart flavor  Use hot peppers or a small amount of hot pepper sauce to add a spicy flavor to foods  · Ask about salt substitutes  Ask your healthcare provider if you may use salt substitutes  Some salt substitutes have ingredients that can be harmful if you have certain health conditions  · Choose foods carefully at restaurants  Meals from restaurants, especially fast food restaurants, are often high in sodium  Some restaurants have nutrition information that tells you the amount of sodium in their foods  Ask to have your food prepared with less, or no salt  What should I know about fats? · Include healthy fats  Examples are unsaturated fats and omega-3 fatty acids  Unsaturated fats are found in soybean, canola, olive, or sunflower oil, and liquid and soft tub margarines  Omega-3 fatty acids are found in fatty fish, such as salmon, tuna, mackerel, and sardines  It is also found in flaxseed oil and ground flaxseed  · Avoid unhealthy fats  Do not eat unhealthy fats, such as saturated fats and trans fats  Saturated fats are found in foods that contain fat from animals  Examples are fatty meats, whole milk, butter, cream, and other dairy foods  It is also found in shortening, stick margarine, palm oil, and coconut oil  Trans fats are found in fried foods, crackers, chips, and baked goods made with margarine or shortening  Which foods should I include? With the DASH eating plan, you need to eat a certain number of servings from each food group  This will help you get enough of certain nutrients and limit others  The amount of servings you should eat depends on how many calories you need  Your dietitian can tell you how many calories you need  The number of servings listed next to the food groups below are for people who need about 2,000 calories each day  · Grains:  6 to 8 servings (3 of these servings should be whole-grain foods)    ? 1 slice of whole-grain bread    ? 1 ounce of dry cereal    ? ½ cup of cooked cereal, pasta, or brown rice    · Vegetables and fruits:  4 to 5 servings of fruits and 4 to 5 servings of vegetables    ? 1 medium fruit    ? 1 cup of raw leafy vegetable    ? ½ cup of frozen, canned (no added salt), or chopped fresh vegetables    ? ½ cup of fresh, frozen, dried, or canned fruit (canned in light syrup or fruit juice)    ? ½ cup of vegetable or fruit juice    · Dairy:  2 to 3 servings    ? 1 cup of nonfat (skim) or 1% milk    ? 1½ ounces of fat-free or low-fat, low-sodium cheese    ? 6 ounces of nonfat or low-fat yogurt    · Lean meat, poultry, and fish:  6 ounces or less    ? Poultry (chicken, turkey) with no skin    ? Fish (especially fatty fish, such as salmon, fresh tuna, or mackerel)    ? Lean beef and pork (loin, round, extra lean hamburger)    ? Egg whites and egg substitutes    · Nuts, seeds, and legumes:  4 to 5 servings each week    ? ½ cup of cooked beans and peas    ? 1½ ounces of unsalted nuts    ? 2 tablespoons of peanut butter or seeds    · Sweets and added sugars:  5 or less each week    ? 1 tablespoon of sugar, jelly, or jam    ? ½ cup of sorbet or gelatin    ? 1 cup of lemonade    · Fats:  2 to 3 servings each week    ? 1 teaspoon of soft margarine or vegetable oil    ? 1 tablespoon of mayonnaise    ? 2 tablespoons of salad dressing    Which foods should I avoid? · Grains:      ? Baked goods, such as doughnuts, pastries, cookies, and biscuits (high in fat and sugar)    ?  Mixes for cornbread and biscuits, packaged foods, such as bread stuffing, rice and pasta mixes, macaroni and cheese, and instant cereals (high in sodium)    · Fruits and vegetables: ? Regular, canned vegetables (high in sodium)    ? Sauerkraut, pickled vegetables, and other foods prepared in brine (high in sodium)    ? Fried vegetables or vegetables in butter or high-fat sauces    ? Fruit in cream or butter sauce (high in fat)    · Dairy:      ? Whole milk, 2% milk, and cream (high in fat)    ? Regular cheese and processed cheese (high in fat and sodium)    · Meats and protein foods:      ? Smoked or cured meat, such as corned beef, goodson, ham, hot dogs, and sausage (high in fat and sodium)    ? Canned beans and canned meats or spreads, such as potted meats, sardines, anchovies, and imitation seafood (high in sodium)    ? Deli or lunch meats, such as bologna, ham, turkey, and roast beef (high in sodium)    ? High-fat meat (T-bone steak, regular hamburger, and ribs)    ? Whole eggs and egg yolks (high in fat)    · Other:      ? Seasonings made with salt, such as garlic salt, celery salt, onion salt, seasoned salt, meat tenderizers, and monosodium glutamate (MSG)    ? Miso soup and canned or dried soup mixes (high in sodium)    ? Regular soy sauce, barbecue sauce, teriyaki sauce, steak sauce, Worcestershire sauce, and most flavored vinegars (high in sodium)    ? Regular condiments, such as mustard, ketchup, and salad dressings (high in sodium)    ? Gravy and sauces, such as Chuck or cheese sauces (high in sodium and fat)    ? Drinks high in sugar, such as soda or fruit drinks    ? Snack foods, such as salted chips, popcorn, pretzels, pork rinds, salted crackers, and salted nuts    ? Frozen foods, such as dinners, entrees, vegetables with sauces, and breaded meats (high in sodium)    What other guidelines should I follow? · Maintain a healthy weight  Your risk for heart disease is higher if you are overweight  Your healthcare provider may suggest that you lose weight if you are overweight  You can lose weight by eating fewer calories and foods that have added sugars and fat   The DASH meal plan can help you do this  Decrease calories by eating smaller portions at each meal and fewer snacks  Ask your healthcare provider for more information about how to lose weight  · Exercise regularly  Regular exercise can help you reach or maintain a healthy weight  Regular exercise can also help decrease your blood pressure and improve your cholesterol levels  Get 30 minutes or more of moderate exercise each day of the week  To lose weight, get at least 60 minutes of exercise  Talk to your healthcare provider about the best exercise program for you  · Limit alcohol  Women should limit alcohol to 1 drink a day  Men should limit alcohol to 2 drinks a day  A drink of alcohol is 12 ounces of beer, 5 ounces of wine, or 1½ ounces of liquor  Where can I find more information? · [facility] [address]  [address] [address]  [place] [address]  Phone: 1- 599 - [phone]  Web Address: [facility] AGREEMENT:   You have the right to help plan your care  Discuss treatment options with your healthcare provider to decide what care you want to receive  You always have the right to refuse treatment  The above information is an  only  It is not intended as medical advice for individual conditions or treatments  Talk to your doctor, nurse or pharmacist before following any medical regimen to see if it is safe and effective for you  © Copyright [employer] 2021 Information is for End User's use only and may not be sold, redistributed or otherwise used for commercial purposes   All illustrations and images included in CareNotes® are the copyrighted property of [employer]  or [address] [place] [address]

## 2021-07-22 DIAGNOSIS — U07.1 COVID-19 VIRUS INFECTION: ICD-10-CM

## 2021-07-22 RX ORDER — ALBUTEROL SULFATE 90 UG/1
2 AEROSOL, METERED RESPIRATORY (INHALATION) EVERY 6 HOURS PRN
Qty: 8.5 G | Refills: 5 | Status: SHIPPED | OUTPATIENT
Start: 2021-07-22 | End: 2021-11-08

## 2021-07-26 ENCOUNTER — TELEPHONE (OUTPATIENT)
Dept: FAMILY MEDICINE CLINIC | Facility: CLINIC | Age: 46
End: 2021-07-26

## 2021-07-28 DIAGNOSIS — R06.02 SOB (SHORTNESS OF BREATH): ICD-10-CM

## 2021-07-28 RX ORDER — FUROSEMIDE 20 MG/1
TABLET ORAL
Qty: 30 TABLET | Refills: 1 | Status: SHIPPED | OUTPATIENT
Start: 2021-07-28 | End: 2021-08-03

## 2021-08-02 ENCOUNTER — DOCUMENTATION (OUTPATIENT)
Dept: FAMILY MEDICINE CLINIC | Facility: CLINIC | Age: 46
End: 2021-08-02

## 2021-08-03 ENCOUNTER — OFFICE VISIT (OUTPATIENT)
Dept: CARDIOLOGY CLINIC | Facility: CLINIC | Age: 46
End: 2021-08-03
Payer: COMMERCIAL

## 2021-08-03 VITALS
WEIGHT: 293 LBS | DIASTOLIC BLOOD PRESSURE: 78 MMHG | SYSTOLIC BLOOD PRESSURE: 114 MMHG | BODY MASS INDEX: 45.99 KG/M2 | OXYGEN SATURATION: 98 % | HEIGHT: 67 IN | HEART RATE: 69 BPM

## 2021-08-03 DIAGNOSIS — E78.5 HYPERLIPIDEMIA, UNSPECIFIED HYPERLIPIDEMIA TYPE: ICD-10-CM

## 2021-08-03 DIAGNOSIS — R06.02 SOB (SHORTNESS OF BREATH): Primary | ICD-10-CM

## 2021-08-03 PROCEDURE — 1036F TOBACCO NON-USER: CPT | Performed by: INTERNAL MEDICINE

## 2021-08-03 PROCEDURE — 99214 OFFICE O/P EST MOD 30 MIN: CPT | Performed by: INTERNAL MEDICINE

## 2021-08-03 PROCEDURE — 3008F BODY MASS INDEX DOCD: CPT | Performed by: INTERNAL MEDICINE

## 2021-08-03 RX ORDER — FUROSEMIDE 20 MG/1
20 TABLET ORAL DAILY
Qty: 30 TABLET | Refills: 3 | Status: SHIPPED | OUTPATIENT
Start: 2021-08-03 | End: 2021-11-29

## 2021-08-03 RX ORDER — ATORVASTATIN CALCIUM 20 MG/1
20 TABLET, FILM COATED ORAL DAILY
Qty: 30 TABLET | Refills: 6 | Status: SHIPPED | OUTPATIENT
Start: 2021-08-03 | End: 2022-04-15

## 2021-08-03 NOTE — PROGRESS NOTES
General Cardiology - Outpatient Progress Note   Felipe Guillen Chasidy 42 y o  female   MRN: [id number]  @ Encounter: [id number]    THOMAS Souza  Consults      Interval History:   Since last encounter, patient states that since our last visit she has still felt quite anxious and fatigued  Endorsing persistent SOB since her COVID diagnosis and has been on home Oxygen nearly 24/7  Goes on to say that her overall health is contributing to her poor sleep  Patient states that on 6/29/21 she had to go to the ER with complaints of heavy vaginal bleeding, dizziness and generalized weakness  and she has established care with OBGYN and Heme/Onc for iron transfusions  Patient left her O2 tank at home and felt as if she needed it in the office  Portable tank was brought to room and placed on 3L NC which is her home regimen  States she felt much better after using it  States she her appointment for TTE is on 8/11/2021 and that she just submitted her Zio patch 3 days prior to this visit  Cardiac History Summary:   Curly Velazquez is a 42y o  year old female with a history of Anxiety, Anemia, Hypothyroidism, Bipolar Disorder, Obesity, Gastric Bypass (2017), Post-COVID Syndrome presented to cardiology office on 6/8/21 for evaluation of SOB  Patient had severe COVID in 04/2021 requiring up to 30L of oxygen and since being discharged she has felt extremely fatigued, weak, dyspneic with minimal exertion limiting her mobility, dry cough with some peripheral edema  She goes on to say that she has conversational dyspnea (which was obvious during our interview), she states when she walks short distances, her heart begins to race and she feels palpitations as if she ran 5 miles  These periods are associated with lightheadedness and dizziness but she denies every having passed out      Patient has been seeing pulmonary as an outpatient and is pending PFTs   Patient expresses increasing anxiety and nervousness recently secondary to being sick and not being able to breath  Says she has a fear of sleeping because she is not sure if she'll wake up sometimes because of her breathing      Besides her menstrual cycle, she denies any blood loss  No EtOH, tobacco (quit 2008) or drug use  No family history of heart disease to her knowledge    8/3/21: Patient states that Since last encounter, patient states that since our last visit she has still felt quite anxious and fatigued  Endorsing persistent SOB since her COVID diagnosis and has been on home Oxygen nearly 24/7  Goes on to say that her overall health is contributing to her poor sleep  Patient states that on 6/29/21 she had to go to the ER with complaints of heavy vaginal bleeding, dizziness and generalized weakness  and she has established care with OBGYN and Heme/Onc for iron transfusions  Patient left her O2 tank at home and felt as if she needed it in the office  Portable tank was brought to room and placed on 3L NC which is her home regimen  States she felt much better after using it  States she her appointment for TTE is on 8/11/2021 and that she just submitted her Zio patch 3 days prior to this visit  Objective:  Review of Systems  ROS as noted in interval history  Physical Exam:  Vitals: not currently breastfeeding  , There is no height or weight on file to calculate BMI     GEN: Shelli Umaña appears well, alert and oriented x 3, pleasant and cooperative   HEENT:  Normocephalic, atraumatic, anicteric, moist mucous membranes  NECK: No JVD or carotid bruits   HEART: reg rhythm, reg rate, normal S1 and S2, no murmurs, clicks, gallops or rubs   LUNGS: reduced air entry in bilateral LLFs  Poor inspiratory effort  Crackles in bilateral lung fields    ABDOMEN:  Normoactive bowel sounds, soft, no tenderness, no distention  EXTREMITIES: radial pulses palpable; no edema  NEURO: no gross focal findings; cranial nerves grossly intact   SKIN:  Dry, intact, warm to touch    Home Medications: (Not in a hospital admission)      Current Outpatient Medications:     albuterol (PROVENTIL HFA,VENTOLIN HFA) 90 mcg/act inhaler, INHALE 2 PUFFS EVERY 6 (SIX) HOURS AS NEEDED FOR WHEEZING OR SHORTNESS OF BREATH, Disp: 8 5 g, Rfl: 5    atorvastatin (LIPITOR) 20 mg tablet, Take 1 tablet (20 mg total) by mouth daily, Disp: 30 tablet, Rfl: 6    benzonatate (TESSALON PERLES) 100 mg capsule, Take 1 capsule (100 mg total) by mouth 3 (three) times a day as needed for cough, Disp: 60 capsule, Rfl: 3    Calcium Citrate 200 MG TABS, 1 tablet 3 (three) times a day, Disp: , Rfl:     Cyanocobalamin ER 1000 MCG TBCR, take one tab daily, Disp: , Rfl:     diphenhydrAMINE-acetaminophen (TYLENOL PM)  MG TABS, Take 1 tablet by mouth daily at bedtime as needed for sleep, Disp: , Rfl:     escitalopram (LEXAPRO) 20 mg tablet, Take 1 tablet (20 mg total) by mouth daily, Disp: 90 tablet, Rfl: 2    fluticasone-vilanterol (BREO ELLIPTA) 200-25 MCG/INH inhaler, Inhale 1 puff daily Rinse mouth after use , Disp: 1 each, Rfl: 2    furosemide (LASIX) 20 mg tablet, TAKE ONE TABLET (20 MG TOTAL) BY MOUTH DAILY, Disp: 30 tablet, Rfl: 1    levothyroxine 137 mcg tablet, TAKE ONE TABLET BY MOUTH EVERY MORNING ON EMPTY STOMACH, Disp: , Rfl: 1    Multiple Vitamins-Minerals (MULTIVITAMIN ADULT PO), every 24 hours, Disp: , Rfl:     pantoprazole (PROTONIX) 40 mg tablet, Take 1 tablet (40 mg total) by mouth every 24 hours, Disp: 90 tablet, Rfl: 3    traZODone (DESYREL) 100 mg tablet, Take 1 tablet (100 mg total) by mouth daily at bedtime, Disp: 90 tablet, Rfl: 1    Labs & Results:  Lab Results   Component Value Date    TROPONINI <0 02 04/08/2021    TROPONINI <0 01 04/06/2021     Lab Results   Component Value Date    CHOL 237 06/03/2014    TRIG 92 12/03/2020    TRIG 113 07/10/2020    HDL 60 12/03/2020    HDL 64 07/10/2020    LDLCALC 133 (H) 12/03/2020    LDLCALC 142 (H) 07/10/2020    HGBA1C 5 4 12/03/2020    HGBA1C 5 3 07/10/2020     Lab Results   Component Value Date     10/14/2015    K 4 0 05/14/2021    CO2 22 05/14/2021     05/14/2021    BUN 10 05/14/2021    CREATININE 0 65 05/14/2021    ALT 42 04/16/2021    AST 23 04/16/2021     Lab Results   Component Value Date    WBC 11 60 (H) 06/29/2021    HGB 12 8 06/29/2021    HCT 40 0 06/29/2021    MCV 85 06/29/2021     (H) 06/29/2021     Lab Results   Component Value Date    INR 1 02 04/06/2021       Imaging: I have personally reviewed pertinent reports  04/2021 CT Chest with significant ground-glass opacities       07/2021: Interval improve        Echo: No results found for this or any previous visit  Assessment/Plan     Assessment:    1  SOB/Fatigue Multifactorial, Likely 2/2 Post-COVID/Longhaul Syndrome with component of profound TAYLOR (Resovled s/p Iron transfusion 06/30/2021), Mild CAR noted on sleep study, deconditioning, obesity   --> Patient unable to perform PFTs "due to poor and inconsistent efforts"  --> Had Sleep Study where she was noted to have apneic events and CPAP machine was recommended for Mild CAR  2  Anxiety   3  Hypothyroidism  4  Obesity  5  HLD  6  Palpitations  7  TAYLOR s/p Iron Transfusion     Plan:  - c/w Lasix 20mg PO Daily  - c/w Lipitor 20mg PO QHS  - f/u TTE, still has not obtained  --> Tentatively scheduled for 8/11/2021  - Patient submitted the Zio patch approximately 3 days prior to this visit, still have not gotten results yet  - PT was not tolerable for patient because she kept getting fatigued and SOB  - Pulmonology Follow up on 9/22/21  --> Advised her to discuss with pulmonologist regarding getting COVID vaccinated as she is concerned about getting the new variant    - Follow up with Hematology Q3 Months for infusion evaluation (next on 9/29/2021)    Case discussed and reviewed with Dr Felipe Padilla who agrees with my assessment and plan        Emma Leon MD  Cardiology Fellow PGY-4    ==========================================================================================    Epic/ Allscripts/Care Everywhere records reviewed:     ** Please Note: Fluency DirectDictation voice to text software may have been used in the creation of this document   **

## 2021-08-09 DIAGNOSIS — U09.9 POST-COVID SYNDROME: ICD-10-CM

## 2021-08-09 DIAGNOSIS — R06.02 SHORTNESS OF BREATH: ICD-10-CM

## 2021-08-09 DIAGNOSIS — R05.3 CHRONIC COUGH: ICD-10-CM

## 2021-08-11 ENCOUNTER — HOSPITAL ENCOUNTER (OUTPATIENT)
Dept: NON INVASIVE DIAGNOSTICS | Facility: HOSPITAL | Age: 46
Discharge: HOME | End: 2021-08-11
Payer: COMMERCIAL

## 2021-08-11 DIAGNOSIS — R06.02 SHORTNESS OF BREATH: ICD-10-CM

## 2021-08-11 DIAGNOSIS — U09.9 POST-COVID SYNDROME: ICD-10-CM

## 2021-08-11 PROCEDURE — 93306 TTE W/DOPPLER COMPLETE: CPT | Performed by: INTERNAL MEDICINE

## 2021-08-11 PROCEDURE — 93306 TTE W/DOPPLER COMPLETE: CPT

## 2021-09-03 ENCOUNTER — OFFICE VISIT (OUTPATIENT)
Dept: BARIATRICS | Facility: CLINIC | Age: 46
End: 2021-09-03
Payer: COMMERCIAL

## 2021-09-03 VITALS
BODY MASS INDEX: 45.99 KG/M2 | SYSTOLIC BLOOD PRESSURE: 124 MMHG | DIASTOLIC BLOOD PRESSURE: 80 MMHG | TEMPERATURE: 96.6 F | HEIGHT: 67 IN | HEART RATE: 78 BPM | WEIGHT: 293 LBS | RESPIRATION RATE: 20 BRPM

## 2021-09-03 DIAGNOSIS — E78.5 HYPERLIPIDEMIA, UNSPECIFIED HYPERLIPIDEMIA TYPE: ICD-10-CM

## 2021-09-03 DIAGNOSIS — U09.9 POST-COVID SYNDROME: ICD-10-CM

## 2021-09-03 DIAGNOSIS — D50.8 IRON DEFICIENCY ANEMIA SECONDARY TO INADEQUATE DIETARY IRON INTAKE: ICD-10-CM

## 2021-09-03 DIAGNOSIS — G47.33 OSA (OBSTRUCTIVE SLEEP APNEA): ICD-10-CM

## 2021-09-03 DIAGNOSIS — Z98.84 BARIATRIC SURGERY STATUS: ICD-10-CM

## 2021-09-03 DIAGNOSIS — Z48.815 ENCOUNTER FOR SURGICAL AFTERCARE FOLLOWING SURGERY OF DIGESTIVE SYSTEM: Primary | ICD-10-CM

## 2021-09-03 DIAGNOSIS — J96.11 CHRONIC RESPIRATORY FAILURE WITH HYPOXIA (HCC): ICD-10-CM

## 2021-09-03 DIAGNOSIS — K91.2 POSTSURGICAL MALABSORPTION: ICD-10-CM

## 2021-09-03 DIAGNOSIS — E66.01 CLASS 3 SEVERE OBESITY WITH SERIOUS COMORBIDITY AND BODY MASS INDEX (BMI) OF 45.0 TO 49.9 IN ADULT, UNSPECIFIED OBESITY TYPE (HCC): ICD-10-CM

## 2021-09-03 DIAGNOSIS — E03.9 HYPOTHYROIDISM, UNSPECIFIED TYPE: ICD-10-CM

## 2021-09-03 PROCEDURE — 99214 OFFICE O/P EST MOD 30 MIN: CPT | Performed by: PHYSICIAN ASSISTANT

## 2021-09-03 PROCEDURE — 3008F BODY MASS INDEX DOCD: CPT | Performed by: INTERNAL MEDICINE

## 2021-09-03 NOTE — PROGRESS NOTES
Assessment/Plan:     Patient ID: Ramone Evangelista is a 42 y o  female  Bariatric Surgery Status    -s/p Efrain-En-Y Gastric Bypass with Dr Natalie Motta in 2016  Presents to the office today for OD annual     She c/o weight regain and has regained all of her weight back since her surgery  She states this is likely due to poor diet and exercise  Of note, she was hospitalized in the ICU in March due to Covid and has been using oxygen since, now follows with pulm for chronic resp failure  Will obtain UGI and have patient follow up with Dr Tena Capone to determine qualifications for revisions  She would be interested in [facility] if she does not qualify  · Continued/Maintain healthy weight loss with good nutrition intakes  · Adequate hydration with at least 64oz  fluid intake  · Follow diet as discussed  · Follow vitamin and mineral recommendations as reviewed with you  · Exercise as tolerated  · Colonoscopy referral made: out of age range    · Follow-up after UGI  We kindly ask that your arrive 15 minutes before your scheduled appointment time with your provider to allow our staff to room you, get your vital signs and update your chart  · Get lab work done  Please call the office if you need a script  It is recommended to check with your insurance BEFORE getting labs done to make sure they are covered by your policy  · Call our office if you have any problems with abdominal pain especially associated with fever, chills, nausea, vomiting or any other concerns  · All  Post-bariatric surgery patients should be aware that very small quantities of any alcohol can cause impairment and it is very possible not to feel the effect  The effect can be in the system for several hours  It is also a stomach irritant  · It is advised to AVOID alcohol, Nonsteroidal antiinflammatory drugs (NSAIDS) and nicotine of all forms   Any of these can cause stomach irritation/pain      · Discussed the effects of alcohol on a bariatric patient and the increased impairment risk  · Keep up the good work! Postsurgical Malabsorption   -At risk for malabsorption of vitamins/minerals secondary to malabsorption and restriction of intake from bariatric surgery  -Currently taking adequate postop bariatric surgery vitamin supplementation  -Next set of bariatric labs ordered for approximately 2 weeks   - she follows with heme for iron infusions, has appt with them this month   -Patient received education about the importance of adhering to a lifelong supplementation regimen to avoid vitamin/mineral deficiencies      Diagnoses and all orders for this visit:    Encounter for surgical aftercare following surgery of digestive system  -     Zinc; Future  -     Vitamin D 25 hydroxy; Future  -     Vitamin B1, whole blood; Future  -     Vitamin A; Future  -     PTH, intact; Future  -     FL UPPER GI UGI; Future    Post-COVID syndrome  -     Ambulatory referral to Weight Management  -     Zinc; Future  -     Vitamin D 25 hydroxy; Future  -     Vitamin B1, whole blood; Future  -     Vitamin A; Future  -     PTH, intact; Future    Class 3 severe obesity with serious comorbidity and body mass index (BMI) of 45 0 to 49 9 in adult, unspecified obesity type ([facility] )  -     Ambulatory referral to Weight Management  -     Zinc; Future  -     Vitamin D 25 hydroxy; Future  -     Vitamin B1, whole blood; Future  -     Vitamin A; Future  -     PTH, intact; Future  -     FL UPPER GI UGI; Future    Bariatric surgery status  -     Zinc; Future  -     Vitamin D 25 hydroxy; Future  -     Vitamin B1, whole blood; Future  -     Vitamin A; Future  -     PTH, intact; Future  -     FL UPPER GI UGI; Future    Postsurgical malabsorption  -     Zinc; Future  -     Vitamin D 25 hydroxy; Future  -     Vitamin B1, whole blood; Future  -     Vitamin A; Future  -     PTH, intact;  Future    Iron deficiency anemia secondary to inadequate dietary iron intake  -     Zinc; Future  -     Vitamin D 25 hydroxy; Future  -     Vitamin B1, whole blood; Future  -     Vitamin A; Future  -     PTH, intact; Future    Chronic respiratory failure with hypoxia (HCC)  -     Zinc; Future  -     Vitamin D 25 hydroxy; Future  -     Vitamin B1, whole blood; Future  -     Vitamin A; Future  -     PTH, intact; Future    CAR (obstructive sleep apnea)    Hyperlipidemia, unspecified hyperlipidemia type    Hypothyroidism, unspecified type         Subjective:      Patient ID: Oma Green is a 42 y o  female  -s/p Efrain-En-Y Gastric Bypass with Dr Yoandy Chapman in 2016  Presents to the office today for OD annual     Initial: 305  Current: 303  EWL: (Weight loss is ahead of schedule at this post surgical period )  Maicol: 240 a few months after surgery  Current BMI is Body mass index is 48 25 kg/m²  · Tolerating a regular diet-yes  · Eating at least 60 grams of protein per day-yes  · Drinking at least 64 ounces of fluid per day-yes  · Drinking carbonated beverages-no  · Sufficient exercise-no  · Using nicotine-no  · Using alcohol-no  · Supplements: calcium + d3+ b12 + mvi + zinc+ iron     · EWL is 24%, which places the patient ahead of schedule for expected post surgical weight loss at this time  The following portions of the patient's history were reviewed and updated as appropriate: allergies, current medications, past family history, past medical history, past social history, past surgical history and problem list     Review of Systems   Respiratory: Positive for shortness of breath (since vocid )  Cardiovascular: Positive for chest pain (since covid )  Gastrointestinal: Negative  Neurological: Negative  Psychiatric/Behavioral: Negative           Hx anxiety - on lexapro          Objective:    /80 (BP Location: Left arm, Patient Position: Sitting, Cuff Size: Standard)   Pulse 78   Temp (!) 96 6 °F (35 9 °C) (Tympanic)   Resp 20   Ht 5' 6 5" (1 689 m)   Wt (!) 138 kg (303 lb 8 oz)   BMI 48 25 kg/m²      Physical Exam

## 2021-09-03 NOTE — PATIENT INSTRUCTIONS
· Follow-up after UGI  We kindly ask that your arrive 15 minutes before your scheduled appointment time with your provider to allow our staff to room you, get your vital signs and update your chart  · Get lab work done  Please call the office if you need a script  It is recommended to check with your insurance BEFORE getting labs done to make sure they are covered by your policy  · Call our office if you have any problems with abdominal pain especially associated with fever, chills, nausea, vomiting or any other concerns  · All  Post-bariatric surgery patients should be aware that very small quantities of any alcohol can cause impairment and it is very possible not to feel the effect  The effect can be in the system for several hours  It is also a stomach irritant  · It is advised to AVOID alcohol, Nonsteroidal antiinflammatory drugs (NSAIDS) and nicotine of all forms   Any of these can cause stomach irritation/pain  · Discussed the effects of alcohol on a bariatric patient and the increased impairment risk  · Keep up the good work!

## 2021-09-08 ENCOUNTER — TELEPHONE (OUTPATIENT)
Dept: HEMATOLOGY ONCOLOGY | Facility: CLINIC | Age: 46
End: 2021-09-08

## 2021-09-08 NOTE — TELEPHONE ENCOUNTER
Left message that we had to  rs her 9/29 appt to Oct 27th due   To provider not being available   I did leave the number and told her  To call if that appt was not good

## 2021-09-10 ENCOUNTER — LAB (OUTPATIENT)
Dept: LAB | Facility: HOSPITAL | Age: 46
End: 2021-09-10
Payer: COMMERCIAL

## 2021-09-10 DIAGNOSIS — Z98.84 BARIATRIC SURGERY STATUS: ICD-10-CM

## 2021-09-10 DIAGNOSIS — K91.2 POSTSURGICAL MALABSORPTION: ICD-10-CM

## 2021-09-10 DIAGNOSIS — D50.9 MICROCYTIC ANEMIA: ICD-10-CM

## 2021-09-10 DIAGNOSIS — J96.11 CHRONIC RESPIRATORY FAILURE WITH HYPOXIA (HCC): ICD-10-CM

## 2021-09-10 DIAGNOSIS — R79.89 HIGH SERUM PARATHYROID HORMONE (PTH): ICD-10-CM

## 2021-09-10 DIAGNOSIS — Z48.815 ENCOUNTER FOR SURGICAL AFTERCARE FOLLOWING SURGERY OF DIGESTIVE SYSTEM: ICD-10-CM

## 2021-09-10 DIAGNOSIS — R53.82 CHRONIC FATIGUE: ICD-10-CM

## 2021-09-10 DIAGNOSIS — D50.8 IRON DEFICIENCY ANEMIA SECONDARY TO INADEQUATE DIETARY IRON INTAKE: ICD-10-CM

## 2021-09-10 DIAGNOSIS — E03.9 HYPOTHYROIDISM, UNSPECIFIED TYPE: ICD-10-CM

## 2021-09-10 DIAGNOSIS — D64.9 ANEMIA, UNSPECIFIED TYPE: ICD-10-CM

## 2021-09-10 DIAGNOSIS — Z98.84 BARIATRIC SURGERY STATUS: Primary | ICD-10-CM

## 2021-09-10 DIAGNOSIS — R79.82 ELEVATED C-REACTIVE PROTEIN (CRP): ICD-10-CM

## 2021-09-10 DIAGNOSIS — R79.89 LOW VITAMIN D LEVEL: ICD-10-CM

## 2021-09-10 DIAGNOSIS — R06.02 SHORTNESS OF BREATH: ICD-10-CM

## 2021-09-10 DIAGNOSIS — D75.838 REACTIVE THROMBOCYTOSIS: ICD-10-CM

## 2021-09-10 DIAGNOSIS — E66.01 CLASS 3 SEVERE OBESITY WITH SERIOUS COMORBIDITY AND BODY MASS INDEX (BMI) OF 45.0 TO 49.9 IN ADULT, UNSPECIFIED OBESITY TYPE (HCC): ICD-10-CM

## 2021-09-10 DIAGNOSIS — U09.9 POST-COVID SYNDROME: ICD-10-CM

## 2021-09-10 LAB
25(OH)D3 SERPL-MCNC: 28.2 NG/ML (ref 30–100)
ALBUMIN SERPL BCP-MCNC: 3.5 G/DL (ref 3.5–5)
ALP SERPL-CCNC: 150 U/L (ref 46–116)
ALT SERPL W P-5'-P-CCNC: 35 U/L (ref 12–78)
ANION GAP SERPL CALCULATED.3IONS-SCNC: 9 MMOL/L (ref 4–13)
AST SERPL W P-5'-P-CCNC: 14 U/L (ref 5–45)
BASOPHILS # BLD AUTO: 0.06 THOUSANDS/ΜL (ref 0–0.1)
BASOPHILS NFR BLD AUTO: 1 % (ref 0–1)
BILIRUB SERPL-MCNC: 0.37 MG/DL (ref 0.2–1)
BUN SERPL-MCNC: 8 MG/DL (ref 5–25)
CALCIUM SERPL-MCNC: 8.6 MG/DL (ref 8.3–10.1)
CHLORIDE SERPL-SCNC: 105 MMOL/L (ref 100–108)
CO2 SERPL-SCNC: 25 MMOL/L (ref 21–32)
CREAT SERPL-MCNC: 1.06 MG/DL (ref 0.6–1.3)
CRP SERPL QL: <3 MG/L
D DIMER PPP FEU-MCNC: 0.28 UG/ML FEU
DAT POLY-SP REAG RBC QL: NEGATIVE
EOSINOPHIL # BLD AUTO: 0.08 THOUSAND/ΜL (ref 0–0.61)
EOSINOPHIL NFR BLD AUTO: 1 % (ref 0–6)
ERYTHROCYTE [DISTWIDTH] IN BLOOD BY AUTOMATED COUNT: 17.9 % (ref 11.6–15.1)
ERYTHROCYTE [SEDIMENTATION RATE] IN BLOOD: 28 MM/HOUR (ref 0–19)
FERRITIN SERPL-MCNC: 15 NG/ML (ref 8–388)
FOLATE SERPL-MCNC: 16.1 NG/ML (ref 3.1–17.5)
GFR SERPL CREATININE-BSD FRML MDRD: 64 ML/MIN/1.73SQ M
GLUCOSE P FAST SERPL-MCNC: 84 MG/DL (ref 65–99)
HCT VFR BLD AUTO: 45.9 % (ref 34.8–46.1)
HGB BLD-MCNC: 15 G/DL (ref 11.5–15.4)
IGA SERPL-MCNC: 278 MG/DL (ref 70–400)
IGG SERPL-MCNC: 835 MG/DL (ref 700–1600)
IGM SERPL-MCNC: 94 MG/DL (ref 40–230)
IMM GRANULOCYTES # BLD AUTO: 0.04 THOUSAND/UL (ref 0–0.2)
IMM GRANULOCYTES NFR BLD AUTO: 1 % (ref 0–2)
IRON SATN MFR SERPL: 25 % (ref 15–50)
IRON SERPL-MCNC: 108 UG/DL (ref 50–170)
LDH SERPL-CCNC: 192 U/L (ref 81–234)
LYMPHOCYTES # BLD AUTO: 2.05 THOUSANDS/ΜL (ref 0.6–4.47)
LYMPHOCYTES NFR BLD AUTO: 24 % (ref 14–44)
MCH RBC QN AUTO: 31.6 PG (ref 26.8–34.3)
MCHC RBC AUTO-ENTMCNC: 32.7 G/DL (ref 31.4–37.4)
MCV RBC AUTO: 97 FL (ref 82–98)
MONOCYTES # BLD AUTO: 0.57 THOUSAND/ΜL (ref 0.17–1.22)
MONOCYTES NFR BLD AUTO: 7 % (ref 4–12)
NEUTROPHILS # BLD AUTO: 5.75 THOUSANDS/ΜL (ref 1.85–7.62)
NEUTS SEG NFR BLD AUTO: 66 % (ref 43–75)
NRBC BLD AUTO-RTO: 0 /100 WBCS
NT-PROBNP SERPL-MCNC: 38 PG/ML
PLATELET # BLD AUTO: 474 THOUSANDS/UL (ref 149–390)
PMV BLD AUTO: 9.2 FL (ref 8.9–12.7)
POTASSIUM SERPL-SCNC: 5.4 MMOL/L (ref 3.5–5.3)
PROT SERPL-MCNC: 7.4 G/DL (ref 6.4–8.2)
PTH-INTACT SERPL-MCNC: 156 PG/ML (ref 18.4–80.1)
RBC # BLD AUTO: 4.74 MILLION/UL (ref 3.81–5.12)
RETICS # AUTO: ABNORMAL 10*3/UL (ref 14097–95744)
RETICS # CALC: 1.99 % (ref 0.37–1.87)
SODIUM SERPL-SCNC: 139 MMOL/L (ref 136–145)
T4 FREE SERPL-MCNC: 0.43 NG/DL (ref 0.76–1.46)
TIBC SERPL-MCNC: 425 UG/DL (ref 250–450)
TSH SERPL DL<=0.05 MIU/L-ACNC: 48.16 UIU/ML (ref 0.36–3.74)
VIT B12 SERPL-MCNC: 502 PG/ML (ref 100–900)
WBC # BLD AUTO: 8.55 THOUSAND/UL (ref 4.31–10.16)

## 2021-09-10 PROCEDURE — 86140 C-REACTIVE PROTEIN: CPT

## 2021-09-10 PROCEDURE — 85025 COMPLETE CBC W/AUTO DIFF WBC: CPT

## 2021-09-10 PROCEDURE — 83883 ASSAY NEPHELOMETRY NOT SPEC: CPT

## 2021-09-10 PROCEDURE — 82668 ASSAY OF ERYTHROPOIETIN: CPT

## 2021-09-10 PROCEDURE — 84439 ASSAY OF FREE THYROXINE: CPT

## 2021-09-10 PROCEDURE — 85652 RBC SED RATE AUTOMATED: CPT

## 2021-09-10 PROCEDURE — 84630 ASSAY OF ZINC: CPT

## 2021-09-10 PROCEDURE — 83970 ASSAY OF PARATHORMONE: CPT

## 2021-09-10 PROCEDURE — 82607 VITAMIN B-12: CPT

## 2021-09-10 PROCEDURE — 36415 COLL VENOUS BLD VENIPUNCTURE: CPT

## 2021-09-10 PROCEDURE — 82306 VITAMIN D 25 HYDROXY: CPT

## 2021-09-10 PROCEDURE — 85379 FIBRIN DEGRADATION QUANT: CPT

## 2021-09-10 PROCEDURE — 83880 ASSAY OF NATRIURETIC PEPTIDE: CPT

## 2021-09-10 PROCEDURE — 81279 JAK2 GENE TRGT SEQUENCE ALYS: CPT

## 2021-09-10 PROCEDURE — 86880 COOMBS TEST DIRECT: CPT

## 2021-09-10 PROCEDURE — 84590 ASSAY OF VITAMIN A: CPT

## 2021-09-10 PROCEDURE — 82728 ASSAY OF FERRITIN: CPT

## 2021-09-10 PROCEDURE — 84165 PROTEIN E-PHORESIS SERUM: CPT | Performed by: PATHOLOGY

## 2021-09-10 PROCEDURE — 85045 AUTOMATED RETICULOCYTE COUNT: CPT

## 2021-09-10 PROCEDURE — 83540 ASSAY OF IRON: CPT

## 2021-09-10 PROCEDURE — 83010 ASSAY OF HAPTOGLOBIN QUANT: CPT

## 2021-09-10 PROCEDURE — 82746 ASSAY OF FOLIC ACID SERUM: CPT

## 2021-09-10 PROCEDURE — 84425 ASSAY OF VITAMIN B-1: CPT

## 2021-09-10 PROCEDURE — 84165 PROTEIN E-PHORESIS SERUM: CPT

## 2021-09-10 PROCEDURE — 84443 ASSAY THYROID STIM HORMONE: CPT

## 2021-09-10 PROCEDURE — 80053 COMPREHEN METABOLIC PANEL: CPT

## 2021-09-10 PROCEDURE — 82525 ASSAY OF COPPER: CPT

## 2021-09-10 PROCEDURE — 83550 IRON BINDING TEST: CPT

## 2021-09-10 PROCEDURE — 82784 ASSAY IGA/IGD/IGG/IGM EACH: CPT

## 2021-09-10 PROCEDURE — 83615 LACTATE (LD) (LDH) ENZYME: CPT

## 2021-09-11 LAB
EPO SERPL-ACNC: 4.9 MIU/ML (ref 2.6–18.5)
HAPTOGLOB SERPL-MCNC: 225 MG/DL (ref 42–296)
KAPPA LC FREE SER-MCNC: 18.6 MG/L (ref 3.3–19.4)
KAPPA LC FREE/LAMBDA FREE SER: 1.5 {RATIO} (ref 0.26–1.65)
LAMBDA LC FREE SERPL-MCNC: 12.4 MG/L (ref 5.7–26.3)

## 2021-09-12 NOTE — RESULT ENCOUNTER NOTE
Is she taking her thyroid medication? Her TSH looks like not   It is 48    Is she taking on empty stomach?    Let me know so can adjust dosage

## 2021-09-13 DIAGNOSIS — E03.9 HYPOTHYROIDISM, UNSPECIFIED TYPE: Primary | ICD-10-CM

## 2021-09-13 LAB
ALBUMIN SERPL ELPH-MCNC: 4.23 G/DL (ref 3.5–5)
ALBUMIN SERPL ELPH-MCNC: 58.7 % (ref 52–65)
ALPHA1 GLOB SERPL ELPH-MCNC: 0.29 G/DL (ref 0.1–0.4)
ALPHA1 GLOB SERPL ELPH-MCNC: 4 % (ref 2.5–5)
ALPHA2 GLOB SERPL ELPH-MCNC: 0.89 G/DL (ref 0.4–1.2)
ALPHA2 GLOB SERPL ELPH-MCNC: 12.4 % (ref 7–13)
BETA GLOB ABNORMAL SERPL ELPH-MCNC: 0.49 G/DL (ref 0.4–0.8)
BETA1 GLOB SERPL ELPH-MCNC: 6.8 % (ref 5–13)
BETA2 GLOB SERPL ELPH-MCNC: 6 % (ref 2–8)
BETA2+GAMMA GLOB SERPL ELPH-MCNC: 0.43 G/DL (ref 0.2–0.5)
GAMMA GLOB ABNORMAL SERPL ELPH-MCNC: 0.87 G/DL (ref 0.5–1.6)
GAMMA GLOB SERPL ELPH-MCNC: 12.1 % (ref 12–22)
IGG/ALB SER: 1.42 {RATIO} (ref 1.1–1.8)
PROT PATTERN SERPL ELPH-IMP: NORMAL
PROT SERPL-MCNC: 7.2 G/DL (ref 6.4–8.2)

## 2021-09-13 RX ORDER — LEVOTHYROXINE SODIUM 137 UG/1
137 TABLET ORAL DAILY
Qty: 30 TABLET | Refills: 1 | Status: SHIPPED | OUTPATIENT
Start: 2021-09-13 | End: 2021-11-02

## 2021-09-13 NOTE — RESULT ENCOUNTER NOTE
Please call her back and advise her I said she can not do that   Her number is terrible   I will place order for repeat tsh in 6 weeks   Needs to take in every day from now on      She will feel better if levels are in normal range

## 2021-09-13 NOTE — TELEPHONE ENCOUNTER
Do we order that? Sleep DR or pulmonology?    If we are I do not know what she needs    Would have to come from place she orders to send us a form   But I think she may be from pulmonology

## 2021-09-14 ENCOUNTER — TELEPHONE (OUTPATIENT)
Dept: PULMONOLOGY | Facility: CLINIC | Age: 46
End: 2021-09-14

## 2021-09-14 NOTE — TELEPHONE ENCOUNTER
Called pt to reschedule her 12:40pm appointment on 9/22/21 with Dr Deyvi Bishop due to Dr Deyvi Bishop doesn't come in until 1:00  LM for pt to call back and reschedule

## 2021-09-15 LAB — VIT A SERPL-MCNC: 45.1 UG/DL (ref 20.1–62)

## 2021-09-16 ENCOUNTER — TELEPHONE (OUTPATIENT)
Dept: PULMONOLOGY | Facility: CLINIC | Age: 46
End: 2021-09-16

## 2021-09-16 LAB — VIT B1 BLD-SCNC: 112.2 NMOL/L (ref 66.5–200)

## 2021-09-16 NOTE — TELEPHONE ENCOUNTER
Called pt to reschedule her 9/22 appointment with Dr Yobani Bennett, he can't see her at 12:40    Rescheduled her for 11/17 at 1:20

## 2021-09-18 LAB
COPPER SERPL-MCNC: 81 UG/DL (ref 80–158)
ZINC SERPL-MCNC: 73 UG/DL (ref 44–115)

## 2021-09-21 ENCOUNTER — OFFICE VISIT (OUTPATIENT)
Dept: FAMILY MEDICINE CLINIC | Facility: CLINIC | Age: 46
End: 2021-09-21
Payer: COMMERCIAL

## 2021-09-21 VITALS
TEMPERATURE: 97.4 F | HEART RATE: 74 BPM | BODY MASS INDEX: 47.09 KG/M2 | HEIGHT: 66 IN | DIASTOLIC BLOOD PRESSURE: 82 MMHG | OXYGEN SATURATION: 97 % | WEIGHT: 293 LBS | RESPIRATION RATE: 18 BRPM | SYSTOLIC BLOOD PRESSURE: 108 MMHG

## 2021-09-21 DIAGNOSIS — N92.1 MENORRHAGIA WITH IRREGULAR CYCLE: ICD-10-CM

## 2021-09-21 DIAGNOSIS — F33.9 DEPRESSION, RECURRENT (HCC): ICD-10-CM

## 2021-09-21 DIAGNOSIS — Z28.21 REFUSED INFLUENZA VACCINE: ICD-10-CM

## 2021-09-21 DIAGNOSIS — E78.2 MIXED HYPERLIPIDEMIA: Primary | ICD-10-CM

## 2021-09-21 LAB — SCAN RESULT: NORMAL

## 2021-09-21 PROCEDURE — 99214 OFFICE O/P EST MOD 30 MIN: CPT | Performed by: NURSE PRACTITIONER

## 2021-09-21 PROCEDURE — 3008F BODY MASS INDEX DOCD: CPT | Performed by: NURSE PRACTITIONER

## 2021-09-21 PROCEDURE — 1036F TOBACCO NON-USER: CPT | Performed by: NURSE PRACTITIONER

## 2021-09-21 NOTE — PROGRESS NOTES
Assessment/Plan:       Diagnoses and all orders for this visit:    Mixed hyperlipidemia    Refused influenza vaccine  Comments:  patient refused    Menorrhagia with irregular cycle  -     Ambulatory referral to Obstetrics / Gynecology; Future    Depression, recurrent (HCC)          Subjective:      Patient ID: Jasmyne Uriarte is a 42 y o  female  HPI  Here for routine follow up on chronic conditions    Seeing bariatric program   Repeating labs  Seeing pulmonology  And hematology in October  Using portable oxygen in office  Menses is heavy    Getting more more frequently    Getting ho flashes    Hasn't seen GYN for some time  Spot on right side of back that hurts  Feels deep inside       The following portions of the patient's history were reviewed and updated as appropriate: allergies, current medications, past family history, past medical history, past social history, past surgical history and problem list     Review of Systems   Constitutional: Positive for activity change, fatigue and fever  Negative for appetite change  HENT: Negative for congestion, postnasal drip, rhinorrhea and sore throat  Respiratory: Positive for shortness of breath  Negative for cough and wheezing  Cardiovascular: Negative for chest pain  Genitourinary: Negative for frequency and urgency  Musculoskeletal: Negative for back pain  Neurological: Negative for dizziness, light-headedness, numbness and headaches  Psychiatric/Behavioral: The patient is not nervous/anxious  Objective:  Vitals:    09/21/21 1004   BP: 108/82   BP Location: Left arm   Patient Position: Sitting   Cuff Size: Large   Pulse: 74   Resp: 18   Temp: (!) 97 4 °F (36 3 °C)   TempSrc: Tympanic   SpO2: 97%   Weight: (!) 137 kg (302 lb 9 6 oz)   Height: 5' 6 25" (1 683 m)      Physical Exam  Vitals reviewed  Constitutional:       Appearance: She is well-developed  She is obese  HENT:      Head: Normocephalic        Right Ear: Tympanic membrane, ear canal and external ear normal       Left Ear: Tympanic membrane, ear canal and external ear normal    Eyes:      General:         Right eye: No discharge  Left eye: No discharge  Conjunctiva/sclera: Conjunctivae normal       Pupils: Pupils are equal, round, and reactive to light  Neck:      Thyroid: No thyromegaly  Cardiovascular:      Rate and Rhythm: Normal rate and regular rhythm  Pulses: Normal pulses  Heart sounds: Normal heart sounds  No murmur heard  Pulmonary:      Effort: Pulmonary effort is normal       Breath sounds: Normal breath sounds  Comments: Walking ok and oxygen at bedtime    Doing ok but still gets dyspnea   Abdominal:      General: Bowel sounds are normal       Palpations: Abdomen is soft  Tenderness: There is no abdominal tenderness  Musculoskeletal:         General: Normal range of motion  Cervical back: Normal range of motion and neck supple  Lymphadenopathy:      Cervical: No cervical adenopathy  Skin:     General: Skin is warm  Findings: No rash  Neurological:      Mental Status: She is alert and oriented to person, place, and time  Psychiatric:         Mood and Affect: Mood normal          Behavior: Behavior normal          Thought Content:  Thought content normal          Judgment: Judgment normal

## 2021-09-29 DIAGNOSIS — K21.9 GASTROESOPHAGEAL REFLUX DISEASE WITHOUT ESOPHAGITIS: ICD-10-CM

## 2021-09-29 RX ORDER — PANTOPRAZOLE SODIUM 40 MG/1
TABLET, DELAYED RELEASE ORAL
Qty: 90 TABLET | Refills: 3 | Status: SHIPPED | OUTPATIENT
Start: 2021-09-29 | End: 2022-07-26

## 2021-10-19 ENCOUNTER — OFFICE VISIT (OUTPATIENT)
Dept: FAMILY MEDICINE CLINIC | Facility: CLINIC | Age: 46
End: 2021-10-19
Payer: COMMERCIAL

## 2021-10-19 VITALS
RESPIRATION RATE: 18 BRPM | SYSTOLIC BLOOD PRESSURE: 138 MMHG | HEART RATE: 92 BPM | DIASTOLIC BLOOD PRESSURE: 94 MMHG | TEMPERATURE: 96.6 F | BODY MASS INDEX: 47.09 KG/M2 | WEIGHT: 293 LBS | OXYGEN SATURATION: 98 % | HEIGHT: 66 IN

## 2021-10-19 DIAGNOSIS — L03.90 WOUND CELLULITIS: Primary | ICD-10-CM

## 2021-10-19 DIAGNOSIS — F33.9 DEPRESSION, RECURRENT (HCC): ICD-10-CM

## 2021-10-19 DIAGNOSIS — J96.11 CHRONIC RESPIRATORY FAILURE WITH HYPOXIA (HCC): ICD-10-CM

## 2021-10-19 PROCEDURE — 99213 OFFICE O/P EST LOW 20 MIN: CPT | Performed by: NURSE PRACTITIONER

## 2021-10-19 RX ORDER — SULFAMETHOXAZOLE AND TRIMETHOPRIM 800; 160 MG/1; MG/1
1 TABLET ORAL EVERY 12 HOURS SCHEDULED
Qty: 14 TABLET | Refills: 0 | Status: SHIPPED | OUTPATIENT
Start: 2021-10-19 | End: 2021-10-26

## 2021-10-20 ENCOUNTER — OFFICE VISIT (OUTPATIENT)
Dept: OBGYN CLINIC | Facility: CLINIC | Age: 46
End: 2021-10-20
Payer: COMMERCIAL

## 2021-10-20 VITALS
WEIGHT: 293 LBS | BODY MASS INDEX: 47.09 KG/M2 | DIASTOLIC BLOOD PRESSURE: 80 MMHG | SYSTOLIC BLOOD PRESSURE: 124 MMHG | HEIGHT: 66 IN

## 2021-10-20 DIAGNOSIS — N93.9 ABNORMAL UTERINE BLEEDING (AUB): Primary | ICD-10-CM

## 2021-10-20 DIAGNOSIS — N92.1 MENORRHAGIA WITH IRREGULAR CYCLE: ICD-10-CM

## 2021-10-20 PROCEDURE — 99203 OFFICE O/P NEW LOW 30 MIN: CPT | Performed by: NURSE PRACTITIONER

## 2021-10-20 RX ORDER — MEDROXYPROGESTERONE ACETATE 10 MG/1
10 TABLET ORAL DAILY
Qty: 10 TABLET | Refills: 0 | Status: SHIPPED | OUTPATIENT
Start: 2021-10-20 | End: 2022-03-09 | Stop reason: ALTCHOICE

## 2021-10-21 ENCOUNTER — HOSPITAL ENCOUNTER (OUTPATIENT)
Dept: ULTRASOUND IMAGING | Facility: HOSPITAL | Age: 46
Discharge: HOME | End: 2021-10-21
Payer: COMMERCIAL

## 2021-10-21 DIAGNOSIS — N93.9 ABNORMAL UTERINE BLEEDING (AUB): ICD-10-CM

## 2021-10-21 PROCEDURE — 76830 TRANSVAGINAL US NON-OB: CPT

## 2021-10-21 PROCEDURE — 76856 US EXAM PELVIC COMPLETE: CPT

## 2021-10-27 ENCOUNTER — TELEPHONE (OUTPATIENT)
Dept: OBGYN CLINIC | Facility: CLINIC | Age: 46
End: 2021-10-27

## 2021-10-27 ENCOUNTER — OFFICE VISIT (OUTPATIENT)
Dept: HEMATOLOGY ONCOLOGY | Facility: CLINIC | Age: 46
End: 2021-10-27
Payer: COMMERCIAL

## 2021-10-27 VITALS
RESPIRATION RATE: 18 BRPM | HEART RATE: 78 BPM | DIASTOLIC BLOOD PRESSURE: 78 MMHG | BODY MASS INDEX: 45.99 KG/M2 | SYSTOLIC BLOOD PRESSURE: 112 MMHG | TEMPERATURE: 98.4 F | WEIGHT: 293 LBS | OXYGEN SATURATION: 98 % | HEIGHT: 67 IN

## 2021-10-27 DIAGNOSIS — D50.8 IRON DEFICIENCY ANEMIA SECONDARY TO INADEQUATE DIETARY IRON INTAKE: Primary | ICD-10-CM

## 2021-10-27 DIAGNOSIS — D75.838 REACTIVE THROMBOCYTOSIS: ICD-10-CM

## 2021-10-27 PROCEDURE — 99214 OFFICE O/P EST MOD 30 MIN: CPT | Performed by: NURSE PRACTITIONER

## 2021-10-27 RX ORDER — SODIUM CHLORIDE 9 MG/ML
20 INJECTION, SOLUTION INTRAVENOUS ONCE
Status: CANCELLED | OUTPATIENT
Start: 2021-11-05

## 2021-10-28 ENCOUNTER — PROCEDURE VISIT (OUTPATIENT)
Dept: OBGYN CLINIC | Facility: CLINIC | Age: 46
End: 2021-10-28
Payer: COMMERCIAL

## 2021-10-28 VITALS
WEIGHT: 293 LBS | DIASTOLIC BLOOD PRESSURE: 78 MMHG | SYSTOLIC BLOOD PRESSURE: 122 MMHG | BODY MASS INDEX: 45.99 KG/M2 | HEIGHT: 67 IN

## 2021-10-28 DIAGNOSIS — N93.9 ABNORMAL UTERINE BLEEDING (AUB): Primary | ICD-10-CM

## 2021-10-28 PROCEDURE — 3008F BODY MASS INDEX DOCD: CPT | Performed by: NURSE PRACTITIONER

## 2021-10-28 PROCEDURE — 1036F TOBACCO NON-USER: CPT | Performed by: NURSE PRACTITIONER

## 2021-10-28 PROCEDURE — 58100 BIOPSY OF UTERUS LINING: CPT | Performed by: NURSE PRACTITIONER

## 2021-10-28 PROCEDURE — 99213 OFFICE O/P EST LOW 20 MIN: CPT | Performed by: NURSE PRACTITIONER

## 2021-10-29 ENCOUNTER — HOSPITAL ENCOUNTER (OUTPATIENT)
Dept: INFUSION CENTER | Facility: HOSPITAL | Age: 46
Discharge: HOME | End: 2021-10-29
Attending: INTERNAL MEDICINE

## 2021-11-02 DIAGNOSIS — E03.9 HYPOTHYROIDISM, UNSPECIFIED TYPE: ICD-10-CM

## 2021-11-02 RX ORDER — LEVOTHYROXINE SODIUM 137 UG/1
TABLET ORAL
Qty: 30 TABLET | Refills: 1 | Status: SHIPPED | OUTPATIENT
Start: 2021-11-02 | End: 2021-12-22

## 2021-11-05 ENCOUNTER — HOSPITAL ENCOUNTER (OUTPATIENT)
Dept: INFUSION CENTER | Facility: HOSPITAL | Age: 46
Discharge: HOME | End: 2021-11-05
Attending: INTERNAL MEDICINE
Payer: COMMERCIAL

## 2021-11-05 VITALS
TEMPERATURE: 98.6 F | OXYGEN SATURATION: 94 % | SYSTOLIC BLOOD PRESSURE: 109 MMHG | HEART RATE: 73 BPM | DIASTOLIC BLOOD PRESSURE: 64 MMHG | RESPIRATION RATE: 16 BRPM

## 2021-11-05 DIAGNOSIS — D50.8 IRON DEFICIENCY ANEMIA SECONDARY TO INADEQUATE DIETARY IRON INTAKE: Primary | ICD-10-CM

## 2021-11-05 PROCEDURE — 96365 THER/PROPH/DIAG IV INF INIT: CPT

## 2021-11-05 RX ORDER — SODIUM CHLORIDE 9 MG/ML
20 INJECTION, SOLUTION INTRAVENOUS ONCE
Status: CANCELLED | OUTPATIENT
Start: 2021-11-12

## 2021-11-05 RX ORDER — SODIUM CHLORIDE 9 MG/ML
20 INJECTION, SOLUTION INTRAVENOUS ONCE
Status: COMPLETED | OUTPATIENT
Start: 2021-11-05 | End: 2021-11-05

## 2021-11-05 RX ADMIN — FERUMOXYTOL 510 MG: 510 INJECTION INTRAVENOUS at 15:12

## 2021-11-05 RX ADMIN — SODIUM CHLORIDE 20 ML/HR: 0.9 INJECTION, SOLUTION INTRAVENOUS at 15:12

## 2021-11-07 DIAGNOSIS — U07.1 COVID-19 VIRUS INFECTION: ICD-10-CM

## 2021-11-08 RX ORDER — ALBUTEROL SULFATE 90 UG/1
2 AEROSOL, METERED RESPIRATORY (INHALATION) EVERY 6 HOURS PRN
Qty: 8.5 G | Refills: 5 | Status: SHIPPED | OUTPATIENT
Start: 2021-11-08

## 2021-11-10 ENCOUNTER — TELEPHONE (OUTPATIENT)
Dept: FAMILY MEDICINE CLINIC | Facility: CLINIC | Age: 46
End: 2021-11-10

## 2021-11-11 ENCOUNTER — OFFICE VISIT (OUTPATIENT)
Dept: GYNECOLOGY | Facility: CLINIC | Age: 46
End: 2021-11-11
Payer: COMMERCIAL

## 2021-11-11 VITALS — HEIGHT: 67 IN | BODY MASS INDEX: 45.99 KG/M2 | WEIGHT: 293 LBS

## 2021-11-11 DIAGNOSIS — N92.1 MENORRHAGIA WITH IRREGULAR CYCLE: Primary | ICD-10-CM

## 2021-11-11 DIAGNOSIS — N93.9 ABNORMAL UTERINE BLEEDING (AUB): ICD-10-CM

## 2021-11-11 PROCEDURE — 99243 OFF/OP CNSLTJ NEW/EST LOW 30: CPT | Performed by: OBSTETRICS & GYNECOLOGY

## 2021-11-11 PROCEDURE — 58100 BIOPSY OF UTERUS LINING: CPT | Performed by: OBSTETRICS & GYNECOLOGY

## 2021-11-11 PROCEDURE — 88305 TISSUE EXAM BY PATHOLOGIST: CPT | Performed by: PATHOLOGY

## 2021-11-11 RX ORDER — MEGESTROL ACETATE 20 MG/1
20 TABLET ORAL DAILY
Qty: 21 TABLET | Refills: 0 | Status: SHIPPED | OUTPATIENT
Start: 2021-11-11 | End: 2022-06-28 | Stop reason: SDUPTHER

## 2021-11-12 ENCOUNTER — HOSPITAL ENCOUNTER (OUTPATIENT)
Dept: INFUSION CENTER | Facility: HOSPITAL | Age: 46
Discharge: HOME | End: 2021-11-12
Attending: INTERNAL MEDICINE
Payer: COMMERCIAL

## 2021-11-12 VITALS
RESPIRATION RATE: 20 BRPM | HEART RATE: 74 BPM | SYSTOLIC BLOOD PRESSURE: 101 MMHG | TEMPERATURE: 97.5 F | DIASTOLIC BLOOD PRESSURE: 71 MMHG

## 2021-11-12 DIAGNOSIS — D50.8 IRON DEFICIENCY ANEMIA SECONDARY TO INADEQUATE DIETARY IRON INTAKE: Primary | ICD-10-CM

## 2021-11-12 PROCEDURE — 96365 THER/PROPH/DIAG IV INF INIT: CPT

## 2021-11-12 RX ORDER — SODIUM CHLORIDE 9 MG/ML
20 INJECTION, SOLUTION INTRAVENOUS ONCE
Status: CANCELLED | OUTPATIENT
Start: 2021-11-12

## 2021-11-12 RX ORDER — SODIUM CHLORIDE 9 MG/ML
20 INJECTION, SOLUTION INTRAVENOUS ONCE
Status: COMPLETED | OUTPATIENT
Start: 2021-11-12 | End: 2021-11-12

## 2021-11-12 RX ADMIN — FERUMOXYTOL 510 MG: 510 INJECTION INTRAVENOUS at 14:58

## 2021-11-12 RX ADMIN — SODIUM CHLORIDE 20 ML/HR: 0.9 INJECTION, SOLUTION INTRAVENOUS at 14:52

## 2021-11-12 NOTE — PROGRESS NOTES
COVID-19 Outpatient Progress Note    Assessment/Plan:    Problem List Items Addressed This Visit        Other    COVID-19 virus infection - Primary    Relevant Medications    ibuprofen (MOTRIN) 600 mg tablet    albuterol (PROVENTIL HFA,VENTOLIN HFA) 90 mcg/act inhaler    Other Relevant Orders    Pulse Oximeter       offered BAM and she is not interested   Have asked her Friday and today   Will keep hydrated, proning should be done more often, get up and walk around off and on, use her IBU for the fever and pain , will get pulse ox if she can to monitor, inhaler prn to help with cough and chest tightness  Disposition:     I recommended continued isolation until at least 24 hours have passed since recovery defined as resolution of fever without the use of fever-reducing medications AND improvement in COVID symptoms AND 10 days have passed since onset of symptoms (or 10 days have passed since date of first positive viral diagnostic test for asymptomatic patients)  I have spent 10 minutes directly with the patient  Greater than 50% of this time was spent in counseling/coordination of care regarding: prognosis and impressions  Encounter provider THOAMS Lauren    Provider located at [address] [id number]    Recent Visits  Date Type Provider Dept   [facility] [address]   04/01/21 Telemedicine Gasper [address]lesby[address]   Showing recent visits within past 7 days and meeting all other requirements     Today's Visits  Date Type Provider Dept   04/04/21 [address] [facility] [address], [address]   Showing today's visits and meeting all other requirements     Future Appointments  No visits were found meeting these conditions     Showing future appointments within next 150 days and meeting all other requirements      This virtual From: Za Hawley  To: Aminata Rm  Sent: 11/11/2021 11:03 AM CST  Subject: Post labor     Hello     My first pregnancy I had to have surgery to stop early labor and because I'm pregnant again my previous doctor said we was going to schedule the appointment yesterday however I had to reschedule and I haven't been able to get in contact. I know my appointment with Dr Rm is in December. Was it possible to still have the surgery beforehand?    Thanks   Za Hawley    check-in was done via [employer] and patient was informed that this is a secure, HIPAA-compliant platform  She agrees to proceed  Patient agrees to participate in a virtual check in via telephone or video visit instead of presenting to the office to address urgent/immediate medical needs  Patient is aware this is a billable service  After connecting through [facility], the patient was identified by name and date of birth  Shellisharlene Douglasfatmatajin was informed that this was a telemedicine visit and that the exam was being conducted confidentially over secure lines  My office door was closed  No one else was in the room  Gerards LANG De DiosAmarjitfatmatajin acknowledged consent and understanding of privacy and security of the telemedicine visit  I informed the patient that I have reviewed her record in Epic and presented the opportunity for her to ask any questions regarding the visit today  The patient agreed to participate  Subjective: Francisco Parrish is a 39 y o  female who has been screened for COVID-19  Symptom change since last report: worsening  Patient's symptoms include fever, chills, fatigue, anosmia, loss of taste, cough, shortness of breath, chest tightness, abdominal pain, diarrhea, myalgias and headache  Patient denies congestion, sore throat, nausea and vomiting  Ta has been staying home and has isolated themselves in her home  She is taking care to not share personal items and is cleaning all surfaces that are touched often, like counters, tabletops, and doorknobs using household cleaning sprays or wipes       Date of symptom onset: 3/30/2021    No results found for: Sai Cohen, [address]  Past Medical History:   Diagnosis Date    Abscess of labia     Anxiety     Bipolar disorder ([facility] )     Breast lump     Frequent headaches     Hemorrhoids     Hypothyroidism 7/1/2013    Migraine     Morbid obesity ([facility] ) 7/1/2013    Obesity     Psychotic disorder ([facility] ) Past Surgical History:   Procedure Laterality Date    BARIATRIC SURGERY      BREAST BIOPSY Right 1999    BREAST LUMPECTOMY Left     benign    CHOLECYSTECTOMY      GALLBLADDER SURGERY       Current Outpatient Medications   Medication Sig Dispense Refill    acarbose (PRECOSE) 50 mg tablet Take 50 mg by mouth      albuterol (PROVENTIL HFA,VENTOLIN HFA) 90 mcg/act inhaler Inhale 2 puffs every 6 (six) hours as needed for wheezing or shortness of breath 1 Inhaler 5    atorvastatin (LIPITOR) 20 mg tablet TK 1 T PO QHS      benzonatate (TESSALON PERLES) 100 mg capsule Take 1 capsule (100 mg total) by mouth 3 (three) times a day as needed for cough 20 capsule 0    Calcium Citrate 200 MG TABS 1 tablet 3 (three) times a day      Cyanocobalamin ER 1000 MCG TBCR take one tab daily      ergocalciferol (VITAMIN D2) 50,000 units TAKE 2 CAPSULES BY MOUTH EVERY WEEK WITH DINNER  1    ferrous sulfate 324 (65 Fe) mg Take 1 tablet (324 mg total) by mouth daily before breakfast  0    ibuprofen (MOTRIN) 600 mg tablet Take 1 tablet (600 mg total) by mouth every 8 (eight) hours as needed for mild pain or fever 30 tablet 0    levothyroxine 137 mcg tablet TAKE ONE TABLET BY MOUTH EVERY MORNING ON EMPTY STOMACH  1    methocarbamol (ROBAXIN) 750 mg tablet Take 1 tab twice a day as needed for spasms 30 tablet 0    Multiple Vitamins-Minerals (MULTIVITAMIN ADULT PO) every 24 hours      pantoprazole (PROTONIX) 40 mg tablet Take 1 tablet (40 mg total) by mouth every 24 hours 90 tablet 3    rosuvastatin (CRESTOR) 10 MG tablet Take 10 mg by mouth daily      thiamine (VITAMIN B1) 50 mg tablet Take 1 tablet (50 mg total) by mouth daily  0    topiramate (TOPAMAX) 50 MG tablet Take 1 tablet (50 mg total) by mouth 2 (two) times a day 180 tablet 3    traZODone (DESYREL) 50 mg tablet Take 1 tablet (50 mg total) by mouth daily at bedtime as needed for sleep 90 tablet 0     No current facility-administered medications for this visit  Allergies   Allergen Reactions    No Active Allergies        Review of Systems   Constitutional: Positive for chills, fatigue and fever  HENT: Negative for congestion and sore throat  Respiratory: Positive for cough, chest tightness and shortness of breath  Gastrointestinal: Positive for abdominal pain and diarrhea  Negative for nausea and vomiting  Musculoskeletal: Positive for myalgias  Neurological: Positive for headaches  Objective: There were no vitals filed for this visit  Physical Exam  Constitutional:       Appearance: She is obese  She is ill-appearing  Pulmonary:      Effort: Pulmonary effort is normal    Neurological:      Mental Status: She is alert and oriented to person, place, and time  VIRTUAL VISIT DISCLAIMER    Ta Umaña acknowledges that she has consented to an online visit or consultation  She understands that the online visit is based solely on information provided by her, and that, in the absence of a face-to-face physical evaluation by the physician, the diagnosis she receives is both limited and provisional in terms of accuracy and completeness  This is not intended to replace a full medical face-to-face evaluation by the physician  Ta Umaña understands and accepts these terms

## 2021-11-16 ENCOUNTER — DOCUMENTATION (OUTPATIENT)
Dept: CARDIOLOGY CLINIC | Facility: CLINIC | Age: 46
End: 2021-11-16

## 2021-11-16 ENCOUNTER — DOCUMENTATION (OUTPATIENT)
Dept: PULMONOLOGY | Facility: CLINIC | Age: 46
End: 2021-11-16

## 2021-11-16 ENCOUNTER — HOSPITAL ENCOUNTER (OUTPATIENT)
Dept: RADIOLOGY | Facility: HOSPITAL | Age: 46
Discharge: HOME | End: 2021-11-16
Payer: COMMERCIAL

## 2021-11-16 DIAGNOSIS — E66.01 CLASS 3 SEVERE OBESITY WITH SERIOUS COMORBIDITY AND BODY MASS INDEX (BMI) OF 45.0 TO 49.9 IN ADULT, UNSPECIFIED OBESITY TYPE (HCC): ICD-10-CM

## 2021-11-16 DIAGNOSIS — Z48.815 ENCOUNTER FOR SURGICAL AFTERCARE FOLLOWING SURGERY OF DIGESTIVE SYSTEM: ICD-10-CM

## 2021-11-16 DIAGNOSIS — Z98.84 BARIATRIC SURGERY STATUS: ICD-10-CM

## 2021-11-16 PROCEDURE — 74240 X-RAY XM UPR GI TRC 1CNTRST: CPT

## 2021-11-17 ENCOUNTER — OFFICE VISIT (OUTPATIENT)
Dept: PULMONOLOGY | Facility: CLINIC | Age: 46
End: 2021-11-17
Payer: COMMERCIAL

## 2021-11-17 ENCOUNTER — DOCUMENTATION (OUTPATIENT)
Dept: PULMONOLOGY | Facility: CLINIC | Age: 46
End: 2021-11-17

## 2021-11-17 VITALS
WEIGHT: 293 LBS | SYSTOLIC BLOOD PRESSURE: 110 MMHG | RESPIRATION RATE: 16 BRPM | OXYGEN SATURATION: 99 % | BODY MASS INDEX: 45.99 KG/M2 | DIASTOLIC BLOOD PRESSURE: 78 MMHG | TEMPERATURE: 98.7 F | HEIGHT: 67 IN | HEART RATE: 80 BPM

## 2021-11-17 DIAGNOSIS — G47.33 OSA (OBSTRUCTIVE SLEEP APNEA): ICD-10-CM

## 2021-11-17 DIAGNOSIS — U09.9 POST-COVID SYNDROME: ICD-10-CM

## 2021-11-17 DIAGNOSIS — F41.9 ANXIETY: ICD-10-CM

## 2021-11-17 DIAGNOSIS — D50.8 OTHER IRON DEFICIENCY ANEMIA: ICD-10-CM

## 2021-11-17 DIAGNOSIS — R06.02 SHORTNESS OF BREATH: Primary | ICD-10-CM

## 2021-11-17 DIAGNOSIS — R05.3 CHRONIC COUGH: ICD-10-CM

## 2021-11-17 DIAGNOSIS — E66.01 MORBID OBESITY (HCC): ICD-10-CM

## 2021-11-17 PROCEDURE — 3008F BODY MASS INDEX DOCD: CPT | Performed by: INTERNAL MEDICINE

## 2021-11-17 PROCEDURE — 99215 OFFICE O/P EST HI 40 MIN: CPT | Performed by: INTERNAL MEDICINE

## 2021-11-17 PROCEDURE — 94618 PULMONARY STRESS TESTING: CPT | Performed by: INTERNAL MEDICINE

## 2021-11-17 PROCEDURE — 1036F TOBACCO NON-USER: CPT | Performed by: INTERNAL MEDICINE

## 2021-11-28 DIAGNOSIS — R06.02 SOB (SHORTNESS OF BREATH): ICD-10-CM

## 2021-11-29 RX ORDER — FUROSEMIDE 20 MG/1
TABLET ORAL
Qty: 30 TABLET | Refills: 3 | Status: SHIPPED | OUTPATIENT
Start: 2021-11-29 | End: 2022-03-09

## 2021-12-08 ENCOUNTER — TELEPHONE (OUTPATIENT)
Dept: FAMILY MEDICINE CLINIC | Facility: CLINIC | Age: 46
End: 2021-12-08

## 2021-12-18 DIAGNOSIS — F41.9 ANXIETY: ICD-10-CM

## 2021-12-18 DIAGNOSIS — F32.1 CURRENT MODERATE EPISODE OF MAJOR DEPRESSIVE DISORDER, UNSPECIFIED WHETHER RECURRENT (HCC): ICD-10-CM

## 2021-12-18 DIAGNOSIS — U09.9 POST-COVID SYNDROME: ICD-10-CM

## 2021-12-20 RX ORDER — TRAZODONE HYDROCHLORIDE 100 MG/1
TABLET ORAL
Qty: 90 TABLET | Refills: 1 | Status: SHIPPED | OUTPATIENT
Start: 2021-12-20 | End: 2022-05-31

## 2021-12-29 ENCOUNTER — TELEPHONE (OUTPATIENT)
Dept: HEMATOLOGY ONCOLOGY | Facility: CLINIC | Age: 46
End: 2021-12-29

## 2022-01-03 ENCOUNTER — TELEPHONE (OUTPATIENT)
Dept: HEMATOLOGY ONCOLOGY | Facility: CLINIC | Age: 47
End: 2022-01-03

## 2022-01-03 NOTE — TELEPHONE ENCOUNTER
Patient called to r/s appointment with Cooper Reno on 1/5/22 due to not feeling safe with the covid numbers  Patient has been r/s

## 2022-01-06 DIAGNOSIS — U09.9 POST-COVID SYNDROME: ICD-10-CM

## 2022-01-06 DIAGNOSIS — R06.02 SHORTNESS OF BREATH: ICD-10-CM

## 2022-01-06 DIAGNOSIS — R05.3 CHRONIC COUGH: ICD-10-CM

## 2022-01-25 ENCOUNTER — CLINICAL SUPPORT (OUTPATIENT)
Dept: CARDIOLOGY CLINIC | Facility: CLINIC | Age: 47
End: 2022-01-25
Payer: COMMERCIAL

## 2022-01-25 ENCOUNTER — OFFICE VISIT (OUTPATIENT)
Dept: CARDIOLOGY CLINIC | Facility: CLINIC | Age: 47
End: 2022-01-25
Payer: COMMERCIAL

## 2022-01-25 VITALS
HEART RATE: 59 BPM | WEIGHT: 293 LBS | DIASTOLIC BLOOD PRESSURE: 74 MMHG | HEIGHT: 67 IN | BODY MASS INDEX: 45.99 KG/M2 | SYSTOLIC BLOOD PRESSURE: 112 MMHG | OXYGEN SATURATION: 100 %

## 2022-01-25 DIAGNOSIS — R00.2 PALPITATIONS: Primary | ICD-10-CM

## 2022-01-25 DIAGNOSIS — J96.11 CHRONIC RESPIRATORY FAILURE WITH HYPOXIA (HCC): ICD-10-CM

## 2022-01-25 DIAGNOSIS — R00.2 PALPITATIONS: ICD-10-CM

## 2022-01-25 PROCEDURE — 1036F TOBACCO NON-USER: CPT | Performed by: INTERNAL MEDICINE

## 2022-01-25 PROCEDURE — 99213 OFFICE O/P EST LOW 20 MIN: CPT | Performed by: INTERNAL MEDICINE

## 2022-01-25 PROCEDURE — 3008F BODY MASS INDEX DOCD: CPT | Performed by: INTERNAL MEDICINE

## 2022-01-25 PROCEDURE — 93248 EXT ECG>7D<15D REV&INTERPJ: CPT | Performed by: INTERNAL MEDICINE

## 2022-01-25 NOTE — PROGRESS NOTES
General Cardiology - Outpatient Progress Note   Nate Umaña 42 y o  female   MRN: [id number]  @ Encounter: [id number]    THOMAS Trivedi  Consults      Interval History:   Since last encounter no new complaints  Still having fatigue and palpitations  States when she is exerting herself she feels her heart racing, feels dizzy and increasingly fatigued  It improves with resting  Denies any CP, tightness, numbness/tingling during the episodes  Overall she says that she has noticed some improvement since the onset of her symptoms last year but still has not returned to her baseline  Also has not gone back to work as a result  Cardiac History Summary:   Jovanny Virk is a 42y o  year old female with a history of Anxiety, Anemia, Hypothyroidism, Bipolar Disorder, Obesity, Gastric Bypass (2017), Post-COVID Syndrome presented to cardiology office on 6/8/21 for evaluation of SOB  Patient had severe COVID in 04/2021 requiring up to 30L of oxygen and since being discharged she has felt extremely fatigued, weak, dyspneic with minimal exertion limiting her mobility, dry cough with some peripheral edema  She goes on to say that she has conversational dyspnea (which was obvious during our interview), she states when she walks short distances, her heart begins to race and she feels palpitations as if she ran 5 miles  These periods are associated with lightheadedness and dizziness but she denies every having passed out      Patient has been seeing pulmonary as an outpatient and is pending PFTs  Patient expresses increasing anxiety and nervousness recently secondary to being sick and not being able to breath   Says she has a fear of sleeping because she is not sure if she'll wake up sometimes because of her breathing      Besides her menstrual cycle, she denies any blood loss  No EtOH, tobacco (quit 2008) or drug use  No family history of heart disease to her knowledge    8/3/21: Patient states that Since last encounter, patient states that since our last visit she has still felt quite anxious and fatigued  Endorsing persistent SOB since her COVID diagnosis and has been on home Oxygen nearly 24/7  Goes on to say that her overall health is contributing to her poor sleep  Patient states that on 6/29/21 she had to go to the ER with complaints of heavy vaginal bleeding, dizziness and generalized weakness and she has established care with OBGYN and Heme/Onc for iron transfusions  Patient left her O2 tank at home and felt as if she needed it in the office  Portable tank was brought to room and placed on 3L NC which is her home regimen  States she felt much better after using it  States her appointment for TTE is on 8/11/2021 and that she just submitted her Zio patch 3 days prior to this visit  Objective:  Review of Systems  ROS as noted in interval history  Physical Exam:  Vitals: not currently breastfeeding  , There is no height or weight on file to calculate BMI     GEN: Shelli Umaña appears well, alert and oriented x 3, pleasant and cooperative   HEENT:  Normocephalic, atraumatic, anicteric, moist mucous membranes  NECK: No JVD or carotid bruits   HEART: reg rhythm, reg rate, normal S1 and S2, no murmurs, clicks, gallops or rubs   LUNGS: reduced air entry in bilateral LLFs  Poor inspiratory effort  Crackles in bilateral lung fields    ABDOMEN:  Normoactive bowel sounds, soft, no tenderness, no distention  EXTREMITIES: radial pulses palpable; no edema  NEURO: no gross focal findings; cranial nerves grossly intact   SKIN:  Dry, intact, warm to touch    Home Medications: (Not in a hospital admission)      Current Outpatient Medications:     albuterol (PROVENTIL HFA,VENTOLIN HFA) 90 mcg/act inhaler, INHALE 2 PUFFS EVERY 6 (SIX) HOURS AS NEEDED FOR WHEEZING OR SHORTNESS OF BREATH, Disp: 8 5 g, Rfl: 5    atorvastatin (LIPITOR) 20 mg tablet, Take 1 tablet (20 mg total) by mouth daily, Disp: 30 tablet, Rfl: 6    benzonatate (TESSALON PERLES) 100 mg capsule, Take 1 capsule (100 mg total) by mouth 3 (three) times a day as needed for cough, Disp: 60 capsule, Rfl: 3    Breo Ellipta 200-25 MCG/INH inhaler, INHALE 1 PUFF DAILY RINSE MOUTH AFTER USE , Disp: 60 each, Rfl: 5    Calcium Citrate 200 MG TABS, 1 tablet 3 (three) times a day, Disp: , Rfl:     Cyanocobalamin ER 1000 MCG TBCR, take one tab daily, Disp: , Rfl:     diphenhydrAMINE-acetaminophen (TYLENOL PM)  MG TABS, Take 1 tablet by mouth daily at bedtime as needed for sleep, Disp: , Rfl:     escitalopram (LEXAPRO) 20 mg tablet, Take 1 tablet (20 mg total) by mouth daily, Disp: 90 tablet, Rfl: 2    furosemide (LASIX) 20 mg tablet, TAKE ONE TABLET (20 MG TOTAL) BY MOUTH DAILY, Disp: 30 tablet, Rfl: 3    levothyroxine 137 mcg tablet, TAKE ONE TABLET (137 MCG TOTAL) BY MOUTH DAILY, Disp: 90 tablet, Rfl: 1    medroxyPROGESTERone (PROVERA) 10 mg tablet, Take 1 tablet (10 mg total) by mouth daily for 10 days, Disp: 10 tablet, Rfl: 0    megestrol (MEGACE) 20 mg tablet, Take 1 tablet (20 mg total) by mouth daily for 21 days, Disp: 21 tablet, Rfl: 0    Multiple Vitamins-Minerals (MULTIVITAMIN ADULT PO), every 24 hours, Disp: , Rfl:     pantoprazole (PROTONIX) 40 mg tablet, TAKE ONE TABLET (40 MG TOTAL) BY MOUTH EVERY 24 HOURS, Disp: 90 tablet, Rfl: 3    traZODone (DESYREL) 100 mg tablet, TAKE ONE TABLET (100 MG TOTAL) BY MOUTH DAILY AT BEDTIME, Disp: 90 tablet, Rfl: 1    Labs & Results:  Lab Results   Component Value Date    TROPONINI <0 02 04/08/2021    TROPONINI <0 01 04/06/2021     Lab Results   Component Value Date    CHOL 237 06/03/2014    TRIG 92 12/03/2020    TRIG 113 07/10/2020    HDL 60 12/03/2020    HDL 64 07/10/2020    LDLCALC 133 (H) 12/03/2020    LDLCALC 142 (H) 07/10/2020    HGBA1C 5 4 12/03/2020    HGBA1C 5 3 07/10/2020     Lab Results   Component Value Date     10/14/2015    K 5 4 (H) 09/10/2021    CO2 25 09/10/2021     09/10/2021    BUN 8 09/10/2021    CREATININE 1 06 09/10/2021    ALT 35 09/10/2021    AST 14 09/10/2021     Lab Results   Component Value Date    WBC 8 55 09/10/2021    HGB 15 0 09/10/2021    HCT 45 9 09/10/2021    MCV 97 09/10/2021     (H) 09/10/2021     Lab Results   Component Value Date    INR 1 02 04/06/2021       Imaging: I have personally reviewed pertinent reports  04/2021 CT Chest with significant ground-glass opacities       07/2021: Interval improve        Echo: No results found for this or any previous visit  Assessment/Plan     Assessment:    1  SOB/Fatigue Multifactorial, Likely 2/2 Post-COVID/Longhaul Syndrome, Mild CAR, deconditioning, obesity   --> Patient unable to perform PFTs "due to poor and inconsistent efforts"  --> TTE (08/11/21): EF 60%  [address]  Mild MR/TR/MT  PASP 25 mmHg  --> Patient is off of home oxygen now  2  Anxiety   3  Hypothyroidism  4  Obesity  5  HLD  6  Palpitations  --> Zio patch with 1 episode of SVT with max HR of 126 last 8 seconds  Periods of sinus tachy and avg HR in 70s   7  TAYLOR s/p Iron Transfusions (resolved)     Plan:  - c/w Lipitor 20mg PO QHS  - Start Metoprolol Tart  12 5mg PO BID as patient is endorsing sensations of tachycardia with amublation  - RTC in 1 month         Case discussed and reviewed with Dr Nolvia Simental who agrees with my assessment and plan  Aniya Reese MD  Cardiology Fellow PGY-5    ==========================================================================================    Epic/ All[employer]/Care Everywhere records reviewed:     ** Please Note: [employer] voice to text software may have been used in the creation of this document   **

## 2022-02-02 ENCOUNTER — TELEPHONE (OUTPATIENT)
Dept: HEMATOLOGY ONCOLOGY | Facility: CLINIC | Age: 47
End: 2022-02-02

## 2022-02-02 NOTE — TELEPHONE ENCOUNTER
I spoke to the patient in regards to her lab work that needs to be completed prior to her appointment on 2/7/22 at 1:30pm  Patient states she will complete them tomorrow

## 2022-02-07 ENCOUNTER — TELEPHONE (OUTPATIENT)
Dept: HEMATOLOGY ONCOLOGY | Facility: CLINIC | Age: 47
End: 2022-02-07

## 2022-02-28 NOTE — PROGRESS NOTES
Pulmonary Follow Up Note   Inez Umaña 42 y o  female MRN: [id number]  3/2/2022      Assessment/Plan:    1  Shortness of breath /dyspnea on exertion  - multifactorial:  COVID-19, deconditioning, iron deficiency anemia, obesity, obstructive sleep apnea, possible heart failure, component of anxiety /psych    -  6 minutes walk test 5/26/21:  Without any desaturations with exertion and ambulation  Did not meet criteria for require oxygen therapy  Supplemental oxygen discontinued at previous visit    - advised patient to continue physical therapy as it is necessary for her deconditioning and improvement of her lung function and exercise tolerance    - 6 minute walk test 11/17/21:     Patient had baseline oxygen saturation of 98% on room air and had baseline heart rate of 95 beats per minute  Patient ambulated for a total of 4 minutes  Could not complete the study because she was too tired  She did not have any desaturations during 4 minutes  Oxygen saturations varied between 95-98% on room air  Heart rates varied between 107-113 beats per minute      - Chest x-ray 6/9/21: decreasing infiltrates bilaterally with increased reticular markings  - high-resolution CT scan 7/1/21 with improvement of ground-glass opacities bilaterally and replacement with peripheral reticulations  - patient evaluated by Cardiology and Has Zio patch monitoring in place  - PFTs as noted below  Poor effort /unable to fully complete   - Continue Breo daily  - continue albuterol p r n     - referral to pulmonary rehab; suspect deconditioning and long-term COVID symptoms playing a role        2  Post COVID syndrome  - plan as above and below     3  Chronic cough -significantly  improved  - likely secondary to COVID  Also suspect component of GERD and possible heart failure  - Moderately controlled with over-the-counter Robitussin  - continue Protonix  - continue Robitussin p r n   - continue Tessalon Perles     4   Obstructive sleep apnea -  Mild  Moderate during REM  -  Patient had desaturation during sleep study  - patient scores 5 on stop [place] questionnaire  - likely contributing to her shortness of breath and dyspnea on exertion  - CPAP with auto titration  5-20 cm H2O pending; patient never received her CPAP machine  Will check with [facility] regarding status   - follow-up post CPAP use for compliant  - continue supplemental oxygen q h s  until CPAP obtained  - will consider overnight oxygen study on CPAP at future visit determine if she still requires nighttime oxygen and to help determine if supplemental oxygen should be discontinued        5  Morbid obesity -  BMI 47 3  - status post gastric bypass  - likely contributing to deconditioning and difficulty with respiration  - advised patient diet and exercise  - advised patient to exercise minimum 150 minutes per week with goal to decrease weight by 10%  - follow-up with PCP     6  Iron deficiency anemia  - likely contributing to dyspnea on exertion shortness of breath  - continue iron supplementation  - Repeat iron studies showing  Severe iron deficiency despite iron supplementation  - patient is scheduled for Venofer infusions  - follow-up with PCP     7  Anxiety  - likely contributing to shortness of breath and dyspnea on exertion  - Tearful at today's visit due to underlying diagnosis of post COVID fibrosis  - denies any suicidal homicidal ideations  - continue Lexapro  - follow-up with PCP      No follow-ups on file  History of Present Illness   HPI:  Rehana Gore is a 42 y o  female with morbid obesity, anxiety, hypothyroidism, gastric bypass, iron deficiency anemia, dyslipidemia presenting for evaluation/follow-up of shortness of breath post COVID  She continues to follow with Hematology/Oncology for iron infusions  Patient has yet to receive her CPAP machine  She still uses supplemental oxygen to sleep; 3 L   Patient still continues to report significant fatigue but reports her cough is much improved  She is compliant with all of her inhalers and denies side effects from the inhalers  Patient states that due to her fatigue she is limited in most of activities  She has to have family members help her  She is currently out of work due to her fatigue and exhaustion  Occupational History:  Previous occupation as   Currently not working due to her symptoms      Social History:  Denies tobacco use or alcohol use  Lives in an [place] setting  Electric/gas heating      Malignancy History:  Colon cancer in the father  Mother had liver cancer      Pets/animal exposure:  No pets  No exotic animals or birds      Inhalers: Albuterol and Breo    Review of systems:  12 point review of systems was completed and was otherwise negative except as listed in HPI      Historical Information   Past Medical History:   Diagnosis Date    Abscess of labia     Acute and chronic respiratory failure with hypoxia ([facility] [address] )     Anemia 4/6/2021    Anxiety     Bipolar disorder (HCC)     Breast lump     COVID-19     Frequent headaches     Hemorrhoids     Hypothyroidism 7/1/2013    Migraine     Morbid obesity (HCC) 7/1/2013    Obesity     Psychotic disorder (HCC)      Past Surgical History:   Procedure Laterality Date    BARIATRIC SURGERY      BREAST BIOPSY Right 1999    BREAST LUMPECTOMY Left     benign    CHOLECYSTECTOMY      GALLBLADDER SURGERY       Family History   Problem Relation Age of Onset    Liver cancer Mother     Prostate cancer Father     Cancer Family     No Known Problems Sister     No Known Problems Maternal Grandmother     No Known Problems Maternal Grandfather     No Known Problems Paternal Grandmother     No Known Problems Paternal Grandfather     No Known Problems Sister     No Known Problems Sister     No Known Problems Sister     No Known Problems Sister     No Known Problems Sister     No Known Problems Sister     No Known Problems Sister     No Known Problems Sister     No Known Problems Sister     Cancer Maternal Aunt     Cancer Maternal Aunt     Cancer Maternal Aunt     Cancer Maternal Aunt     Cancer Maternal Aunt     Cancer Maternal Aunt     Cancer Maternal Aunt     No Known Problems Paternal Aunt     No Known Problems Paternal Aunt     No Known Problems Paternal Aunt     No Known Problems Paternal Aunt     Cancer Paternal Aunt     Cancer Paternal Aunt          Meds/Allergies     Current Outpatient Medications:     albuterol (PROVENTIL HFA,VENTOLIN HFA) 90 mcg/act inhaler, INHALE 2 PUFFS EVERY 6 (SIX) HOURS AS NEEDED FOR WHEEZING OR SHORTNESS OF BREATH, Disp: 8 5 g, Rfl: 5    atorvastatin (LIPITOR) 20 mg tablet, Take 1 tablet (20 mg total) by mouth daily, Disp: 30 tablet, Rfl: 6    Breo Ellipta 200-25 MCG/INH inhaler, INHALE 1 PUFF DAILY RINSE MOUTH AFTER USE , Disp: 60 each, Rfl: 5    Calcium Citrate 200 MG TABS, 1 tablet 3 (three) times a day, Disp: , Rfl:     Cyanocobalamin ER 1000 MCG TBCR, take one tab daily, Disp: , Rfl:     diphenhydrAMINE-acetaminophen (TYLENOL PM)  MG TABS, Take 1 tablet by mouth daily at bedtime as needed for sleep, Disp: , Rfl:     escitalopram (LEXAPRO) 20 mg tablet, Take 1 tablet (20 mg total) by mouth daily, Disp: 90 tablet, Rfl: 2    furosemide (LASIX) 20 mg tablet, TAKE ONE TABLET (20 MG TOTAL) BY MOUTH DAILY, Disp: 30 tablet, Rfl: 3    levothyroxine 137 mcg tablet, TAKE ONE TABLET (137 MCG TOTAL) BY MOUTH DAILY, Disp: 90 tablet, Rfl: 1    metoprolol tartrate (LOPRESSOR) 25 mg tablet, Take 0 5 tablets (12 5 mg total) by mouth every 12 (twelve) hours, Disp: 60 tablet, Rfl: 3    Multiple Vitamins-Minerals (MULTIVITAMIN ADULT PO), every 24 hours, Disp: , Rfl:     pantoprazole (PROTONIX) 40 mg tablet, TAKE ONE TABLET (40 MG TOTAL) BY MOUTH EVERY 24 HOURS, Disp: 90 tablet, Rfl: 3    traZODone (DESYREL) 100 mg tablet, TAKE ONE TABLET (100 MG TOTAL) BY MOUTH DAILY AT BEDTIME, Disp: 90 tablet, Rfl: 1    benzonatate (TESSALON PERLES) 100 mg capsule, Take 1 capsule (100 mg total) by mouth 3 (three) times a day as needed for cough (Patient not taking: Reported on 3/2/2022 ), Disp: 60 capsule, Rfl: 3    medroxyPROGESTERone (PROVERA) 10 mg tablet, Take 1 tablet (10 mg total) by mouth daily for 10 days, Disp: 10 tablet, Rfl: 0    megestrol (MEGACE) 20 mg tablet, Take 1 tablet (20 mg total) by mouth daily for 21 days, Disp: 21 tablet, Rfl: 0  Allergies   Allergen Reactions    No Active Allergies        Vitals: Blood pressure 120/78, pulse 82, temperature 97 6 °F (36 4 °C), temperature source Tympanic, resp  rate 16, height 5' 7" (1 702 m), weight (!) 137 kg (302 lb), SpO2 98 %, not currently breastfeeding  Body mass index is 47 3 kg/m²  Oxygen Therapy  SpO2: 98 %  Oxygen Therapy: None (Room air)      Physical Exam  Physical Exam  Vitals reviewed  Constitutional:       General: She is not in acute distress  Appearance: She is obese  She is not ill-appearing, toxic-appearing or diaphoretic  HENT:      Head: Normocephalic and atraumatic  Right Ear: External ear normal       Left Ear: External ear normal       Nose: Nose normal    Eyes:      General: No scleral icterus  Right eye: No discharge  Extraocular Movements: Extraocular movements intact  Conjunctiva/sclera: Conjunctivae normal    Cardiovascular:      Rate and Rhythm: Normal rate and regular rhythm  Pulses: Normal pulses  Heart sounds: Normal heart sounds  No murmur heard  No friction rub  No gallop  Pulmonary:      Effort: Pulmonary effort is normal  No respiratory distress  Breath sounds: No stridor  No wheezing, rhonchi or rales  Comments: Diminished breath sounds at lung bases  Chest:      Chest wall: No tenderness  Abdominal:      General: Bowel sounds are normal  There is no distension  Palpations: Abdomen is soft  Tenderness: There is no abdominal tenderness   There is no guarding or rebound  Musculoskeletal:      Right lower leg: No edema  Left lower leg: No edema  Skin:     General: Skin is warm and dry  Neurological:      Mental Status: She is oriented to person, place, and time  Labs: I have personally reviewed pertinent lab results  Lab Results   Component Value Date    WBC 8 55 09/10/2021    HGB 15 0 09/10/2021    HCT 45 9 09/10/2021    MCV 97 09/10/2021     (H) 09/10/2021     Lab Results   Component Value Date    GLUCOSE 89 10/14/2015    CALCIUM 8 6 09/10/2021     10/14/2015    K 5 4 (H) 09/10/2021    CO2 25 09/10/2021     09/10/2021    BUN 8 09/10/2021    CREATININE 1 06 09/10/2021     No results found for: IGE  Lab Results   Component Value Date    ALT 35 09/10/2021    AST 14 09/10/2021    ALKPHOS 150 (H) 09/10/2021    BILITOT 0 33 10/14/2015       Imaging and other studies: I have personally reviewed pertinent reports  and I have personally reviewed pertinent films in PACS     High-resolution CT scan 21:  Significant improvement of ground-glass opacities  Areas replace with increased reticulations      Chest x-ray 21:  Bilateral ground-glass opacity slightly improved as compared to chest x-ray on 21     CTA pulmonary embolism study 21:  multifocal ground-glass opacities bilaterally    No pulmonary embolism     Chest x-ray 21: decreasing infiltrates bilaterally with increased reticular markings     Pulmonary function testin/22/21:     **Patient unable to perform pulmonary function testing due to poor, inconsistent efforts**     Post bronchodilator testing performed after the administration of 2 5mg albuterol in 3cc normal saline administered via nebulizer per bronchodilator protocol      Results:  FEV1/FVC Ratio: 52 %  Forced Vital Capacity: 1 30 L    32 % predicted  FEV1: 0 98 L     21 % predicted     After administration of bronchodilator   FVC: 1 14 L, 28 % predicted, -12 % change  FEV1: 0 87 L, 27 % predicted, +27 % change     Unable to perform maneuvers for lung volumes and diffusion capacity     EKG, Pathology, and Other Studies: I have personally reviewed pertinent reports  and I have personally reviewed pertinent films in PACS     Sleep study 6/14/21: AHI 12 5  REM AHI 30 0     Echocardiogram 8/11/21: Ejection fraction 60% with no wall motion abnormalities    Estimated peak PA pressure 25 mmHg          Genet Crockett DO - PGY5  [facility]

## 2022-03-02 ENCOUNTER — OFFICE VISIT (OUTPATIENT)
Dept: PULMONOLOGY | Facility: CLINIC | Age: 47
End: 2022-03-02
Payer: COMMERCIAL

## 2022-03-02 VITALS
HEIGHT: 67 IN | RESPIRATION RATE: 16 BRPM | SYSTOLIC BLOOD PRESSURE: 120 MMHG | OXYGEN SATURATION: 98 % | TEMPERATURE: 97.6 F | HEART RATE: 82 BPM | WEIGHT: 293 LBS | BODY MASS INDEX: 45.99 KG/M2 | DIASTOLIC BLOOD PRESSURE: 78 MMHG

## 2022-03-02 DIAGNOSIS — D50.8 OTHER IRON DEFICIENCY ANEMIA: ICD-10-CM

## 2022-03-02 DIAGNOSIS — R06.02 SHORTNESS OF BREATH: Primary | ICD-10-CM

## 2022-03-02 DIAGNOSIS — G47.33 OSA (OBSTRUCTIVE SLEEP APNEA): ICD-10-CM

## 2022-03-02 DIAGNOSIS — F41.9 ANXIETY: ICD-10-CM

## 2022-03-02 DIAGNOSIS — U09.9 POST-COVID SYNDROME: ICD-10-CM

## 2022-03-02 DIAGNOSIS — R05.3 CHRONIC COUGH: ICD-10-CM

## 2022-03-02 DIAGNOSIS — E66.01 MORBID OBESITY (HCC): ICD-10-CM

## 2022-03-02 PROCEDURE — 99214 OFFICE O/P EST MOD 30 MIN: CPT | Performed by: INTERNAL MEDICINE

## 2022-03-03 DIAGNOSIS — F32.1 CURRENT MODERATE EPISODE OF MAJOR DEPRESSIVE DISORDER, UNSPECIFIED WHETHER RECURRENT (HCC): ICD-10-CM

## 2022-03-03 DIAGNOSIS — U09.9 POST-COVID SYNDROME: ICD-10-CM

## 2022-03-03 DIAGNOSIS — F41.9 ANXIETY: ICD-10-CM

## 2022-03-03 RX ORDER — ESCITALOPRAM OXALATE 20 MG/1
TABLET ORAL
Qty: 90 TABLET | Refills: 2 | Status: SHIPPED | OUTPATIENT
Start: 2022-03-03

## 2022-03-07 NOTE — PROGRESS NOTES
General Cardiology - Outpatient Progress Note   Kathy Umaña 42 y o  female   MRN: [id number]  @ Encounter: [id number]    THOMAS Ponce  Consults      Interval History:   She saw her Pulmonologist earlier in the month (3/2/22) who also felt that her fatigue and SOB are likely multifactorial +/-  component of COVID long haul however this a diagnosis of exclusion  Patient has been referred to her PCP for long-hauler evaluation  Since last encounter no new complaints and states the addition of Metoprolol has helped her tachycardia and she wants to continue it  Still having exertional fatigue with minimal tasks that are not exhaustive  States after a shower she feels worse then she did prior to the shower  When she does household chores she has to take a break to catch her breath  Cardiac History Summary:   Jose Quiles is a 42y o  year old female with a history of Anxiety, Anemia, Hypothyroidism, Bipolar Disorder, Obesity, Gastric Bypass (2017), Post-COVID Syndrome presented to cardiology office on 6/8/21 for evaluation of SOB  Patient had severe COVID in 04/2021 requiring up to 30L of oxygen and since being discharged she has felt extremely fatigued, weak, dyspneic with minimal exertion limiting her mobility, dry cough with some peripheral edema  She goes on to say that she has conversational dyspnea (which was obvious during our interview), she states when she walks short distances, her heart begins to race and she feels palpitations as if she ran 5 miles  These periods are associated with lightheadedness and dizziness but she denies every having passed out      Besides her menstrual cycle, she denies any blood loss  No EtOH, tobacco (quit 2008) or drug use  No family history of heart disease to her knowledge    8/3/21: Patient states that Since last encounter, patient states that since our last visit she has still felt quite anxious and fatigued   Endorsing persistent SOB since her COVID diagnosis and has been on home Oxygen nearly 24/7  Goes on to say that her overall health is contributing to her poor sleep  Patient states that on 6/29/21 she had to go to the ER with complaints of heavy vaginal bleeding, dizziness and generalized weakness and she has established care with OBGYN and Heme/Onc for iron transfusions  Patient left her O2 tank at home and felt as if she needed it in the office  Portable tank was brought to room and placed on 3L NC which is her home regimen  States she felt much better after using it  States her appointment for TTE is on 8/11/2021 and that she just submitted her Zio patch 3 days prior to this visit  1/25/22: Still having fatigue and palpitations  States when she is exerting herself she feels her heart racing, feels dizzy and increasingly fatigued  It improves with resting  Denies any CP, tightness, numbness/tingling during the episodes  Overall she says that she has noticed some improvement since the onset of her symptoms last year but still has not returned to her baseline  Also has not gone back to work as a result  Objective:  Review of Systems  ROS as noted in interval history  Physical Exam:  Vitals: not currently breastfeeding  , There is no height or weight on file to calculate BMI     GEN: Ada Donya Umaña appears well, alert and oriented x 3, pleasant and cooperative   HEENT:  Normocephalic, atraumatic, anicteric, moist mucous membranes  NECK: No JVD or carotid bruits   HEART: reg rhythm, reg rate, normal S1 and S2, no murmurs, clicks, gallops or rubs   LUNGS: reduced air entry in bilateral LLFs  Poor inspiratory effort  Crackles in bilateral lung fields    ABDOMEN:  Normoactive bowel sounds, soft, no tenderness, no distention  EXTREMITIES: radial pulses palpable; no edema  NEURO: no gross focal findings; cranial nerves grossly intact   SKIN:  Dry, intact, warm to touch    Home Medications: (Not in a hospital admission)      Current Outpatient Medications:     albuterol (PROVENTIL HFA,VENTOLIN HFA) 90 mcg/act inhaler, INHALE 2 PUFFS EVERY 6 (SIX) HOURS AS NEEDED FOR WHEEZING OR SHORTNESS OF BREATH, Disp: 8 5 g, Rfl: 5    atorvastatin (LIPITOR) 20 mg tablet, Take 1 tablet (20 mg total) by mouth daily, Disp: 30 tablet, Rfl: 6    benzonatate (TESSALON PERLES) 100 mg capsule, Take 1 capsule (100 mg total) by mouth 3 (three) times a day as needed for cough (Patient not taking: Reported on 3/2/2022 ), Disp: 60 capsule, Rfl: 3    Breo Ellipta 200-25 MCG/INH inhaler, INHALE 1 PUFF DAILY RINSE MOUTH AFTER USE , Disp: 60 each, Rfl: 5    Calcium Citrate 200 MG TABS, 1 tablet 3 (three) times a day, Disp: , Rfl:     Cyanocobalamin ER 1000 MCG TBCR, take one tab daily, Disp: , Rfl:     diphenhydrAMINE-acetaminophen (TYLENOL PM)  MG TABS, Take 1 tablet by mouth daily at bedtime as needed for sleep, Disp: , Rfl:     escitalopram (LEXAPRO) 20 mg tablet, TAKE ONE TABLET (20 MG TOTAL) BY MOUTH DAILY, Disp: 90 tablet, Rfl: 2    furosemide (LASIX) 20 mg tablet, TAKE ONE TABLET (20 MG TOTAL) BY MOUTH DAILY, Disp: 30 tablet, Rfl: 3    levothyroxine 137 mcg tablet, TAKE ONE TABLET (137 MCG TOTAL) BY MOUTH DAILY, Disp: 90 tablet, Rfl: 1    medroxyPROGESTERone (PROVERA) 10 mg tablet, Take 1 tablet (10 mg total) by mouth daily for 10 days, Disp: 10 tablet, Rfl: 0    megestrol (MEGACE) 20 mg tablet, Take 1 tablet (20 mg total) by mouth daily for 21 days, Disp: 21 tablet, Rfl: 0    metoprolol tartrate (LOPRESSOR) 25 mg tablet, Take 0 5 tablets (12 5 mg total) by mouth every 12 (twelve) hours, Disp: 60 tablet, Rfl: 3    Multiple Vitamins-Minerals (MULTIVITAMIN ADULT PO), every 24 hours, Disp: , Rfl:     pantoprazole (PROTONIX) 40 mg tablet, TAKE ONE TABLET (40 MG TOTAL) BY MOUTH EVERY 24 HOURS, Disp: 90 tablet, Rfl: 3    traZODone (DESYREL) 100 mg tablet, TAKE ONE TABLET (100 MG TOTAL) BY MOUTH DAILY AT BEDTIME, Disp: 90 tablet, Rfl: 1    Labs & Results:  Lab Results   Component Value Date    TROPONINI <0 02 04/08/2021    TROPONINI <0 01 04/06/2021     Lab Results   Component Value Date    CHOL 237 06/03/2014    TRIG 92 12/03/2020    TRIG 113 07/10/2020    HDL 60 12/03/2020    HDL 64 07/10/2020    LDLCALC 133 (H) 12/03/2020    LDLCALC 142 (H) 07/10/2020    HGBA1C 5 4 12/03/2020    HGBA1C 5 3 07/10/2020     Lab Results   Component Value Date     10/14/2015    K 5 4 (H) 09/10/2021    CO2 25 09/10/2021     09/10/2021    BUN 8 09/10/2021    CREATININE 1 06 09/10/2021    ALT 35 09/10/2021    AST 14 09/10/2021     Lab Results   Component Value Date    WBC 8 55 09/10/2021    HGB 15 0 09/10/2021    HCT 45 9 09/10/2021    MCV 97 09/10/2021     (H) 09/10/2021     Lab Results   Component Value Date    INR 1 02 04/06/2021       Imaging: I have personally reviewed pertinent reports  04/2021 CT Chest with significant ground-glass opacities       07/2021: Interval improvement        Assessment/Plan     Assessment:    1  SOB/Fatigue Multifactorial, Likely 2/2 Post-COVID/Longhaul Syndrome, Mild CAR, deconditioning, obesity, TAYLOR  --> Patient unable to perform PFTs "due to poor and inconsistent efforts"  --> TTE (08/11/21): EF 60%  [facility]  Mild MR/TR/CA  PASP 25 mmHg  --> Ferritin in 2021 was as low as 15, ongoing iron infusions with Hematology  --> 6 MWT on 11/17/21 where patient ambulated for 4 minutes but could not complete as she was too tired  No desaturation episodes were noted during this time  --> 3/8/22: Although patient continues to have fatigue and SOB, on my evaluation she appears to have made significant improvements since our initial encounter on 06/2021 where she was having significant conversational dyspnea and visibly was uncomfortable in the office  Patient likely needs more aggressive rehabilitation  2  Anxiety   3  Hypothyroidism  4  Obesity s/p Gastric Bypass (BMI 47)  5  HLD  6   Palpitations  --> Zio patch with 1 episode of SVT with max HR of 126 last 8 seconds  Periods of sinus tachy and avg HR in 70s      Plan:  - c/w Lipitor 20mg PO QHS  - c/w Metoprolol Tart  12 5mg PO BID as she states this has helped her with her palpitations  Would opt to not increase dosage as this can actually worsen her fatigue   - Continue following Pulmonary and PCP for long-hauler evaluation (has appointment with PCP on 3/9/22)  - Patient undergoing Pulmonary rehabilitation, tentatively starting later this month on 3/29/22  - Ongoing iron infusions with Hematology  - Encouraged patient to continue with aggressive rehabilitation as this will likely lead to significant improvement in her overal functional status  Case discussed and reviewed with Dr Jay Jay Galarza who agrees with my assessment and plan  Vallorie Carrel, MD  Cardiology Fellow PGY-5    ==========================================================================================    Epic/ Allscripts/Care Everywhere records reviewed:     ** Please Note: Fluency DirectDictation voice to text software may have been used in the creation of this document   **

## 2022-03-08 ENCOUNTER — OFFICE VISIT (OUTPATIENT)
Dept: CARDIOLOGY CLINIC | Facility: CLINIC | Age: 47
End: 2022-03-08
Payer: COMMERCIAL

## 2022-03-08 VITALS
HEIGHT: 67 IN | WEIGHT: 293 LBS | BODY MASS INDEX: 45.99 KG/M2 | DIASTOLIC BLOOD PRESSURE: 78 MMHG | HEART RATE: 80 BPM | SYSTOLIC BLOOD PRESSURE: 122 MMHG

## 2022-03-08 DIAGNOSIS — R06.02 SOB (SHORTNESS OF BREATH): ICD-10-CM

## 2022-03-08 DIAGNOSIS — G93.3 POSTVIRAL FATIGUE SYNDROME: Primary | ICD-10-CM

## 2022-03-08 DIAGNOSIS — R00.2 PALPITATIONS: ICD-10-CM

## 2022-03-08 PROCEDURE — 99213 OFFICE O/P EST LOW 20 MIN: CPT | Performed by: INTERNAL MEDICINE

## 2022-03-09 ENCOUNTER — OFFICE VISIT (OUTPATIENT)
Dept: FAMILY MEDICINE CLINIC | Facility: CLINIC | Age: 47
End: 2022-03-09
Payer: COMMERCIAL

## 2022-03-09 VITALS
WEIGHT: 293 LBS | RESPIRATION RATE: 18 BRPM | DIASTOLIC BLOOD PRESSURE: 74 MMHG | TEMPERATURE: 97.5 F | HEART RATE: 80 BPM | BODY MASS INDEX: 47.09 KG/M2 | HEIGHT: 66 IN | SYSTOLIC BLOOD PRESSURE: 128 MMHG | OXYGEN SATURATION: 99 %

## 2022-03-09 DIAGNOSIS — F41.9 ANXIETY: Primary | ICD-10-CM

## 2022-03-09 DIAGNOSIS — R06.02 SOB (SHORTNESS OF BREATH): ICD-10-CM

## 2022-03-09 DIAGNOSIS — R73.03 PREDIABETES: ICD-10-CM

## 2022-03-09 DIAGNOSIS — E78.5 HYPERLIPIDEMIA, UNSPECIFIED HYPERLIPIDEMIA TYPE: ICD-10-CM

## 2022-03-09 DIAGNOSIS — D50.8 OTHER IRON DEFICIENCY ANEMIA: ICD-10-CM

## 2022-03-09 DIAGNOSIS — U09.9 POST-COVID SYNDROME: ICD-10-CM

## 2022-03-09 DIAGNOSIS — E66.01 MORBID OBESITY WITH BODY MASS INDEX (BMI) OF 45.0 TO 49.9 IN ADULT (HCC): ICD-10-CM

## 2022-03-09 DIAGNOSIS — F32.A DEPRESSION, UNSPECIFIED DEPRESSION TYPE: ICD-10-CM

## 2022-03-09 DIAGNOSIS — E03.9 HYPOTHYROIDISM, UNSPECIFIED TYPE: ICD-10-CM

## 2022-03-09 PROCEDURE — 3725F SCREEN DEPRESSION PERFORMED: CPT | Performed by: NURSE PRACTITIONER

## 2022-03-09 PROCEDURE — 99214 OFFICE O/P EST MOD 30 MIN: CPT | Performed by: NURSE PRACTITIONER

## 2022-03-09 RX ORDER — FUROSEMIDE 20 MG/1
TABLET ORAL
Qty: 30 TABLET | Refills: 3 | Status: SHIPPED | OUTPATIENT
Start: 2022-03-09

## 2022-03-09 RX ORDER — BUSPIRONE HYDROCHLORIDE 5 MG/1
5 TABLET ORAL 2 TIMES DAILY
Qty: 60 TABLET | Refills: 2 | Status: SHIPPED | OUTPATIENT
Start: 2022-03-09 | End: 2022-03-30 | Stop reason: SDUPTHER

## 2022-03-10 ENCOUNTER — TELEPHONE (OUTPATIENT)
Dept: HEMATOLOGY ONCOLOGY | Facility: CLINIC | Age: 47
End: 2022-03-10

## 2022-03-10 NOTE — TELEPHONE ENCOUNTER
I spoke with the patient in regards to scheduling the patient for a follow up appointment  Informed the patient I can schedule her for a virtual visit on 3/14/22 at 3:40pm  Patient accepted appointment date and time  Informed patient on virtual instructions

## 2022-03-11 DIAGNOSIS — R00.2 PALPITATIONS: ICD-10-CM

## 2022-03-14 ENCOUNTER — TELEPHONE (OUTPATIENT)
Dept: HEMATOLOGY ONCOLOGY | Facility: CLINIC | Age: 47
End: 2022-03-14

## 2022-03-14 ENCOUNTER — TELEMEDICINE (OUTPATIENT)
Dept: HEMATOLOGY ONCOLOGY | Facility: CLINIC | Age: 47
End: 2022-03-14
Payer: COMMERCIAL

## 2022-03-14 DIAGNOSIS — D75.838 REACTIVE THROMBOCYTOSIS: ICD-10-CM

## 2022-03-14 DIAGNOSIS — D50.8 IRON DEFICIENCY ANEMIA SECONDARY TO INADEQUATE DIETARY IRON INTAKE: Primary | ICD-10-CM

## 2022-03-14 DIAGNOSIS — Z98.84 H/O GASTRIC BYPASS: ICD-10-CM

## 2022-03-14 PROCEDURE — 99214 OFFICE O/P EST MOD 30 MIN: CPT | Performed by: INTERNAL MEDICINE

## 2022-03-14 PROCEDURE — 1036F TOBACCO NON-USER: CPT | Performed by: INTERNAL MEDICINE

## 2022-03-14 RX ORDER — SODIUM CHLORIDE 9 MG/ML
20 INJECTION, SOLUTION INTRAVENOUS ONCE
Status: CANCELLED | OUTPATIENT
Start: 2022-03-21

## 2022-03-14 NOTE — TELEPHONE ENCOUNTER
Left message that Dr Romel Burgess  Would like to do the virtual   appt at noon    Left my directy  Number of [phone]

## 2022-03-14 NOTE — Clinical Note
Follow-up in the office with blood work prior  Feraheme IV to be scheduled for early next week, weekly x2

## 2022-03-14 NOTE — PROGRESS NOTES
Virtual Brief Visit    Patient is located in the following state in which I hold an active license [place]      Assessment/Plan:  1  Iron deficiency anemia secondary to inadequate dietary iron intake  The Patient does not seem to be anemic but she definitely is iron deficient  She was offered Feraheme IV weekly x2 doses to correct her iron deficiency and prevent anemia since she has mild absorption due to the history of gastric bypass surgery  - CBC and differential; Future  - Comprehensive metabolic panel; Future  - Iron Panel (Includes Ferritin, Iron Sat%, Iron, and TIBC); Future  - Ferritin; Future  - Vitamin B12; Future  - LD,Blood; Future  - C-reactive protein; Future  - Sedimentation rate, automated; Future  - TSH, 3rd generation with Free T4 reflex; Future  - Magnesium; Future    2  Reactive thrombocytosis  Her thrombocytosis seems to be reactive in nature  However, for completeness sake we will start myeloproliferative disorder workup prior to her next visit  - CBC and differential; Future  - JAK2 V617F,Ql,W/RFL Exons 12,13 and MPL K943,I269; Future    3   H/O gastric bypass      Problem List Items Addressed This Visit        Other    Iron deficiency anemia secondary to inadequate dietary iron intake - Primary    Relevant Orders    CBC and differential    Comprehensive metabolic panel    Iron Panel (Includes Ferritin, Iron Sat%, Iron, and TIBC)    Ferritin    Vitamin B12    LD,Blood    C-reactive protein    Sedimentation rate, automated    TSH, 3rd generation with Free T4 reflex    Magnesium      Other Visit Diagnoses     Reactive thrombocytosis        Relevant Orders    CBC and differential          Recent Visits  Date Type Provider Dept   03/09/22 Office Visit Anam Beltran 24 recent visits within past 7 days and meeting all other requirements  Today's Visits  Date Type Provider Dept   03/14/22 Telephone Isabelle Olguin[address]   03/14/22 Robert Asencio MD [facility]   Showing today's visits and meeting all other requirements  Future Appointments  No visits were found meeting these conditions  Showing future appointments within next 150 days and meeting all other requirements   HPI:  Patient is a 30-year-old female who originally presented today for further evaluation and treatment of her microcytic anemia/iron deficiency  She has a past medical history of bipolar disorder, anxiety, hypothyroidism, obstructive sleep apnea, gastric bypass surgery 2017 and post COVID syndrome after she has significant COVID-19 infection April 2021  She mentions that ventilation was recommended when she had COVID-19 which she declined  She is being monitored by Pulmonology and Cardiology      CBC 05/14/2021 showed normal white cells 10 9, microcytic hypochromic anemia H&H 10 9/34 4, MCV 96, MCH 24 7 she has thrombocytosis platelet count 200423  BMP was essentially normal with the exception of a decreased glucose 62  She had additional laboratory studies done 06/03/2021 which showed low ferritin 14 and low serum iron 25 complete iron panel was not done  Patient was treated with a course of IV iron Feraheme x2 treatments June/July 2021  Interval history:  The patient was found to have low ferritin on recent blood work  She denied obvious bleeding from any sites  Her menstrual cycle is regular and not heavy  Most recent blood work on 03/09/2022 showed ferritin of 9  Hemoglobin 13 8 with normal white cells  The platelet count was 845  Vitamin B12 was 402  TSH was high 19 suggesting hypothyroidism      I spent 20 minutes directly with the patient during this visit

## 2022-03-18 ENCOUNTER — TELEPHONE (OUTPATIENT)
Dept: HEMATOLOGY ONCOLOGY | Facility: CLINIC | Age: 47
End: 2022-03-18

## 2022-03-18 DIAGNOSIS — D50.8 IRON DEFICIENCY ANEMIA SECONDARY TO INADEQUATE DIETARY IRON INTAKE: Primary | ICD-10-CM

## 2022-03-18 RX ORDER — SODIUM CHLORIDE 9 MG/ML
20 INJECTION, SOLUTION INTRAVENOUS ONCE
Status: CANCELLED | OUTPATIENT
Start: 2022-03-21

## 2022-03-18 NOTE — TELEPHONE ENCOUNTER
Phoned pt to inform her that the preferred drug for her insurance plan is Venofer and it will be five doses once a week  Pt is to keep her same appt for Mon  Pt verbalized understanding

## 2022-03-21 ENCOUNTER — HOSPITAL ENCOUNTER (OUTPATIENT)
Dept: INFUSION CENTER | Facility: HOSPITAL | Age: 47
Discharge: HOME | End: 2022-03-21
Attending: INTERNAL MEDICINE
Payer: COMMERCIAL

## 2022-03-21 VITALS
SYSTOLIC BLOOD PRESSURE: 130 MMHG | HEART RATE: 92 BPM | TEMPERATURE: 97.5 F | RESPIRATION RATE: 18 BRPM | DIASTOLIC BLOOD PRESSURE: 75 MMHG

## 2022-03-21 DIAGNOSIS — D50.8 IRON DEFICIENCY ANEMIA SECONDARY TO INADEQUATE DIETARY IRON INTAKE: Primary | ICD-10-CM

## 2022-03-21 PROCEDURE — 96365 THER/PROPH/DIAG IV INF INIT: CPT

## 2022-03-21 RX ORDER — SODIUM CHLORIDE 9 MG/ML
20 INJECTION, SOLUTION INTRAVENOUS ONCE
Status: COMPLETED | OUTPATIENT
Start: 2022-03-21 | End: 2022-03-21

## 2022-03-21 RX ORDER — SODIUM CHLORIDE 9 MG/ML
20 INJECTION, SOLUTION INTRAVENOUS ONCE
Status: CANCELLED | OUTPATIENT
Start: 2022-03-28

## 2022-03-21 RX ADMIN — IRON SUCROSE 200 MG: 20 INJECTION, SOLUTION INTRAVENOUS at 14:05

## 2022-03-21 RX ADMIN — SODIUM CHLORIDE 20 ML/HR: 9 INJECTION, SOLUTION INTRAVENOUS at 14:02

## 2022-03-21 NOTE — PROGRESS NOTES
Pt tolerated treatment today with no adverse reactions  AVS provided and next apt confirmed  Left unit ambulatory with a steady gait

## 2022-03-25 ENCOUNTER — TELEPHONE (OUTPATIENT)
Dept: FAMILY MEDICINE CLINIC | Facility: CLINIC | Age: 47
End: 2022-03-25

## 2022-03-28 ENCOUNTER — HOSPITAL ENCOUNTER (OUTPATIENT)
Dept: INFUSION CENTER | Facility: HOSPITAL | Age: 47
Discharge: HOME | End: 2022-03-28
Attending: INTERNAL MEDICINE
Payer: COMMERCIAL

## 2022-03-28 ENCOUNTER — TELEPHONE (OUTPATIENT)
Dept: CARDIAC REHAB | Facility: CLINIC | Age: 47
End: 2022-03-28

## 2022-03-28 VITALS
TEMPERATURE: 96.5 F | HEART RATE: 83 BPM | DIASTOLIC BLOOD PRESSURE: 79 MMHG | SYSTOLIC BLOOD PRESSURE: 109 MMHG | RESPIRATION RATE: 18 BRPM | OXYGEN SATURATION: 92 %

## 2022-03-28 DIAGNOSIS — D50.8 IRON DEFICIENCY ANEMIA SECONDARY TO INADEQUATE DIETARY IRON INTAKE: Primary | ICD-10-CM

## 2022-03-28 PROCEDURE — 96365 THER/PROPH/DIAG IV INF INIT: CPT

## 2022-03-28 RX ORDER — SODIUM CHLORIDE 9 MG/ML
20 INJECTION, SOLUTION INTRAVENOUS ONCE
Status: COMPLETED | OUTPATIENT
Start: 2022-03-28 | End: 2022-03-28

## 2022-03-28 RX ORDER — SODIUM CHLORIDE 9 MG/ML
20 INJECTION, SOLUTION INTRAVENOUS ONCE
Status: CANCELLED | OUTPATIENT
Start: 2022-04-04

## 2022-03-28 RX ADMIN — SODIUM CHLORIDE 20 ML/HR: 0.9 INJECTION, SOLUTION INTRAVENOUS at 13:48

## 2022-03-28 RX ADMIN — IRON SUCROSE 200 MG: 20 INJECTION, SOLUTION INTRAVENOUS at 13:49

## 2022-03-28 NOTE — PROGRESS NOTES
Patient tolerated venofer infusion well with no adverse effects  Offers no complaints  Next appointment verified, AVS provided

## 2022-03-30 ENCOUNTER — OFFICE VISIT (OUTPATIENT)
Dept: FAMILY MEDICINE CLINIC | Facility: CLINIC | Age: 47
End: 2022-03-30
Payer: COMMERCIAL

## 2022-03-30 VITALS
BODY MASS INDEX: 47.09 KG/M2 | WEIGHT: 293 LBS | OXYGEN SATURATION: 99 % | HEART RATE: 88 BPM | HEIGHT: 66 IN | DIASTOLIC BLOOD PRESSURE: 70 MMHG | SYSTOLIC BLOOD PRESSURE: 132 MMHG | TEMPERATURE: 97.6 F

## 2022-03-30 DIAGNOSIS — F33.9 DEPRESSION, RECURRENT (HCC): ICD-10-CM

## 2022-03-30 DIAGNOSIS — N62 LARGE BREASTS: ICD-10-CM

## 2022-03-30 DIAGNOSIS — R05.9 COUGH: ICD-10-CM

## 2022-03-30 DIAGNOSIS — F41.9 ANXIETY: Primary | ICD-10-CM

## 2022-03-30 PROCEDURE — 3008F BODY MASS INDEX DOCD: CPT | Performed by: INTERNAL MEDICINE

## 2022-03-30 PROCEDURE — 99214 OFFICE O/P EST MOD 30 MIN: CPT | Performed by: NURSE PRACTITIONER

## 2022-03-30 RX ORDER — BUSPIRONE HYDROCHLORIDE 10 MG/1
10 TABLET ORAL 2 TIMES DAILY
Qty: 60 TABLET | Refills: 3 | Status: SHIPPED | OUTPATIENT
Start: 2022-03-30

## 2022-03-30 RX ORDER — BENZONATATE 200 MG/1
200 CAPSULE ORAL
Qty: 30 CAPSULE | Refills: 3 | Status: SHIPPED | OUTPATIENT
Start: 2022-03-30

## 2022-03-30 NOTE — PROGRESS NOTES
Assessment/Plan:         Diagnoses and all orders for this visit:    Anxiety  -     busPIRone (BUSPAR) 10 mg tablet; Take 1 tablet (10 mg total) by mouth 2 (two) times a day    Cough  -     benzonatate (TESSALON) 200 MG capsule; Take 1 capsule (200 mg total) by mouth daily at bedtime as needed for cough    Large breasts  -     Ambulatory Referral to Plastic Surgery; Future    Depression, recurrent (HCC)          Subjective:      Patient ID: Cameron Age is a 42 y o  female  HPI    The following portions of the patient's history were reviewed and updated as appropriate: allergies, current medications, past family history, past medical history, past social history, past surgical history and problem list     Review of Systems   Constitutional: Negative for activity change, appetite change and fatigue  HENT: Negative for congestion and rhinorrhea  Respiratory: Positive for cough and shortness of breath  Negative for wheezing  Cardiovascular: Negative for chest pain  Genitourinary: Negative for urgency  Neurological: Negative for light-headedness and numbness  Psychiatric/Behavioral: The patient is not nervous/anxious  Objective:  Vitals:    03/30/22 0909   BP: 132/70   BP Location: Left arm   Patient Position: Sitting   Cuff Size: Large   Pulse: 88   Temp: 97 6 °F (36 4 °C)   TempSrc: Tympanic   SpO2: 99%   Weight: (!) 139 kg (305 lb 9 6 oz)   Height: 5' 6 25" (1 683 m)      Physical Exam  Vitals reviewed  Constitutional:       Appearance: Normal appearance  She is obese  HENT:      Right Ear: Tympanic membrane, ear canal and external ear normal       Left Ear: Tympanic membrane and ear canal normal    Eyes:      Conjunctiva/sclera: Conjunctivae normal    Cardiovascular:      Rate and Rhythm: Normal rate and regular rhythm  Heart sounds: Normal heart sounds  Pulmonary:      Effort: Pulmonary effort is normal       Breath sounds: Normal breath sounds     Musculoskeletal: General: Normal range of motion  Cervical back: Normal range of motion  Thoracic back: Spasms and tenderness present  Comments: Shoulder denting from strapes   With sleeping needs to place pillow between breasts so can breath     Skin:     General: Skin is warm  Neurological:      Mental Status: She is alert and oriented to person, place, and time  Psychiatric:         Mood and Affect: Mood normal          Behavior: Behavior normal          Thought Content:  Thought content normal

## 2022-04-04 ENCOUNTER — HOSPITAL ENCOUNTER (OUTPATIENT)
Dept: INFUSION CENTER | Facility: HOSPITAL | Age: 47
Discharge: HOME | End: 2022-04-04
Attending: INTERNAL MEDICINE
Payer: COMMERCIAL

## 2022-04-04 VITALS
DIASTOLIC BLOOD PRESSURE: 72 MMHG | SYSTOLIC BLOOD PRESSURE: 116 MMHG | OXYGEN SATURATION: 98 % | TEMPERATURE: 97.5 F | HEART RATE: 72 BPM

## 2022-04-04 DIAGNOSIS — D50.8 IRON DEFICIENCY ANEMIA SECONDARY TO INADEQUATE DIETARY IRON INTAKE: Primary | ICD-10-CM

## 2022-04-04 PROCEDURE — 96365 THER/PROPH/DIAG IV INF INIT: CPT

## 2022-04-04 RX ORDER — SODIUM CHLORIDE 9 MG/ML
20 INJECTION, SOLUTION INTRAVENOUS ONCE
Status: CANCELLED | OUTPATIENT
Start: 2022-04-11

## 2022-04-04 RX ORDER — SODIUM CHLORIDE 9 MG/ML
20 INJECTION, SOLUTION INTRAVENOUS ONCE
Status: COMPLETED | OUTPATIENT
Start: 2022-04-04 | End: 2022-04-04

## 2022-04-04 RX ADMIN — SODIUM CHLORIDE 20 ML/HR: 0.9 INJECTION, SOLUTION INTRAVENOUS at 13:02

## 2022-04-04 RX ADMIN — IRON SUCROSE 200 MG: 20 INJECTION, SOLUTION INTRAVENOUS at 13:02

## 2022-04-04 NOTE — PLAN OF CARE
Problem: Knowledge Deficit  Goal: Patient/family/caregiver demonstrates understanding of disease process, treatment plan, medications, and discharge instructions  Description: Complete learning assessment and assess knowledge base    Interventions:  - Provide teaching at level of understanding  - Provide teaching via preferred learning methods  Outcome: Progressing     Problem: HEMATOLOGIC - ADULT  Goal: Maintains hematologic stability  Description: INTERVENTIONS  - Administer Venofer as ordered  Outcome: Progressing

## 2022-04-11 ENCOUNTER — HOSPITAL ENCOUNTER (OUTPATIENT)
Dept: INFUSION CENTER | Facility: HOSPITAL | Age: 47
Discharge: HOME | End: 2022-04-11
Attending: INTERNAL MEDICINE
Payer: COMMERCIAL

## 2022-04-11 VITALS
TEMPERATURE: 97.5 F | HEART RATE: 93 BPM | DIASTOLIC BLOOD PRESSURE: 69 MMHG | SYSTOLIC BLOOD PRESSURE: 111 MMHG | RESPIRATION RATE: 16 BRPM

## 2022-04-11 DIAGNOSIS — D50.8 IRON DEFICIENCY ANEMIA SECONDARY TO INADEQUATE DIETARY IRON INTAKE: Primary | ICD-10-CM

## 2022-04-11 PROCEDURE — 96365 THER/PROPH/DIAG IV INF INIT: CPT

## 2022-04-11 RX ORDER — SODIUM CHLORIDE 9 MG/ML
20 INJECTION, SOLUTION INTRAVENOUS ONCE
Status: COMPLETED | OUTPATIENT
Start: 2022-04-11 | End: 2022-04-11

## 2022-04-11 RX ORDER — SODIUM CHLORIDE 9 MG/ML
20 INJECTION, SOLUTION INTRAVENOUS ONCE
Status: CANCELLED | OUTPATIENT
Start: 2022-04-18

## 2022-04-11 RX ADMIN — SODIUM CHLORIDE 20 ML/HR: 0.9 INJECTION, SOLUTION INTRAVENOUS at 13:42

## 2022-04-11 RX ADMIN — IRON SUCROSE 200 MG: 20 INJECTION, SOLUTION INTRAVENOUS at 14:11

## 2022-04-11 NOTE — PROGRESS NOTES
Patient arrived on unit for Venofer  Denies any present issues/concerns  Tolerated infusion without incident  Aware of next appointment   DEclined AVS

## 2022-04-15 DIAGNOSIS — E78.5 HYPERLIPIDEMIA, UNSPECIFIED HYPERLIPIDEMIA TYPE: ICD-10-CM

## 2022-04-15 RX ORDER — ATORVASTATIN CALCIUM 20 MG/1
TABLET, FILM COATED ORAL
Qty: 30 TABLET | Refills: 6 | Status: SHIPPED | OUTPATIENT
Start: 2022-04-15

## 2022-04-18 ENCOUNTER — HOSPITAL ENCOUNTER (OUTPATIENT)
Dept: INFUSION CENTER | Facility: HOSPITAL | Age: 47
End: 2022-04-18
Attending: INTERNAL MEDICINE

## 2022-04-21 DIAGNOSIS — D50.8 IRON DEFICIENCY ANEMIA SECONDARY TO INADEQUATE DIETARY IRON INTAKE: Primary | ICD-10-CM

## 2022-04-21 RX ORDER — SODIUM CHLORIDE 9 MG/ML
20 INJECTION, SOLUTION INTRAVENOUS ONCE
Status: CANCELLED | OUTPATIENT
Start: 2022-04-25

## 2022-04-25 ENCOUNTER — HOSPITAL ENCOUNTER (OUTPATIENT)
Dept: INFUSION CENTER | Facility: HOSPITAL | Age: 47
Discharge: HOME | End: 2022-04-25
Attending: INTERNAL MEDICINE
Payer: COMMERCIAL

## 2022-04-25 VITALS
SYSTOLIC BLOOD PRESSURE: 116 MMHG | HEART RATE: 67 BPM | OXYGEN SATURATION: 97 % | TEMPERATURE: 97.7 F | RESPIRATION RATE: 15 BRPM | DIASTOLIC BLOOD PRESSURE: 67 MMHG

## 2022-04-25 DIAGNOSIS — D50.8 IRON DEFICIENCY ANEMIA SECONDARY TO INADEQUATE DIETARY IRON INTAKE: Primary | ICD-10-CM

## 2022-04-25 PROCEDURE — 96365 THER/PROPH/DIAG IV INF INIT: CPT

## 2022-04-25 RX ORDER — SODIUM CHLORIDE 9 MG/ML
20 INJECTION, SOLUTION INTRAVENOUS ONCE
Status: COMPLETED | OUTPATIENT
Start: 2022-04-25 | End: 2022-04-25

## 2022-04-25 RX ORDER — SODIUM CHLORIDE 9 MG/ML
20 INJECTION, SOLUTION INTRAVENOUS ONCE
Status: CANCELLED | OUTPATIENT
Start: 2022-05-02

## 2022-04-25 RX ADMIN — IRON SUCROSE 200 MG: 20 INJECTION, SOLUTION INTRAVENOUS at 14:46

## 2022-04-25 RX ADMIN — SODIUM CHLORIDE 20 ML/HR: 9 INJECTION, SOLUTION INTRAVENOUS at 14:44

## 2022-04-25 NOTE — PROGRESS NOTES
Patient tolerated IV venofer without issues  F/u appt confirmed, AVS given  1. The severity of signs/symptoms.(See ED/admit documents)

## 2022-04-27 ENCOUNTER — TELEPHONE (OUTPATIENT)
Dept: PULMONOLOGY | Facility: CLINIC | Age: 47
End: 2022-04-27

## 2022-04-27 ENCOUNTER — OFFICE VISIT (OUTPATIENT)
Dept: FAMILY MEDICINE CLINIC | Facility: CLINIC | Age: 47
End: 2022-04-27
Payer: COMMERCIAL

## 2022-04-27 VITALS
WEIGHT: 293 LBS | BODY MASS INDEX: 48.22 KG/M2 | SYSTOLIC BLOOD PRESSURE: 118 MMHG | RESPIRATION RATE: 17 BRPM | HEART RATE: 84 BPM | TEMPERATURE: 96.4 F | DIASTOLIC BLOOD PRESSURE: 76 MMHG | OXYGEN SATURATION: 97 %

## 2022-04-27 DIAGNOSIS — Z12.31 ENCOUNTER FOR SCREENING MAMMOGRAM FOR MALIGNANT NEOPLASM OF BREAST: ICD-10-CM

## 2022-04-27 DIAGNOSIS — J96.11 CHRONIC RESPIRATORY FAILURE WITH HYPOXIA (HCC): ICD-10-CM

## 2022-04-27 DIAGNOSIS — E03.8 HYPOTHYROIDISM DUE TO HASHIMOTO'S THYROIDITIS: ICD-10-CM

## 2022-04-27 DIAGNOSIS — E06.3 HYPOTHYROIDISM DUE TO HASHIMOTO'S THYROIDITIS: ICD-10-CM

## 2022-04-27 DIAGNOSIS — D50.8 OTHER IRON DEFICIENCY ANEMIA: ICD-10-CM

## 2022-04-27 DIAGNOSIS — F33.9 DEPRESSION, RECURRENT (HCC): Primary | ICD-10-CM

## 2022-04-27 PROCEDURE — 1036F TOBACCO NON-USER: CPT | Performed by: NURSE PRACTITIONER

## 2022-04-27 PROCEDURE — 99213 OFFICE O/P EST LOW 20 MIN: CPT | Performed by: NURSE PRACTITIONER

## 2022-04-27 NOTE — PROGRESS NOTES
Assessment/Plan:         Diagnoses and all orders for this visit:    Depression, recurrent ([facility] [address] )    Encounter for screening mammogram for malignant neoplasm of breast  -     Mammo screening bilateral w 3d & cad; Future    Other iron deficiency anemia    Chronic respiratory failure with hypoxia ([facility] [address] )    Hypothyroidism due to Hashimoto's thyroiditis        forms filled out fo r disability      Subjective:      Patient ID: Christi Gurrola is a 42 y o  female      HPI    The following portions of the patient's history were reviewed and updated as appropriate: allergies, current medications, past family history, past medical history, past social history, past surgical history and problem list     Review of Systems      Objective:  Vitals:    04/27/22 1007   BP: 118/76   BP Location: Left arm   Patient Position: Sitting   Cuff Size: Large   Pulse: 84   Resp: 17   Temp: (!) 96 4 °F (35 8 °C)   TempSrc: Tympanic   SpO2: 97%   Weight: (!) 137 kg (301 lb)      Physical Exam

## 2022-04-27 NOTE — TELEPHONE ENCOUNTER
[employer] is calling in regards to an update on the status of a questionnaire that was needed to be filled out by Elin Stearns   Please advise    LANG [id number]    [employer] Phone  #[phone]

## 2022-04-29 NOTE — TELEPHONE ENCOUNTER
Spoke with Rocío Jeronimo from [employer] they will faxed questionnaire to my attention  Once Dr Sukumar Bello is in will have him sign and faxed to [phone]

## 2022-05-05 NOTE — TELEPHONE ENCOUNTER
Louis Guthrie is calling to check up on the status of the disability paperwork Pls revise information from message of 4/27/22

## 2022-05-12 NOTE — TELEPHONE ENCOUNTER
Spoke with Sapna Serrato from [employer] and confirmed 12 pages were faxed to [phone] for Disability

## 2022-05-30 DIAGNOSIS — F41.9 ANXIETY: ICD-10-CM

## 2022-05-30 DIAGNOSIS — U09.9 POST-COVID SYNDROME: ICD-10-CM

## 2022-05-30 DIAGNOSIS — F32.1 CURRENT MODERATE EPISODE OF MAJOR DEPRESSIVE DISORDER, UNSPECIFIED WHETHER RECURRENT (HCC): ICD-10-CM

## 2022-05-31 RX ORDER — TRAZODONE HYDROCHLORIDE 100 MG/1
TABLET ORAL
Qty: 90 TABLET | Refills: 1 | Status: SHIPPED | OUTPATIENT
Start: 2022-05-31

## 2022-06-03 NOTE — TELEPHONE ENCOUNTER
[employer] calling again regarding questionaire  They state they still did not receive papers from 5/12  They are asking if we can re-fax to number provided in previous message

## 2022-06-14 DIAGNOSIS — E03.9 HYPOTHYROIDISM, UNSPECIFIED TYPE: ICD-10-CM

## 2022-06-14 RX ORDER — LEVOTHYROXINE SODIUM 0.15 MG/1
150 TABLET ORAL
Qty: 90 TABLET | Refills: 3 | Status: SHIPPED | OUTPATIENT
Start: 2022-06-14

## 2022-06-28 ENCOUNTER — TELEPHONE (OUTPATIENT)
Dept: GYNECOLOGY | Facility: CLINIC | Age: 47
End: 2022-06-28

## 2022-06-28 DIAGNOSIS — N92.1 MENORRHAGIA WITH IRREGULAR CYCLE: ICD-10-CM

## 2022-06-29 RX ORDER — MEGESTROL ACETATE 20 MG/1
20 TABLET ORAL DAILY
Qty: 21 TABLET | Refills: 3 | Status: SHIPPED | OUTPATIENT
Start: 2022-06-29 | End: 2022-07-20

## 2022-07-26 DIAGNOSIS — K21.9 GASTROESOPHAGEAL REFLUX DISEASE WITHOUT ESOPHAGITIS: ICD-10-CM

## 2022-07-26 RX ORDER — PANTOPRAZOLE SODIUM 40 MG/1
TABLET, DELAYED RELEASE ORAL
Qty: 90 TABLET | Refills: 3 | Status: SHIPPED | OUTPATIENT
Start: 2022-07-26

## 2022-08-03 ENCOUNTER — TELEPHONE (OUTPATIENT)
Dept: HEMATOLOGY ONCOLOGY | Facility: CLINIC | Age: 47
End: 2022-08-03

## 2022-08-03 NOTE — TELEPHONE ENCOUNTER
LVM to the patient in regards to her lab work that needs to be completed prior to her appt on 8/15/22 at 8:00am

## 2022-08-10 ENCOUNTER — TELEPHONE (OUTPATIENT)
Dept: HEMATOLOGY ONCOLOGY | Facility: CLINIC | Age: 47
End: 2022-08-10

## 2022-08-10 NOTE — TELEPHONE ENCOUNTER
CALL RETURN FORM   Reason for patient call? Patient calling needs lab orders sent to [phone] [facility]    Patient's primary oncologist? Verna Baird    Name of person the patient was calling for? Verna Baird    Any additional information to add, if applicable? [phone]   Informed patient that the message will be forwarded to the team and someone will get back to them as soon as possible    Did you relay this information to the patient?   yes

## 2022-08-15 ENCOUNTER — DOCUMENTATION (OUTPATIENT)
Dept: HEMATOLOGY ONCOLOGY | Facility: CLINIC | Age: 47
End: 2022-08-15

## 2022-08-15 ENCOUNTER — TELEPHONE (OUTPATIENT)
Dept: HEMATOLOGY ONCOLOGY | Facility: CLINIC | Age: 47
End: 2022-08-15

## 2022-08-15 ENCOUNTER — TELEPHONE (OUTPATIENT)
Dept: PSYCHIATRY | Facility: CLINIC | Age: 47
End: 2022-08-15

## 2022-08-15 ENCOUNTER — OFFICE VISIT (OUTPATIENT)
Dept: HEMATOLOGY ONCOLOGY | Facility: CLINIC | Age: 47
End: 2022-08-15
Payer: COMMERCIAL

## 2022-08-15 VITALS
SYSTOLIC BLOOD PRESSURE: 114 MMHG | HEART RATE: 74 BPM | HEIGHT: 67 IN | DIASTOLIC BLOOD PRESSURE: 68 MMHG | WEIGHT: 293 LBS | RESPIRATION RATE: 20 BRPM | BODY MASS INDEX: 45.99 KG/M2 | OXYGEN SATURATION: 96 % | TEMPERATURE: 98.4 F

## 2022-08-15 DIAGNOSIS — Z98.84 H/O GASTRIC BYPASS: ICD-10-CM

## 2022-08-15 DIAGNOSIS — D75.838 REACTIVE THROMBOCYTOSIS: ICD-10-CM

## 2022-08-15 DIAGNOSIS — N92.1 MENORRHAGIA WITH IRREGULAR CYCLE: ICD-10-CM

## 2022-08-15 DIAGNOSIS — D50.8 IRON DEFICIENCY ANEMIA SECONDARY TO INADEQUATE DIETARY IRON INTAKE: ICD-10-CM

## 2022-08-15 DIAGNOSIS — E83.41 HYPERMAGNESEMIA: ICD-10-CM

## 2022-08-15 DIAGNOSIS — D50.8 OTHER IRON DEFICIENCY ANEMIA: Primary | ICD-10-CM

## 2022-08-15 PROCEDURE — 99214 OFFICE O/P EST MOD 30 MIN: CPT | Performed by: NURSE PRACTITIONER

## 2022-08-15 RX ORDER — SODIUM CHLORIDE 9 MG/ML
20 INJECTION, SOLUTION INTRAVENOUS ONCE
Status: CANCELLED | OUTPATIENT
Start: 2022-08-22

## 2022-08-15 NOTE — TELEPHONE ENCOUNTER
While we try to accommodate patient requests, our priority is to schedule treatment according to Doctor's orders and site availability  1  Does the Provider use the intake sheet or checkout note? NO  2  What would be a preferred day of the week that would work best for your infusion appointment? MONDAY  3  Do you prefer mornings or afternoons for your appointments? AFTERNOON  4  Are there any days or dates that do not work for your schedule, including any upcoming vacations? NO  5  We are going to try our best to schedule you at the infusion center closest to your home  In the event that we are unable to what would be your next preferred infusion site or sites? 1  [facility]  2  [facility]  3  [facility]    6  Do you have transportation to take you to all of your appointments? YES  7   Would you like the infusion center to draw labs from your port? (disregard if patient doesn't have a port or need labs for infusion appointment) N/A

## 2022-08-15 NOTE — TELEPHONE ENCOUNTER
Left voice message for patient with all appointments, Gave Teams # to call with any questions and she can also view these on her [address]

## 2022-08-15 NOTE — PROGRESS NOTES
Hematology/Oncology Outpatient Follow-up  Mason Umaña 41 y o  female 1975 [id number]    Date:  8/15/2022      Assessment and Plan:  1  Other iron deficiency anemia  Patient is not currently anemic hemoglobin 13 9 however she seems to be iron deficient again ferritin 10 despite bleeding a course of IV Venofer recently March/April 2022  Has been requiring IV iron every 4-5 month  Suspect that her ongoing iron deficiency is primarily due to her ongoing menorrhagia  She also has a history of bariatric surgery and malabsorption due to this is likely contributory as well  She mentions that she recently developed some epigastric discomfort despite taking her pantoprazole daily  We did discuss referral to Gastroenterology versus following up with her bariatrics team to consider upper endoscopy; she states she will call bariatrics for follow-up  She seems to be symptomatic with her iron deficiency reporting worsening fatigue; her TSH is also abnormal which may be contributing will make PCP aware  Patient was offered another course of IV iron Venofer 200 mg weekly for 5 sessions total which she agreed to  Request she follow up again with repeat labs January 2023 or sooner should the need arise     - CBC and differential; Future  - Comprehensive metabolic panel; Future  - C-reactive protein; Future  - Sedimentation rate, automated; Future  - Vitamin B12; Future  - Iron Panel (Includes Ferritin, Iron Sat%, Iron, and TIBC); Future  - Ferritin; Future    2  Reactive thrombocytosis  Likely due to iron deficiency  She was found to be negative for the usual YANNICK 2 V617F mutation in the past   Was reordered at last visit for completeness sake reflexive studies to rule out myeloproliferative disorder  Results are not available this time will have office staff follow-up on results  - CBC and differential; Future    3   Menorrhagia with irregular cycle  Advised patient to contact her gynecology team to inform them that the Megace prescribed does not seem to be helping; she agrees  She states that she has been having menstrual bleeding for 2 months trait  4  H/O gastric bypass    5  Hypermagnesemia  Patient states that she has been taking magnesium supplement on a regular basis  Advised her to stop since her magnesium is higher than average  She mentions that she does eat a lot of fruits and vegetables in her diet and is likely getting sufficient amount through her diet  - Magnesium; Future        HPI:  Patient is a 30-year-old female who originally presented today for further evaluation and treatment of her microcytic anemia/iron deficiency  She has a past medical history of bipolar disorder, anxiety, hypothyroidism, obstructive sleep apnea, gastric bypass surgery 2017 and post COVID syndrome after she has significant COVID-19 infection April 2021  She mentions that ventilation was recommended when she had COVID-19 which she declined  She is being monitored by Pulmonology and Cardiology      CBC 05/14/2021 showed normal white cells 10 9, microcytic hypochromic anemia H&H 10 9/34 4, MCV 96, MCH 24 7 she has thrombocytosis platelet count [id number]  BMP was essentially normal with the exception of a decreased glucose 62  She had additional laboratory studies done 06/03/2021 which showed low ferritin 14 and low serum iron 25 complete iron panel was not done  She was found to be negative for the [id number] mutation 09/2021; reflexive studies were not performed  Patient was treated with a course of IV iron Feraheme x2 treatments June/July 2021  Required additional courses of IV iron on different occasions  Interval history:  Presents today for a follow-up visit accompanied by her daughter who kindly assisted with some [place]an translation per patient request   Patient received an additional course of IV iron Venofer x5 treatments after her last office visit March/April 2022    She states today that she continues to have heavy menstrual bleeding, in fact has had bleeding for 2 months straight  She did recently undergo workup with her gyn team who placed her on megace; he states that the medication is not helping  She also mentions that she has been having some epigastric discomfort despite taking her pantoprazole on a daily basis  She mentions that she has been feeling more fatigued than usual but otherwise has no new complaints other than above mentioned  Her most recent laboratory studies from 08/11/2022 at [facility] showed normal WBC 9 6, she is not anemic H&H 13 9/411 3, MCV 95 with again thrombocytosis platelet count [id number]  Albumin 3 4 remaining metabolic panel is appropriate for patient  Her inflammatory markers are not elevated  LDH normal 191  Her TSH is elevated 11 3, with decreased free T4 0 61  Magnesium is slightly higher than average 2 4  B12 is appropriate 624  She seems to be iron deficient again iron saturation 19% and ferritin 10  ROS: Review of Systems   Constitutional: Positive for activity change, appetite change and fatigue  Negative for chills, fever and unexpected weight change  HENT: Positive for trouble swallowing  Negative for congestion, mouth sores, nosebleeds and sore throat  Respiratory: Positive for cough and shortness of breath  Negative for chest tightness  Cardiovascular: Positive for palpitations  Negative for chest pain and leg swelling  Gastrointestinal: Positive for constipation, diarrhea and nausea  Negative for abdominal distention, abdominal pain, blood in stool and vomiting  Endocrine: Positive for cold intolerance  Genitourinary: Positive for menstrual problem  Negative for difficulty urinating, dysuria, frequency, hematuria and urgency  Musculoskeletal: Positive for arthralgias and myalgias  Negative for back pain, gait problem and joint swelling  Skin: Negative for color change, pallor and rash     Neurological: Positive for dizziness, numbness and headaches  Negative for light-headedness  Hematological: Negative for adenopathy  Does not bruise/bleed easily  Psychiatric/Behavioral: Positive for dysphoric mood and sleep disturbance  The patient is nervous/anxious          Past Medical History:   Diagnosis Date    Abscess of labia     Acute and chronic respiratory failure with hypoxia ([facility] [address] )     Anemia 2021    Anxiety     Bipolar disorder (HCC)     Breast lump     COVID-19     Frequent headaches     Hemorrhoids     Hypothyroidism 2013    Migraine     Morbid obesity (HCC) 2013    Obesity     Psychotic disorder (HCC)        Past Surgical History:   Procedure Laterality Date    BARIATRIC SURGERY      BREAST BIOPSY Right 1999    BREAST LUMPECTOMY Left     benign    CHOLECYSTECTOMY      GALLBLADDER SURGERY         Social History     Socioeconomic History    Marital status: Single     Spouse name: None    Number of children: 1    Years of education: None    Highest education level: None   Occupational History    None   Tobacco Use    Smoking status: Former Smoker     Types: Cigarettes     Quit date:      Years since quittin 6    Smokeless tobacco: Never Used    Tobacco comment: smoker for 2-3 yrs and quit in , smoked about 3 cigarettes a day   Vaping Use    Vaping Use: Never used   Substance and Sexual Activity    Alcohol use: Never    Drug use: No    Sexual activity: Not Currently   Other Topics Concern    None   Social History Narrative    None     Social Determinants of Health     Financial Resource Strain: Not on file   Food Insecurity: Not on file   Transportation Needs: Not on file   Physical Activity: Not on file   Stress: Not on file   Social Connections: Not on file   Intimate Partner Violence: Not on file   Housing Stability: Not on file       Family History   Problem Relation Age of Onset    Liver cancer Mother     Prostate cancer Father     Cancer Family  No Known Problems Sister     No Known Problems Maternal Grandmother     No Known Problems Maternal Grandfather     No Known Problems Paternal Grandmother     No Known Problems Paternal Grandfather     No Known Problems Sister     No Known Problems Sister     No Known Problems Sister     No Known Problems Sister     No Known Problems Sister     No Known Problems Sister     No Known Problems Sister     No Known Problems Sister     No Known Problems Sister     Cancer Maternal Aunt     Cancer Maternal Aunt     Cancer Maternal Aunt     Cancer Maternal Aunt     Cancer Maternal Aunt     Cancer Maternal Aunt     Cancer Maternal Aunt     No Known Problems Paternal Aunt     No Known Problems Paternal Aunt     No Known Problems Paternal Aunt     No Known Problems Paternal Aunt     Cancer Paternal Aunt     Cancer Paternal Aunt        Allergies   Allergen Reactions    No Active Allergies          Current Outpatient Medications:     albuterol (PROVENTIL HFA,VENTOLIN HFA) 90 mcg/act inhaler, INHALE 2 PUFFS EVERY 6 (SIX) HOURS AS NEEDED FOR WHEEZING OR SHORTNESS OF BREATH, Disp: 8 5 g, Rfl: 5    atorvastatin (LIPITOR) 20 mg tablet, TAKE ONE TABLET (20 MG TOTAL) BY MOUTH DAILY, Disp: 30 tablet, Rfl: 6    benzonatate (TESSALON) 200 MG capsule, Take 1 capsule (200 mg total) by mouth daily at bedtime as needed for cough, Disp: 30 capsule, Rfl: 3    Breo Ellipta 200-25 MCG/INH inhaler, INHALE 1 PUFF DAILY RINSE MOUTH AFTER USE , Disp: 60 each, Rfl: 5    busPIRone (BUSPAR) 10 mg tablet, Take 1 tablet (10 mg total) by mouth 2 (two) times a day, Disp: 60 tablet, Rfl: 3    Calcium Citrate 200 MG TABS, 1 tablet 3 (three) times a day, Disp: , Rfl:     Cyanocobalamin ER 1000 MCG TBCR, take one tab daily, Disp: , Rfl:     diphenhydrAMINE-acetaminophen (TYLENOL PM)  MG TABS, Take 1 tablet by mouth daily at bedtime as needed for sleep, Disp: , Rfl:     escitalopram (LEXAPRO) 20 mg tablet, TAKE ONE TABLET (20 MG TOTAL) BY MOUTH DAILY, Disp: 90 tablet, Rfl: 2    furosemide (LASIX) 20 mg tablet, TAKE ONE TABLET (20 MG TOTAL) BY MOUTH DAILY, Disp: 30 tablet, Rfl: 3    levothyroxine (Levo-T) 150 mcg tablet, Take 1 tablet (150 mcg total) by mouth daily in the early morning, Disp: 90 tablet, Rfl: 3    metoprolol tartrate (LOPRESSOR) 25 mg tablet, TAKE 1/2 TABLET(12 5 MG) BY MOUTH EVERY 12 HOURS, Disp: 90 tablet, Rfl: 4    Multiple Vitamins-Minerals (MULTIVITAMIN ADULT PO), every 24 hours, Disp: , Rfl:     pantoprazole (PROTONIX) 40 mg tablet, TAKE ONE TABLET (40 MG TOTAL) BY MOUTH EVERY 24 HOURS, Disp: 90 tablet, Rfl: 3    traZODone (DESYREL) 100 mg tablet, TAKE ONE TABLET (100 MG TOTAL) BY MOUTH DAILY AT BEDTIME, Disp: 90 tablet, Rfl: 1    megestrol (MEGACE) 20 mg tablet, Take 1 tablet (20 mg total) by mouth daily for 21 days, Disp: 21 tablet, Rfl: 3      Physical Exam:  /68 (BP Location: Left arm, Patient Position: Sitting, Cuff Size: Large)   Pulse 74   Temp 98 4 °F (36 9 °C) (Tympanic)   Resp 20   Ht 5' 7" (1 702 m)   Wt (!) 138 kg (304 lb 12 8 oz)   LMP  (Approximate) Comment: Patient has had periods for 2 months   SpO2 96%   BMI 47 74 kg/m²     Physical Exam  Vitals reviewed  Constitutional:       Appearance: She is well-developed  She is morbidly obese  She is not diaphoretic  HENT:      Head: Normocephalic and atraumatic  Eyes:      General: No scleral icterus  Conjunctiva/sclera: Conjunctivae normal       Pupils: Pupils are equal, round, and reactive to light  Neck:      Thyroid: No thyromegaly  Cardiovascular:      Rate and Rhythm: Normal rate and regular rhythm  Heart sounds: Normal heart sounds  No murmur heard  Pulmonary:      Breath sounds: Decreased breath sounds present  Chest:   Breasts:      Right: No axillary adenopathy  Left: No axillary adenopathy  Abdominal:      General: There is no distension  Palpations: Abdomen is soft   There is no hepatomegaly or splenomegaly  Tenderness: There is abdominal tenderness in the epigastric area  Musculoskeletal:         General: No swelling  Normal range of motion  Cervical back: Normal range of motion and neck supple  Lymphadenopathy:      Cervical: No cervical adenopathy  Upper Body:      Right upper body: No axillary adenopathy  Left upper body: No axillary adenopathy  Skin:     General: Skin is warm and dry  Coloration: Skin is not pale  Findings: No erythema or rash  Neurological:      General: No focal deficit present  Mental Status: She is alert and oriented to person, place, and time  Psychiatric:         Mood and Affect: Mood is depressed  Affect is flat  Behavior: Behavior normal  Behavior is cooperative  Thought Content: Thought content normal          Judgment: Judgment normal            Labs:  Lab Results   Component Value Date    WBC 8 55 09/10/2021    HGB 15 0 09/10/2021    HCT 45 9 09/10/2021    MCV 97 09/10/2021     (H) 09/10/2021     Lab Results   Component Value Date     10/14/2015    K 5 4 (H) 09/10/2021     09/10/2021    CO2 25 09/10/2021    ANIONGAP 7 10/14/2015    BUN 8 09/10/2021    CREATININE 1 06 09/10/2021    GLUCOSE 89 10/14/2015    GLUF 84 09/10/2021    CALCIUM 8 6 09/10/2021    CORRECTEDCA 9 1 04/16/2021    AST 14 09/10/2021    ALT 35 09/10/2021    ALKPHOS 150 (H) 09/10/2021    PROT 7 2 10/14/2015    BILITOT 0 33 10/14/2015    EGFR 64 09/10/2021       Patient voiced understanding and agreement in the above discussion  Aware to contact our office with questions/symptoms in the interim  This note has been generated by voice recognition software system  Therefore, there may be spelling, grammar, and or syntax errors  Please contact if questions arise

## 2022-08-15 NOTE — PROGRESS NOTES
Called [facility] to obtain Bernardo 2 results that Dr Annie Mccurdy ordered  Representative states no Bernardo 2 was drawn  Informed She Gonzalez, she states if needed she will order another test at the patients next visit

## 2022-08-15 NOTE — TELEPHONE ENCOUNTER
contacted patient off med mgmt waitlist to verify needs of services in attempts to update list  lvm for patient to contact intake dept

## 2022-08-15 NOTE — Clinical Note
Hi Jimena, Not sure if you aware, mutual patient's TSH 8/11 is 11 3 with free T4 0 61  I believe you are managing her levothyroxine  She is reporting fatigue which may be partially due to her iron deficiency    Thanks Imtiaz Duncan NP Heme/Onc

## 2022-08-24 ENCOUNTER — CONSULT (OUTPATIENT)
Dept: GASTROENTEROLOGY | Facility: CLINIC | Age: 47
End: 2022-08-24
Payer: COMMERCIAL

## 2022-08-24 ENCOUNTER — TELEPHONE (OUTPATIENT)
Dept: PULMONOLOGY | Facility: CLINIC | Age: 47
End: 2022-08-24

## 2022-08-24 VITALS
WEIGHT: 293 LBS | SYSTOLIC BLOOD PRESSURE: 122 MMHG | HEIGHT: 67 IN | BODY MASS INDEX: 45.99 KG/M2 | OXYGEN SATURATION: 96 % | TEMPERATURE: 98.1 F | DIASTOLIC BLOOD PRESSURE: 80 MMHG | HEART RATE: 83 BPM

## 2022-08-24 DIAGNOSIS — R11.0 NAUSEA: ICD-10-CM

## 2022-08-24 DIAGNOSIS — Z12.11 COLON CANCER SCREENING: ICD-10-CM

## 2022-08-24 DIAGNOSIS — R19.7 DIARRHEA, UNSPECIFIED TYPE: Primary | ICD-10-CM

## 2022-08-24 DIAGNOSIS — R10.11 POSTPRANDIAL ABDOMINAL PAIN IN RIGHT UPPER QUADRANT: ICD-10-CM

## 2022-08-24 PROCEDURE — 99243 OFF/OP CNSLTJ NEW/EST LOW 30: CPT | Performed by: INTERNAL MEDICINE

## 2022-08-24 NOTE — PROGRESS NOTES
[facility] - Outpatient Consultation  [place] Umaña 41 y o  female MRN: [id number]  Encounter: [id number]    HPI:    Gilda Huang is a 41 y o  female with morbid obesity and history of gastric bypass, who presents with complaint of right-sided abdominal pain and diarrhea  She complains of epigastric and right upper quadrant pain, associated with nausea  She says this has been going on for several months, but she had similar complaints in 2019 when she was last seen in our clinic  She says she is unable to eat because of the pain which is postprandial   The pain is especially worsened by greasy foods  She is status post cholecystectomy in 2013  She has GERD and is on pantoprazole  No dysphagia  No weight loss  She is gaining weight  She also complains of chronic diarrhea and has 5-6 liquid bowel movements per day  The diarrhea typically happens after she eats and she has to plan her meals a rounded  There is no specific food association  The diarrhea has been a problem for longer than the abdominal pain  No blood in her stools  She had gastric bypass in 2016 and has not had EGD since  She has iron deficiency and gets IV iron  She has never had colonoscopy  Mother with liver cancer  Other issues include fatty liver diagnosed in 2019 and leg edema for which she takes Lasix  LFTs are normal   She had severe COVID in 04/2021 and still requires occasional home oxygen  She had severe COVID in 4/2021  Still on occasional O2 at home  REVIEW OF SYSTEMS:  CONSTITUTIONAL: Denies any fever, chills, rigors, and weight loss  HEENT: No earache or tinnitus  Denies hearing loss or visual disturbances  CARDIOVASCULAR: No chest pain or palpitations  RESPIRATORY: Denies any cough, hemoptysis, shortness of breath or dyspnea on exertion  GASTROINTESTINAL: As noted in the History of Present Illness  GENITOURINARY: No problems with urination  Denies any hematuria or dysuria  NEUROLOGIC: No dizziness or vertigo, denies headaches  MUSCULOSKELETAL: Denies any muscle or joint pain  SKIN: Denies skin rashes or itching  ENDOCRINE: Denies excessive thirst  Denies intolerance to heat or cold  PSYCHOSOCIAL: Denies depression or anxiety  Denies any recent memory loss       Historical Information   Past Medical History:   Diagnosis Date    Abscess of labia     Acute and chronic respiratory failure with hypoxia ([address] )     Anemia 2021    Anxiety     Bipolar disorder (HCC)     Breast lump     COVID-19     Frequent headaches     Hemorrhoids     Hypothyroidism 2013    Migraine     Morbid obesity (HCC) 2013    Obesity     Psychotic disorder (HCC)      Past Surgical History:   Procedure Laterality Date    BARIATRIC SURGERY      BREAST BIOPSY Right 1999    BREAST LUMPECTOMY Left     benign    CHOLECYSTECTOMY      GALLBLADDER SURGERY       Social History   Social History     Substance and Sexual Activity   Alcohol Use Never     Social History     Substance and Sexual Activity   Drug Use No     Social History     Tobacco Use   Smoking Status Former Smoker    Types: Cigarettes    Quit date:     Years since quittin 6   Smokeless Tobacco Never Used   Tobacco Comment    smoker for 2-3 yrs and quit in , smoked about 3 cigarettes a day     Family History   Problem Relation Age of Onset    Liver cancer Mother     Prostate cancer Father     Cancer Family     No Known Problems Sister     No Known Problems Maternal Grandmother     No Known Problems Maternal Grandfather     No Known Problems Paternal Grandmother     No Known Problems Paternal Grandfather     No Known Problems Sister     No Known Problems Sister     No Known Problems Sister     No Known Problems Sister     No Known Problems Sister     No Known Problems Sister     No Known Problems Sister     No Known Problems Sister     No Known Problems Sister Priya Corral Cancer Maternal Aunt     Cancer Maternal Aunt     Cancer Maternal Aunt     Cancer Maternal Aunt     Cancer Maternal Aunt     Cancer Maternal Aunt     Cancer Maternal Aunt     No Known Problems Paternal Aunt     No Known Problems Paternal Aunt     No Known Problems Paternal Aunt     No Known Problems Paternal Aunt     Cancer Paternal Aunt     Cancer Paternal Aunt        Meds/Allergies       Current Outpatient Medications:     albuterol (PROVENTIL HFA,VENTOLIN HFA) 90 mcg/act inhaler    atorvastatin (LIPITOR) 20 mg tablet    benzonatate (TESSALON) 200 MG capsule    Breo Ellipta 200-25 MCG/INH inhaler    busPIRone (BUSPAR) 10 mg tablet    Calcium Citrate 200 MG TABS    Cyanocobalamin ER 1000 MCG TBCR    diphenhydrAMINE-acetaminophen (TYLENOL PM)  MG TABS    escitalopram (LEXAPRO) 20 mg tablet    furosemide (LASIX) 20 mg tablet    levothyroxine (Levo-T) 150 mcg tablet    metoprolol tartrate (LOPRESSOR) 25 mg tablet    Multiple Vitamins-Minerals (MULTIVITAMIN ADULT PO)    pantoprazole (PROTONIX) 40 mg tablet    traZODone (DESYREL) 100 mg tablet    megestrol (MEGACE) 20 mg tablet    Allergies   Allergen Reactions    No Active Allergies        Objective   Blood pressure 122/80, pulse 83, temperature 98 1 °F (36 7 °C), temperature source Tympanic, height 5' 7" (1 702 m), weight (!) 138 kg (303 lb 9 6 oz), SpO2 96 %, not currently breastfeeding  Body mass index is 47 55 kg/m²  PHYSICAL EXAM:    General Appearance:   Alert, cooperative, no distress   HEENT:   Normocephalic, atraumatic, anicteric  Neck:  Supple, symmetrical, trachea midline   Lungs:   Clear to auscultation bilaterally; no rales, rhonchi or wheezing; respirations unlabored    Heart[de-identified]   Regular rate and rhythm; no murmur, rub, or gallop     Abdomen:   Soft, very ttp in RUQ, also in epigastric area and LLQ, non-distended; normal bowel sounds; no masses, no organomegaly    Genitalia:   Deferred    Rectal:   Deferred Extremities:  No cyanosis, clubbing or edema    Pulses:  2+ and symmetric    Skin:  No jaundice, rashes, or lesions    Lymph nodes:  No palpable cervical lymphadenopathy        Lab Results:   No visits with results within 1 Day(s) from this visit  Latest known visit with results is:   Office Visit on 11/11/2021   Component Date Value    Case Report 11/11/2021                      Value:Surgical Pathology Report                         Case: [id number]                                   Authorizing Provider:  Jo Cook DO      Collected:           11/11/2021 1017              Ordering Location:     [facility]        Received:            11/11/2021 1017                                     [facility]                                                    Pathologist:           Lyndsay Villar MD                                                   Specimen:    Endometrium, EMB                                                                           Final Diagnosis 11/11/2021                      Value: This result contains rich text formatting which cannot be displayed here   Additional Information 11/11/2021                      Value: This result contains rich text formatting which cannot be displayed here  Baron Paniagua Gross Description 11/11/2021                      Value: This result contains rich text formatting which cannot be displayed here         Lab Results   Component Value Date    WBC 8 55 09/10/2021    HGB 15 0 09/10/2021    HCT 45 9 09/10/2021    MCV 97 09/10/2021     (H) 09/10/2021       Lab Results   Component Value Date     10/14/2015    SODIUM 139 09/10/2021    K 5 4 (H) 09/10/2021     09/10/2021    CO2 25 09/10/2021    ANIONGAP 7 10/14/2015    AGAP 9 09/10/2021    BUN 8 09/10/2021    CREATININE 1 06 09/10/2021    GLUC 62 (L) 05/14/2021    GLUF 84 09/10/2021    CALCIUM 8 6 09/10/2021    AST 14 09/10/2021    ALT 35 09/10/2021    ALKPHOS 150 (H) 09/10/2021    PROT 7 2 10/14/2015    TP 7 4 09/10/2021    TP 7 2 09/10/2021    BILITOT 0 33 10/14/2015    TBILI 0 37 09/10/2021    EGFR 64 09/10/2021       Lab Results   Component Value Date    CRP <3 0 09/10/2021       Lab Results   Component Value Date    PJA2PNXZBZJC 48 158 (H) 09/10/2021       Lab Results   Component Value Date    IRON 108 09/10/2021    TIBC 425 09/10/2021    FERRITIN 15 09/10/2021       Radiology Results:   No results found  ______________________________________________________________________  ASSESSMENT AND PLAN:    Valentina Pichardo is a 41 y o  female with morbid obesity, history of gastric bypass, history of cholecystectomy, fatty liver, presenting with postprandial abdominal pain, nausea, and diarrhea  I would like to start with EGD to rule out H pylori infection, marginal ulcer, anastomotic stenosis  If this is negative, we can consider MRCP to rule out choledocholithiasis  In terms of her diarrhea, we are going to start with stool tests to rule out infections check fecal calprotectin  We are also going to schedule a colonoscopy at this time for screening, and also to get random biopsies to rule out microscopic colitis and IBD  I discussed the risks of bleeding, infection, and perforation associated with endoscopic procedures  Procedure will be scheduled [facility]     1  Diarrhea, unspecified type    2  Postprandial abdominal pain in right upper quadrant    3  Colon cancer screening    4   Nausea        Orders Placed This Encounter   Procedures    Calprotectin,Fecal    Clostridium difficile toxin by PCR with EIA    Ova and parasite examination    Stool Enteric Bacterial Panel by PCR    EGD    Colonoscopy

## 2022-08-24 NOTE — PATIENT INSTRUCTIONS
Scheduled date of Colon/EGD (as of today): 11/17/2022  Physician performing: Dr Emy Obando  Location of procedure: [facility]  Bowel prep reviewed with patient: Miralax/Dulcolax  Instructions reviewed with patient by: Estevan Garcia  Clearances:  n/a

## 2022-08-24 NOTE — TELEPHONE ENCOUNTER
LM  for patient to give a call back to set up a follow up Appt with Dr Darling Moss in [place] or she saw Dr William Renteria also

## 2022-08-25 DIAGNOSIS — D50.8 IRON DEFICIENCY ANEMIA SECONDARY TO INADEQUATE DIETARY IRON INTAKE: Primary | ICD-10-CM

## 2022-08-25 RX ORDER — SODIUM CHLORIDE 9 MG/ML
20 INJECTION, SOLUTION INTRAVENOUS ONCE
Status: CANCELLED | OUTPATIENT
Start: 2022-08-29

## 2022-08-26 ENCOUNTER — TELEPHONE (OUTPATIENT)
Dept: CARDIAC REHAB | Facility: CLINIC | Age: 47
End: 2022-08-26

## 2022-08-29 ENCOUNTER — CLINICAL SUPPORT (OUTPATIENT)
Dept: PULMONOLOGY | Facility: CLINIC | Age: 47
End: 2022-08-29
Payer: COMMERCIAL

## 2022-08-29 DIAGNOSIS — R06.02 SHORTNESS OF BREATH: ICD-10-CM

## 2022-08-29 DIAGNOSIS — R05.3 CHRONIC COUGH: ICD-10-CM

## 2022-08-29 DIAGNOSIS — U09.9 POST-COVID SYNDROME: Primary | ICD-10-CM

## 2022-08-29 DIAGNOSIS — E66.01 MORBID OBESITY (HCC): ICD-10-CM

## 2022-08-29 PROCEDURE — 94625 PHY/QHP OP PULM RHB W/O MNTR: CPT

## 2022-08-29 NOTE — PROGRESS NOTES
Pulmonary Rehabilitation Plan of Care   Initial Care Plan      Today's date: 2022   # of Exercise Sessions Completed: Evaluation  Patient name: Monique Michel      : 1975  Age: 41 y o  MRN: [id number]  Referring Physician: Waqar Griggs DO  Pulmonologist: Ananya Christina DO  Provider: [facility]  Clinician: Anai Carpenter MS, CEP    Dx:   Encounter Diagnoses   Name Primary?  Post-COVID syndrome Yes    Chronic cough     Shortness of breath     Morbid obesity (HCC)      Date of onset: 2021      SUMMARY OF PROGRESS:  Today is Ada's initial evaluation to begin Pulmonary Rehab for the diagnosis of post COVID syndrome  She was hospitalized on 2021 and since then she has had persistent SOB and extreme fatigue  She finds herself very limited in her daily life, not being able to do much  She reports difficulty showering and getting dressed herself  Renea Led has hx of CAR, pneumonia, HLD, HTN, obesity and anemia, which contributes to her fatigue at times  The patient currently does not follow a formal exercise program at home and has no history of exercise  Pt reports the following physical limitations: daily back pain  Depression screening using the PHQ-9 was missed at today's sessions and will be administered at her first exercise session  CARMEN-7 screening tool for anxiety suggests 15-21 = Severe anxiety  When addressed, the patient admits to having depression/anxiety  She states she is compliant with her medications to treat and was also interested in trying [address]  Patient reports excellent social/emotional support  PHQ-9 score will be reassessed in 30 days  The patient is a non-smoker  Patient admits to 100% medication compliance  At rest, the patient rated dyspnea 3/10 with SpO2 95% on room air  They completed an initial 6MWT, walking 600 ft on room air  The patients rating of perceived dyspnea during the 6MWT was 7/10 with SpO2 95%    Patient took 2 rest breaks and requested to stop walking at 5:30  Telemetry revealed NSR  Resting  /84 with appropriate hemodynamic response to exercise reaching 138/82  Patient will exercise on room air and will titrate O2 to maintain SpO2 >90%  Education on oxygen use, breathing techniques, pulmonary anatomy, exercise for the pulmonary patient, healthy eating, stress, and relaxation will be provided  Patient goals include: weight loss, return to work operating a forklift, and return to normal life again  Mary Carmen Terrazas will attend 24 exercise sessions, 2x/wk for 12-18 weeks beginning 2022  Will progress patient as tolerated over the next 30 days        Medication compliance: Yes   Comments: Pt reports to be compliant with medications  Fall Risk: Low   Comments: Ambulates with a steady gait with no assist device and Denies a fall in the past 6 months    Smoking: Never used    RPD at Rest: 3/10  RPD with Exercise:  7/10    Assessment of progression of lung disease and functional status:  CAT: 34/40  Shortness of breath questionnaire: 95/120      EXERCISE ASSESSMENT and PLAN    Current Exercise Program in Rehab:       Frequency: 1 days/week   Supplement with home exercise 2+ days/wk as tolerated        Minutes: 20-26         METS: 1 5-2 2              SpO2: >90%              RPD: 4-6                      HR: RHR+30bpm   RPE: 4-5         Modalities: UBE and NuStep      Exercise Progression 30 Day Goals :    Frequency: 2 days/week    Supplement with home exercise 2+ days/wk as tolerated       Minutes: 30         METS: 2 0-2 5              SpO2: >90%              RPD: 4-6                      HR: RHR+30bpm   RPE: 4-5        Modalities: UBE, NuStep and Room walking     Strength trainin-3 days / week  12-15 repetitions  1-2 sets per modality   Will be added following 2-3 weeks of monitored exercise sessions   Modalities: Lateral Raise, Arm Curl, Sit to Stands, Wall push-ups, Front Raises and Shoulder Shrugs    Oxygen Needs: on room air at rest, on room air with exercise, O2 while sleeping  and uses O2 as needed 3L  Oxygen Goal: Maintain SpO2>90% during exercise    Home Exercise: none  Education: pursed lipped breathing, diaphragmatic breathing, relaxation breathing, home exercise, benefits of exercise for pulmonary disease, RPE scale, RPD scale, O2 saturation monitoring and appropriate O2 response to exercise    Goals: reduced score on  USCD Shortness of Breath Questionnaire, Improved 6MW distance by 10%, reduced dyspnea during exercise (0-3/10), improved exercise tolerance (max METs tolerated in pulmonary rehab), SpO2 >90% during exercise, reduced score on CAT, reduced RPD at rest, attend pulmonary rehab regularly, decrease sitting time at home and start a walking program  Progressing:  Reviewed Pt goals and determined plan of care    Plan: Titrate supplemental oxygen as needed to maintain SpO2>90% with exercise, learn to conserve energy with ADLs , practice breathing techniques 3x/day and reduce time sitting at home    Readiness to change: Preparation:  (Getting ready to change)       NUTRITION ASSESSMENT AND PLAN    Weight control:    Starting weight: 303 6 lbs   Current weight:       Diabetes: N/A    Goals:Improved Rate Your Plate score  >21, 8 0-9%  wt loss, eliminate processed meats, reduce portion sizes of meat to 3oz or less, use low fat dairy, eat 3 or more servings of whole grains a day, Eat 4-5 cups of fruits and vegetables daily, choose low sodium processed foods, eliminate butter, choose healthy snacks: light popcorn, plain pretzels, eliminate or choose low-fat sweets and daily saturated fat intake <7%/13g  Education: heart healthy eating  low sodium diet  hydration  wt  loss   portion control  Progressing:Reviewed Pt goals and determined plan of care  Plan: Education class: Reading Food Labels, Education Class: Heart Healthy Eating, replace refined grain bread with whole grain bread, replace unhealthy snacks with fruits & vegs, reduce portion sizes, avoid processed foods, drink more water, learn how to read food labels, replace sugar with stevia or truvia and keep added daily sugar <25g/day  Readiness to change: Preparation:  (Getting ready to change)       PSYCHOSOCIAL ASSESSMENT AND PLAN    Emotional:  Depression assessment:  PHQ-9 = was not assessed today            Anxiety measure:  CARMEN-7 = 15-21 = Severe anxiety  Self-reported stress level: 9   Social support: Patient reports excellent emotional/social support from family and was accompanied by her son today    Goals:  Reduce perceived stress to 1-3/10, improved [facility] QOL < 27, PHQ-9 - reduced severity by one level, continue medical therapy, Feelings in [facility] Score < 3, Physical Fitness in [facility] Score < 3, Daily Activity in [facility] Score < 3, Social Activities in [facility] Score < 3, Pain in [facility] Score < 3, Overall Health in [facility] Score < 3, Quality of Life in [facility] Score < 3 , Change in Health in [place] Score < 3 , Increased interest in doing things, improved positive thoughts of well being, increased energy, stop worrying and take time to relax  Education: signs/sxs of depression, benefits of a positive support system, stress management techniques, depression and CAD and benefits of mental health counseling    Progressing:Reviewed Pt goals and determined plan of care  Plan: Class: Stress and Your Health, Class: Relaxation, PHQ-9 >5 will refer to MD, Refer to Naomi Hobbs, Practice relaxation techniques, Exercise, Enjoy a hobby, Return to previous social activity and Repeat PHQ-9 every 30 days if score >5  Readiness to change: Preparation:  (Getting ready to change)       OTHER CORE COMPONENTS     Tobacco:   Social History     Tobacco Use   Smoking Status Former Smoker    Types: Cigarettes    Quit date:     Years since quittin 6   Smokeless Tobacco Never Used   Tobacco Comment    smoker for 2-3 yrs and quit in , smoked about 3 cigarettes a day Tobacco Use Intervention: Referral to tobacco expert:   N/A:  Patient is a non-smoker     Blood pressure:    [place]/84   Exercise: 138/82    Goals: consistent BP < 130/80, reduced dietary sodium <2300mg, moderate intensity exercise >150 mins/wk and medication compliance  Education:  pathophysiology of pulmonary disease, control coughing, inspiratory muscle training, environmental triggers and bronchodilators  Progressing:Reviewed Pt goals and determined plan of care  Plan: Avoid Processed foods, engage in regular exercise, eliminate salt shaker at the table, use salt substitutes, check labels for sodium content and monitor home BP  Readiness to change: Preparation:  (Getting ready to change)

## 2022-08-29 NOTE — PROGRESS NOTES
PULMONARY REHAB ASSESSMENT    Today's date: 2022  Patient name: Essence Riggs     : 1975       MRN: [id number]  PCP: THOMAS Ac  Referring Physician: Leland Angeles DO  Pulmonologist: Cherelle Lopez DO    Dx: U5 11 Post COVID syndrome      Date of onset: 2021  Cultural needs: U[place]    Weight:    Wt Readings from Last 1 Encounters:   22 (!) 138 kg (303 lb 9 6 oz)      Height:   Ht Readings from Last 1 Encounters:   22 5' 7" (1 702 m)     Medical History:   Past Medical History:   Diagnosis Date    Abscess of labia     Acute and chronic respiratory failure with hypoxia ([facility] [address] )     Anemia 2021    Anxiety     Bipolar disorder (HCC)     Breast lump     COVID-19     Frequent headaches     Hemorrhoids     Hypothyroidism 2013    Migraine     Morbid obesity ([facility] [address] ) 2013    Obesity     Psychotic disorder (HCC)          Physical Limitations: pain back daily, weight    Oxygen needs: 3LO2 as needed and sleep    History of Toxic Exposure:  None    Risk Factors   Cholesterol: Yes  Smoking: Never used  HTN: Yes  DM: No  Obesity: Yes   Inactivity: Yes  Stress:  perceived  stress: 8/10   Stressors: health, deals with mental illness (bipolar depression and anxiety)   Goals for Stress Management: exercise and continue medications    Family History:  Family History   Problem Relation Age of Onset    Liver cancer Mother     Prostate cancer Father     Cancer Family     No Known Problems Sister     No Known Problems Maternal Grandmother     No Known Problems Maternal Grandfather     No Known Problems Paternal Grandmother     No Known Problems Paternal Grandfather     No Known Problems Sister     No Known Problems Sister     No Known Problems Sister     No Known Problems Sister     No Known Problems Sister     No Known Problems Sister     No Known Problems Sister     No Known Problems Sister     No Known Problems Sister     Cancer Maternal Aunt     Cancer Maternal Aunt     Cancer Maternal Aunt     Cancer Maternal Aunt     Cancer Maternal Aunt     Cancer Maternal Aunt     Cancer Maternal Aunt     No Known Problems Paternal Aunt     No Known Problems Paternal Aunt     No Known Problems Paternal Aunt     No Known Problems Paternal Aunt     Cancer Paternal Aunt     Cancer Paternal Aunt        Allergies: No active allergies  ETOH:   Social History     Substance and Sexual Activity   Alcohol Use Never         Current Medications:   Current Outpatient Medications   Medication Sig Dispense Refill    albuterol (PROVENTIL HFA,VENTOLIN HFA) 90 mcg/act inhaler INHALE 2 PUFFS EVERY 6 (SIX) HOURS AS NEEDED FOR WHEEZING OR SHORTNESS OF BREATH 8 5 g 5    atorvastatin (LIPITOR) 20 mg tablet TAKE ONE TABLET (20 MG TOTAL) BY MOUTH DAILY 30 tablet 6    benzonatate (TESSALON) 200 MG capsule Take 1 capsule (200 mg total) by mouth daily at bedtime as needed for cough 30 capsule 3    Breo Ellipta 200-25 MCG/INH inhaler INHALE 1 PUFF DAILY RINSE MOUTH AFTER USE   60 each 5    busPIRone (BUSPAR) 10 mg tablet Take 1 tablet (10 mg total) by mouth 2 (two) times a day 60 tablet 3    Calcium Citrate 200 MG TABS 1 tablet 3 (three) times a day      Cyanocobalamin ER 1000 MCG TBCR take one tab daily      diphenhydrAMINE-acetaminophen (TYLENOL PM)  MG TABS Take 1 tablet by mouth daily at bedtime as needed for sleep      escitalopram (LEXAPRO) 20 mg tablet TAKE ONE TABLET (20 MG TOTAL) BY MOUTH DAILY 90 tablet 2    furosemide (LASIX) 20 mg tablet TAKE ONE TABLET (20 MG TOTAL) BY MOUTH DAILY 30 tablet 3    levothyroxine (Levo-T) 150 mcg tablet Take 1 tablet (150 mcg total) by mouth daily in the early morning 90 tablet 3    megestrol (MEGACE) 20 mg tablet Take 1 tablet (20 mg total) by mouth daily for 21 days 21 tablet 3    metoprolol tartrate (LOPRESSOR) 25 mg tablet TAKE 1/2 TABLET(12 5 MG) BY MOUTH EVERY 12 HOURS 90 tablet 4    Multiple Vitamins-Minerals (MULTIVITAMIN ADULT PO) every 24 hours      pantoprazole (PROTONIX) 40 mg tablet TAKE ONE TABLET (40 MG TOTAL) BY MOUTH EVERY 24 HOURS 90 tablet 3    traZODone (DESYREL) 100 mg tablet TAKE ONE TABLET (100 MG TOTAL) BY MOUTH DAILY AT BEDTIME 90 tablet 1     No current facility-administered medications for this visit  Functional Status Prior to Diagnosis for Treatment   Occupation: full time job [employer]   Recreation: None  ADLs: No limitations  [place]: No limitations  Exercise: None    Current Functional Status  Occupation: would like to return to work once she feels better  Recreation: None  ADLs:Capable of performing light ADLs only limited by Dyspnea  Fatigue, dizziness  [place]: Capable of performing light ADLs only limited by Dyspnea  Fatigue, dizziness  Exercise: None    Patient Specific Goals:  Get back to work and normal life  Short Term Program Goals: dietary modifications increased strength improved energy/stamina with ADLs exercise 120-150 mins/wk wt loss 1-2 ppw return to work    Long Term Goals: increased maximal walking duration  increased intial training workload  Improved functional capacity  Improved Quality of Life - [facility] score reduced  Reduced body fat%  Reduced stress  improved Rate Your Plate Score    Oxygen Goals: Maintain SpO2>90% titrating supplemental oxygen as needed     Ability to reach goals/rehabilitation potential:  Good    Projected return to function: 12 weeks  Objective tests: 6 MWT      Nutritional   Reviewed details of Rate your Plate  Discussed key elements of heart healthy eating  Reviewed patient goals for dietary modifications and their clinical implications  Reviewed most recent lipid profile       Goals for dietary modification: eliminate processed meats  reduce portions of meat to 3 oz  low fat dairy   increase whole grains  increase fruits and vegetables  eliminate butter  low sodium  improved snack choices      Emotional/Social  Patient reports feelings of depression   Patient reports feelings of anxiety  compliant with medical therapy for depression/anxiety    SOCIAL SUPPORT NETWORK    Marital status: single      Domestic Violence Screening: No    Comments: COVID hospitalized 10 days, ICU  No vent,supp O2  Headaches  Had asthma before  Sees Cardiologist  Thyroid issues, anemia which causes more fatigue

## 2022-08-30 ENCOUNTER — HOSPITAL ENCOUNTER (OUTPATIENT)
Dept: INFUSION CENTER | Facility: HOSPITAL | Age: 47
Discharge: HOME | End: 2022-08-30
Attending: INTERNAL MEDICINE
Payer: COMMERCIAL

## 2022-08-30 VITALS
RESPIRATION RATE: 20 BRPM | DIASTOLIC BLOOD PRESSURE: 74 MMHG | OXYGEN SATURATION: 96 % | HEART RATE: 92 BPM | TEMPERATURE: 97.6 F | SYSTOLIC BLOOD PRESSURE: 104 MMHG

## 2022-08-30 DIAGNOSIS — D50.8 IRON DEFICIENCY ANEMIA SECONDARY TO INADEQUATE DIETARY IRON INTAKE: Primary | ICD-10-CM

## 2022-08-30 PROCEDURE — 96365 THER/PROPH/DIAG IV INF INIT: CPT

## 2022-08-30 RX ORDER — SODIUM CHLORIDE 9 MG/ML
20 INJECTION, SOLUTION INTRAVENOUS ONCE
Status: COMPLETED | OUTPATIENT
Start: 2022-08-30 | End: 2022-08-30

## 2022-08-30 RX ORDER — SODIUM CHLORIDE 9 MG/ML
20 INJECTION, SOLUTION INTRAVENOUS ONCE
Status: CANCELLED | OUTPATIENT
Start: 2022-09-06

## 2022-08-30 RX ADMIN — SODIUM CHLORIDE 20 ML/HR: 9 INJECTION, SOLUTION INTRAVENOUS at 11:43

## 2022-08-30 RX ADMIN — IRON SUCROSE 200 MG: 20 INJECTION, SOLUTION INTRAVENOUS at 11:43

## 2022-09-06 ENCOUNTER — HOSPITAL ENCOUNTER (OUTPATIENT)
Dept: INFUSION CENTER | Facility: HOSPITAL | Age: 47
Discharge: HOME | End: 2022-09-06
Attending: INTERNAL MEDICINE
Payer: COMMERCIAL

## 2022-09-06 ENCOUNTER — TELEPHONE (OUTPATIENT)
Dept: PSYCHIATRY | Facility: CLINIC | Age: 47
End: 2022-09-06

## 2022-09-06 ENCOUNTER — OFFICE VISIT (OUTPATIENT)
Dept: FAMILY MEDICINE CLINIC | Facility: CLINIC | Age: 47
End: 2022-09-06
Payer: COMMERCIAL

## 2022-09-06 VITALS
HEART RATE: 78 BPM | OXYGEN SATURATION: 93 % | SYSTOLIC BLOOD PRESSURE: 111 MMHG | DIASTOLIC BLOOD PRESSURE: 70 MMHG | RESPIRATION RATE: 18 BRPM | TEMPERATURE: 97 F

## 2022-09-06 VITALS
RESPIRATION RATE: 22 BRPM | DIASTOLIC BLOOD PRESSURE: 88 MMHG | HEART RATE: 85 BPM | HEIGHT: 67 IN | WEIGHT: 293 LBS | TEMPERATURE: 97.6 F | OXYGEN SATURATION: 96 % | SYSTOLIC BLOOD PRESSURE: 124 MMHG | BODY MASS INDEX: 45.99 KG/M2

## 2022-09-06 DIAGNOSIS — E03.9 HYPOTHYROIDISM, UNSPECIFIED TYPE: ICD-10-CM

## 2022-09-06 DIAGNOSIS — E66.01 MORBID OBESITY WITH BMI OF 45.0-49.9, ADULT (HCC): ICD-10-CM

## 2022-09-06 DIAGNOSIS — Z00.00 ANNUAL PHYSICAL EXAM: Primary | ICD-10-CM

## 2022-09-06 DIAGNOSIS — D50.8 IRON DEFICIENCY ANEMIA SECONDARY TO INADEQUATE DIETARY IRON INTAKE: Primary | ICD-10-CM

## 2022-09-06 PROCEDURE — 99396 PREV VISIT EST AGE 40-64: CPT | Performed by: NURSE PRACTITIONER

## 2022-09-06 PROCEDURE — 96365 THER/PROPH/DIAG IV INF INIT: CPT

## 2022-09-06 PROCEDURE — 3725F SCREEN DEPRESSION PERFORMED: CPT | Performed by: NURSE PRACTITIONER

## 2022-09-06 RX ORDER — LEVOTHYROXINE SODIUM 0.15 MG/1
150 TABLET ORAL
Qty: 90 TABLET | Refills: 3 | Status: SHIPPED | OUTPATIENT
Start: 2022-09-06

## 2022-09-06 RX ORDER — SODIUM CHLORIDE 9 MG/ML
20 INJECTION, SOLUTION INTRAVENOUS ONCE
Status: COMPLETED | OUTPATIENT
Start: 2022-09-06 | End: 2022-09-06

## 2022-09-06 RX ORDER — SODIUM CHLORIDE 9 MG/ML
20 INJECTION, SOLUTION INTRAVENOUS ONCE
Status: CANCELLED | OUTPATIENT
Start: 2022-09-12

## 2022-09-06 RX ADMIN — SODIUM CHLORIDE 20 ML/HR: 0.9 INJECTION, SOLUTION INTRAVENOUS at 13:21

## 2022-09-06 RX ADMIN — IRON SUCROSE 200 MG: 20 INJECTION, SOLUTION INTRAVENOUS at 13:22

## 2022-09-06 NOTE — PROGRESS NOTES
ADULT ANNUAL PHYSICAL  [address] [facility]    NAME: Shelli Umaña  AGE: 41 y o  SEX: female  : 1975     DATE: 2022     Assessment and Plan:     Problem List Items Addressed This Visit        Endocrine    Hypothyroidism    Relevant Medications    levothyroxine (Levo-T) 150 mcg tablet    Other Relevant Orders    TSH, 3rd generation      Other Visit Diagnoses     Annual physical exam    -  Primary    Relevant Orders    Lipid Panel with Direct LDL reflex    Morbid obesity with BMI of 45 0-49 9, adult ([address] )              Immunizations and preventive care screenings were discussed with patient today  Appropriate education was printed on patient's after visit summary  Counseling:  Alcohol/drug use: discussed moderation in alcohol intake, the recommendations for healthy alcohol use, and avoidance of illicit drug use  Dental Health: discussed importance of regular tooth brushing, flossing, and dental visits  Injury prevention: discussed safety/seat belts, safety helmets, smoke detectors, carbon dioxide detectors, and smoking near bedding or upholstery  Sexual health: discussed sexually transmitted diseases, partner selection, use of condoms, avoidance of unintended pregnancy, and contraceptive alternatives  · Exercise: the importance of regular exercise/physical activity was discussed  Recommend exercise 3-5 times per week for at least 30 minutes  BMI Counseling: Body mass index is 48 08 kg/m²  The BMI is above normal  Nutrition recommendations include decreasing portion sizes, encouraging healthy choices of fruits and vegetables, decreasing fast food intake, consuming healthier snacks, limiting drinks that contain sugar and moderation in carbohydrate intake  Exercise recommendations include exercising 3-5 times per week  No pharmacotherapy was ordered  Rationale for BMI follow-up plan is due to patient being overweight or obese  Return in about 6 months (around 3/6/2023) for Next scheduled follow up  Chief Complaint:     Chief Complaint   Patient presents with    Anxiety     FMLA paperwork       History of Present Illness:     Adult Annual Physical   Patient here for a comprehensive physical exam  The patient reports problems - has not been taking her thyroid medication on a daily basis    Stressed in importance  of taking routinely    Susan Soulier Diet and Physical Activity  · Diet/Nutrition: well balanced diet and limited fruits/vegetables  · Exercise: no formal exercise and starting rehab pulmonology  Depression Screening  PHQ-2/9 Depression Screening    Little interest or pleasure in doing things: 2 - more than half the days  Feeling down, depressed, or hopeless: 2 - more than half the days  Trouble falling or staying asleep, or sleeping too much: 2 - more than half the days  Feeling tired or having little energy: 2 - more than half the days  Poor appetite or overeatin - more than half the days  Feeling bad about yourself - or that you are a failure or have let yourself or your family down: 2 - more than half the days  Trouble concentrating on things, such as reading the newspaper or watching television: 2 - more than half the days  Moving or speaking so slowly that other people could have noticed  Or the opposite - being so fidgety or restless that you have been moving around a lot more than usual: 2 - more than half the days  Thoughts that you would be better off dead, or of hurting yourself in some way: 0 - not at all  PHQ-9 Score: 16   PHQ-9 Interpretation: Moderately severe depression        General Health  · Sleep: sleeps poorly and using sleep medication  · Hearing: normal - bilateral   · Vision: no vision problems  · Dental: no dental visits for >1 year         /GYN Health  · Patient is: perimenopausal  · Last menstrual period: having menses for 4 months  Has gyn appt  ·      Review of Systems:     Review of Systems   Constitutional: Positive for activity change and fatigue  Negative for appetite change, chills and fever  HENT: Negative for congestion, ear pain and sore throat  Eyes: Negative for pain and visual disturbance  Respiratory: Negative for cough and shortness of breath  Cardiovascular: Negative for chest pain and palpitations  Gastrointestinal: Negative for abdominal pain, constipation, diarrhea, nausea and vomiting  Genitourinary: Negative for dysuria, hematuria and urgency  Musculoskeletal: Negative for arthralgias and back pain  Skin: Negative for color change and rash  Neurological: Negative for dizziness, seizures, syncope, light-headedness, numbness and headaches  Psychiatric/Behavioral: Positive for sleep disturbance  The patient is not nervous/anxious  All other systems reviewed and are negative       Past Medical History:     Past Medical History:   Diagnosis Date    Abscess of labia     Acute and chronic respiratory failure with hypoxia ([facility] [address] )     Anemia 2021    Anxiety     Bipolar disorder (HCC)     Breast lump     COVID-19     Frequent headaches     Hemorrhoids     Hypothyroidism 2013    Migraine     Morbid obesity (HCC) 2013    Obesity     Psychotic disorder (HCC)       Past Surgical History:     Past Surgical History:   Procedure Laterality Date    BARIATRIC SURGERY      BREAST BIOPSY Right 1999    BREAST LUMPECTOMY Left     benign    CHOLECYSTECTOMY      GALLBLADDER SURGERY        Social History:     Social History     Socioeconomic History    Marital status: Single     Spouse name: None    Number of children: 1    Years of education: None    Highest education level: None   Occupational History    None   Tobacco Use    Smoking status: Former Smoker     Types: Cigarettes     Quit date:      Years since quittin 6    Smokeless tobacco: Never Used    Tobacco comment: smoker for 2-3 yrs and quit in , smoked about 3 cigarettes a day   Vaping Use    Vaping Use: Never used   Substance and Sexual Activity    Alcohol use: Never    Drug use: No    Sexual activity: Not Currently   Other Topics Concern    None   Social History Narrative    None     Social Determinants of Health     Financial Resource Strain: Not on file   Food Insecurity: Not on file   Transportation Needs: Not on file   Physical Activity: Not on file   Stress: Not on file   Social Connections: Not on file   Intimate Partner Violence: Not on file   Housing Stability: Not on file      Family History:     Family History   Problem Relation Age of Onset    Liver cancer Mother     Prostate cancer Father     Cancer Family     No Known Problems Sister     No Known Problems Maternal Grandmother     No Known Problems Maternal Grandfather     No Known Problems Paternal Grandmother     No Known Problems Paternal Grandfather     No Known Problems Sister     No Known Problems Sister     No Known Problems Sister     No Known Problems Sister     No Known Problems Sister     No Known Problems Sister     No Known Problems Sister     No Known Problems Sister     No Known Problems Sister     Cancer Maternal Aunt     Cancer Maternal Aunt     Cancer Maternal Aunt     Cancer Maternal Aunt     Cancer Maternal Aunt     Cancer Maternal Aunt     Cancer Maternal Aunt     No Known Problems Paternal Aunt     No Known Problems Paternal Aunt     No Known Problems Paternal Aunt     No Known Problems Paternal Aunt     Cancer Paternal Aunt     Cancer Paternal Aunt       Current Medications:     Current Outpatient Medications   Medication Sig Dispense Refill    albuterol (PROVENTIL HFA,VENTOLIN HFA) 90 mcg/act inhaler INHALE 2 PUFFS EVERY 6 (SIX) HOURS AS NEEDED FOR WHEEZING OR SHORTNESS OF BREATH 8 5 g 5    atorvastatin (LIPITOR) 20 mg tablet TAKE ONE TABLET (20 MG TOTAL) BY MOUTH DAILY 30 tablet 6    benzonatate (TESSALON) 200 MG capsule Take 1 capsule (200 mg total) by mouth daily at bedtime as needed for cough 30 capsule 3    Breo Ellipta 200-25 MCG/INH inhaler INHALE 1 PUFF DAILY RINSE MOUTH AFTER USE  60 each 5    busPIRone (BUSPAR) 10 mg tablet Take 1 tablet (10 mg total) by mouth 2 (two) times a day 60 tablet 3    Calcium Citrate 200 MG TABS 1 tablet 3 (three) times a day      Cyanocobalamin ER 1000 MCG TBCR take one tab daily      diphenhydrAMINE-acetaminophen (TYLENOL PM)  MG TABS Take 1 tablet by mouth daily at bedtime as needed for sleep      escitalopram (LEXAPRO) 20 mg tablet TAKE ONE TABLET (20 MG TOTAL) BY MOUTH DAILY 90 tablet 2    furosemide (LASIX) 20 mg tablet TAKE ONE TABLET (20 MG TOTAL) BY MOUTH DAILY 30 tablet 3    levothyroxine (Levo-T) 150 mcg tablet Take 1 tablet (150 mcg total) by mouth daily in the early morning 90 tablet 3    metoprolol tartrate (LOPRESSOR) 25 mg tablet TAKE 1/2 TABLET(12 5 MG) BY MOUTH EVERY 12 HOURS 90 tablet 4    Multiple Vitamins-Minerals (MULTIVITAMIN ADULT PO) every 24 hours      pantoprazole (PROTONIX) 40 mg tablet TAKE ONE TABLET (40 MG TOTAL) BY MOUTH EVERY 24 HOURS 90 tablet 3    traZODone (DESYREL) 100 mg tablet TAKE ONE TABLET (100 MG TOTAL) BY MOUTH DAILY AT BEDTIME 90 tablet 1    megestrol (MEGACE) 20 mg tablet Take 1 tablet (20 mg total) by mouth daily for 21 days 21 tablet 3     No current facility-administered medications for this visit  Allergies: Allergies   Allergen Reactions    No Active Allergies       Physical Exam:     /88 (BP Location: Left arm, Patient Position: Sitting, Cuff Size: Large)   Pulse 85   Temp 97 6 °F (36 4 °C) (Temporal)   Resp 22   Ht 5' 7" (1 702 m)   Wt (!) 139 kg (307 lb)   SpO2 96%   BMI 48 08 kg/m²     Physical Exam  Vitals and nursing note reviewed  Constitutional:       General: She is not in acute distress  Appearance: Normal appearance  She is well-developed  She is obese  HENT:      Head: Normocephalic and atraumatic        Right Ear: Tympanic membrane, ear canal and external ear normal       Left Ear: Tympanic membrane, ear canal and external ear normal       Nose: Nose normal       Mouth/Throat:      Pharynx: Oropharynx is clear  Eyes:      Conjunctiva/sclera: Conjunctivae normal       Pupils: Pupils are equal, round, and reactive to light  Cardiovascular:      Rate and Rhythm: Normal rate and regular rhythm  Pulses: Normal pulses  Heart sounds: Normal heart sounds  No murmur heard  Pulmonary:      Effort: Pulmonary effort is normal  No respiratory distress  Breath sounds: Normal breath sounds  Abdominal:      General: Bowel sounds are normal       Palpations: Abdomen is soft  Tenderness: There is no abdominal tenderness  Musculoskeletal:         General: Normal range of motion  Cervical back: Normal range of motion and neck supple  Skin:     General: Skin is warm and dry  Neurological:      Mental Status: She is alert and oriented to person, place, and time  Mental status is at baseline  Psychiatric:         Mood and Affect: Mood normal          Behavior: Behavior normal          Thought Content:  Thought content normal          Judgment: Judgment normal           Leah Orts, CRNP  [address]

## 2022-09-06 NOTE — PATIENT INSTRUCTIONS

## 2022-09-12 ENCOUNTER — HOSPITAL ENCOUNTER (OUTPATIENT)
Dept: INFUSION CENTER | Facility: HOSPITAL | Age: 47
Discharge: HOME | End: 2022-09-12
Attending: INTERNAL MEDICINE
Payer: COMMERCIAL

## 2022-09-12 ENCOUNTER — TELEPHONE (OUTPATIENT)
Dept: CARDIAC REHAB | Facility: CLINIC | Age: 47
End: 2022-09-12

## 2022-09-12 VITALS
SYSTOLIC BLOOD PRESSURE: 110 MMHG | DIASTOLIC BLOOD PRESSURE: 73 MMHG | TEMPERATURE: 98.1 F | HEART RATE: 89 BPM | RESPIRATION RATE: 20 BRPM

## 2022-09-12 DIAGNOSIS — D50.8 IRON DEFICIENCY ANEMIA SECONDARY TO INADEQUATE DIETARY IRON INTAKE: Primary | ICD-10-CM

## 2022-09-12 PROCEDURE — 96365 THER/PROPH/DIAG IV INF INIT: CPT

## 2022-09-12 RX ORDER — SODIUM CHLORIDE 9 MG/ML
20 INJECTION, SOLUTION INTRAVENOUS ONCE
Status: COMPLETED | OUTPATIENT
Start: 2022-09-12 | End: 2022-09-12

## 2022-09-12 RX ORDER — SODIUM CHLORIDE 9 MG/ML
20 INJECTION, SOLUTION INTRAVENOUS ONCE
Status: CANCELLED | OUTPATIENT
Start: 2022-09-19

## 2022-09-12 RX ADMIN — IRON SUCROSE 200 MG: 20 INJECTION, SOLUTION INTRAVENOUS at 14:06

## 2022-09-12 RX ADMIN — SODIUM CHLORIDE 20 ML/HR: 0.9 INJECTION, SOLUTION INTRAVENOUS at 14:00

## 2022-09-12 NOTE — PROGRESS NOTES
Pt tolerated treatment today with no adverse reactions  Declined AVS   Left unit ambulatory with a steady gait

## 2022-09-13 ENCOUNTER — TELEPHONE (OUTPATIENT)
Dept: CARDIAC REHAB | Facility: CLINIC | Age: 47
End: 2022-09-13

## 2022-09-19 ENCOUNTER — HOSPITAL ENCOUNTER (OUTPATIENT)
Dept: INFUSION CENTER | Facility: HOSPITAL | Age: 47
Discharge: HOME | End: 2022-09-19
Attending: INTERNAL MEDICINE
Payer: COMMERCIAL

## 2022-09-19 VITALS
OXYGEN SATURATION: 94 % | HEART RATE: 91 BPM | TEMPERATURE: 96.9 F | SYSTOLIC BLOOD PRESSURE: 117 MMHG | DIASTOLIC BLOOD PRESSURE: 74 MMHG

## 2022-09-19 DIAGNOSIS — D50.8 IRON DEFICIENCY ANEMIA SECONDARY TO INADEQUATE DIETARY IRON INTAKE: Primary | ICD-10-CM

## 2022-09-19 PROCEDURE — 96365 THER/PROPH/DIAG IV INF INIT: CPT

## 2022-09-19 RX ORDER — SODIUM CHLORIDE 9 MG/ML
20 INJECTION, SOLUTION INTRAVENOUS ONCE
Status: CANCELLED | OUTPATIENT
Start: 2022-09-26

## 2022-09-19 RX ORDER — SODIUM CHLORIDE 9 MG/ML
20 INJECTION, SOLUTION INTRAVENOUS ONCE
Status: COMPLETED | OUTPATIENT
Start: 2022-09-19 | End: 2022-09-19

## 2022-09-19 RX ADMIN — SODIUM CHLORIDE 20 ML/HR: 0.9 INJECTION, SOLUTION INTRAVENOUS at 13:06

## 2022-09-19 RX ADMIN — IRON SUCROSE 200 MG: 20 INJECTION, SOLUTION INTRAVENOUS at 13:07

## 2022-09-19 NOTE — PROGRESS NOTES
Patient tolerated venofer today without complications, made next appt, declined AVS, states she uses mychart

## 2022-09-19 NOTE — TELEPHONE ENCOUNTER
Behavorial Health Outpatient Intake Questions    Referred by:PCP    Please advised interviewee that they need to answer all questions truthfully to allow for best care and any misrepresentations of information may affect their ability to be seen at this clinic   => Was this discussed? Yes     [facility] -     Presenting Problem (in patient's words):   Bipolar Disorder    Are there any developmental disabilities? ? If yes, can they speak to you on the phone? If they are too limited to speak to you on phone, refer out No    Are you taking any psychiatric medications? Yes    => If yes, who prescribes? If yes, are they injectable medications? Buspar,Trazodone,Lexapro    Does the patient have a language barrier or hearing impairment? No    Have you been treated at [facility] by a therapist or a doctor in the past? If yes, who? No    Has the patient been hospitalized for mental health? Yes   If yes, how long ago was last hospitalization and where was it? Patient stated "many years ago"    Do you actively use alcohol or marijuana or illegal substances? If yes, what and how much - refer out to Drug and alcohol treatment if use is excessive or daily use of illegal substances No concerns of substance abuse are reported  Do you have a community treatment team or ? No    Legal History-     Does the patient have any history of arrests, custodial/correction time, or DUIs? No  If Yes-  1) What types of charges? 2) When were they last incarcerated? 3) Are they currently on parole or probation? Minor Child-    Who has custody of the child? Is there a custody agreement? If there is a custody agreement remind parent that they must bring a copy to the first appt or they will not be seen       Intake Team, please check with provider before scheduling if flags come up such as:  - complex case  - legal history (other than DUI)  - communication barrier concerns are present  - if, in your judgment, this needs further review    ACCEPTED as a patient Yes  => Appointment Date: 10/14/2022 at 2pm with Vaishali Espinoza    Referred Elsewhere? No    Name of [address] [place] [address]#[id number]  Insurance Phone #  If ins is primary or secondary:Primary  If patient is a minor, parents information such as Name, D  O B of guarantor

## 2022-09-20 ENCOUNTER — CLINICAL SUPPORT (OUTPATIENT)
Dept: PULMONOLOGY | Facility: CLINIC | Age: 47
End: 2022-09-20
Payer: COMMERCIAL

## 2022-09-20 DIAGNOSIS — U09.9 POST-COVID SYNDROME: Primary | ICD-10-CM

## 2022-09-20 PROCEDURE — 94625 PHY/QHP OP PULM RHB W/O MNTR: CPT

## 2022-09-22 ENCOUNTER — TELEPHONE (OUTPATIENT)
Dept: PULMONOLOGY | Facility: CLINIC | Age: 47
End: 2022-09-22

## 2022-09-22 DIAGNOSIS — R05.3 CHRONIC COUGH: ICD-10-CM

## 2022-09-22 DIAGNOSIS — U07.1 COVID-19 VIRUS INFECTION: ICD-10-CM

## 2022-09-22 DIAGNOSIS — R06.02 SHORTNESS OF BREATH: ICD-10-CM

## 2022-09-22 DIAGNOSIS — U09.9 POST-COVID SYNDROME: ICD-10-CM

## 2022-09-22 RX ORDER — ALBUTEROL SULFATE 90 UG/1
2 AEROSOL, METERED RESPIRATORY (INHALATION) EVERY 6 HOURS PRN
Qty: 8.5 G | Refills: 5 | Status: CANCELLED | OUTPATIENT
Start: 2022-09-22

## 2022-09-22 RX ORDER — ALBUTEROL SULFATE 90 UG/1
2 AEROSOL, METERED RESPIRATORY (INHALATION) EVERY 6 HOURS PRN
Qty: 8.5 G | Refills: 5 | Status: SHIPPED | OUTPATIENT
Start: 2022-09-22 | End: 2022-09-27 | Stop reason: SDUPTHER

## 2022-09-22 NOTE — TELEPHONE ENCOUNTER
Pt called stating she is feeling short of breathe along with having pain in her back by her lungs when she takes a breathe  Please advise   Pt scheduled for a follow up in December with Mari Galdamez ( Pt has been seeing Mele Baker lately )

## 2022-09-22 NOTE — TELEPHONE ENCOUNTER
Spoke with patient she confirmed this is not cold, flu, covid symptom related  She is having SOB with all types of exertion including bedtime  She is using her oxygen to sleep still not getting enough  Slight dry cough  Severe back pain as if it was in her lungs as she described  Taking her Breo and Albuterol which needs refills  She would like to be seen as to appt is not until December  Please advise

## 2022-09-22 NOTE — TELEPHONE ENCOUNTER
Please schedule with whover available  I am not in clinic all next month due to trauma icu rotation   Thanks

## 2022-09-26 ENCOUNTER — HOSPITAL ENCOUNTER (OUTPATIENT)
Dept: INFUSION CENTER | Facility: HOSPITAL | Age: 47
Discharge: HOME | End: 2022-09-26
Attending: INTERNAL MEDICINE
Payer: COMMERCIAL

## 2022-09-26 VITALS
TEMPERATURE: 97.8 F | RESPIRATION RATE: 15 BRPM | HEART RATE: 84 BPM | SYSTOLIC BLOOD PRESSURE: 112 MMHG | DIASTOLIC BLOOD PRESSURE: 61 MMHG

## 2022-09-26 DIAGNOSIS — D50.8 IRON DEFICIENCY ANEMIA SECONDARY TO INADEQUATE DIETARY IRON INTAKE: Primary | ICD-10-CM

## 2022-09-26 PROCEDURE — 96365 THER/PROPH/DIAG IV INF INIT: CPT

## 2022-09-26 RX ORDER — SODIUM CHLORIDE 9 MG/ML
20 INJECTION, SOLUTION INTRAVENOUS ONCE
Status: CANCELLED | OUTPATIENT
Start: 2022-09-26

## 2022-09-26 RX ORDER — SODIUM CHLORIDE 9 MG/ML
20 INJECTION, SOLUTION INTRAVENOUS ONCE
Status: COMPLETED | OUTPATIENT
Start: 2022-09-26 | End: 2022-09-26

## 2022-09-26 RX ADMIN — IRON SUCROSE 200 MG: 20 INJECTION, SOLUTION INTRAVENOUS at 14:30

## 2022-09-26 RX ADMIN — SODIUM CHLORIDE 20 ML/HR: 9 INJECTION, SOLUTION INTRAVENOUS at 14:30

## 2022-09-27 ENCOUNTER — APPOINTMENT (OUTPATIENT)
Dept: PULMONOLOGY | Facility: CLINIC | Age: 47
End: 2022-09-27
Payer: COMMERCIAL

## 2022-09-27 ENCOUNTER — APPOINTMENT (OUTPATIENT)
Dept: LAB | Facility: CLINIC | Age: 47
End: 2022-09-27
Payer: COMMERCIAL

## 2022-09-27 ENCOUNTER — OFFICE VISIT (OUTPATIENT)
Dept: PULMONOLOGY | Facility: CLINIC | Age: 47
End: 2022-09-27
Payer: COMMERCIAL

## 2022-09-27 ENCOUNTER — TELEPHONE (OUTPATIENT)
Dept: PULMONOLOGY | Facility: CLINIC | Age: 47
End: 2022-09-27

## 2022-09-27 VITALS
OXYGEN SATURATION: 100 % | BODY MASS INDEX: 47.09 KG/M2 | TEMPERATURE: 98.8 F | DIASTOLIC BLOOD PRESSURE: 80 MMHG | WEIGHT: 293 LBS | HEART RATE: 84 BPM | SYSTOLIC BLOOD PRESSURE: 120 MMHG | HEIGHT: 66 IN

## 2022-09-27 DIAGNOSIS — U09.9 POST-COVID SYNDROME: ICD-10-CM

## 2022-09-27 DIAGNOSIS — E03.9 HYPOTHYROIDISM, UNSPECIFIED TYPE: ICD-10-CM

## 2022-09-27 DIAGNOSIS — R06.02 SHORTNESS OF BREATH: ICD-10-CM

## 2022-09-27 DIAGNOSIS — U07.1 COVID-19 VIRUS INFECTION: ICD-10-CM

## 2022-09-27 DIAGNOSIS — R05.3 CHRONIC COUGH: ICD-10-CM

## 2022-09-27 DIAGNOSIS — R05.9 COUGH: ICD-10-CM

## 2022-09-27 DIAGNOSIS — R06.02 SHORTNESS OF BREATH: Primary | ICD-10-CM

## 2022-09-27 LAB
ANION GAP SERPL CALCULATED.3IONS-SCNC: 6 MMOL/L (ref 4–13)
BUN SERPL-MCNC: 9 MG/DL (ref 5–25)
CALCIUM SERPL-MCNC: 8.9 MG/DL (ref 8.3–10.1)
CHLORIDE SERPL-SCNC: 111 MMOL/L (ref 96–108)
CO2 SERPL-SCNC: 22 MMOL/L (ref 21–32)
CREAT SERPL-MCNC: 1.12 MG/DL (ref 0.6–1.3)
GFR SERPL CREATININE-BSD FRML MDRD: 59 ML/MIN/1.73SQ M
GLUCOSE SERPL-MCNC: 81 MG/DL (ref 65–140)
POTASSIUM SERPL-SCNC: 4 MMOL/L (ref 3.5–5.3)
SODIUM SERPL-SCNC: 139 MMOL/L (ref 135–147)
T4 FREE SERPL-MCNC: 0.69 NG/DL (ref 0.76–1.46)
TSH SERPL DL<=0.05 MIU/L-ACNC: 9.96 UIU/ML (ref 0.45–4.5)

## 2022-09-27 PROCEDURE — 99215 OFFICE O/P EST HI 40 MIN: CPT | Performed by: INTERNAL MEDICINE

## 2022-09-27 PROCEDURE — 36415 COLL VENOUS BLD VENIPUNCTURE: CPT

## 2022-09-27 PROCEDURE — 84443 ASSAY THYROID STIM HORMONE: CPT

## 2022-09-27 PROCEDURE — 80048 BASIC METABOLIC PNL TOTAL CA: CPT

## 2022-09-27 PROCEDURE — 84439 ASSAY OF FREE THYROXINE: CPT

## 2022-09-27 RX ORDER — BENZONATATE 200 MG/1
200 CAPSULE ORAL
Qty: 30 CAPSULE | Refills: 3 | Status: SHIPPED | OUTPATIENT
Start: 2022-09-27

## 2022-09-27 RX ORDER — ALBUTEROL SULFATE 90 UG/1
2 AEROSOL, METERED RESPIRATORY (INHALATION) EVERY 6 HOURS PRN
Qty: 8.5 G | Refills: 5 | Status: SHIPPED | OUTPATIENT
Start: 2022-09-27

## 2022-09-27 NOTE — PROGRESS NOTES
Pulmonary Follow Up Note   Kodi Umaña 41 y o  female MRN: [id number]  9/27/2022      Assessment:    1  Shortness of breath  - unclear etiology, the pleuritic nature could indicate an underlying pulmonary emboli however her O2 sat is normal and the pain is somewhat reproducible  This could be musculoskeletal in nature however to be certain I will check a CTA chest PE study    2  Hypothyroidism, unspecified type  - last TSH and FT4 were both abnormal   Patient reports worsening fatigue and shortness of breath  Will repeat labs    3  COVID-19 virus infection  - had significant ground-glass opacities that were being followed  The last imaging was July of 2021 which shows some possible early fibrosis  The CTA chest PE which will be obtained to rule out a pulmonary embolism showed also show there has been progression of fibrosis or ground-glass  Patient has a follow-up in December with Pulmonary and I informed her to keep that appointment    4  Cough  - I provided a refill of the Breo, Tessalon Perles and albuterol rescue inhaler  -     benzonatate (TESSALON) 200 MG capsule; Take 1 capsule (200 mg total) by mouth daily at bedtime as needed for cough    5  Post-COVID syndrome  -     fluticasone-vilanterol (Breo Ellipta) 200-25 MCG/INH inhaler; Inhale 1 puff daily Rinse mouth after use  6  Chronic cough  -     fluticasone-vilanterol (Breo Ellipta) 200-25 MCG/INH inhaler; Inhale 1 puff daily Rinse mouth after use  Plan:    Diagnoses and all orders for this visit:    Shortness of breath  -     Basic metabolic panel; Future  -     CTA chest pe study; Future  -     fluticasone-vilanterol (Breo Ellipta) 200-25 MCG/INH inhaler; Inhale 1 puff daily Rinse mouth after use  Hypothyroidism, unspecified type  -     TSH, 3rd generation with Free T4 reflex; Future    COVID-19 virus infection  -     albuterol (PROVENTIL HFA,VENTOLIN HFA) 90 mcg/act inhaler;  Inhale 2 puffs every 6 (six) hours as needed for wheezing or shortness of breath    Cough  -     benzonatate (TESSALON) 200 MG capsule; Take 1 capsule (200 mg total) by mouth daily at bedtime as needed for cough    Post-COVID syndrome  -     fluticasone-vilanterol (Breo Ellipta) 200-25 MCG/INH inhaler; Inhale 1 puff daily Rinse mouth after use  Chronic cough  -     fluticasone-vilanterol (Breo Ellipta) 200-25 MCG/INH inhaler; Inhale 1 puff daily Rinse mouth after use  No follow-ups on file  History of Present Illness   HPI:  Jose R Quinones is a 41 y o  female who presents for a sick visit  She reports having pleuritic chest pain that is posterior and to the right  The pain however is somewhat reproducible  She is having more shortness of breath than usual that is worse with lying flat  She is also complaining of severe fatigue  She denies any hemoptysis or anterior chest pain  Review of Systems   Constitutional: Negative for chills and fever  HENT: Negative for congestion, postnasal drip and rhinorrhea  Eyes: Negative for itching  Respiratory: Positive for cough  Negative for shortness of breath, wheezing and stridor  Cardiovascular: Negative for chest pain, palpitations and leg swelling  Gastrointestinal: Negative for abdominal distention, abdominal pain, nausea and vomiting  Genitourinary: Negative for dysuria and flank pain  Musculoskeletal: Negative for arthralgias and myalgias  Skin: Negative for color change  Neurological: Negative for dizziness, light-headedness and headaches  Psychiatric/Behavioral: Negative          Historical Information   Past Medical History:   Diagnosis Date    Abscess of labia     Acute and chronic respiratory failure with hypoxia ([facility] [address] )     Anemia 4/6/2021    Anxiety     Bipolar disorder (HCC)     Breast lump     COVID-19     Frequent headaches     Hemorrhoids     Hypothyroidism 7/1/2013    Migraine     Morbid obesity (HCC) 7/1/2013    Obesity     Psychotic disorder ([facility] [address] ) Past Surgical History:   Procedure Laterality Date    BARIATRIC SURGERY      BREAST BIOPSY Right 1999    BREAST LUMPECTOMY Left     benign    CHOLECYSTECTOMY      GALLBLADDER SURGERY       Family History   Problem Relation Age of Onset    Liver cancer Mother     Prostate cancer Father     Cancer Family     No Known Problems Sister     No Known Problems Maternal Grandmother     No Known Problems Maternal Grandfather     No Known Problems Paternal Grandmother     No Known Problems Paternal Grandfather     No Known Problems Sister     No Known Problems Sister     No Known Problems Sister     No Known Problems Sister     No Known Problems Sister     No Known Problems Sister     No Known Problems Sister     No Known Problems Sister     No Known Problems Sister     Cancer Maternal Aunt     Cancer Maternal Aunt     Cancer Maternal Aunt     Cancer Maternal Aunt     Cancer Maternal Aunt     Cancer Maternal Aunt     Cancer Maternal Aunt     No Known Problems Paternal Aunt     No Known Problems Paternal Aunt     No Known Problems Paternal Aunt     No Known Problems Paternal Aunt     Cancer Paternal Aunt     Cancer Paternal Aunt          Meds/Allergies     Current Outpatient Medications:     albuterol (PROVENTIL HFA,VENTOLIN HFA) 90 mcg/act inhaler, Inhale 2 puffs every 6 (six) hours as needed for wheezing or shortness of breath, Disp: 8 5 g, Rfl: 5    atorvastatin (LIPITOR) 20 mg tablet, TAKE ONE TABLET (20 MG TOTAL) BY MOUTH DAILY, Disp: 30 tablet, Rfl: 6    benzonatate (TESSALON) 200 MG capsule, Take 1 capsule (200 mg total) by mouth daily at bedtime as needed for cough, Disp: 30 capsule, Rfl: 3    busPIRone (BUSPAR) 10 mg tablet, Take 1 tablet (10 mg total) by mouth 2 (two) times a day, Disp: 60 tablet, Rfl: 3    Calcium Citrate 200 MG TABS, 1 tablet 3 (three) times a day, Disp: , Rfl:     Cyanocobalamin ER 1000 MCG TBCR, take one tab daily, Disp: , Rfl:    diphenhydrAMINE-acetaminophen (TYLENOL PM)  MG TABS, Take 1 tablet by mouth daily at bedtime as needed for sleep, Disp: , Rfl:     escitalopram (LEXAPRO) 20 mg tablet, TAKE ONE TABLET (20 MG TOTAL) BY MOUTH DAILY, Disp: 90 tablet, Rfl: 2    fluticasone-vilanterol (Breo Ellipta) 200-25 MCG/INH inhaler, Inhale 1 puff daily Rinse mouth after use , Disp: 60 each, Rfl: 5    furosemide (LASIX) 20 mg tablet, TAKE ONE TABLET (20 MG TOTAL) BY MOUTH DAILY, Disp: 30 tablet, Rfl: 3    levothyroxine (Levo-T) 150 mcg tablet, Take 1 tablet (150 mcg total) by mouth daily in the early morning, Disp: 90 tablet, Rfl: 3    megestrol (MEGACE) 20 mg tablet, Take 1 tablet (20 mg total) by mouth daily for 21 days, Disp: 21 tablet, Rfl: 3    metoprolol tartrate (LOPRESSOR) 25 mg tablet, TAKE 1/2 TABLET(12 5 MG) BY MOUTH EVERY 12 HOURS, Disp: 90 tablet, Rfl: 4    Multiple Vitamins-Minerals (MULTIVITAMIN ADULT PO), every 24 hours, Disp: , Rfl:     pantoprazole (PROTONIX) 40 mg tablet, TAKE ONE TABLET (40 MG TOTAL) BY MOUTH EVERY 24 HOURS, Disp: 90 tablet, Rfl: 3    traZODone (DESYREL) 100 mg tablet, TAKE ONE TABLET (100 MG TOTAL) BY MOUTH DAILY AT BEDTIME, Disp: 90 tablet, Rfl: 1  No current facility-administered medications for this visit  Allergies   Allergen Reactions    No Active Allergies        Vitals: Blood pressure 120/80, pulse 84, temperature 98 8 °F (37 1 °C), temperature source Tympanic, height 5' 6" (1 676 m), weight (!) 139 kg (306 lb), SpO2 100 %, not currently breastfeeding  Body mass index is 49 39 kg/m²  Oxygen Therapy  SpO2: 100 %  Oxygen Therapy: None (Room air)      Physical Exam  Physical Exam  Constitutional:       General: She is not in acute distress  Appearance: She is obese  She is not diaphoretic  HENT:      Head: Normocephalic and atraumatic  Nose: Nose normal       Mouth/Throat:      Pharynx: No oropharyngeal exudate  Eyes:      General: No scleral icterus       Conjunctiva/sclera: Conjunctivae normal       Pupils: Pupils are equal, round, and reactive to light  Neck:      Thyroid: No thyromegaly  Vascular: No JVD  Trachea: No tracheal deviation  Cardiovascular:      Rate and Rhythm: Normal rate and regular rhythm  Heart sounds: Normal heart sounds  No murmur heard  No friction rub  No gallop  Pulmonary:      Effort: Pulmonary effort is normal  No respiratory distress  Breath sounds: Normal breath sounds  No stridor  No wheezing or rales  Abdominal:      General: Bowel sounds are normal  There is no distension  Palpations: Abdomen is soft  Tenderness: There is no abdominal tenderness  There is no guarding or rebound  Musculoskeletal:         General: No deformity  Normal range of motion  Cervical back: Normal range of motion and neck supple  Lymphadenopathy:      Cervical: No cervical adenopathy  Skin:     General: Skin is warm  Findings: No erythema or rash  Neurological:      Mental Status: She is alert and oriented to person, place, and time  Cranial Nerves: No cranial nerve deficit  Sensory: No sensory deficit  Labs: I have personally reviewed pertinent lab results  Lab Results   Component Value Date    WBC 8 55 09/10/2021    HGB 15 0 09/10/2021    HCT 45 9 09/10/2021    MCV 97 09/10/2021     (H) 09/10/2021     Lab Results   Component Value Date    GLUCOSE 89 10/14/2015    CALCIUM 8 6 09/10/2021     10/14/2015    K 5 4 (H) 09/10/2021    CO2 25 09/10/2021     09/10/2021    BUN 8 09/10/2021    CREATININE 1 06 09/10/2021     No results found for: IGE  Lab Results   Component Value Date    ALT 35 09/10/2021    AST 14 09/10/2021    ALKPHOS 150 (H) 09/10/2021    BILITOT 0 33 10/14/2015         Imaging and other studies: I have personally reviewed pertinent reports     and I have personally reviewed pertinent films in PACS  I reviewed the CTA chest PE study from 04/06/2021 and the high-resolution CT scan from 07/01/2021  The initial imaging study shows scattered bilateral ground-glass opacities  The follow-up high-resolution shows improvement in the ground-glass with replacement of some fibrotic changes at the bases  Pulmonary function testing:   I reviewed the pulmonary function test from June of 2021 which shows very severe obstructive airflow defect however the results are questionable due to the patient inability to perform certain maneuvers    EKG, Pathology, and Other Studies: I have personally reviewed pertinent reports     and I have personally reviewed pertinent films in PACS    Radha Alcala MD  Pulmonary and Critical Care   [facility]

## 2022-09-29 ENCOUNTER — APPOINTMENT (OUTPATIENT)
Dept: PULMONOLOGY | Facility: CLINIC | Age: 47
End: 2022-09-29
Payer: COMMERCIAL

## 2022-09-29 ENCOUNTER — OFFICE VISIT (OUTPATIENT)
Dept: PSYCHIATRY | Facility: CLINIC | Age: 47
End: 2022-09-29

## 2022-09-29 DIAGNOSIS — F41.9 ANXIETY: Primary | ICD-10-CM

## 2022-09-29 PROCEDURE — NOSHOW: Performed by: NURSE PRACTITIONER

## 2022-10-04 ENCOUNTER — CLINICAL SUPPORT (OUTPATIENT)
Dept: PULMONOLOGY | Facility: CLINIC | Age: 47
End: 2022-10-04
Payer: COMMERCIAL

## 2022-10-04 DIAGNOSIS — U09.9 POST-COVID SYNDROME: Primary | ICD-10-CM

## 2022-10-04 PROCEDURE — 94625 PHY/QHP OP PULM RHB W/O MNTR: CPT

## 2022-10-04 NOTE — PLAN OF CARE
Problem: Potential for Falls  Goal: Patient will remain free of falls  Description: INTERVENTIONS:  - Assess patient frequently for physical needs  -  Identify cognitive and physical deficits and behaviors that affect risk of falls    -  [place] fall precautions as indicated by assessment   - Educate patient/family on patient safety including physical limitations  - Instruct patient to call for assistance with activity based on assessment  - Modify environment to reduce risk of injury  - Consider OT/PT consult to assist with strengthening/mobility  Outcome: Progressing     Problem: RESPIRATORY - ADULT  Goal: Achieves optimal ventilation and oxygenation  Description: INTERVENTIONS:  - Assess for changes in respiratory status  - Assess for changes in mentation and behavior  - Position to facilitate oxygenation and minimize respiratory effort  - Oxygen administered by appropriate delivery if ordered  - Initiate smoking cessation education as indicated  - Encourage broncho-pulmonary hygiene including cough, deep breathe, Incentive Spirometry  - Assess the need for suctioning and aspirate as needed  - Assess and instruct to report SOB or any respiratory difficulty  - Respiratory Therapy support as indicated  Outcome: Progressing     Problem: Prexisting or High Potential for Compromised Skin Integrity  Goal: Skin integrity is maintained or improved  Description: INTERVENTIONS:  - Identify patients at risk for skin breakdown  - Assess and monitor skin integrity  - Assess and monitor nutrition and hydration status  - Monitor labs   - Assess for incontinence   - Turn and reposition patient  - Assist with mobility/ambulation  - Relieve pressure over bony prominences  - Avoid friction and shearing  - Provide appropriate hygiene as needed including keeping skin clean and dry  - Evaluate need for skin moisturizer/barrier cream  - Collaborate with interdisciplinary team   - Patient/family teaching  - Consider wound care [FreeTextEntry1] : 31M  with recurrent thrombosed external hemorrhoid\par \par The patient is healing well from surgery.  I recommended that they continue to keep the area clean and dry.  The patient will return in 3 months.\par  consult   Outcome: Progressing     Problem: Nutrition/Hydration-ADULT  Goal: Nutrient/Hydration intake appropriate for improving, restoring or maintaining nutritional needs  Description: Monitor and assess patient's nutrition/hydration status for malnutrition  Collaborate with interdisciplinary team and initiate plan and interventions as ordered  Monitor patient's weight and dietary intake as ordered or per policy  Utilize nutrition screening tool and intervene as necessary  Determine patient's food preferences and provide high-protein, high-caloric foods as appropriate  INTERVENTIONS:  - Monitor oral intake, urinary output, labs, and treatment plans  - Assess nutrition and hydration status and recommend course of action  - Evaluate amount of meals eaten  - Assist patient with eating if necessary   - Allow adequate time for meals  - Recommend/ encourage appropriate diets, oral nutritional supplements, and vitamin/mineral supplements  - Order, calculate, and assess calorie counts as needed  - Recommend, monitor, and adjust tube feedings and TPN/PPN based on assessed needs  - Assess need for intravenous fluids  - Provide specific nutrition/hydration education as appropriate  - Include patient/family/caregiver in decisions related to nutrition  Outcome: Progressing   Care plan ongoing

## 2022-10-11 ENCOUNTER — APPOINTMENT (OUTPATIENT)
Dept: PULMONOLOGY | Facility: CLINIC | Age: 47
End: 2022-10-11

## 2022-10-11 ENCOUNTER — CLINICAL SUPPORT (OUTPATIENT)
Dept: PULMONOLOGY | Facility: CLINIC | Age: 47
End: 2022-10-11
Payer: COMMERCIAL

## 2022-10-11 DIAGNOSIS — U09.9 POST-COVID SYNDROME: Primary | ICD-10-CM

## 2022-10-11 PROCEDURE — 94625 PHY/QHP OP PULM RHB W/O MNTR: CPT

## 2022-10-12 PROBLEM — U07.1 PNEUMONIA DUE TO COVID-19 VIRUS: Status: RESOLVED | Noted: 2021-04-08 | Resolved: 2022-10-12

## 2022-10-12 PROBLEM — J12.82 PNEUMONIA DUE TO COVID-19 VIRUS: Status: RESOLVED | Noted: 2021-04-08 | Resolved: 2022-10-12

## 2022-10-18 ENCOUNTER — APPOINTMENT (OUTPATIENT)
Dept: PULMONOLOGY | Facility: CLINIC | Age: 47
End: 2022-10-18

## 2022-10-24 ENCOUNTER — TELEPHONE (OUTPATIENT)
Dept: CARDIOLOGY CLINIC | Facility: CLINIC | Age: 47
End: 2022-10-24

## 2022-10-24 ENCOUNTER — TELEPHONE (OUTPATIENT)
Dept: FAMILY MEDICINE CLINIC | Facility: CLINIC | Age: 47
End: 2022-10-24

## 2022-10-24 NOTE — TELEPHONE ENCOUNTER
If this is new onset breast pain she is to come into the office  for a visit  for an exam and ultrasound order  She technically does have a mammogram order placed from 4/27/2022

## 2022-10-24 NOTE — TELEPHONE ENCOUNTER
Sorry for the double message :)    She also has a CT chest ordered from pulmonology that needs to be done to rule out pulmonary embolism highly suggest that she gets that done as this pain could be related   Thank you!!

## 2022-10-24 NOTE — TELEPHONE ENCOUNTER
Patient is requesting an order be put in for mammogram  She has breast pain  Can we please put an order   Please advise

## 2022-10-25 ENCOUNTER — APPOINTMENT (OUTPATIENT)
Dept: PULMONOLOGY | Facility: CLINIC | Age: 47
End: 2022-10-25

## 2022-10-31 ENCOUNTER — HOSPITAL ENCOUNTER (OUTPATIENT)
Dept: CT IMAGING | Facility: HOSPITAL | Age: 47
Discharge: HOME | End: 2022-10-31
Attending: INTERNAL MEDICINE

## 2022-10-31 DIAGNOSIS — R06.02 SHORTNESS OF BREATH: ICD-10-CM

## 2022-10-31 RX ADMIN — IOHEXOL 85 ML: 350 INJECTION, SOLUTION INTRAVENOUS at 11:45

## 2022-11-01 ENCOUNTER — CLINICAL SUPPORT (OUTPATIENT)
Dept: PULMONOLOGY | Facility: CLINIC | Age: 47
End: 2022-11-01

## 2022-11-01 DIAGNOSIS — U09.9 POST-COVID SYNDROME: Primary | ICD-10-CM

## 2022-11-02 ENCOUNTER — TELEPHONE (OUTPATIENT)
Dept: PSYCHIATRY | Facility: CLINIC | Age: 47
End: 2022-11-02

## 2022-11-02 NOTE — TELEPHONE ENCOUNTER
Called patient off talk therapy wait list to see if patient was still interested  Patient stated they were still interested in talk therapy

## 2022-11-07 ENCOUNTER — TELEPHONE (OUTPATIENT)
Dept: FAMILY MEDICINE CLINIC | Facility: CLINIC | Age: 47
End: 2022-11-07

## 2022-11-07 NOTE — TELEPHONE ENCOUNTER
CTA of chest was negative for pulmonary embolism  Already has a mammogram order placed from 4/2022  If this is a new issue needs to be seen in office for examination then ultrasound can be ordered

## 2022-11-07 NOTE — TELEPHONE ENCOUNTER
Patient needs a by lateral diagnostic mammogram  She has a lot of pain in the left breast and sometimes on the right  But the left is the worse   Please advise

## 2022-11-07 NOTE — TELEPHONE ENCOUNTER
After collaborating with other providers, it is best she be seen in office for further evaluation to target exact problem

## 2022-11-07 NOTE — TELEPHONE ENCOUNTER
Patient is aware of the order from march  But when she tried to schedule because she has breast pain they asked for a diagnostic mammo

## 2022-11-08 ENCOUNTER — CLINICAL SUPPORT (OUTPATIENT)
Dept: PULMONOLOGY | Facility: CLINIC | Age: 47
End: 2022-11-08

## 2022-11-08 DIAGNOSIS — U09.9 POST-COVID SYNDROME: Primary | ICD-10-CM

## 2022-11-10 ENCOUNTER — OFFICE VISIT (OUTPATIENT)
Dept: FAMILY MEDICINE CLINIC | Facility: CLINIC | Age: 47
End: 2022-11-10

## 2022-11-10 VITALS
DIASTOLIC BLOOD PRESSURE: 82 MMHG | WEIGHT: 293 LBS | HEIGHT: 66 IN | BODY MASS INDEX: 47.09 KG/M2 | SYSTOLIC BLOOD PRESSURE: 118 MMHG | HEART RATE: 78 BPM | RESPIRATION RATE: 16 BRPM | OXYGEN SATURATION: 98 % | TEMPERATURE: 96.6 F

## 2022-11-10 DIAGNOSIS — U09.9 POST-COVID SYNDROME: ICD-10-CM

## 2022-11-10 DIAGNOSIS — N63.24 MASS OF LOWER INNER QUADRANT OF LEFT BREAST: Primary | ICD-10-CM

## 2022-11-10 RX ORDER — ALBUTEROL SULFATE 90 UG/1
2 AEROSOL, METERED RESPIRATORY (INHALATION) EVERY 6 HOURS PRN
Qty: 8.5 G | Refills: 5 | Status: SHIPPED | OUTPATIENT
Start: 2022-11-10

## 2022-11-10 RX ORDER — FLUTICASONE FUROATE AND VILANTEROL 200; 25 UG/1; UG/1
1 POWDER RESPIRATORY (INHALATION) DAILY
Qty: 60 BLISTER | Refills: 5 | Status: SHIPPED | OUTPATIENT
Start: 2022-11-10

## 2022-11-10 NOTE — ASSESSMENT & PLAN NOTE
Upon assessment small lump identified in the lower inner quadrant of the left breast  Right breast is bone benign upon physical exam  No discoloration or skin texture noted  Diagnostic mammogram ordered

## 2022-11-10 NOTE — PROGRESS NOTES
Name: Emily Umaña      : 1975      MRN: [id number]  Encounter Provider: THOMAS Leroy  Encounter Date: 11/10/2022   Encounter department: [facility]     1  Mass of lower inner quadrant of left breast  Assessment & Plan:  Upon assessment small lump identified in the lower inner quadrant of the left breast  Right breast is bone benign upon physical exam  No discoloration or skin texture noted  Diagnostic mammogram ordered    Orders:  -     Mammo diagnostic bilateral w cad; Future; Expected date: 11/10/2022    2  Post-COVID syndrome  -     fluticasone-vilanterol (Breo Ellipta) 200-25 mcg/actuation inhaler; Inhale 1 puff daily Rinse mouth after use  -     albuterol (PROVENTIL HFA,VENTOLIN HFA) 90 mcg/act inhaler; Inhale 2 puffs every 6 (six) hours as needed for wheezing or shortness of breath         Subjective      Breast pain that started in the beginning of summer, left has more discomfort than the right  Reports feeling abnormal lump in left breast  No abnormal discoloration, skin texture or discharge coming from bilateral breasts  Has history of breast lump in the past 2019  Mammogram in 2019 showed R 5-5 5 mass, guided biopsy was negative  Review of Systems   Constitutional: Negative for activity change, chills, fatigue and fever  HENT: Negative for congestion, ear pain, rhinorrhea, sore throat and trouble swallowing  Eyes: Negative for pain and visual disturbance  Respiratory: Negative for cough, chest tightness and shortness of breath  Cardiovascular: Negative for chest pain, palpitations and leg swelling  Gastrointestinal: Negative for abdominal pain, constipation, diarrhea, nausea and vomiting  Genitourinary: Negative for difficulty urinating, dysuria, hematuria and urgency  Musculoskeletal: Negative for arthralgias and back pain  Skin: Negative for color change and rash     Neurological: Negative for dizziness, seizures, syncope and headaches  Psychiatric/Behavioral: Negative for dysphoric mood  The patient is not nervous/anxious  All other systems reviewed and are negative  Current Outpatient Medications on File Prior to Visit   Medication Sig   • atorvastatin (LIPITOR) 20 mg tablet TAKE ONE TABLET (20 MG TOTAL) BY MOUTH DAILY   • benzonatate (TESSALON) 200 MG capsule Take 1 capsule (200 mg total) by mouth daily at bedtime as needed for cough   • busPIRone (BUSPAR) 10 mg tablet Take 1 tablet (10 mg total) by mouth 2 (two) times a day   • Calcium Citrate 200 MG TABS 1 tablet 3 (three) times a day   • Cyanocobalamin ER 1000 MCG TBCR take one tab daily   • diphenhydrAMINE-acetaminophen (TYLENOL PM)  MG TABS Take 1 tablet by mouth daily at bedtime as needed for sleep   • escitalopram (LEXAPRO) 20 mg tablet TAKE ONE TABLET (20 MG TOTAL) BY MOUTH DAILY   • furosemide (LASIX) 20 mg tablet TAKE ONE TABLET (20 MG TOTAL) BY MOUTH DAILY   • levothyroxine (Levo-T) 150 mcg tablet Take 1 tablet (150 mcg total) by mouth daily in the early morning   • metoprolol tartrate (LOPRESSOR) 25 mg tablet TAKE 1/2 TABLET(12 5 MG) BY MOUTH EVERY 12 HOURS   • Multiple Vitamins-Minerals (MULTIVITAMIN ADULT PO) every 24 hours   • pantoprazole (PROTONIX) 40 mg tablet TAKE ONE TABLET (40 MG TOTAL) BY MOUTH EVERY 24 HOURS   • traZODone (DESYREL) 100 mg tablet TAKE ONE TABLET (100 MG TOTAL) BY MOUTH DAILY AT BEDTIME   • [DISCONTINUED] albuterol (PROVENTIL HFA,VENTOLIN HFA) 90 mcg/act inhaler Inhale 2 puffs every 6 (six) hours as needed for wheezing or shortness of breath   • [DISCONTINUED] fluticasone-vilanterol (Breo Ellipta) 200-25 MCG/INH inhaler Inhale 1 puff daily Rinse mouth after use     • megestrol (MEGACE) 20 mg tablet Take 1 tablet (20 mg total) by mouth daily for 21 days       Objective     /82 (BP Location: Left arm, Patient Position: Sitting, Cuff Size: Large)   Pulse 78   Temp (!) 96 6 °F (35 9 °C)   Resp 16    5' 6" (1 676 m)   Wt (!) 138 kg (304 lb)   SpO2 98%   BMI 49 07 kg/m²     Physical Exam  Vitals and nursing note reviewed  Constitutional:       Appearance: Normal appearance  She is normal weight  HENT:      Head: Normocephalic  Right Ear: Tympanic membrane, ear canal and external ear normal  There is no impacted cerumen  Left Ear: Tympanic membrane, ear canal and external ear normal  There is no impacted cerumen  Nose: Nose normal       Mouth/Throat:      Mouth: Mucous membranes are moist       Pharynx: Oropharynx is clear  Eyes:      Extraocular Movements: Extraocular movements intact  Conjunctiva/sclera: Conjunctivae normal       Pupils: Pupils are equal, round, and reactive to light  Cardiovascular:      Rate and Rhythm: Normal rate and regular rhythm  Pulses: Normal pulses  Heart sounds: Normal heart sounds  Pulmonary:      Effort: Pulmonary effort is normal       Breath sounds: Normal breath sounds  Chest:   Breasts:      Right: No swelling, bleeding, inverted nipple, mass, nipple discharge, skin change or tenderness  Left: Mass and tenderness present  No swelling, bleeding, inverted nipple, nipple discharge or skin change  Abdominal:      General: Bowel sounds are normal  There is no distension  Palpations: Abdomen is soft  Tenderness: There is no abdominal tenderness  Musculoskeletal:         General: Normal range of motion  Cervical back: Normal range of motion  Skin:     General: Skin is warm  Capillary Refill: Capillary refill takes less than 2 seconds  Neurological:      General: No focal deficit present  Mental Status: She is alert and oriented to person, place, and time  Mental status is at baseline  Psychiatric:         Mood and Affect: Mood normal          Behavior: Behavior normal          Thought Content:  Thought content normal          Judgment: Judgment normal        THOMAS Galeas

## 2022-11-13 DIAGNOSIS — F32.1 CURRENT MODERATE EPISODE OF MAJOR DEPRESSIVE DISORDER, UNSPECIFIED WHETHER RECURRENT (HCC): ICD-10-CM

## 2022-11-13 DIAGNOSIS — F41.9 ANXIETY: ICD-10-CM

## 2022-11-13 DIAGNOSIS — U09.9 POST-COVID SYNDROME: ICD-10-CM

## 2022-11-14 RX ORDER — ESCITALOPRAM OXALATE 20 MG/1
TABLET ORAL
Qty: 90 TABLET | Refills: 2 | Status: SHIPPED | OUTPATIENT
Start: 2022-11-14

## 2022-11-15 ENCOUNTER — CLINICAL SUPPORT (OUTPATIENT)
Dept: PULMONOLOGY | Facility: CLINIC | Age: 47
End: 2022-11-15

## 2022-11-15 DIAGNOSIS — U09.9 POST-COVID SYNDROME: Primary | ICD-10-CM

## 2022-11-16 ENCOUNTER — NURSE TRIAGE (OUTPATIENT)
Dept: OTHER | Facility: OTHER | Age: 47
End: 2022-11-16

## 2022-11-16 RX ORDER — SODIUM CHLORIDE 9 MG/ML
125 INJECTION, SOLUTION INTRAVENOUS CONTINUOUS
Status: CANCELLED | OUTPATIENT
Start: 2022-11-16

## 2022-11-16 NOTE — TELEPHONE ENCOUNTER
Regarding: Has colonoscopy tomorrow and no prep and no instructions  ----- Message from Adela Hudson sent at 11/16/2022  1:32 PM EST -----  "Pt  Called because she has no instructions and no prep for her colonoscopy tomorrow  Her phone was not ringing and kept missing the calls   Pls call her on this number [phone]"

## 2022-11-16 NOTE — TELEPHONE ENCOUNTER
Patient called in for bowel prep instructions; she did not have any information  Triage RN reviewed if patient ate anything solid today  Patient just finished "small" amount of chicken noodle soup  Patient provided with list of ingredients for Miralax / Dulcolax prep  Patient is waiting for callback before she goes out to purchase products  Please follow up with patent to determine if procedures are going forward or to cancel/reschedule  Reason for Disposition  • [1] Caller has URGENT question or concern AND [2] triager unable to answer question    Answer Assessment - Initial Assessment Questions  1  DATE/TIME: "When did you have your colonoscopy?"       Scheduled for 11/17/22    2   MAIN CONCERN: "What is your main concern right now?" "What questions do you have?"      Ate chicken soup with chicken, noodles, potato and carrots    Protocols used: COLONOSCOPY SYMPTOMS AND QUESTIONS-ADULT-

## 2022-11-16 NOTE — TELEPHONE ENCOUNTER
Regarding: colonoscopy prep question  ----- Message from Mariana Bunn sent at 11/16/2022 11:01 AM EST -----  "I have a colonoscopy tomorrow and I need to know what steps I need to take to prepare for it?"

## 2022-11-16 NOTE — TELEPHONE ENCOUNTER
Reason for Disposition  • Third attempt to contact caller AND no contact made  Phone number verified      Protocols used: NO CONTACT OR DUPLICATE CONTACT CALL-ADULT-[place]

## 2022-11-17 ENCOUNTER — ANESTHESIA (OUTPATIENT)
Dept: GASTROENTEROLOGY | Facility: HOSPITAL | Age: 47
End: 2022-11-17

## 2022-11-17 ENCOUNTER — ANESTHESIA EVENT (OUTPATIENT)
Dept: GASTROENTEROLOGY | Facility: HOSPITAL | Age: 47
End: 2022-11-17

## 2022-11-17 ENCOUNTER — HOSPITAL ENCOUNTER (OUTPATIENT)
Dept: GASTROENTEROLOGY | Facility: HOSPITAL | Age: 47
Setting detail: OUTPATIENT SURGERY
End: 2022-11-17
Attending: INTERNAL MEDICINE

## 2022-11-17 VITALS
HEART RATE: 59 BPM | HEIGHT: 66 IN | RESPIRATION RATE: 20 BRPM | OXYGEN SATURATION: 100 % | SYSTOLIC BLOOD PRESSURE: 123 MMHG | BODY MASS INDEX: 47.09 KG/M2 | TEMPERATURE: 97.7 F | WEIGHT: 293 LBS | DIASTOLIC BLOOD PRESSURE: 76 MMHG

## 2022-11-17 DIAGNOSIS — R10.11 POSTPRANDIAL ABDOMINAL PAIN IN RIGHT UPPER QUADRANT: ICD-10-CM

## 2022-11-17 DIAGNOSIS — F41.9 ANXIETY: ICD-10-CM

## 2022-11-17 DIAGNOSIS — U09.9 POST-COVID SYNDROME: ICD-10-CM

## 2022-11-17 DIAGNOSIS — R19.7 DIARRHEA, UNSPECIFIED TYPE: ICD-10-CM

## 2022-11-17 DIAGNOSIS — F32.1 CURRENT MODERATE EPISODE OF MAJOR DEPRESSIVE DISORDER, UNSPECIFIED WHETHER RECURRENT (HCC): ICD-10-CM

## 2022-11-17 DIAGNOSIS — Z12.11 COLON CANCER SCREENING: ICD-10-CM

## 2022-11-17 LAB
EXT PREGNANCY TEST URINE: NEGATIVE
EXT. CONTROL: NORMAL

## 2022-11-17 RX ORDER — PROPOFOL 10 MG/ML
INJECTION, EMULSION INTRAVENOUS AS NEEDED
Status: DISCONTINUED | OUTPATIENT
Start: 2022-11-17 | End: 2022-11-17

## 2022-11-17 RX ORDER — SODIUM CHLORIDE 9 MG/ML
125 INJECTION, SOLUTION INTRAVENOUS CONTINUOUS
Status: DISCONTINUED | OUTPATIENT
Start: 2022-11-17 | End: 2022-11-21 | Stop reason: HOSPADM

## 2022-11-17 RX ORDER — TRAZODONE HYDROCHLORIDE 100 MG/1
TABLET ORAL
Qty: 90 TABLET | Refills: 1 | Status: SHIPPED | OUTPATIENT
Start: 2022-11-17

## 2022-11-17 RX ORDER — LIDOCAINE HYDROCHLORIDE 20 MG/ML
INJECTION, SOLUTION EPIDURAL; INFILTRATION; INTRACAUDAL; PERINEURAL AS NEEDED
Status: DISCONTINUED | OUTPATIENT
Start: 2022-11-17 | End: 2022-11-17

## 2022-11-17 RX ADMIN — LIDOCAINE HYDROCHLORIDE 100 MG: 20 INJECTION, SOLUTION EPIDURAL; INFILTRATION; INTRACAUDAL; PERINEURAL at 15:47

## 2022-11-17 RX ADMIN — PROPOFOL 50 MG: 10 INJECTION, EMULSION INTRAVENOUS at 16:00

## 2022-11-17 RX ADMIN — PROPOFOL 50 MG: 10 INJECTION, EMULSION INTRAVENOUS at 16:19

## 2022-11-17 RX ADMIN — PROPOFOL 50 MG: 10 INJECTION, EMULSION INTRAVENOUS at 16:13

## 2022-11-17 RX ADMIN — PROPOFOL 160 MG: 10 INJECTION, EMULSION INTRAVENOUS at 15:47

## 2022-11-17 RX ADMIN — SODIUM CHLORIDE 125 ML/HR: 0.9 INJECTION, SOLUTION INTRAVENOUS at 14:57

## 2022-11-17 RX ADMIN — PROPOFOL 50 MG: 10 INJECTION, EMULSION INTRAVENOUS at 16:06

## 2022-11-17 RX ADMIN — PROPOFOL 50 MG: 10 INJECTION, EMULSION INTRAVENOUS at 15:52

## 2022-11-17 RX ADMIN — PROPOFOL 40 MG: 10 INJECTION, EMULSION INTRAVENOUS at 15:49

## 2022-11-17 RX ADMIN — PROPOFOL 50 MG: 10 INJECTION, EMULSION INTRAVENOUS at 15:56

## 2022-11-17 NOTE — H&P
History and Physical - [facility]  Shelli Naqvi Chasidy 41 y o  female MRN: [id number]                  HPI: Valentina Pichardo is a 41y o  year old female who presents for right upper quadrant pain, history of gastric bypass, colon cancer screening, diarrhea  REVIEW OF SYSTEMS: Per the HPI, and otherwise unremarkable      Historical Information   Past Medical History:   Diagnosis Date   • Abscess of labia    • Acute and chronic respiratory failure with hypoxia (HCC)    • Anemia 2021   • Anxiety    • Bipolar disorder (HCC)    • Breast lump    • COVID-19    • Frequent headaches    • Hemorrhoids    • Hypothyroidism 2013   • Migraine    • Morbid obesity ([facility] [address] ) 2013   • Obesity    • Psychotic disorder (HCC)      Past Surgical History:   Procedure Laterality Date   • BARIATRIC SURGERY     • BREAST BIOPSY Right    • BREAST LUMPECTOMY Left     benign   • CHOLECYSTECTOMY     • GALLBLADDER SURGERY       Social History   Social History     Substance and Sexual Activity   Alcohol Use Never     Social History     Substance and Sexual Activity   Drug Use No     Social History     Tobacco Use   Smoking Status Former   • Types: Cigarettes   • Quit date:    • Years since quittin 8   Smokeless Tobacco Never   Tobacco Comments    smoker for 2-3 yrs and quit in , smoked about 3 cigarettes a day     Family History   Problem Relation Age of Onset   • Liver cancer Mother    • Prostate cancer Father    • Cancer Family    • No Known Problems Sister    • No Known Problems Maternal Grandmother    • No Known Problems Maternal Grandfather    • No Known Problems Paternal Grandmother    • No Known Problems Paternal Grandfather    • No Known Problems Sister    • No Known Problems Sister    • No Known Problems Sister    • No Known Problems Sister    • No Known Problems Sister    • No Known Problems Sister    • No Known Problems Sister    • No Known Problems Sister    • No Known Problems Sister    • Cancer Maternal Aunt    • Cancer Maternal Aunt    • Cancer Maternal Aunt    • Cancer Maternal Aunt    • Cancer Maternal Aunt    • Cancer Maternal Aunt    • Cancer Maternal Aunt    • No Known Problems Paternal Aunt    • No Known Problems Paternal Aunt    • No Known Problems Paternal Aunt    • No Known Problems Paternal Aunt    • Cancer Paternal Aunt    • Cancer Paternal Aunt        Meds/Allergies     (Not in a hospital admission)      Allergies   Allergen Reactions   • No Active Allergies        Objective     not currently breastfeeding  PHYSICAL EXAMINATION:    General Appearance:   Alert, cooperative, no distress   HEENT:  Normocephalic, atraumatic, anicteric  Neck supple, symmetrical, trachea midline  Lungs:   Equal chest rise and unlabored breathing, normal effort, no coughing  Cardiovascular:   No visualized JVD  Abdomen:   No abdominal distension  Skin:   No jaundice, rashes, or lesions  Musculoskeletal:   Normal range of motion visualized  Psych:  Normal affect and normal insight  Neuro:  Alert and appropriate  ASSESSMENT/PLAN:  This is a 41y o  year old female here for EGD and colonoscopy, and she is stable and optimized for her procedure

## 2022-11-17 NOTE — ANESTHESIA PREPROCEDURE EVALUATION
Procedure:  EGD  COLONOSCOPY    Relevant Problems   CARDIO   (+) Hyperlipidemia      ENDO   (+) Hypothyroidism   (+) Hypothyroidism due to Hashimoto's thyroiditis      HEMATOLOGY   (+) Anemia   (+) Iron deficiency anemia   (+) Iron deficiency anemia secondary to inadequate dietary iron intake      NEURO/PSYCH   (+) Anxiety   (+) Depression, recurrent (HCC)   (+) Headache      PULMONARY   (+) Chronic respiratory failure with hypoxia (HCC)   (+) CAR (obstructive sleep apnea)   (+) Shortness of breath      Other   (+) History of gastric bypass   (+) Morbid obesity (HCC)        Physical Exam    Airway    Mallampati score: II  TM Distance: >3 FB  Neck ROM: full     Dental   No notable dental hx     Cardiovascular  Rhythm: regular, Rate: normal, Cardiovascular exam normal    Pulmonary  Pulmonary exam normal Breath sounds clear to auscultation,     Other Findings        Anesthesia Plan  ASA Score- 3     Anesthesia Type- IV sedation with anesthesia with ASA Monitors  Additional Monitors:   Airway Plan:           Plan Factors-    Chart reviewed  Patient summary reviewed  Patient is not a current smoker  Patient not instructed to abstain from smoking on day of procedure  Patient did not smoke on day of surgery  Induction- intravenous  Postoperative Plan-     Informed Consent- Anesthetic plan and risks discussed with patient and daughter Sharda Segura)  I personally reviewed this patient with the CRNA  Discussed and agreed on the Anesthesia Plan with the CRNA  Jr Dougherty

## 2022-11-17 NOTE — ANESTHESIA POSTPROCEDURE EVALUATION
Post-Op Assessment Note    CV Status:  Stable  Pain Score: 1    Pain management: adequate     Mental Status:  Alert and awake   Hydration Status:  Euvolemic   PONV Controlled:  Controlled   Airway Patency:  Patent      Post Op Vitals Reviewed: Yes      Staff: Anesthesiologist         No notable events documented      BP      Temp      Pulse     Resp      SpO2      /76   Pulse 59   Temp 97 7 °F (36 5 °C) (Temporal)   Resp 20   Ht 5' 6" (1 676 m)   Wt (!) 138 kg (305 lb)   LMP 11/03/2022   SpO2 100%   BMI 49 23 kg/m²

## 2022-11-22 ENCOUNTER — APPOINTMENT (OUTPATIENT)
Dept: PULMONOLOGY | Facility: CLINIC | Age: 47
End: 2022-11-22

## 2022-11-28 ENCOUNTER — TELEPHONE (OUTPATIENT)
Dept: GYNECOLOGY | Facility: CLINIC | Age: 47
End: 2022-11-28

## 2022-11-29 ENCOUNTER — CLINICAL SUPPORT (OUTPATIENT)
Dept: PULMONOLOGY | Facility: CLINIC | Age: 47
End: 2022-11-29

## 2022-11-29 DIAGNOSIS — U09.9 POST-COVID SYNDROME: Primary | ICD-10-CM

## 2022-12-01 ENCOUNTER — TELEPHONE (OUTPATIENT)
Dept: PULMONOLOGY | Facility: CLINIC | Age: 47
End: 2022-12-01

## 2022-12-01 NOTE — TELEPHONE ENCOUNTER
Called patient and spoke to her regarding the low thyroid levels  She recently established care with a new physician for management of her thyroid levels  Recommended she schedule a follow up as soon as possible to address low levels   All questions answered and she verbalized understanding

## 2022-12-02 DIAGNOSIS — R19.7 DIARRHEA, UNSPECIFIED TYPE: Primary | ICD-10-CM

## 2022-12-02 DIAGNOSIS — R19.7 DIARRHEA, UNSPECIFIED TYPE: ICD-10-CM

## 2022-12-02 RX ORDER — CHOLESTYRAMINE 4 G/9G
POWDER, FOR SUSPENSION ORAL
Qty: 180 EACH | Refills: 3 | Status: SHIPPED | OUTPATIENT
Start: 2022-12-02

## 2022-12-02 RX ORDER — CHOLESTYRAMINE 4 G/9G
1 POWDER, FOR SUSPENSION ORAL 2 TIMES DAILY WITH MEALS
Qty: 60 EACH | Refills: 3 | Status: SHIPPED | OUTPATIENT
Start: 2022-12-02 | End: 2022-12-02

## 2022-12-08 ENCOUNTER — TELEPHONE (OUTPATIENT)
Dept: GASTROENTEROLOGY | Facility: CLINIC | Age: 47
End: 2022-12-08

## 2022-12-08 NOTE — TELEPHONE ENCOUNTER
===View-only below this line===  ----- Message -----  From: Grayson Stringer MD  Sent: 12/2/2022   7:55 AM EST  To: Linda Maxwell RN    Thank you   I have ordered cholestyramine  I just saw that she also takes Synthroid  Assuming that she takes it in the morning, can you please ask her to take the cholestyramine at lunch time and/or in the evening (1-2 times per day) so that it does not interfere with synthroid absorption  Thank you

## 2022-12-19 ENCOUNTER — OFFICE VISIT (OUTPATIENT)
Dept: PULMONOLOGY | Facility: CLINIC | Age: 47
End: 2022-12-19

## 2022-12-19 ENCOUNTER — DOCUMENTATION (OUTPATIENT)
Dept: PULMONOLOGY | Facility: CLINIC | Age: 47
End: 2022-12-19

## 2022-12-19 VITALS
OXYGEN SATURATION: 99 % | SYSTOLIC BLOOD PRESSURE: 124 MMHG | HEIGHT: 66 IN | DIASTOLIC BLOOD PRESSURE: 80 MMHG | BODY MASS INDEX: 47.09 KG/M2 | WEIGHT: 293 LBS | HEART RATE: 81 BPM | TEMPERATURE: 98.1 F

## 2022-12-19 DIAGNOSIS — U09.9 POST-ACUTE SEQUELAE OF COVID-19 (PASC): Primary | ICD-10-CM

## 2022-12-19 DIAGNOSIS — R06.09 DYSPNEA ON EXERTION: ICD-10-CM

## 2022-12-19 DIAGNOSIS — G47.33 OSA (OBSTRUCTIVE SLEEP APNEA): ICD-10-CM

## 2022-12-19 DIAGNOSIS — E66.01 MORBID OBESITY (HCC): ICD-10-CM

## 2022-12-19 NOTE — PROGRESS NOTES
Pulmonary Follow Up Note  [facility] MRN: [id number]  12/19/2022      HPI:    51-year-old female with severe PASC presents for follow-up of shortness of breath with even mild exertion  Patient states that she has significant shortness of breath even with taking a shower or walking from her bedroom to the bathroom  No fevers or chills  No recent illness since infection with SARS-CoV-2 approximately 1-1/2 years ago  Exercise Tolerance: Very poor  Able to ambulate several feet prior to feeling extremely short of breath  Meds:  Breo 200 daily    ROS:  Constitutional: - Fatigue, - chills, - fever, - weight change  HEENT: - rhinorrhea, - sneezing, - sore throat  Respiratory: - cough, + shortness of breath, - wheezing  Cardiovascular: - chest pain,  -palpitations, - leg swelling  Gastrointestinal: - abdominal pain, - constipation, - diarrhea, - nausea, - vomiting  Endocrine: - cold intolerance, - heat intolerance  Genitourinary: - dysuria  Musculoskeletal: - arthralgias  Skin:- rash, - wound  Allergic/Immunologic: - allergies  Neurological: - dizziness, - numbness        Vitals: Blood pressure 124/80, pulse 81, temperature 98 1 °F (36 7 °C), height 5' 6" (1 676 m), weight (!) 141 kg (311 lb), SpO2 99 %, not currently breastfeeding , Body mass index is 50 2 kg/m²      Physical Exam:  GEN  NAD  NECK  supple, no JVD, no LAD  CV  +s1s2, no mrg, RRR  PULM  CTA BL, no wrr  ABD  soft, nt, morbidly obese, + BS  EXT + edema, no cyanosis, no clubbing  NEURO  Aox3, no focal weakness    Imaging and other studies:   I personally viewed and interpreted the following imaging studies:  CT chest 10/31/2022 shows him with diffuse but right apical predominant groundglass opacification mild increased reticulation peripherally located    Assessment:  PASC  Dyspnea on exertion  Morbid obesity    Plan:  · Continue Breo 200 daily  · Continue albuterol as needed  · CT chest shows mild improvement in referral predominant groundglass opacification worse in right upper lobe  · Counseled on the importance of obtaining vaccination against COVID-19 for both treatment of PASC and for prevention of severe illness from future infections  · Patient will likely have improvement in exertional dyspnea with significant weight loss  Return visit in 3 months  On next visit we will evaluate symptoms of PASC, compliance with Breo, albuterol requirement, compliance with vaccination against SARS-CoV-2    BRITTANY Carrillo [facility]

## 2022-12-21 ENCOUNTER — HOSPITAL ENCOUNTER (OUTPATIENT)
Dept: MAMMOGRAPHY | Facility: CLINIC | Age: 47
Discharge: HOME | End: 2022-12-21

## 2022-12-21 ENCOUNTER — HOSPITAL ENCOUNTER (OUTPATIENT)
Dept: ULTRASOUND IMAGING | Facility: CLINIC | Age: 47
Discharge: HOME | End: 2022-12-21

## 2022-12-21 VITALS — BODY MASS INDEX: 47.09 KG/M2 | WEIGHT: 293 LBS | HEIGHT: 66 IN

## 2022-12-21 DIAGNOSIS — N63.24 MASS OF LOWER INNER QUADRANT OF LEFT BREAST: ICD-10-CM

## 2022-12-21 DIAGNOSIS — N64.4 PAIN IN BREAST: ICD-10-CM

## 2022-12-22 LAB

## 2023-01-12 ENCOUNTER — TELEPHONE (OUTPATIENT)
Dept: HEMATOLOGY ONCOLOGY | Facility: CLINIC | Age: 48
End: 2023-01-12

## 2023-01-12 NOTE — TELEPHONE ENCOUNTER
LVM to the patient in regards to her lab work that needs to be completed prior to her appt on 1/18/23 at 10:30am

## 2023-01-17 ENCOUNTER — TELEPHONE (OUTPATIENT)
Dept: HEMATOLOGY ONCOLOGY | Facility: MEDICAL CENTER | Age: 48
End: 2023-01-17

## 2023-01-18 ENCOUNTER — TELEPHONE (OUTPATIENT)
Dept: HEMATOLOGY ONCOLOGY | Facility: CLINIC | Age: 48
End: 2023-01-18

## 2023-01-18 ENCOUNTER — TELEPHONE (OUTPATIENT)
Dept: HEMATOLOGY ONCOLOGY | Facility: MEDICAL CENTER | Age: 48
End: 2023-01-18

## 2023-01-18 NOTE — TELEPHONE ENCOUNTER
Called the patient in regards to her missed appointment  Patient's daughter informed me that she spoke with the hopeline and rescheduled for this Friday

## 2023-01-18 NOTE — TELEPHONE ENCOUNTER
Appointment Cancellation Or Reschedule     Person calling in Patient    If other than patient calling, are they listed on the communication consent form? Provider Ramiro Bernard, [address]   Office Visit Date and Time 1/18/23   Office Visit Location [place]   Did patient want to reschedule their office appointment? If so, when was it scheduled to? 1/20/23   Did you have STAR scheduled for this appointment? no   Do you need STAR set up for your new appointment? If yes, please send to "PATIENT RIDESHARE" pool for STAR rescheduling no   If you are cancelling appointment, can we notify STAR to cancel ride? If yes, please send to "PATIENT RIDESHARE" pool for STAR to cancel service no   Is this patient calling to reschedule an infusion appointment? no   When is their next infusion appointment? no   Is this patient a Chemo patient? no   Reason for Cancellation or Reschedule Patient advised she did not get blood work in and needs them placed  Fax too [phone]     If the patient is a treatment patient, please route this to the office nurse  If the patient is not on treatment, please route to the Clerical pool based on location  If the patient is a surgical oncology patient, please route to surg/onc clinical pool  Route message as high priority if same day cancellation

## 2023-01-18 NOTE — TELEPHONE ENCOUNTER
LVM to the patient in regards to her lab work that needs to be completed prior to her appt on 1/20/23 at 10:00am

## 2023-01-20 ENCOUNTER — TELEPHONE (OUTPATIENT)
Dept: HEMATOLOGY ONCOLOGY | Facility: CLINIC | Age: 48
End: 2023-01-20

## 2023-01-20 NOTE — TELEPHONE ENCOUNTER
Called patient to ask if she  Is able Was  to get her labs  Done today for her Monday  Appt  I asked her to call   Back and if she is not going  To have them done to  cx her appt and rs when she  Has her labs done    I did  Give her are direct number,

## 2023-01-20 NOTE — TELEPHONE ENCOUNTER
Appointment Cancellation Or Reschedule     Person calling In self   If other than patient calling, are they listed on the communication consent form? self   Provider [facility] Visit Date and Time 1/20 10Am   Office Visit Location [facility]   Did patient want to reschedule their office appointment? If so, when was it scheduled to? Yes, 1/23 1PM   Did you have STAR scheduled for this appointment? no   Do you need STAR set up for your new appointment? If yes, please send to "PATIENT RIDESHARE" pool for STAR rescheduling no   If you are cancelling appointment, can we notify STAR to cancel ride? If yes, please send to "PATIENT RIDESHARE" pool for STAR to cancel service no   Is this patient calling to reschedule an infusion appointment? no   When is their next infusion appointment? no   Is this patient a Chemo patient? no   Reason for Cancellation or Reschedule Need labs     If the patient is a treatment patient, please route this to the office nurse  If the patient is not on treatment, please route to the Clerical pool based on location  If the patient is a surgical oncology patient, please route to surg/onc clinical pool  Route message as high priority if same day cancellation

## 2023-01-23 ENCOUNTER — TELEPHONE (OUTPATIENT)
Dept: HEMATOLOGY ONCOLOGY | Facility: CLINIC | Age: 48
End: 2023-01-23

## 2023-01-23 ENCOUNTER — OFFICE VISIT (OUTPATIENT)
Dept: HEMATOLOGY ONCOLOGY | Facility: CLINIC | Age: 48
End: 2023-01-23

## 2023-01-23 VITALS
DIASTOLIC BLOOD PRESSURE: 80 MMHG | HEART RATE: 71 BPM | OXYGEN SATURATION: 96 % | BODY MASS INDEX: 47.09 KG/M2 | RESPIRATION RATE: 18 BRPM | SYSTOLIC BLOOD PRESSURE: 140 MMHG | HEIGHT: 66 IN | TEMPERATURE: 97 F | WEIGHT: 293 LBS

## 2023-01-23 DIAGNOSIS — D50.8 IRON DEFICIENCY ANEMIA SECONDARY TO INADEQUATE DIETARY IRON INTAKE: Primary | ICD-10-CM

## 2023-01-23 DIAGNOSIS — D75.1 ERYTHROCYTOSIS: ICD-10-CM

## 2023-01-23 DIAGNOSIS — D75.838 REACTIVE THROMBOCYTOSIS: ICD-10-CM

## 2023-01-23 RX ORDER — SODIUM CHLORIDE 9 MG/ML
20 INJECTION, SOLUTION INTRAVENOUS ONCE
Status: CANCELLED | OUTPATIENT
Start: 2023-02-06

## 2023-01-23 NOTE — PROGRESS NOTES
Hematology/Oncology Outpatient Follow-up  Angelito Umaña 41 y o  female 1975 [id number]    Date:  1/23/2023      Assessment and Plan:  1  Iron deficiency anemia secondary to inadequate dietary iron intake  Is not currently anemic however she seems to have iron deficiency again ferritin 27 despite completing a course of IV iron Venofer x5 treatments August/September 2022  Has been requiring IV iron on a regular basis  She did recently undergo upper and lower endoscopy November 2022 which did not show any findings to explain her chronic iron deficiency  Her iron deficiency is likely due to malabsorption from her prior bariatric surgery  Was also having heavy menstrual bleeding in the past which was likely contributory as well; fortunately she states she is no longer having menorrhagia  She was offered another short course of IV iron Venofer 200 mg weekly for 3 sessions total since she is starting to become symptomatic with worsening fatigue  Requested that she follow-up again with repeat labs for close monitoring in 6 months from now or sooner should the need arise     - CBC and differential; Future  - Comprehensive metabolic panel; Future  - C-reactive protein; Future  - Sedimentation rate, automated; Future  - Vitamin B12; Future  - Iron Panel (Includes Ferritin, Iron Sat%, Iron, and TIBC); Future  - Ferritin; Future    2  Reactive thrombocytosis  Patient was again noted to have mild thrombocytosis which is likely reactive in nature due to inflammation/iron deficiency  We will monitor closely  She was found to be negative for JAK2 V617F mutation in the past     - CBC and differential; Future  - C-reactive protein; Future  - Sedimentation rate, automated; Future  - Iron Panel (Includes Ferritin, Iron Sat%, Iron, and TIBC); Future  - Ferritin; Future    3  Erythrocytosis  Patient's most recent laboratory studies showed mild erythrocytosis which is fairly new finding for her    Likely reactive in nature due to her COVID induced lung disease versus obstructive sleep apnea  She mentions that she will be starting CPAP machine in the near future once she receives  It was also noted that specimen was slightly hemolyzed which could alter results  We will continue to monitor closely  Could consider checking reflexive JAK2 mutational studies in the future if it persists/worsens     - CBC and differential; Future  - Comprehensive metabolic panel; Future  - C-reactive protein; Future  - Sedimentation rate, automated; Future  - Erythropoietin; Future  - Carboxyhemoglobin; Future      HPI:  Patient is a 72-year-old female who originally presented today for further evaluation and treatment of her microcytic anemia/iron deficiency  She has a past medical history of bipolar disorder, anxiety, hypothyroidism, obstructive sleep apnea, gastric bypass surgery 2017 and post COVID syndrome after she has significant COVID-19 infection April 2021  She mentions that ventilation was recommended when she had COVID-19 which she declined  She is being monitored by Pulmonology and Cardiology      CBC 05/14/2021 showed normal white cells 10 9, microcytic hypochromic anemia H&H 10 9/34 4, MCV 96, MCH 24 7 she has thrombocytosis platelet count [id number]  BMP was essentially normal with the exception of a decreased glucose 62  She had additional laboratory studies done 06/03/2021 which showed low ferritin 14 and low serum iron 25 complete iron panel was not done  She was found to be negative for the [id number] mutation 09/2021; reflexive studies were not performed    Patient was treated with a course of IV iron Feraheme x2 treatments June/July 2021  Required additional courses of IV iron on different occasions  Interval history:  Patient presents today for a follow-up visit    Received another course of IV iron Venofer x5 treatments August/September 2022 she believes that her iron is starting to drop again as she is starting to feel more fatigued than usual which is typical   She denies any obvious bleeding from any site and mentions that her menstrual cycles are no longer heavy they are regular with normal flow  She continues to have issues with chronic dyspnea/etc  which has been ongoing her significant COVID-19 infection  She does follow with pulmonology regularly  States that she was diagnosed with sleep apnea and will be starting CPAP machine in the future as soon as she receives it  She was seen by GI who pursued upper and lower endoscopy 11/17/2022  Upper endoscopy appeared normal   He did have 9 polyps removed on her colonoscopy with recommendation for 1 year follow-up colonoscopy  Her most recent laboratory studies from 3 days ago at [facility] labs 1/20/2023 showed normal WBC 8 1, she has mild erythrocytosis RBC 4 76, H&H 15 1/45 1, MCV 95 platelet count is again mildly increased 436  Her laboratory markers are mildly elevated C-reactive protein 7 2 and sed rate 24  B12 is appropriate  Alk phos is slightly elevated 159, albumin 3 4 remaining metabolic panel is normal   Iron saturation 34%, TIBC 344, serum iron 117 with ferritin decreased  It is noted that her specimen is slightly hemolyzed which could alter results  ROS: Review of Systems   Constitutional: Positive for activity change, appetite change and fatigue  Negative for chills, fever and unexpected weight change  HENT: Positive for trouble swallowing  Negative for congestion, mouth sores, nosebleeds and sore throat  Respiratory: Positive for cough and shortness of breath  Negative for chest tightness  Cardiovascular: Negative for chest pain and leg swelling  Gastrointestinal: Positive for diarrhea (chronic) and nausea  Negative for abdominal distention, abdominal pain, blood in stool, constipation and vomiting  Endocrine: Positive for cold intolerance     Genitourinary: Negative for difficulty urinating, dysuria, frequency, hematuria, menstrual problem and urgency  Musculoskeletal: Negative for arthralgias, back pain, gait problem, joint swelling and myalgias  Skin: Positive for rash  Negative for color change and pallor  Neurological: Positive for dizziness, numbness and headaches  Negative for light-headedness  Hematological: Negative for adenopathy  Does not bruise/bleed easily  Psychiatric/Behavioral: Positive for dysphoric mood and sleep disturbance         Past Medical History:   Diagnosis Date   • Abscess of labia    • Acute and chronic respiratory failure with hypoxia (HCC)    • Anemia 4/6/2021   • Anxiety    • Bipolar disorder (HCC)    • Breast lump    • COVID-19    • Frequent headaches    • Hemorrhoids    • Hypothyroidism 7/1/2013   • Migraine    • Morbid obesity ([facility] [address] ) 7/1/2013   • Obesity    • Psychotic disorder (HCC)        Past Surgical History:   Procedure Laterality Date   • BARIATRIC SURGERY     • BREAST BIOPSY Right 1999    benign   • BREAST LUMPECTOMY Left     benign   • CHOLECYSTECTOMY     • GALLBLADDER SURGERY         Social History     Socioeconomic History   • Marital status: Single     Spouse name: None   • Number of children: 1   • Years of education: None   • Highest education level: None   Occupational History   • None   Tobacco Use   • Smoking status: Former     Types: Cigarettes     Quit date: 2008     Years since quitting: 15 0   • Smokeless tobacco: Never   • Tobacco comments:     smoker for 2-3 yrs and quit in 2008, smoked about 3 cigarettes a day   Vaping Use   • Vaping Use: Never used   Substance and Sexual Activity   • Alcohol use: Never   • Drug use: No   • Sexual activity: Not Currently   Other Topics Concern   • None   Social History Narrative   • None     Social Determinants of Health     Financial Resource Strain: Not on file   Food Insecurity: Not on file   Transportation Needs: Not on file   Physical Activity: Not on file   Stress: Not on file   Social Connections: Not on file   Intimate Partner Violence: Not on file   Housing Stability: Not on file       Family History   Problem Relation Age of Onset   • Liver cancer Mother    • Prostate cancer Father    • No Known Problems Sister    • No Known Problems Sister    • No Known Problems Sister    • No Known Problems Sister    • No Known Problems Sister    • No Known Problems Sister    • No Known Problems Sister    • No Known Problems Sister    • No Known Problems Sister    • No Known Problems Sister    • No Known Problems Maternal Grandmother    • No Known Problems Maternal Grandfather    • No Known Problems Paternal Grandmother    • No Known Problems Paternal Grandfather    • Cancer Maternal Aunt    • Cancer Maternal Aunt    • Cancer Maternal Aunt    • Cancer Maternal Aunt    • Cancer Maternal Aunt    • Cancer Maternal Aunt    • Cancer Maternal Aunt    • No Known Problems Paternal Aunt    • No Known Problems Paternal Aunt    • No Known Problems Paternal Aunt    • No Known Problems Paternal Aunt    • Cancer Paternal Aunt    • Cancer Paternal Aunt        Allergies   Allergen Reactions   • No Active Allergies          Current Outpatient Medications:   •  albuterol (PROVENTIL HFA,VENTOLIN HFA) 90 mcg/act inhaler, Inhale 2 puffs every 6 (six) hours as needed for wheezing or shortness of breath, Disp: 8 5 g, Rfl: 5  •  atorvastatin (LIPITOR) 20 mg tablet, TAKE ONE TABLET (20 MG TOTAL) BY MOUTH DAILY, Disp: 30 tablet, Rfl: 6  •  benzonatate (TESSALON) 200 MG capsule, Take 1 capsule (200 mg total) by mouth daily at bedtime as needed for cough, Disp: 30 capsule, Rfl: 3  •  busPIRone (BUSPAR) 10 mg tablet, Take 1 tablet (10 mg total) by mouth 2 (two) times a day, Disp: 60 tablet, Rfl: 3  •  Calcium Citrate 200 MG TABS, 1 tablet 3 (three) times a day, Disp: , Rfl:   •  cholestyramine (QUESTRAN) 4 g packet, MIX AND DRINK 1 PACKET(4 GRAMS) BY MOUTH TWICE DAILY WITH MEALS, Disp: 180 each, Rfl: 3  •  Cyanocobalamin ER 1000 MCG TBCR, take one tab daily, Disp: , Rfl:   • diphenhydrAMINE-acetaminophen (TYLENOL PM)  MG TABS, Take 1 tablet by mouth daily at bedtime as needed for sleep, Disp: , Rfl:   •  escitalopram (LEXAPRO) 20 mg tablet, TAKE ONE TABLET (20 MG TOTAL) BY MOUTH DAILY, Disp: 90 tablet, Rfl: 2  •  fluticasone-vilanterol (Breo Ellipta) 200-25 mcg/actuation inhaler, Inhale 1 puff daily Rinse mouth after use , Disp: 60 blister, Rfl: 5  •  furosemide (LASIX) 20 mg tablet, TAKE ONE TABLET (20 MG TOTAL) BY MOUTH DAILY, Disp: 30 tablet, Rfl: 3  •  levothyroxine (Levo-T) 150 mcg tablet, Take 1 tablet (150 mcg total) by mouth daily in the early morning, Disp: 90 tablet, Rfl: 3  •  metoprolol tartrate (LOPRESSOR) 25 mg tablet, TAKE 1/2 TABLET(12 5 MG) BY MOUTH EVERY 12 HOURS, Disp: 90 tablet, Rfl: 4  •  Multiple Vitamins-Minerals (MULTIVITAMIN ADULT PO), every 24 hours, Disp: , Rfl:   •  pantoprazole (PROTONIX) 40 mg tablet, TAKE ONE TABLET (40 MG TOTAL) BY MOUTH EVERY 24 HOURS, Disp: 90 tablet, Rfl: 3  •  traZODone (DESYREL) 100 mg tablet, TAKE ONE TABLET (100 MG TOTAL) BY MOUTH DAILY AT BEDTIME, Disp: 90 tablet, Rfl: 1  •  megestrol (MEGACE) 20 mg tablet, Take 1 tablet (20 mg total) by mouth daily for 21 days, Disp: 21 tablet, Rfl: 3      Physical Exam:  /80 (BP Location: Left arm, Patient Position: Sitting, Cuff Size: Adult)   Pulse 71   Temp (!) 97 °F (36 1 °C)   Resp 18   Ht 5' 6" (1 676 m)   Wt (!) 142 kg (313 lb)   SpO2 96%   BMI 50 52 kg/m²     Physical Exam  Vitals reviewed  Constitutional:       Appearance: She is well-developed  She is morbidly obese  She is not diaphoretic  HENT:      Head: Normocephalic and atraumatic  Eyes:      General: No scleral icterus  Conjunctiva/sclera: Conjunctivae normal       Pupils: Pupils are equal, round, and reactive to light  Neck:      Thyroid: No thyromegaly  Cardiovascular:      Rate and Rhythm: Normal rate and regular rhythm  Heart sounds: Normal heart sounds  No murmur heard    Pulmonary: Breath sounds: Decreased breath sounds present  Abdominal:      General: There is no distension  Palpations: Abdomen is soft  There is no hepatomegaly or splenomegaly  Tenderness: There is no abdominal tenderness  Musculoskeletal:         General: No swelling  Normal range of motion  Cervical back: Normal range of motion and neck supple  Lymphadenopathy:      Cervical: No cervical adenopathy  Upper Body:      Right upper body: No axillary adenopathy  Left upper body: No axillary adenopathy  Skin:     General: Skin is warm and dry  Coloration: Skin is not pale  Findings: No erythema or rash  Neurological:      General: No focal deficit present  Mental Status: She is alert and oriented to person, place, and time  Psychiatric:         Mood and Affect: Affect is blunt  Behavior: Behavior normal  Behavior is cooperative  Thought Content: Thought content normal          Judgment: Judgment normal            Labs:  Lab Results   Component Value Date    WBC 8 55 09/10/2021    HGB 15 0 09/10/2021    HCT 45 9 09/10/2021    MCV 97 09/10/2021     (H) 09/10/2021     Lab Results   Component Value Date     10/14/2015    K 4 0 09/27/2022     (H) 09/27/2022    CO2 22 09/27/2022    ANIONGAP 7 10/14/2015    BUN 9 09/27/2022    CREATININE 1 12 09/27/2022    GLUCOSE 89 10/14/2015    GLUF 84 09/10/2021    CALCIUM 8 9 09/27/2022    CORRECTEDCA 9 1 04/16/2021    AST 14 09/10/2021    ALT 35 09/10/2021    ALKPHOS 150 (H) 09/10/2021    PROT 7 2 10/14/2015    BILITOT 0 33 10/14/2015    EGFR 59 09/27/2022       Patient voiced understanding and agreement in the above discussion  Aware to contact our office with questions/symptoms in the interim  This note has been generated by voice recognition software system  Therefore, there may be spelling, grammar, and or syntax errors  Please contact if questions arise

## 2023-01-30 ENCOUNTER — HOSPITAL ENCOUNTER (OUTPATIENT)
Dept: INFUSION CENTER | Facility: HOSPITAL | Age: 48
End: 2023-01-30
Attending: INTERNAL MEDICINE

## 2023-01-31 ENCOUNTER — HOSPITAL ENCOUNTER (OUTPATIENT)
Dept: INFUSION CENTER | Facility: HOSPITAL | Age: 48
Discharge: HOME | End: 2023-01-31
Attending: INTERNAL MEDICINE

## 2023-02-06 ENCOUNTER — HOSPITAL ENCOUNTER (OUTPATIENT)
Dept: INFUSION CENTER | Facility: HOSPITAL | Age: 48
Discharge: HOME | End: 2023-02-06
Attending: INTERNAL MEDICINE

## 2023-02-06 VITALS
DIASTOLIC BLOOD PRESSURE: 75 MMHG | RESPIRATION RATE: 16 BRPM | SYSTOLIC BLOOD PRESSURE: 109 MMHG | TEMPERATURE: 97.3 F | HEART RATE: 85 BPM

## 2023-02-06 DIAGNOSIS — D50.8 IRON DEFICIENCY ANEMIA SECONDARY TO INADEQUATE DIETARY IRON INTAKE: Primary | ICD-10-CM

## 2023-02-06 RX ORDER — SODIUM CHLORIDE 9 MG/ML
20 INJECTION, SOLUTION INTRAVENOUS ONCE
Status: CANCELLED | OUTPATIENT
Start: 2023-02-13

## 2023-02-06 RX ORDER — SODIUM CHLORIDE 9 MG/ML
20 INJECTION, SOLUTION INTRAVENOUS ONCE
Status: COMPLETED | OUTPATIENT
Start: 2023-02-06 | End: 2023-02-06

## 2023-02-06 RX ADMIN — SODIUM CHLORIDE 20 ML/HR: 0.9 INJECTION, SOLUTION INTRAVENOUS at 14:32

## 2023-02-06 RX ADMIN — SODIUM CHLORIDE 200 MG: 9 INJECTION, SOLUTION INTRAVENOUS at 14:36

## 2023-02-06 NOTE — PROGRESS NOTES
Pt tolerated treatment today with no adverse reactions  Next apts confirmed  Left unit ambulatory with a steady gait

## 2023-02-07 ENCOUNTER — APPOINTMENT (OUTPATIENT)
Dept: PULMONOLOGY | Facility: CLINIC | Age: 48
End: 2023-02-07

## 2023-02-13 ENCOUNTER — HOSPITAL ENCOUNTER (OUTPATIENT)
Dept: INFUSION CENTER | Facility: HOSPITAL | Age: 48
End: 2023-02-13
Attending: INTERNAL MEDICINE

## 2023-02-14 ENCOUNTER — HOSPITAL ENCOUNTER (OUTPATIENT)
Dept: INFUSION CENTER | Facility: HOSPITAL | Age: 48
Discharge: HOME | End: 2023-02-14
Attending: INTERNAL MEDICINE

## 2023-02-14 ENCOUNTER — APPOINTMENT (OUTPATIENT)
Dept: PULMONOLOGY | Facility: CLINIC | Age: 48
End: 2023-02-14

## 2023-02-14 VITALS
RESPIRATION RATE: 18 BRPM | DIASTOLIC BLOOD PRESSURE: 75 MMHG | HEART RATE: 77 BPM | TEMPERATURE: 97.1 F | SYSTOLIC BLOOD PRESSURE: 109 MMHG

## 2023-02-14 DIAGNOSIS — D50.8 IRON DEFICIENCY ANEMIA SECONDARY TO INADEQUATE DIETARY IRON INTAKE: Primary | ICD-10-CM

## 2023-02-14 RX ORDER — SODIUM CHLORIDE 9 MG/ML
20 INJECTION, SOLUTION INTRAVENOUS ONCE
Status: CANCELLED | OUTPATIENT
Start: 2023-02-20

## 2023-02-14 RX ORDER — SODIUM CHLORIDE 9 MG/ML
20 INJECTION, SOLUTION INTRAVENOUS ONCE
Status: COMPLETED | OUTPATIENT
Start: 2023-02-14 | End: 2023-02-14

## 2023-02-14 RX ADMIN — IRON SUCROSE 200 MG: 20 INJECTION, SOLUTION INTRAVENOUS at 14:24

## 2023-02-14 RX ADMIN — SODIUM CHLORIDE 20 ML/HR: 9 INJECTION, SOLUTION INTRAVENOUS at 14:27

## 2023-02-15 ENCOUNTER — TELEPHONE (OUTPATIENT)
Dept: CARDIAC REHAB | Facility: CLINIC | Age: 48
End: 2023-02-15

## 2023-02-20 ENCOUNTER — HOSPITAL ENCOUNTER (OUTPATIENT)
Dept: INFUSION CENTER | Facility: HOSPITAL | Age: 48
Discharge: HOME | End: 2023-02-20
Attending: INTERNAL MEDICINE

## 2023-02-20 VITALS
SYSTOLIC BLOOD PRESSURE: 116 MMHG | TEMPERATURE: 98.4 F | HEART RATE: 85 BPM | DIASTOLIC BLOOD PRESSURE: 76 MMHG | OXYGEN SATURATION: 95 % | RESPIRATION RATE: 18 BRPM

## 2023-02-20 DIAGNOSIS — D50.8 IRON DEFICIENCY ANEMIA SECONDARY TO INADEQUATE DIETARY IRON INTAKE: Primary | ICD-10-CM

## 2023-02-20 RX ORDER — SODIUM CHLORIDE 9 MG/ML
20 INJECTION, SOLUTION INTRAVENOUS ONCE
Status: COMPLETED | OUTPATIENT
Start: 2023-02-20 | End: 2023-02-20

## 2023-02-20 RX ORDER — SODIUM CHLORIDE 9 MG/ML
20 INJECTION, SOLUTION INTRAVENOUS ONCE
Status: CANCELLED | OUTPATIENT
Start: 2023-02-21

## 2023-02-20 RX ADMIN — SODIUM CHLORIDE 20 ML/HR: 0.9 INJECTION, SOLUTION INTRAVENOUS at 14:16

## 2023-02-20 RX ADMIN — IRON SUCROSE 200 MG: 20 INJECTION, SOLUTION INTRAVENOUS at 14:21

## 2023-02-20 NOTE — PROGRESS NOTES
Pt tolerated venofer without difficulty  No s/s reaction noted  Aware of office follow up and to have labs done prior  AVS declined  Left ambulatory in stable condition

## 2023-02-26 DIAGNOSIS — U09.9 POST-COVID SYNDROME: ICD-10-CM

## 2023-02-27 RX ORDER — ALBUTEROL SULFATE 90 UG/1
2 AEROSOL, METERED RESPIRATORY (INHALATION) EVERY 6 HOURS PRN
Qty: 8.5 G | Refills: 5 | Status: SHIPPED | OUTPATIENT
Start: 2023-02-27

## 2023-03-01 ENCOUNTER — OFFICE VISIT (OUTPATIENT)
Dept: GASTROENTEROLOGY | Facility: CLINIC | Age: 48
End: 2023-03-01

## 2023-03-01 VITALS
HEIGHT: 66 IN | OXYGEN SATURATION: 96 % | TEMPERATURE: 97.6 F | BODY MASS INDEX: 47.09 KG/M2 | SYSTOLIC BLOOD PRESSURE: 125 MMHG | WEIGHT: 293 LBS | DIASTOLIC BLOOD PRESSURE: 84 MMHG | HEART RATE: 95 BPM

## 2023-03-01 DIAGNOSIS — Z98.84 GASTRIC BYPASS STATUS FOR OBESITY: ICD-10-CM

## 2023-03-01 DIAGNOSIS — R19.7 DIARRHEA, UNSPECIFIED TYPE: ICD-10-CM

## 2023-03-01 DIAGNOSIS — D12.6 COLON ADENOMAS: ICD-10-CM

## 2023-03-01 DIAGNOSIS — R10.11 POSTPRANDIAL ABDOMINAL PAIN IN RIGHT UPPER QUADRANT: Primary | ICD-10-CM

## 2023-03-01 RX ORDER — DICYCLOMINE HYDROCHLORIDE 10 MG/1
10 CAPSULE ORAL 2 TIMES DAILY PRN
Qty: 60 CAPSULE | Refills: 2 | Status: SHIPPED | OUTPATIENT
Start: 2023-03-01

## 2023-03-01 NOTE — PROGRESS NOTES
[facility] - Outpatient Follow-up Note  Monster Umaña 41 y o  female MRN: [id number]  Encounter: [id number]          ASSESSMENT AND PLAN:    Liam Sequeira is a 41 y o  female with morbid obesity, history of gastric bypass and cholecystectomy, with chronic abdominal pain and diarrhea  She inquired several times today about the possibility of reversing her gastric bypass  I explained that her work-up for these issues has not been completed and I am not sure that reversing the bypass will resolve them  I also do not know whether such a reversal would be technically feasible and explained that she needs to consult with a bariatric surgeon  For now, we will do the followin  We will check stools for C  difficile, enteric pathogens, O&P, and calprotectin her chronic diarrhea  2   I have asked her to stay on a lactose-free diet  3   I have also reminded her to eat small meals and avoid simple and highly processed carbohydrates  4   She is at risk for nutritional deficiencies and we will check iron levels, fat-soluble vitamins, micronutrients  5   She has not had recent cross-sectional imaging so we will order CT of the abdomen and was given her chronic pain  6   Per her request, I will refer her to bariatrics to discuss her surgical options  7   Trial of dicyclomine for abdominal pain  Return in 3 months  1  Postprandial abdominal pain in right upper quadrant    2  Diarrhea, unspecified type    3  Gastric bypass status for obesity    4   Colon adenomas        Orders Placed This Encounter   Procedures   • Calprotectin,Fecal   • Clostridium difficile toxin by PCR with EIA   • Ova and parasite examination   • Stool Enteric Bacterial Panel by PCR   • CT abdomen pelvis w contrast   • CBC and Platelet   • C-reactive protein   • Comprehensive metabolic panel   • Vitamin B12   • Vitamin D 25 hydroxy   • Folate   • Vitamin A and E   • Protime-INR   • Selenium serum • Zinc   • Vitamin B1, whole blood   • Vitamin C   • Copper Level   • Ambulatory Referral to Bariatric Surgery   • Colonoscopy     ______________________________________________________________________    SUBJECTIVE:    Alcira Gutierrez is a 41 y o  female treated with gastric bypass in 2016 or 2017, cholecystectomy, who presents for follow-up of chronic abdominal pain and diarrhea  There has been no improvement since I last saw her  I had asked her to do stool tests which were not completed  We did EGD and colonoscopy November 2022  The gastrojejunostomy appeared healthy, as did the jejunum  Biopsies negative for H  pylori and celiac disease  Her colon colonoscopy had several adenomas and fair prep  She continues to have diarrhea and abdominal pain every day  She says that she has pain while eating this is followed by diarrhea  She has diarrhea after each meal approximately 5 times per day  Her diarrhea is watery  She eats cheese, some ice cream, yogurt  She still has pain with oily and greasy foods  She admits to eating sweet foods with simple carbohydrates  Her gastric bypass was in 2016 or 2017  She initially lost 60 pounds but then gained it back and is now heavier than before the surgery  She does not take multivitamins and required IV iron infusions last month  She is aware of the phenomenon of dumping but says that it is easier for her to eat simple carbohydrates because other foods make her feel heavy and uncomfortable  She asked several times about having her bypass reversed because she does not want to take vitamins and feels that her abdominal pain and diarrhea started after the procedure  REVIEW OF SYSTEMS IS OTHERWISE NEGATIVE        Historical Information   Past Medical History:   Diagnosis Date   • Abscess of labia    • Acute and chronic respiratory failure with hypoxia ([facility] [address] )    • Anemia 4/6/2021   • Anxiety    • Bipolar disorder (HCC)    • Breast lump    • COVID-19    • Frequent headaches    • Hemorrhoids    • Hypothyroidism 7/1/2013   • Migraine    • Morbid obesity ([address] ) 7/1/2013   • Obesity    • Psychotic disorder (HCC)      Past Surgical History:   Procedure Laterality Date   • BARIATRIC SURGERY     • BREAST BIOPSY Right 1999    benign   • BREAST LUMPECTOMY Left     benign   • CHOLECYSTECTOMY     • GALLBLADDER SURGERY       Social History   Social History     Substance and Sexual Activity   Alcohol Use Never     Social History     Substance and Sexual Activity   Drug Use No     Social History     Tobacco Use   Smoking Status Former   • Types: Cigarettes   • Quit date: 2008   • Years since quitting: 15 1   Smokeless Tobacco Never   Tobacco Comments    smoker for 2-3 yrs and quit in 2008, smoked about 3 cigarettes a day     Family History   Problem Relation Age of Onset   • Liver cancer Mother    • Prostate cancer Father    • No Known Problems Sister    • No Known Problems Sister    • No Known Problems Sister    • No Known Problems Sister    • No Known Problems Sister    • No Known Problems Sister    • No Known Problems Sister    • No Known Problems Sister    • No Known Problems Sister    • No Known Problems Sister    • No Known Problems Maternal Grandmother    • No Known Problems Maternal Grandfather    • No Known Problems Paternal Grandmother    • No Known Problems Paternal Grandfather    • Cancer Maternal Aunt    • Cancer Maternal Aunt    • Cancer Maternal Aunt    • Cancer Maternal Aunt    • Cancer Maternal Aunt    • Cancer Maternal Aunt    • Cancer Maternal Aunt    • No Known Problems Paternal Aunt    • No Known Problems Paternal Aunt    • No Known Problems Paternal Aunt    • No Known Problems Paternal Aunt    • Cancer Paternal Aunt    • Cancer Paternal Aunt        Meds/Allergies       Current Outpatient Medications:   •  albuterol (PROVENTIL HFA,VENTOLIN HFA) 90 mcg/act inhaler  •  atorvastatin (LIPITOR) 20 mg tablet  •  benzonatate (TESSALON) 200 MG capsule  • busPIRone (BUSPAR) 10 mg tablet  •  Calcium Citrate 200 MG TABS  •  Cyanocobalamin ER 1000 MCG TBCR  •  dicyclomine (BENTYL) 10 mg capsule  •  diphenhydrAMINE-acetaminophen (TYLENOL PM)  MG TABS  •  escitalopram (LEXAPRO) 20 mg tablet  •  fluticasone-vilanterol (Breo Ellipta) 200-25 mcg/actuation inhaler  •  furosemide (LASIX) 20 mg tablet  •  levothyroxine (Levo-T) 150 mcg tablet  •  metoprolol tartrate (LOPRESSOR) 25 mg tablet  •  Multiple Vitamins-Minerals (MULTIVITAMIN ADULT PO)  •  pantoprazole (PROTONIX) 40 mg tablet  •  traZODone (DESYREL) 100 mg tablet  •  megestrol (MEGACE) 20 mg tablet    Allergies   Allergen Reactions   • No Active Allergies            Objective     Blood pressure 125/84, pulse 95, temperature 97 6 °F (36 4 °C), temperature source Tympanic, height 5' 6" (1 676 m), weight (!) 141 kg (310 lb 3 2 oz), SpO2 96 %, not currently breastfeeding  Body mass index is 50 07 kg/m²  PHYSICAL EXAM:      General Appearance:   Alert, cooperative, no distress   HEENT:   Normocephalic, atraumatic, anicteric  Neck:  Supple, symmetrical, trachea midline   Lungs:   Clear to auscultation bilaterally; no rales, rhonchi or wheezing; respirations unlabored    Heart[de-identified]   Regular rate and rhythm; no murmur, rub, or gallop  Abdomen:   Soft, non-tender, non-distended; normal bowel sounds; no masses, no organomegaly    Genitalia:   Deferred    Rectal:   Deferred    Extremities:  No cyanosis, clubbing or edema    Pulses:  2+ and symmetric    Skin:  No jaundice, rashes, or lesions    Lymph nodes:  No palpable cervical lymphadenopathy        Lab Results:   No visits with results within 1 Day(s) from this visit     Latest known visit with results is:   Hospital Outpatient Visit on 11/17/2022   Component Date Value   • EXT Preg Test, Ur 11/17/2022 Negative    • Control 11/17/2022 Valid    • Case Report 11/17/2022                      Value:Surgical Pathology Report                         Case: [id number] Authorizing Provider:  Moira Landry MD         Collected:           11/17/2022 1549              Ordering Location:     [facility]        Received:            11/17/2022 [facility]                                                          Pathologist:           Derek Kennedy MD                                                         Specimens:   A) - Jejunum, r/o celiac                                                                            B) - Stomach, r/o h pylori                                                                          C) - Large Intestine, Cecum, polyp x3                                                               D) - Ileocecal valve, polyp                                                                         E) - Large Intestine, Right/Ascending Colon, polyp                                                                            F) - Large Intestine, Left/Descending Colon, polyp x2                                               G) - Large Intestine, Sigmoid Colon, polypx2                                              • Final Diagnosis 11/17/2022                      Value: This result contains rich text formatting which cannot be displayed here  • Additional Information 11/17/2022                      Value: This result contains rich text formatting which cannot be displayed here  • Synoptic Checklist 11/17/2022                      Value:                            COLON/RECTUM POLYP FORM - GI - C, D, E, F                                                                                     :    Adenoma(s)                                                         : Other     • Gross Description 11/17/2022                      Value: This result contains rich text formatting which cannot be displayed here         Lab Results   Component Value Date    WBC 8 55 09/10/2021    HGB 15 0 09/10/2021 HCT 45 9 09/10/2021    MCV 97 09/10/2021     (H) 09/10/2021       Lab Results   Component Value Date     10/14/2015    SODIUM 139 09/27/2022    K 4 0 09/27/2022     (H) 09/27/2022    CO2 22 09/27/2022    ANIONGAP 7 10/14/2015    AGAP 6 09/27/2022    BUN 9 09/27/2022    CREATININE 1 12 09/27/2022    GLUC 81 09/27/2022    GLUF 84 09/10/2021    CALCIUM 8 9 09/27/2022    AST 14 09/10/2021    ALT 35 09/10/2021    ALKPHOS 150 (H) 09/10/2021    PROT 7 2 10/14/2015    TP 7 4 09/10/2021    TP 7 2 09/10/2021    BILITOT 0 33 10/14/2015    TBILI 0 37 09/10/2021    EGFR 59 09/27/2022       Lab Results   Component Value Date    CRP <3 0 09/10/2021       Lab Results   Component Value Date    [id number] 9 960 (H) 09/27/2022       Lab Results   Component Value Date    IRON 108 09/10/2021    TIBC 425 09/10/2021    FERRITIN 15 09/10/2021       Radiology Results:   No results found

## 2023-03-06 PROBLEM — R00.2 PALPITATIONS: Status: ACTIVE | Noted: 2023-03-06

## 2023-03-21 ENCOUNTER — HOSPITAL ENCOUNTER (OUTPATIENT)
Dept: CT IMAGING | Facility: HOSPITAL | Age: 48
Discharge: HOME | End: 2023-03-21
Attending: INTERNAL MEDICINE

## 2023-03-21 ENCOUNTER — APPOINTMENT (OUTPATIENT)
Dept: LAB | Facility: HOSPITAL | Age: 48
End: 2023-03-21

## 2023-03-21 DIAGNOSIS — R10.11 POSTPRANDIAL ABDOMINAL PAIN IN RIGHT UPPER QUADRANT: ICD-10-CM

## 2023-03-21 DIAGNOSIS — Z98.84 GASTRIC BYPASS STATUS FOR OBESITY: ICD-10-CM

## 2023-03-21 DIAGNOSIS — R19.7 DIARRHEA, UNSPECIFIED TYPE: ICD-10-CM

## 2023-03-21 DIAGNOSIS — R10.11 POSTPRANDIAL ABDOMINAL PAIN IN RIGHT UPPER QUADRANT: Primary | ICD-10-CM

## 2023-03-21 LAB
25(OH)D3 SERPL-MCNC: 15.9 NG/ML (ref 30–100)
ALBUMIN SERPL BCP-MCNC: 4.1 G/DL (ref 3.5–5)
ALP SERPL-CCNC: 124 U/L (ref 34–104)
ALT SERPL W P-5'-P-CCNC: 17 U/L (ref 7–52)
ANION GAP SERPL CALCULATED.3IONS-SCNC: 8 MMOL/L (ref 4–13)
AST SERPL W P-5'-P-CCNC: 12 U/L (ref 13–39)
BILIRUB SERPL-MCNC: 0.43 MG/DL (ref 0.2–1)
BUN SERPL-MCNC: 12 MG/DL (ref 5–25)
CALCIUM SERPL-MCNC: 9.2 MG/DL (ref 8.4–10.2)
CHLORIDE SERPL-SCNC: 102 MMOL/L (ref 96–108)
CO2 SERPL-SCNC: 25 MMOL/L (ref 21–32)
CREAT SERPL-MCNC: 0.96 MG/DL (ref 0.6–1.3)
CRP SERPL QL: 4 MG/L
ERYTHROCYTE [DISTWIDTH] IN BLOOD BY AUTOMATED COUNT: 14.5 % (ref 11.6–15.1)
FOLATE SERPL-MCNC: 6.6 NG/ML (ref 3.1–17.5)
GFR SERPL CREATININE-BSD FRML MDRD: 71 ML/MIN/1.73SQ M
GLUCOSE P FAST SERPL-MCNC: 80 MG/DL (ref 65–99)
HCT VFR BLD AUTO: 47.1 % (ref 34.8–46.1)
HGB BLD-MCNC: 15.2 G/DL (ref 11.5–15.4)
INR PPP: 0.95 (ref 0.84–1.19)
MCH RBC QN AUTO: 31.7 PG (ref 26.8–34.3)
MCHC RBC AUTO-ENTMCNC: 32.3 G/DL (ref 31.4–37.4)
MCV RBC AUTO: 98 FL (ref 82–98)
PLATELET # BLD AUTO: 494 THOUSANDS/UL (ref 149–390)
PMV BLD AUTO: 9 FL (ref 8.9–12.7)
POTASSIUM SERPL-SCNC: 4.1 MMOL/L (ref 3.5–5.3)
PROT SERPL-MCNC: 6.9 G/DL (ref 6.4–8.4)
PROTHROMBIN TIME: 13 SECONDS (ref 11.6–14.5)
RBC # BLD AUTO: 4.79 MILLION/UL (ref 3.81–5.12)
SODIUM SERPL-SCNC: 135 MMOL/L (ref 135–147)
VIT B12 SERPL-MCNC: 284 PG/ML (ref 100–900)
WBC # BLD AUTO: 7.21 THOUSAND/UL (ref 4.31–10.16)

## 2023-03-21 RX ADMIN — IOHEXOL 100 ML: 350 INJECTION, SOLUTION INTRAVENOUS at 11:28

## 2023-03-22 DIAGNOSIS — E55.9 VITAMIN D DEFICIENCY: ICD-10-CM

## 2023-03-22 DIAGNOSIS — E55.9 VITAMIN D DEFICIENCY: Primary | ICD-10-CM

## 2023-03-22 RX ORDER — ERGOCALCIFEROL 1.25 MG/1
CAPSULE ORAL
Qty: 12 CAPSULE | Refills: 0 | Status: SHIPPED | OUTPATIENT
Start: 2023-03-22

## 2023-03-22 RX ORDER — ERGOCALCIFEROL 1.25 MG/1
50000 CAPSULE ORAL WEEKLY
Qty: 10 CAPSULE | Refills: 0 | Status: SHIPPED | OUTPATIENT
Start: 2023-03-22 | End: 2023-03-22

## 2023-03-23 LAB
COPPER SERPL-MCNC: 107 UG/DL (ref 80–158)
SELENIUM SERPL-MCNC: 142 UG/L (ref 93–198)
ZINC SERPL-MCNC: 68 UG/DL (ref 44–115)

## 2023-03-24 ENCOUNTER — TELEPHONE (OUTPATIENT)
Dept: HEMATOLOGY ONCOLOGY | Facility: CLINIC | Age: 48
End: 2023-03-24

## 2023-03-24 LAB — VIT B1 BLD-SCNC: 75.9 NMOL/L (ref 66.5–200)

## 2023-03-24 NOTE — TELEPHONE ENCOUNTER
Called pt and left a message letting her know we needed to reschedule her appt with Bruno Perea    Gave her a new date and time and to call back if this does not work for her

## 2023-03-25 LAB — VIT C SERPL-MCNC: 1 MG/DL (ref 0.4–2)

## 2023-03-28 LAB
A-TOCOPHEROL VIT E SERPL-MCNC: 11.8 MG/L (ref 7–25.1)
GAMMA TOCOPHEROL SERPL-MCNC: 1.7 MG/L (ref 0.5–5.5)
VIT A SERPL-MCNC: 44.2 UG/DL (ref 20.1–62)

## 2023-03-30 ENCOUNTER — TELEPHONE (OUTPATIENT)
Dept: GASTROENTEROLOGY | Facility: AMBULARY SURGERY CENTER | Age: 48
End: 2023-03-30

## 2023-03-30 NOTE — TELEPHONE ENCOUNTER
Patients GI provider:  Dr. Dooley    Number to return call: (  [phone]    Reason for call: Pt has significant findings on cat scan abdomen / pelvis done 3/21/23, spoke w/ Sierra in radiology, will send tiger text as well    Scheduled procedure/appointment date if applicable: Appt 6/5/23

## 2023-03-30 NOTE — TELEPHONE ENCOUNTER
Called ans spoke to pt regarding results: she says she is SOB, so she would like to speak to her PCP regarding the lung findings, which I explained is the right coarse of action. I will also be copying her PCP to this message to make them aware.     Dr. Dooley: remaining findings stable and non-urgent.     Thanks!

## 2023-03-31 ENCOUNTER — OFFICE VISIT (OUTPATIENT)
Dept: PULMONOLOGY | Facility: CLINIC | Age: 48
End: 2023-03-31

## 2023-03-31 VITALS
OXYGEN SATURATION: 99 % | DIASTOLIC BLOOD PRESSURE: 72 MMHG | TEMPERATURE: 97.5 F | RESPIRATION RATE: 20 BRPM | HEIGHT: 66 IN | WEIGHT: 293 LBS | HEART RATE: 70 BPM | SYSTOLIC BLOOD PRESSURE: 102 MMHG | BODY MASS INDEX: 47.09 KG/M2

## 2023-03-31 DIAGNOSIS — R05.3 CHRONIC COUGH: ICD-10-CM

## 2023-03-31 DIAGNOSIS — R06.09 DYSPNEA ON EXERTION: Primary | ICD-10-CM

## 2023-03-31 DIAGNOSIS — U09.9 POST-COVID SYNDROME: ICD-10-CM

## 2023-03-31 DIAGNOSIS — Q21.12 PATENT FORAMEN OVALE: ICD-10-CM

## 2023-03-31 DIAGNOSIS — G47.33 OSA (OBSTRUCTIVE SLEEP APNEA): ICD-10-CM

## 2023-03-31 PROBLEM — M54.9 NOTALGIA: Status: ACTIVE | Noted: 2023-03-31

## 2023-03-31 RX ORDER — ALBUTEROL SULFATE 90 UG/1
2 AEROSOL, METERED RESPIRATORY (INHALATION) EVERY 6 HOURS PRN
Qty: 8.5 G | Refills: 5 | Status: SHIPPED | OUTPATIENT
Start: 2023-03-31

## 2023-03-31 RX ORDER — FLUTICASONE FUROATE AND VILANTEROL 200; 25 UG/1; UG/1
1 POWDER RESPIRATORY (INHALATION) DAILY
Qty: 60 BLISTER | Refills: 5 | Status: SHIPPED | OUTPATIENT
Start: 2023-03-31

## 2023-03-31 RX ORDER — LIDOCAINE 50 MG/G
2 PATCH TOPICAL DAILY
Qty: 15 PATCH | Refills: 2 | Status: SHIPPED | OUTPATIENT
Start: 2023-03-31

## 2023-03-31 NOTE — ASSESSMENT & PLAN NOTE
The patient completed a sleep study on 6/24/2021 which showed mild sleep apnea with an AHI of 12 5  Patient was ordered auto CPAP, 5-20 cmH2O, however she never received the machine  Patient states that it was recalled before she got it  I placed a new order at today's visit  She often feels that she wakes up middle night gasping for air and that something is blocking her airway

## 2023-03-31 NOTE — ASSESSMENT & PLAN NOTE
An echocardiogram was completed on 8/11/2021 that showed a possible small PFO  She has mild mitral valve regurgitation as well as mild tricuspid and pulmonic valve regurgitation  Patient states that she gets palpitations often  She also states that her current cardiologist is moving and she would like to [facility]   A referral has been placed

## 2023-03-31 NOTE — ASSESSMENT & PLAN NOTE
This is the patient's main concern today  She is maintained on Breo Ellipta, 1 puff daily however she has been without this medication since the beginning of March  She still has her albuterol which she uses 2 puffs daily if she is exerting herself  Unfortunately patient attempted PFTs on 6/22/2021 but was unable to perform and complete the testing  A 6-minute walk was attempted in the office today but the patient became too short of breath after 3 minutes  Her oxygen remained at 90%  Patient states that she is attending pulmonary rehab at [facility] in [facility]  Unfortunately, I am unable to view their notes  I discussed with patient that she should also try strengthening exercises at home with small weights  Her BMI at today's visit is 50 36, suspect that some of the dyspnea on exertion is related to obesity  She does follow with bariatrics outpatient

## 2023-03-31 NOTE — ASSESSMENT & PLAN NOTE
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

## 2023-03-31 NOTE — PROGRESS NOTES
Pulmonary Follow Up Note   [facility]  [facility] MRN: [id number]  3/31/2023      Assessment/Plan:     Patent foramen ovale  An echocardiogram was completed on 8/11/2021 that showed a possible small PFO  She has mild mitral valve regurgitation as well as mild tricuspid and pulmonic valve regurgitation  Patient states that she gets palpitations often  She also states that her current cardiologist is moving and she would like to establish care with a [address] cardiologist   A referral has been placed  Post-COVID syndrome  This is overall improving  CTA of the chest completed on 10/31/2022 showed post COVID-19 pulmonary scarring that improved since July 1, 2021  Patient had a repeat CT of abdomen and pelvis completed on 3/21/2023 that showed nonspecific patchy groundglass densities in the visualized lung bases  Upon reviewing the most recent CT, there does not appear to be any new opacities  I reassured patient that since she is improving and is without sputum production, chills, fevers, this is most likely to her post COVID-19 pulmonary scarring  I personally reviewed the CT scans with the patient and her friend  She is requesting a repeat CT of her chest and 6 months  This can be discussed at her next visit  CAR (obstructive sleep apnea)  The patient completed a sleep study on 6/24/2021 which showed mild sleep apnea with an AHI of 12 5  Patient was ordered auto CPAP, 5-20 cmH2O, however she never received the machine  Patient states that it was recalled before she got it  I placed a new order at today's visit  She often feels that she wakes up middle night gasping for air and that something is blocking her airway  Chronic cough  This has been improving since last visit  Patient is maintained on Breo Ellipta, 1 puff daily  She has been without her medication since the beginning of March  I have reordered this for her    I have also reordered her benzonatate as needed  She is complaining of right-sided chest pain and right-sided back pain, suspect this is related to her cough and muscle soreness  The pain is reproducible on palpation  I have ordered Lidoderm patches for her to try  Dyspnea on exertion  This is the patient's main concern today  She is maintained on Breo Ellipta, 1 puff daily however she has been without this medication since the beginning of March  She still has her albuterol which she uses 2 puffs daily if she is exerting herself  Unfortunately patient attempted PFTs on 6/22/2021 but was unable to perform and complete the testing  A 6-minute walk was attempted in the office today but the patient became too short of breath after 3 minutes  Her oxygen remained at 90%  Patient states that she is attending pulmonary rehab at [facility] in [facility]  Unfortunately, I am unable to view their notes  I discussed with patient that she should also try strengthening exercises at home with small weights  Her BMI at today's visit is 50 36, suspect that some of the dyspnea on exertion is related to obesity  She does follow with bariatrics outpatient  Visit orders:    Problem List Items Addressed This Visit        Respiratory    CAR (obstructive sleep apnea)     The patient completed a sleep study on 6/24/2021 which showed mild sleep apnea with an AHI of 12 5  Patient was ordered auto CPAP, 5-20 cmH2O, however she never received the machine  Patient states that it was recalled before she got it  I placed a new order at today's visit  She often feels that she wakes up middle night gasping for air and that something is blocking her airway  Relevant Orders    CPAP Auto New DME       Cardiovascular and Mediastinum    Patent foramen ovale     An echocardiogram was completed on 8/11/2021 that showed a possible small PFO  She has mild mitral valve regurgitation as well as mild tricuspid and pulmonic valve regurgitation      Patient states that she gets palpitations often  She also states that her current cardiologist is moving and she would [facility]   A referral has been placed  Relevant Orders    Ambulatory Referral to Cardiology       Other    Post-COVID syndrome     This is overall improving  CTA of the chest completed on 10/31/2022 showed post COVID-19 pulmonary scarring that improved since July 1, 2021  Patient had a repeat CT of abdomen and pelvis completed on 3/21/2023 that showed nonspecific patchy groundglass densities in the visualized lung bases  Upon reviewing the most recent CT, there does not appear to be any new opacities  I reassured patient that since she is improving and is without sputum production, chills, fevers, this is most likely to her post COVID-19 pulmonary scarring  I personally reviewed the CT scans with the patient and her friend  She is requesting a repeat CT of her chest and 6 months  This can be discussed at her next visit  Relevant Medications    albuterol (PROVENTIL HFA,VENTOLIN HFA) 90 mcg/act inhaler    fluticasone-vilanterol (Breo Ellipta) 200-25 mcg/actuation inhaler    Dyspnea on exertion - Primary     This is the patient's main concern today  She is maintained on Breo Ellipta, 1 puff daily however she has been without this medication since the beginning of March  She still has her albuterol which she uses 2 puffs daily if she is exerting herself  Unfortunately patient attempted PFTs on 6/22/2021 but was unable to perform and complete the testing  A 6-minute walk was attempted in the office today but the patient became too short of breath after 3 minutes  Her oxygen remained at 90%  Patient states that she is attending pulmonary rehab at [facility] in [facility]  Unfortunately, I am unable to view their notes  I discussed with patient that she should also try strengthening exercises at home with small weights    Her BMI at today's "visit is 50 36, suspect that some of the dyspnea on exertion is related to obesity  She does follow with bariatrics outpatient  Relevant Orders    POCT 6 minute walk (Completed)    Chronic cough     This has been improving since last visit  Patient is maintained on Breo Ellipta, 1 puff daily  She has been without her medication since the beginning of March  I have reordered this for her  I have also reordered her benzonatate as needed  She is complaining of right-sided chest pain and right-sided back pain, suspect this is related to her cough and muscle soreness  The pain is reproducible on palpation  I have ordered Lidoderm patches for her to try  Relevant Medications    lidocaine (Lidoderm) 5 %       Return in about 3 months (around 6/30/2023), or if symptoms worsen or fail to improve  History of Present Illness   HPI:  Tyler Sharif is a 41 y o  female who presents to the office today with her friend for follow up regarding post-acute sequela of COVID-19, dyspnea on exertion, and obstructive sleep apnea  The patient is currently upset because she received a phone call yesterday evening from her GI physician stating that \"there was something seen on my recent CT scan that required immediate follow-up\" with the pulmonologist   Patient states that it was something that \"was seen on my lungs  \" She was unable to sleep last night because she thought something was wrong  With regards to her breathing, she is feeling better since her last appointment and thinks that her breathing and cough have slightly improved  However she still gets very short of breath with activity states that her heart races  She is also stating that she wakes up throughout the night gasping for air and feels like something is blocking her breathing  She was unable to get her CPAP machine stating that it was on a recall, she would like this as soon as possible    She has ran out of her [employer] in the " beginning of March  She uses her albuterol around twice daily if she is exerting herself or walking upstairs  She did not get her COVID vaccinations  She is receiving iron infusions every 3 months, this was started by a cardiologist  Patient state's her cardiologist is moving and she would like to to follow-up with one at [place] [id number]  She states that she is attending pulmonary rehab on [address] [facility] in [facility]  She also states that she will be having her bariatric surgery reversed  She would like a repeat CT scan of her chest in 6 months  Review of Systems   Constitutional: Negative for appetite change, chills, diaphoresis, fatigue and fever  HENT: Negative for congestion, postnasal drip and rhinorrhea  Respiratory: Positive for cough, chest tightness, shortness of breath and wheezing  Cardiovascular: Positive for chest pain and palpitations  Gastrointestinal: Positive for diarrhea  Negative for nausea and vomiting  Musculoskeletal: Positive for back pain  Neurological: Positive for dizziness (on exertion)  Psychiatric/Behavioral: The patient is nervous/anxious  All other systems reviewed and are negative        Medical, Family and Social history reviewed and updated as appropriate    Historical Information   Past Medical History:   Diagnosis Date   • Abscess of labia    • Acute and chronic respiratory failure with hypoxia ([facility] )    • Anemia 4/6/2021   • Anxiety    • Bipolar disorder ([facility] )    • Breast lump    • COVID-19    • Frequent headaches    • Hemorrhoids    • Hypothyroidism 7/1/2013   • Migraine    • Morbid obesity ([facility] ) 7/1/2013   • Obesity    • [facility]      Past Surgical History:   Procedure Laterality Date   • BARIATRIC SURGERY     • BREAST BIOPSY Right 1999    benign   • BREAST LUMPECTOMY Left     benign   • CHOLECYSTECTOMY     • GALLBLADDER SURGERY       Family History   Problem Relation Age of Onset   • Liver cancer Mother    • Prostate cancer Father    • No Known Problems Sister    • No Known Problems Sister    • No Known Problems Sister    • No Known Problems Sister    • No Known Problems Sister    • No Known Problems Sister    • No Known Problems Sister    • No Known Problems Sister    • No Known Problems Sister    • No Known Problems Sister    • No Known Problems Maternal Grandmother    • No Known Problems Maternal Grandfather    • No Known Problems Paternal Grandmother    • No Known Problems Paternal Grandfather    • Cancer Maternal Aunt    • Cancer Maternal Aunt    • Cancer Maternal Aunt    • Cancer Maternal Aunt    • Cancer Maternal Aunt    • Cancer Maternal Aunt    • Cancer Maternal Aunt    • No Known Problems Paternal Aunt    • No Known Problems Paternal Aunt    • No Known Problems Paternal Aunt    • No Known Problems Paternal Aunt    • Cancer Paternal Aunt    • Cancer Paternal Aunt        Social History     Tobacco Use   Smoking Status Former   • Types: Cigarettes   • Quit date: 2008   • Years since quitting: 15 2   Smokeless Tobacco Never   Tobacco Comments    smoker for 2-3 yrs and quit in 2008, smoked about 3 cigarettes a day         Meds/Allergies     Current Outpatient Medications:   •  albuterol (PROVENTIL HFA,VENTOLIN HFA) 90 mcg/act inhaler, Inhale 2 puffs every 6 (six) hours as needed for wheezing or shortness of breath, Disp: 8 5 g, Rfl: 5  •  atorvastatin (LIPITOR) 20 mg tablet, TAKE ONE TABLET (20 MG TOTAL) BY MOUTH DAILY, Disp: 30 tablet, Rfl: 6  •  benzonatate (TESSALON) 200 MG capsule, Take 1 capsule (200 mg total) by mouth daily at bedtime as needed for cough, Disp: 30 capsule, Rfl: 3  •  busPIRone (BUSPAR) 10 mg tablet, Take 1 tablet (10 mg total) by mouth 2 (two) times a day, Disp: 60 tablet, Rfl: 3  •  Calcium Citrate 200 MG TABS, 1 tablet 3 (three) times a day, Disp: , Rfl:   •  Cyanocobalamin ER 1000 MCG TBCR, take one tab daily, Disp: , Rfl:   •  dicyclomine (BENTYL) 10 mg capsule, Take 1 capsule (10 mg total) by mouth 2 (two) times a day "as needed (abd pain), Disp: 60 capsule, Rfl: 2  •  diphenhydrAMINE-acetaminophen (TYLENOL PM)  MG TABS, Take 1 tablet by mouth daily at bedtime as needed for sleep, Disp: , Rfl:   •  ergocalciferol (VITAMIN D2) 50,000 units, TAKE 1 CAPSULE BY MOUTH 1 TIME A WEEK, Disp: 12 capsule, Rfl: 0  •  escitalopram (LEXAPRO) 20 mg tablet, TAKE ONE TABLET (20 MG TOTAL) BY MOUTH DAILY, Disp: 90 tablet, Rfl: 2  •  fluticasone-vilanterol (Breo Ellipta) 200-25 mcg/actuation inhaler, Inhale 1 puff daily Rinse mouth after use , Disp: 60 blister, Rfl: 5  •  furosemide (LASIX) 20 mg tablet, TAKE ONE TABLET (20 MG TOTAL) BY MOUTH DAILY, Disp: 30 tablet, Rfl: 3  •  levothyroxine (Levo-T) 150 mcg tablet, Take 1 tablet (150 mcg total) by mouth daily in the early morning, Disp: 90 tablet, Rfl: 3  •  lidocaine (Lidoderm) 5 %, Apply 2 patches topically over 12 hours daily Remove & Discard patch within 12 hours or as directed by MD  Place 1 patch to right chest and 1 to back daily  , Disp: 15 patch, Rfl: 2  •  metoprolol tartrate (LOPRESSOR) 25 mg tablet, TAKE 1/2 TABLET(12 5 MG) BY MOUTH EVERY 12 HOURS, Disp: 90 tablet, Rfl: 4  •  Multiple Vitamins-Minerals (MULTIVITAMIN ADULT PO), every 24 hours, Disp: , Rfl:   •  pantoprazole (PROTONIX) 40 mg tablet, TAKE ONE TABLET (40 MG TOTAL) BY MOUTH EVERY 24 HOURS, Disp: 90 tablet, Rfl: 3  •  traZODone (DESYREL) 100 mg tablet, TAKE ONE TABLET (100 MG TOTAL) BY MOUTH DAILY AT BEDTIME, Disp: 90 tablet, Rfl: 1  •  megestrol (MEGACE) 20 mg tablet, Take 1 tablet (20 mg total) by mouth daily for 21 days, Disp: 21 tablet, Rfl: 3  Allergies   Allergen Reactions   • No Active Allergies        Vitals: Blood pressure 102/72, pulse 70, temperature 97 5 °F (36 4 °C), temperature source Tympanic, resp  rate 20, height 5' 6\" (1 676 m), weight (!) 142 kg (312 lb), SpO2 99 %, not currently breastfeeding  Body mass index is 50 36 kg/m²   Oxygen Therapy  SpO2: 99 %  Oxygen Therapy: None (Room air)      Physical " Exam  Vitals reviewed  Constitutional:       General: She is not in acute distress  Appearance: She is obese  She is not ill-appearing or toxic-appearing  HENT:      Head: Normocephalic and atraumatic  Nose: Nose normal       Mouth/Throat:      Pharynx: Oropharynx is clear  Eyes:      Conjunctiva/sclera: Conjunctivae normal    Cardiovascular:      Rate and Rhythm: Normal rate and regular rhythm  Heart sounds: Normal heart sounds  Pulmonary:      Effort: Pulmonary effort is normal  No tachypnea, accessory muscle usage, respiratory distress or retractions  Breath sounds: Normal air entry  No stridor or decreased air movement  No decreased breath sounds, wheezing, rhonchi or rales  Chest:      Chest wall: Tenderness (right sided) present  Abdominal:      Palpations: Abdomen is soft  Musculoskeletal:         General: Normal range of motion  Cervical back: Normal range of motion  Skin:     General: Skin is warm and dry  Neurological:      General: No focal deficit present  Mental Status: She is alert and oriented to person, place, and time  Psychiatric:         Mood and Affect: Mood is anxious  Labs: I have personally reviewed pertinent lab results  Lab Results   Component Value Date    WBC 7 21 03/21/2023    HGB 15 2 03/21/2023    HCT 47 1 (H) 03/21/2023    MCV 98 03/21/2023     (H) 03/21/2023     Lab Results   Component Value Date    GLUCOSE 89 10/14/2015    CALCIUM 9 2 03/21/2023     10/14/2015    K 4 1 03/21/2023    CO2 25 03/21/2023     03/21/2023    BUN 12 03/21/2023    CREATININE 0 96 03/21/2023     No results found for: IGE  Lab Results   Component Value Date    ALT 17 03/21/2023    AST 12 (L) 03/21/2023    ALKPHOS 124 (H) 03/21/2023    BILITOT 0 33 10/14/2015       Imaging and other studies: I have personally reviewed pertinent reports     and I have personally reviewed pertinent films in PACS     3/21/23 CT abdomen and pelvis: Nonspecific patchy groundglass densities in the visualized lung bases  Status post gastric bypass  Small to moderate right and left peraumbilical hernias  CTA chest pe study 10/31/22: No pulmonary embolism  Post COVID-19 pulmonary scarring persists though has improved since July 1, 2021    Pulmonary function testing:  Performed 6/22/21 and personally interpreted  FEV1/FVC ratio 52%   FEV1 21% predicted  FVC 32% predicted  Positive but no significant response to bronchodilators  TLC  % predicted- unable to perform  RV  % predicted- unable to perform  Patient was unable to perform pulmonary function testing due to poor and inconsistent efforts  Results likely do not reflect actual disease  Spirometry shows very severe obstructive airflow limitation with reduced vital capacity  Unable to interpret flow-volume loops due to difficulty with testing  Other Studies: I have personally reviewed pertinent reports  3/31/23 6 minute walk: Pretest, heart rate was 70 and oxygen was 99% on room air  Patient at 1 minute of exercise had an oxygen saturation of 90% on room air and a heart rate of 60  At 2 minutes she was 91% on room air with a heart rate of 62  At 3 minutes her heart rate was 90 on room air with a heart rate of 50  Unfortunately patient could not finish the full 6 minutes secondary to shortness of breath  Patient completed a total of 3 laps for a total distance of 54 m  At the end of her exam her heart rate improved to 86 and her oxygen saturation improved to 98%  Echo 8/11/21: LVEF 60%  There was redundancy of the atrial septum, with borderline criteria for aneurysm  A small PFO cannot be excluded on this study  Mild mitral valve regurgitation  Mild tricuspid valve regurgitation  Estimated peak PA pressure was 25 mmHg  mild pulmonic valve regurgitation

## 2023-03-31 NOTE — ASSESSMENT & PLAN NOTE
This has been improving since last visit  Patient is maintained on Breo Ellipta, 1 puff daily  She has been without her medication since the beginning of March  I have reordered this for her  I have also reordered her benzonatate as needed  She is complaining of right-sided chest pain and right-sided back pain, suspect this is related to her cough and muscle soreness  The pain is reproducible on palpation  I have ordered Lidoderm patches for her to try

## 2023-04-07 ENCOUNTER — TELEPHONE (OUTPATIENT)
Dept: PSYCHIATRY | Facility: CLINIC | Age: 48
End: 2023-04-07

## 2023-04-25 ENCOUNTER — TELEPHONE (OUTPATIENT)
Dept: HEMATOLOGY ONCOLOGY | Facility: CLINIC | Age: 48
End: 2023-04-25

## 2023-04-25 NOTE — TELEPHONE ENCOUNTER
I called Glennie Dakins regarding an appointment that they have scheduled with THOMAS Yu scheduled on 07/28/23 at 1PM  I left a voicemail explaining to patient that the provider will be out of the office on this date and will need to reschedule the visit  I encouraged patient to call [facility] at [phone] to reschedule  A [employer] message has been sent to patient relaying the above information and advising patient to call [facility] and reschedule their appointment

## 2023-05-01 ENCOUNTER — TELEPHONE (OUTPATIENT)
Dept: HEMATOLOGY ONCOLOGY | Facility: CLINIC | Age: 48
End: 2023-05-01

## 2023-05-01 NOTE — TELEPHONE ENCOUNTER
3rd attempt made  to reschedule patients upcoming visit with Ayaz Hill as he will not be in the office the day of       Appt 7/28/23     I left a voicemail detailing this and instructing patient to call back [facility] phone number to arrange a new follow up appointment

## 2023-05-06 DIAGNOSIS — F41.9 ANXIETY: ICD-10-CM

## 2023-05-06 DIAGNOSIS — F32.1 CURRENT MODERATE EPISODE OF MAJOR DEPRESSIVE DISORDER, UNSPECIFIED WHETHER RECURRENT (HCC): ICD-10-CM

## 2023-05-06 DIAGNOSIS — U09.9 POST-COVID SYNDROME: ICD-10-CM

## 2023-05-08 ENCOUNTER — TELEPHONE (OUTPATIENT)
Dept: HEMATOLOGY ONCOLOGY | Facility: CLINIC | Age: 48
End: 2023-05-08

## 2023-05-08 NOTE — TELEPHONE ENCOUNTER
Appointment Change  Cancel, Reschedule, Change to Virtual      Who are you speaking with? Patient   If it is not the patient, are they listed on an active communication consent form? N/A   Which provider is the appointment scheduled with? THOMAS Carrion   When is the appointment scheduled? Please list date and time  07/28/23 1PM   At which location is the appointment scheduled to take place? [place]   Was the appointment rescheduled or changed from an in person visit to a virtual visit? If so, please list the details of the change  07/31/23 9:30AM   What is the reason for the appointment change? Provider out of office   Was [facility] transport scheduled for this visit? No   Does [facility] transport need to be scheduled for the new visit (if applicable) N/A   Does the patient need an infusion appointment rescheduled? No   Does the patient have an infusion appointment scheduled? If so, when? No   Is the patient undergoing chemotherapy? No   Was the no-show policy reviewed for appointments being changed with less then 24 hours of notice?  N/A

## 2023-05-09 RX ORDER — TRAZODONE HYDROCHLORIDE 100 MG/1
TABLET ORAL
Qty: 90 TABLET | Refills: 1 | Status: SHIPPED | OUTPATIENT
Start: 2023-05-09

## 2023-05-31 ENCOUNTER — APPOINTMENT (OUTPATIENT)
Dept: LAB | Facility: HOSPITAL | Age: 48
End: 2023-05-31
Payer: COMMERCIAL

## 2023-05-31 DIAGNOSIS — R19.7 DIARRHEA, UNSPECIFIED TYPE: ICD-10-CM

## 2023-05-31 PROCEDURE — 87505 NFCT AGENT DETECTION GI: CPT

## 2023-05-31 PROCEDURE — 83993 ASSAY FOR CALPROTECTIN FECAL: CPT

## 2023-05-31 PROCEDURE — 87209 SMEAR COMPLEX STAIN: CPT

## 2023-05-31 PROCEDURE — 87177 OVA AND PARASITES SMEARS: CPT

## 2023-06-01 LAB
C DIFF TOX GENS STL QL NAA+PROBE: NEGATIVE
CAMPYLOBACTER DNA SPEC NAA+PROBE: NORMAL
SALMONELLA DNA SPEC QL NAA+PROBE: NORMAL
SHIGA TOXIN STX GENE SPEC NAA+PROBE: NORMAL
SHIGELLA DNA SPEC QL NAA+PROBE: NORMAL

## 2023-06-05 ENCOUNTER — OFFICE VISIT (OUTPATIENT)
Dept: GASTROENTEROLOGY | Facility: CLINIC | Age: 48
End: 2023-06-05
Payer: COMMERCIAL

## 2023-06-05 VITALS
TEMPERATURE: 98 F | DIASTOLIC BLOOD PRESSURE: 90 MMHG | HEIGHT: 66 IN | HEART RATE: 86 BPM | OXYGEN SATURATION: 98 % | WEIGHT: 293 LBS | BODY MASS INDEX: 47.09 KG/M2 | SYSTOLIC BLOOD PRESSURE: 130 MMHG

## 2023-06-05 DIAGNOSIS — R11.0 NAUSEA: ICD-10-CM

## 2023-06-05 DIAGNOSIS — R10.11 POSTPRANDIAL ABDOMINAL PAIN IN RIGHT UPPER QUADRANT: ICD-10-CM

## 2023-06-05 DIAGNOSIS — R19.7 DIARRHEA, UNSPECIFIED TYPE: Primary | ICD-10-CM

## 2023-06-05 DIAGNOSIS — E66.01 MORBID OBESITY (HCC): ICD-10-CM

## 2023-06-05 DIAGNOSIS — E55.9 VITAMIN D DEFICIENCY: ICD-10-CM

## 2023-06-05 DIAGNOSIS — D12.6 COLON ADENOMAS: ICD-10-CM

## 2023-06-05 PROCEDURE — 99214 OFFICE O/P EST MOD 30 MIN: CPT | Performed by: INTERNAL MEDICINE

## 2023-06-05 RX ORDER — DICYCLOMINE HYDROCHLORIDE 10 MG/1
10 CAPSULE ORAL 2 TIMES DAILY PRN
Qty: 60 CAPSULE | Refills: 2 | Status: SHIPPED | OUTPATIENT
Start: 2023-06-05

## 2023-06-05 NOTE — PROGRESS NOTES
[facility] - Outpatient Follow-up Note  Jennifer Umaña 57 y o  female MRN: [id number]  Encounter: [id number]          ASSESSMENT AND PLAN:    Oskar Fields is a 57 y o  female with obesity and prior history of gastric bypass, who has been seeing me for chronic GI symptoms of abdominal discomfort, bloating, and loose stools  We have done fairly extensive testing as documented below and in my previous note (EGD, colonoscopy, CT scan, gastric emptying scan)  She really has had improvement with lactose avoidance and has been making an effort to lose weight and eat more healthily  She seems motivated to continue and I strongly encouraged her  I told her that she is welcome to go back to bariatric surgery, but I am not sure that her bypass is the reason for her GI symptoms  I encouraged her to continue her healthy lifestyle  Her last colonoscopy had multiple adenomas and needs repeat colonoscopy at the end of this year  1  Trial of dicyclomine as needed for abdominal cramping  2   Vitamin D supplementation  3   Continue caloric restriction and exercise and weight loss  4   Colonoscopy later this year  1  Diarrhea, unspecified type    2  Postprandial abdominal pain in right upper quadrant    3  Nausea    4  Vitamin D deficiency    5  Colon adenomas    6  Morbid obesity ([address] )        No orders of the defined types were placed in this encounter     ______________________________________________________________________    SUBJECTIVE:    Oskar Fields is a 57 y o  female with history of gastric bypass, morbid obesity, prior cholecystectomy, presenting for follow-up of chronic abdominal pain and diarrhea  After her last visit, I ordered CT of the abdomen and pelvis  This noted nonspecific patchy groundglass densities in the lower lobes and small periumbilical hernias without bowel involvement  She is now following up with pulmonology    Otherwise, no abdominal pathology  She also had stool testing which was negative for C  difficile, enteric pathogens, O&P  Calprotectin is pending  Nutritional labs for vitamin A, E, C, B1, zinc, selenium, folate, B12, all normal   Vitamin D was low  I had instructed her to be on a lactose-free diet and eat small frequent meals  She had expressed an interest in going back to bariatric surgery and reversing her Efrain-en-Y because she felt that it was not working for her  Since I last saw her, she did start a lactose-free diet and felt that it was quite helpful  She now has less diarrhea, bloating and abdominal pain  She has some soft stools in the morning, but no diarrhea  She also decreased rice and bread consumption, as well as eating less sweets  She has lost some weight and feels that her energy has improved  Overall, she feels better  Still has occasional right upper quadrant pain  REVIEW OF SYSTEMS IS OTHERWISE NEGATIVE        Historical Information   Past Medical History:   Diagnosis Date   • Abscess of labia    • Acute and chronic respiratory failure with hypoxia (HCC)    • Anemia 4/6/2021   • Anxiety    • Bipolar disorder (HCC)    • Breast lump    • COVID-19    • Frequent headaches    • Hemorrhoids    • Hypothyroidism 7/1/2013   • Migraine    • Morbid obesity ([facility] [address] ) 7/1/2013   • Obesity    • Psychotic disorder ([facility] [address] )      Past Surgical History:   Procedure Laterality Date   • BARIATRIC SURGERY     • BREAST BIOPSY Right 1999    benign   • BREAST LUMPECTOMY Left     benign   • CHOLECYSTECTOMY     • GALLBLADDER SURGERY       Social History   Social History     Substance and Sexual Activity   Alcohol Use Never     Social History     Substance and Sexual Activity   Drug Use No     Social History     Tobacco Use   Smoking Status Former   • Types: Cigarettes   • Quit date: 2008   • Years since quitting: 15 4   Smokeless Tobacco Never   Tobacco Comments    smoker for 2-3 yrs and quit in 2008, smoked about 3 cigarettes a day     Family History   Problem Relation Age of Onset   • Liver cancer Mother    • Prostate cancer Father    • No Known Problems Sister    • No Known Problems Sister    • No Known Problems Sister    • No Known Problems Sister    • No Known Problems Sister    • No Known Problems Sister    • No Known Problems Sister    • No Known Problems Sister    • No Known Problems Sister    • No Known Problems Sister    • No Known Problems Maternal Grandmother    • No Known Problems Maternal Grandfather    • No Known Problems Paternal Grandmother    • No Known Problems Paternal Grandfather    • Cancer Maternal Aunt    • Cancer Maternal Aunt    • Cancer Maternal Aunt    • Cancer Maternal Aunt    • Cancer Maternal Aunt    • Cancer Maternal Aunt    • Cancer Maternal Aunt    • No Known Problems Paternal Aunt    • No Known Problems Paternal Aunt    • No Known Problems Paternal Aunt    • No Known Problems Paternal Aunt    • Cancer Paternal Aunt    • Cancer Paternal Aunt        Meds/Allergies       Current Outpatient Medications:   •  albuterol (PROVENTIL HFA,VENTOLIN HFA) 90 mcg/act inhaler  •  atorvastatin (LIPITOR) 20 mg tablet  •  benzonatate (TESSALON) 200 MG capsule  •  busPIRone (BUSPAR) 10 mg tablet  •  Calcium Citrate 200 MG TABS  •  Cyanocobalamin ER 1000 MCG TBCR  •  dicyclomine (BENTYL) 10 mg capsule  •  diphenhydrAMINE-acetaminophen (TYLENOL PM)  MG TABS  •  ergocalciferol (VITAMIN D2) 50,000 units  •  escitalopram (LEXAPRO) 20 mg tablet  •  fluticasone-vilanterol (Breo Ellipta) 200-25 mcg/actuation inhaler  •  furosemide (LASIX) 20 mg tablet  •  levothyroxine (Levo-T) 150 mcg tablet  •  lidocaine (Lidoderm) 5 %  •  metoprolol tartrate (LOPRESSOR) 25 mg tablet  •  Multiple Vitamins-Minerals (MULTIVITAMIN ADULT PO)  •  pantoprazole (PROTONIX) 40 mg tablet  •  traZODone (DESYREL) 100 mg tablet  •  megestrol (MEGACE) 20 mg tablet    Allergies   Allergen Reactions   • No Active Allergies            Objective "    Blood pressure 130/90, pulse 86, temperature 98 °F (36 7 °C), temperature source Tympanic, height 5' 6\" (1 676 m), weight (!) 141 kg (311 lb 6 4 oz), SpO2 98 %, not currently breastfeeding  Body mass index is 50 26 kg/m²  PHYSICAL EXAM:      General Appearance:   Alert, cooperative, no distress   HEENT:   Normocephalic, atraumatic, anicteric  Neck:  Supple, symmetrical, trachea midline   Lungs:   Clear to auscultation bilaterally; no rales, rhonchi or wheezing; respirations unlabored    Heart[de-identified]   Regular rate and rhythm; no murmur, rub, or gallop  Abdomen:   Soft, mild RUQ ttp, non-distended; normal bowel sounds; no masses, no organomegaly    Genitalia:   Deferred    Rectal:   Deferred    Extremities:  No cyanosis, clubbing or edema    Pulses:  2+ and symmetric    Skin:  No jaundice, rashes, or lesions    Lymph nodes:  No palpable cervical lymphadenopathy        Lab Results:   No visits with results within 1 Day(s) from this visit     Latest known visit with results is:   Appointment on 05/31/2023   Component Date Value   • C difficile toxin by PCR 05/31/2023 Negative    • Salmonella sp PCR 05/31/2023 None Detected    • Shigella sp/Enteroinvasi* 05/31/2023 None Detected    • Campylobacter sp (jejuni* 05/31/2023 None Detected    • Shiga toxin 1/Shiga toxi* 05/31/2023 None Detected        Lab Results   Component Value Date    HCT 47 1 (H) 03/21/2023    HGB 15 2 03/21/2023    MCV 98 03/21/2023     (H) 03/21/2023    WBC 7 21 03/21/2023       Lab Results   Component Value Date    AGAP 8 03/21/2023    ALKPHOS 124 (H) 03/21/2023    ALT 17 03/21/2023    ANIONGAP 7 10/14/2015    AST 12 (L) 03/21/2023    BILITOT 0 33 10/14/2015    BUN 12 03/21/2023    CALCIUM 9 2 03/21/2023     03/21/2023    CO2 25 03/21/2023    CREATININE 0 96 03/21/2023    EGFR 71 03/21/2023    GLUC 81 09/27/2022    GLUF 80 03/21/2023    K 4 1 03/21/2023     10/14/2015    PROT 7 2 10/14/2015    SODIUM 135 03/21/2023    JEAN " 0 43 03/21/2023    TP 6 9 03/21/2023       Lab Results   Component Value Date    CRP 4 0 (H) 03/21/2023       Lab Results   Component Value Date    RRA1BHVRXRSI 9 960 (H) 09/27/2022       Lab Results   Component Value Date    FERRITIN 15 09/10/2021    IRON 108 09/10/2021    TIBC 425 09/10/2021       Radiology Results:   No results found

## 2023-06-11 LAB — CALPROTECTIN STL-MCNT: 35 UG/G (ref 0–120)

## 2023-07-05 ENCOUNTER — OFFICE VISIT (OUTPATIENT)
Dept: FAMILY MEDICINE CLINIC | Facility: CLINIC | Age: 48
End: 2023-07-05
Payer: COMMERCIAL

## 2023-07-05 VITALS
RESPIRATION RATE: 20 BRPM | OXYGEN SATURATION: 97 % | HEART RATE: 85 BPM | SYSTOLIC BLOOD PRESSURE: 120 MMHG | BODY MASS INDEX: 50.2 KG/M2 | DIASTOLIC BLOOD PRESSURE: 88 MMHG | WEIGHT: 293 LBS | TEMPERATURE: 97.9 F

## 2023-07-05 DIAGNOSIS — E23.7 LESION OF PITUITARY GLAND (HCC): ICD-10-CM

## 2023-07-05 DIAGNOSIS — U09.9 POST-COVID SYNDROME: ICD-10-CM

## 2023-07-05 DIAGNOSIS — R10.9 FLANK PAIN: Primary | ICD-10-CM

## 2023-07-05 DIAGNOSIS — B02.9 HERPES ZOSTER WITHOUT COMPLICATION: ICD-10-CM

## 2023-07-05 DIAGNOSIS — E03.9 HYPOTHYROIDISM, UNSPECIFIED TYPE: ICD-10-CM

## 2023-07-05 DIAGNOSIS — R05.9 COUGH: ICD-10-CM

## 2023-07-05 LAB
SL AMB  POCT GLUCOSE, UA: NEGATIVE
SL AMB LEUKOCYTE ESTERASE,UA: NEGATIVE
SL AMB POCT BILIRUBIN,UA: NEGATIVE
SL AMB POCT BLOOD,UA: ABNORMAL
SL AMB POCT CLARITY,UA: ABNORMAL
SL AMB POCT COLOR,UA: ABNORMAL
SL AMB POCT KETONES,UA: NEGATIVE
SL AMB POCT NITRITE,UA: NEGATIVE
SL AMB POCT PH,UA: 5
SL AMB POCT SPECIFIC GRAVITY,UA: 1.02
SL AMB POCT URINE PROTEIN: ABNORMAL
SL AMB POCT UROBILINOGEN: NEGATIVE

## 2023-07-05 PROCEDURE — 99214 OFFICE O/P EST MOD 30 MIN: CPT

## 2023-07-05 PROCEDURE — 81002 URINALYSIS NONAUTO W/O SCOPE: CPT

## 2023-07-05 RX ORDER — FLUTICASONE FUROATE, UMECLIDINIUM BROMIDE AND VILANTEROL TRIFENATATE 200; 62.5; 25 UG/1; UG/1; UG/1
1 POWDER RESPIRATORY (INHALATION) DAILY
Qty: 60 BLISTER | Refills: 0 | Status: SHIPPED | OUTPATIENT
Start: 2023-07-05

## 2023-07-05 RX ORDER — VALACYCLOVIR HYDROCHLORIDE 500 MG/1
TABLET, FILM COATED ORAL
COMMUNITY
Start: 2023-06-22

## 2023-07-05 RX ORDER — BENZONATATE 200 MG/1
200 CAPSULE ORAL
Qty: 30 CAPSULE | Refills: 3 | Status: SHIPPED | OUTPATIENT
Start: 2023-07-05

## 2023-07-05 RX ORDER — FLUTICASONE FUROATE AND VILANTEROL 200; 25 UG/1; UG/1
1 POWDER RESPIRATORY (INHALATION) DAILY
Qty: 60 BLISTER | Refills: 5 | Status: CANCELLED | OUTPATIENT
Start: 2023-07-05

## 2023-07-05 RX ORDER — LEVOTHYROXINE SODIUM 0.15 MG/1
150 TABLET ORAL
Qty: 90 TABLET | Refills: 3 | Status: SHIPPED | OUTPATIENT
Start: 2023-07-05

## 2023-07-05 NOTE — ASSESSMENT & PLAN NOTE
06/22 MRI Brain   · There is a lesion within the pituitary gland measuring approximately 0.66 x 0.61cm  Referred to endocrinology for management

## 2023-07-05 NOTE — PROGRESS NOTES
Name: Charanjit Umaña      : 1975      MRN: [id number]  Encounter Provider: THOMAS Zacarias  Encounter Date: 2023   [facility]     1. Flank pain  Comments:  (+) blood in PCOT urine. (+) CVA tenderness. Possible kidney stone. Tylenol for pain. Increase fluids. Orders:  -     POCT urine dip    2. Lesion of pituitary gland [facility])  Assessment & Plan:   MRI Brain   · There is a lesion within the pituitary gland measuring approximately 0.66 x 0.61cm  Referred to endocrinology for management       Orders:  -     Ambulatory Referral to Endocrinology; Future    3. Herpes zoster without complication  Comments:  Seen for issue on  in ED. Has not been taking medications as directed. Pain still present. Encouraged to take medication as prescribed. BMI Counseling: Body mass index is 50.2 kg/m². The BMI is above normal. Nutrition recommendations include decreasing portion sizes, encouraging healthy choices of fruits and vegetables, decreasing fast food intake, consuming healthier snacks, limiting drinks that contain sugar, moderation in carbohydrate intake and reducing intake of saturated and trans fat. Exercise recommendations include exercising 3-5 times per week. No pharmacotherapy was ordered. Rationale for BMI follow-up plan is due to patient being overweight or obese. Subjective      Here for pain, was seen in the ED on  with pain in the face was worked up in ED and diagnosed with Shingles, did not complete course of valtrex. Still having pains. On scans did find a pituitary lesion measuring 0.66 x 0.61cm. Flank pain- UA completed today      Review of Systems   Constitutional: Negative for activity change, chills, fatigue and fever. HENT: Negative for congestion, ear pain, rhinorrhea, sore throat and trouble swallowing. Eyes: Negative for pain and visual disturbance.    Respiratory: Negative for cough, chest tightness and shortness of breath. Cardiovascular: Negative for chest pain, palpitations and leg swelling. Gastrointestinal: Negative for abdominal pain, constipation, diarrhea, nausea and vomiting. Genitourinary: Negative for difficulty urinating, dysuria, hematuria and urgency. Musculoskeletal: Negative for arthralgias and back pain. Skin: Negative for color change and rash. Neurological: Negative for dizziness, seizures, syncope and headaches. Psychiatric/Behavioral: Negative for dysphoric mood. The patient is not nervous/anxious. All other systems reviewed and are negative. Current Outpatient Medications on File Prior to Visit   Medication Sig   • albuterol (PROVENTIL HFA,VENTOLIN HFA) 90 mcg/act inhaler Inhale 2 puffs every 6 (six) hours as needed for wheezing or shortness of breath   • atorvastatin (LIPITOR) 20 mg tablet TAKE ONE TABLET (20 MG TOTAL) BY MOUTH DAILY   • benzonatate (TESSALON) 200 MG capsule Take 1 capsule (200 mg total) by mouth daily at bedtime as needed for cough   • busPIRone (BUSPAR) 10 mg tablet Take 1 tablet (10 mg total) by mouth 2 (two) times a day   • Calcium Citrate 200 MG TABS 1 tablet 3 (three) times a day   • Cyanocobalamin ER 1000 MCG TBCR take one tab daily   • dicyclomine (BENTYL) 10 mg capsule Take 1 capsule (10 mg total) by mouth 2 (two) times a day as needed (abd pain)   • diphenhydrAMINE-acetaminophen (TYLENOL PM)  MG TABS Take 1 tablet by mouth daily at bedtime as needed for sleep   • ergocalciferol (VITAMIN D2) 50,000 units TAKE 1 CAPSULE BY MOUTH 1 TIME A WEEK   • escitalopram (LEXAPRO) 20 mg tablet TAKE ONE TABLET (20 MG TOTAL) BY MOUTH DAILY   • fluticasone-vilanterol (Breo Ellipta) 200-25 mcg/actuation inhaler Inhale 1 puff daily Rinse mouth after use.    • furosemide (LASIX) 20 mg tablet TAKE ONE TABLET (20 MG TOTAL) BY MOUTH DAILY   • levothyroxine (Levo-T) 150 mcg tablet Take 1 tablet (150 mcg total) by mouth daily in the early morning   • lidocaine (Lidoderm) 5 % Apply 2 patches topically over 12 hours daily Remove & Discard patch within 12 hours or as directed by MD. Place 1 patch to right chest and 1 to back daily. • metoprolol tartrate (LOPRESSOR) 25 mg tablet TAKE 1/2 TABLET(12.5 MG) BY MOUTH EVERY 12 HOURS   • Multiple Vitamins-Minerals (MULTIVITAMIN ADULT PO) every 24 hours   • pantoprazole (PROTONIX) 40 mg tablet TAKE ONE TABLET (40 MG TOTAL) BY MOUTH EVERY 24 HOURS   • traZODone (DESYREL) 100 mg tablet TAKE ONE TABLET (100 MG TOTAL) BY MOUTH DAILY AT BEDTIME   • valACYclovir (VALTREX) 500 mg tablet TAKE 2 TABLET BY MOUTH THREE TIMES DAILY FOR 7 DAYS. TAKE IF YOU DEVELOP A RASH   • megestrol (MEGACE) 20 mg tablet Take 1 tablet (20 mg total) by mouth daily for 21 days       Objective     /88 (BP Location: Left arm, Patient Position: Sitting, Cuff Size: Large)   Pulse 85   Temp 97.9 °F (36.6 °C) (Temporal)   Resp 20   Wt (!) 141 kg (311 lb)   SpO2 97%   BMI 50.20 kg/m²     Physical Exam  Vitals and nursing note reviewed. Constitutional:       Appearance: Normal appearance. She is normal weight. HENT:      Head: Normocephalic. Right Ear: Tympanic membrane, ear canal and external ear normal. There is no impacted cerumen. Left Ear: Tympanic membrane, ear canal and external ear normal. There is no impacted cerumen. Nose: Nose normal.      Mouth/Throat:      Mouth: Mucous membranes are moist.      Pharynx: Oropharynx is clear. Eyes:      Extraocular Movements: Extraocular movements intact. Conjunctiva/sclera: Conjunctivae normal.      Pupils: Pupils are equal, round, and reactive to light. Cardiovascular:      Rate and Rhythm: Normal rate and regular rhythm. Pulses: Normal pulses. Heart sounds: Normal heart sounds. Pulmonary:      Effort: Pulmonary effort is normal.      Breath sounds: Normal breath sounds.    Abdominal:      General: Bowel sounds are normal. There is no distension. Palpations: Abdomen is soft. Tenderness: There is no abdominal tenderness. Musculoskeletal:         General: Normal range of motion. Cervical back: Normal range of motion. Skin:     General: Skin is warm. Capillary Refill: Capillary refill takes less than 2 seconds. Neurological:      General: No focal deficit present. Mental Status: She is alert and oriented to person, place, and time. Mental status is at baseline. Psychiatric:         Mood and Affect: Mood normal.         Behavior: Behavior normal.         Thought Content:  Thought content normal.         Judgment: Judgment normal.       THOMAS Treadwell

## 2023-07-06 ENCOUNTER — TELEPHONE (OUTPATIENT)
Dept: ENDOCRINOLOGY | Facility: CLINIC | Age: 48
End: 2023-07-06

## 2023-07-06 NOTE — TELEPHONE ENCOUNTER
Received referral for Shelli Umaña. Left voicemail for patient to contact office to schedule Consult/New Patient Appointment.

## 2023-07-27 DIAGNOSIS — F32.1 CURRENT MODERATE EPISODE OF MAJOR DEPRESSIVE DISORDER, UNSPECIFIED WHETHER RECURRENT (HCC): ICD-10-CM

## 2023-07-27 DIAGNOSIS — U09.9 POST-COVID SYNDROME: ICD-10-CM

## 2023-07-27 DIAGNOSIS — F41.9 ANXIETY: ICD-10-CM

## 2023-07-27 RX ORDER — ESCITALOPRAM OXALATE 20 MG/1
TABLET ORAL
Qty: 90 TABLET | Refills: 2 | Status: SHIPPED | OUTPATIENT
Start: 2023-07-27

## 2023-07-31 ENCOUNTER — OFFICE VISIT (OUTPATIENT)
Dept: HEMATOLOGY ONCOLOGY | Facility: CLINIC | Age: 48
End: 2023-07-31
Payer: COMMERCIAL

## 2023-07-31 VITALS
SYSTOLIC BLOOD PRESSURE: 126 MMHG | WEIGHT: 293 LBS | DIASTOLIC BLOOD PRESSURE: 80 MMHG | BODY MASS INDEX: 47.09 KG/M2 | OXYGEN SATURATION: 97 % | HEART RATE: 75 BPM | TEMPERATURE: 97.6 F | RESPIRATION RATE: 18 BRPM | HEIGHT: 66 IN

## 2023-07-31 DIAGNOSIS — E55.9 VITAMIN D DEFICIENCY: ICD-10-CM

## 2023-07-31 DIAGNOSIS — Z98.84 H/O GASTRIC BYPASS: ICD-10-CM

## 2023-07-31 DIAGNOSIS — D50.8 IRON DEFICIENCY ANEMIA SECONDARY TO INADEQUATE DIETARY IRON INTAKE: Primary | ICD-10-CM

## 2023-07-31 DIAGNOSIS — D75.838 REACTIVE THROMBOCYTOSIS: ICD-10-CM

## 2023-07-31 DIAGNOSIS — E53.8 FOLIC ACID DEFICIENCY: ICD-10-CM

## 2023-07-31 DIAGNOSIS — E53.8 B12 DEFICIENCY: ICD-10-CM

## 2023-07-31 PROCEDURE — 99214 OFFICE O/P EST MOD 30 MIN: CPT | Performed by: NURSE PRACTITIONER

## 2023-07-31 RX ORDER — ERGOCALCIFEROL 1.25 MG/1
50000 CAPSULE ORAL WEEKLY
Qty: 12 CAPSULE | Refills: 0 | Status: SHIPPED | OUTPATIENT
Start: 2023-07-31 | End: 2023-10-17

## 2023-07-31 RX ORDER — FOLIC ACID 1 MG/1
1 TABLET ORAL DAILY
Qty: 90 TABLET | Refills: 4 | Status: SHIPPED | OUTPATIENT
Start: 2023-07-31

## 2023-07-31 NOTE — PROGRESS NOTES
Hematology/Oncology Outpatient Follow-up  Nathalia Umaña 57 y.o. female 1975 [id number]    Date:  7/31/2023      Assessment and Plan:  1. Iron deficiency anemia secondary to inadequate dietary iron intake  Patient does not seem to be anemic according to her most recent laboratory studies from last month hemoglobin 14.1. She received 3 additional doses of IV Venofer after her last office visit February 2023. Unfortunately an iron panel was not drawn to rule out iron deficiency. She does report symptom suggestive of iron deficiency including worsening fatigue, headaches, positional dizziness. She continues to report menorrhagia for 7 days each cycle which is likely contributory to her iron deficiency however this has improved in comparison to prior. She also has a history of bariatric surgery still malabsorption from this is likely as well. I did ask her to go to the lab today/this week to have an iron panel done. We will wait the results and if IV iron replacement is warranted we will arrange. Otherwise she will follow-up with repeat labs in  - CBC and differential; Future  - Comprehensive metabolic panel; Future  - C-reactive protein; Future  - Sedimentation rate, automated; Future  - Iron Panel (Includes Ferritin, Iron Sat%, Iron, and TIBC); Future  - Ferritin; Future  - LD,Blood; Future  - Ferritin; Future  - Iron Panel (Includes Ferritin, Iron Sat%, Iron, and TIBC); Future    2. Reactive thrombocytosis  Possibly due to iron deficiency awaiting iron panel to rule out versus inflammatory process. Her additional work-up in the past showed negative JAK2 [id number] mutation making myeloproliferative disorder less likely. However if her thrombocytosis persist/worsens can consider checking reflexive studies which were not done in the past.    - CBC and differential; Future  - C-reactive protein; Future  - Sedimentation rate, automated; Future  - LD,Blood; Future    3. H/O gastric bypass    4.  B12 deficiency  Has not currently taking supplements. Recommend she start B12 supplements daily we will send prescription. Repeat B12 before her next office visit. - Vitamin B12; Future  - Vitamin B12; Future  - cyanocobalamin (VITAMIN B-12) 1000 MCG tablet; Take 1 tablet (1,000 mcg total) by mouth daily  Dispense: 90 tablet; Refill: 3    5. Folic acid deficiency  Is not taking folic acid supplements. We will start folic acid 1 mg daily. Repeat level before her next office visit. - Folate; Future  - folic acid (FOLVITE) 1 mg tablet; Take 1 tablet (1 mg total) by mouth daily  Dispense: 90 tablet; Refill: 4    6. Vitamin D deficiency  Patient's Vit D  was low on her studies from March 2023. A prescription was sent for high-dose vitamin D however she admits she never picked it up. Will send for high-dose vitamin D 50,000 international units weekly for 12 weeks total.  Advised her to start over-the-counter daily supplement thereafter.    - ergocalciferol (VITAMIN D2) 50,000 units; Take 1 capsule (50,000 Units total) by mouth once a week for 12 doses  Dispense: 12 capsule; Refill: 0      HPI:  Patient is a 61-year-old female who originally presented today for further evaluation and treatment of her microcytic anemia/iron deficiency. She has a past medical history of bipolar disorder, anxiety, hypothyroidism, obstructive sleep apnea, gastric bypass surgery 2017 and post COVID syndrome after she has significant COVID-19 infection April 2021. She mentions that ventilation was recommended when she had COVID-19 which she declined. She is being monitored by Pulmonology and Cardiology.     CBC 05/14/2021 showed normal white cells 10.9, microcytic hypochromic anemia H&H 10.9/34.4, MCV 96, MCH 24.7 she has thrombocytosis platelet count [id number]. BMP was essentially normal with the exception of a decreased glucose 62.  She had additional laboratory studies done 06/03/2021 which showed low ferritin 14 and low serum iron 25 complete iron panel was not done.  She was found to be negative for the [id number] mutation 09/2021; reflexive studies were not performed.   Patient was treated with a course of IV iron Feraheme x2 treatments June/July 2021.  Required additional courses of IV iron on different occasions. Interval history:  Patient presents today for a follow-up visit. She received 3 additional doses of IV Venofer after her last office visit February 2023 and reports she felt much better afterward. She denies any obvious bleeding from any site other than her menstrual cycles. She states that her menses continue to be heavy lasting 7 days but are not lasting as long as they were in the past.  She states in the past she was having her menses for 2 to 4 weeks at a time. She has been having some GI symptoms including abdominal pain, decreased appetite and occasional nausea. She did have endoscopy recently. Is following up with GI regarding her symptoms and was last seen last month. 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 She does mention that she has been experiencing worsening fatigue and feeling somewhat off balance. The headaches and positional dizziness. She mentions that she is still has not heard anything about her CPAP machine which apparently she has been having difficulty obtaining. Her most recent laboratory studies from 6/22/2023 showed normal WBC 8.9, she is not anemic H&H 14.1/42.3, she again has mild thrombocytosis platelet count 016. Sed rate 21 with normal C-reactive protein. Alk phos is slightly above average 123 otherwise normal CMP. Unfortunately our laboratory studies including iron panel were not drawn. She had some additional studies done March/21/23 which showed low vitamin U39 253 and low folic acid 6.6.    ROS: Review of Systems   Constitutional: Positive for appetite change and fatigue.  Negative for activity change, chills, fever and unexpected weight change. HENT: Positive for trouble swallowing. Negative for congestion, mouth sores, nosebleeds and sore throat. Respiratory: Positive for cough and shortness of breath. Negative for chest tightness. Cardiovascular: Negative for chest pain and leg swelling. Gastrointestinal: Positive for diarrhea and nausea. Negative for abdominal distention, abdominal pain, blood in stool, constipation and vomiting. Endocrine: Negative for cold intolerance. Genitourinary: Positive for menstrual problem. Negative for difficulty urinating, dysuria, frequency, hematuria and urgency. Musculoskeletal: Negative for arthralgias, back pain, gait problem, joint swelling and myalgias. Skin: Negative for color change, pallor and rash. Neurological: Positive for dizziness, numbness and headaches. Negative for light-headedness. Hematological: Negative for adenopathy. Does not bruise/bleed easily. Psychiatric/Behavioral: Positive for dysphoric mood and sleep disturbance.        Past Medical History:   Diagnosis Date   • Abscess of labia    • Acute and chronic respiratory failure with hypoxia (HCC)    • Anemia 4/6/2021   • Anxiety    • Bipolar disorder (HCC)    • Breast lump    • COVID-19    • Frequent headaches    • Hemorrhoids    • Hypothyroidism 7/1/2013   • Migraine    • Morbid obesity ([address]) 7/1/2013   • Obesity    • Psychotic disorder (HCC)        Past Surgical History:   Procedure Laterality Date   • BARIATRIC SURGERY     • BREAST BIOPSY Right 1999    benign   • BREAST LUMPECTOMY Left     benign   • CHOLECYSTECTOMY     • GALLBLADDER SURGERY         Social History     Socioeconomic History   • Marital status: Single     Spouse name: None   • Number of children: 1   • Years of education: None   • Highest education level: None   Occupational History   • None   Tobacco Use   • Smoking status: Former     Types: Cigarettes     Quit date: 2008     Years since quitting: 15.5     Passive exposure: Past   • Smokeless tobacco: Never   • Tobacco comments:     smoker for 2-3 yrs and quit in 2008, smoked about 3 cigarettes a day   Vaping Use   • Vaping Use: Never used   Substance and Sexual Activity   • Alcohol use: Never   • Drug use: No   • Sexual activity: Not Currently   Other Topics Concern   • None   Social History Narrative   • None     Social Determinants of Health     Financial Resource Strain: Not on file   Food Insecurity: Not on file   Transportation Needs: Not on file   Physical Activity: Not on file   Stress: Not on file   Social Connections: Not on file   Intimate Partner Violence: Not on file   Housing Stability: Not on file       Family History   Problem Relation Age of Onset   • Liver cancer Mother    • Prostate cancer Father    • No Known Problems Sister    • No Known Problems Sister    • No Known Problems Sister    • No Known Problems Sister    • No Known Problems Sister    • No Known Problems Sister    • No Known Problems Sister    • No Known Problems Sister    • No Known Problems Sister    • No Known Problems Sister    • No Known Problems Maternal Grandmother    • No Known Problems Maternal Grandfather    • No Known Problems Paternal Grandmother    • No Known Problems Paternal Grandfather    • Cancer Maternal Aunt    • Cancer Maternal Aunt    • Cancer Maternal Aunt    • Cancer Maternal Aunt    • Cancer Maternal Aunt    • Cancer Maternal Aunt    • Cancer Maternal Aunt    • No Known Problems Paternal Aunt    • No Known Problems Paternal Aunt    • No Known Problems Paternal Aunt    • No Known Problems Paternal Aunt    • Cancer Paternal Aunt    • Cancer Paternal Aunt        Allergies   Allergen Reactions   • No Active Allergies          Current Outpatient Medications:   •  albuterol (PROVENTIL HFA,VENTOLIN HFA) 90 mcg/act inhaler, Inhale 2 puffs every 6 (six) hours as needed for wheezing or shortness of breath, Disp: 8.5 g, Rfl: 5  •  atorvastatin (LIPITOR) 20 mg tablet, TAKE ONE TABLET (20 MG TOTAL) BY MOUTH DAILY, Disp: 30 tablet, Rfl: 6  •  benzonatate (TESSALON) 200 MG capsule, Take 1 capsule (200 mg total) by mouth daily at bedtime as needed for cough, Disp: 30 capsule, Rfl: 3  •  busPIRone (BUSPAR) 10 mg tablet, Take 1 tablet (10 mg total) by mouth 2 (two) times a day, Disp: 60 tablet, Rfl: 3  •  Calcium Citrate 200 MG TABS, 1 tablet 3 (three) times a day, Disp: , Rfl:   •  cyanocobalamin (VITAMIN B-12) 1000 MCG tablet, Take 1 tablet (1,000 mcg total) by mouth daily, Disp: 90 tablet, Rfl: 3  •  dicyclomine (BENTYL) 10 mg capsule, Take 1 capsule (10 mg total) by mouth 2 (two) times a day as needed (abd pain), Disp: 60 capsule, Rfl: 2  •  diphenhydrAMINE-acetaminophen (TYLENOL PM)  MG TABS, Take 1 tablet by mouth daily at bedtime as needed for sleep, Disp: , Rfl:   •  ergocalciferol (VITAMIN D2) 50,000 units, Take 1 capsule (50,000 Units total) by mouth once a week for 12 doses, Disp: 12 capsule, Rfl: 0  •  escitalopram (LEXAPRO) 20 mg tablet, TAKE ONE TABLET (20 MG TOTAL) BY MOUTH DAILY, Disp: 90 tablet, Rfl: 2  •  fluticasone-umeclidinium-vilanterol (Trelegy Ellipta) 200-62.5-25 mcg/actuation AEPB inhaler, Inhale 1 puff daily Rinse mouth after use., Disp: 60 blister, Rfl: 0  •  folic acid (FOLVITE) 1 mg tablet, Take 1 tablet (1 mg total) by mouth daily, Disp: 90 tablet, Rfl: 4  •  furosemide (LASIX) 20 mg tablet, TAKE ONE TABLET (20 MG TOTAL) BY MOUTH DAILY, Disp: 30 tablet, Rfl: 3  •  levothyroxine (Levo-T) 150 mcg tablet, Take 1 tablet (150 mcg total) by mouth daily in the early morning, Disp: 90 tablet, Rfl: 3  •  lidocaine (Lidoderm) 5 %, Apply 2 patches topically over 12 hours daily Remove & Discard patch within 12 hours or as directed by MD. Place 1 patch to right chest and 1 to back daily. , Disp: 15 patch, Rfl: 2  •  metoprolol tartrate (LOPRESSOR) 25 mg tablet, TAKE 1/2 TABLET(12.5 MG) BY MOUTH EVERY 12 HOURS, Disp: 90 tablet, Rfl: 4  •  Multiple Vitamins-Minerals (MULTIVITAMIN ADULT PO), every 24 hours, Disp: , Rfl:   •  pantoprazole (PROTONIX) 40 mg tablet, TAKE ONE TABLET (40 MG TOTAL) BY MOUTH EVERY 24 HOURS, Disp: 90 tablet, Rfl: 3  •  traZODone (DESYREL) 100 mg tablet, TAKE ONE TABLET (100 MG TOTAL) BY MOUTH DAILY AT BEDTIME, Disp: 90 tablet, Rfl: 1  •  valACYclovir (VALTREX) 500 mg tablet, TAKE 2 TABLET BY MOUTH THREE TIMES DAILY FOR 7 DAYS. TAKE IF YOU DEVELOP A RASH, Disp: , Rfl:       Physical Exam:  /80 (BP Location: Left arm, Patient Position: Sitting, Cuff Size: Large)   Pulse 75   Temp 97.6 °F (36.4 °C) (Temporal)   Resp 18   Ht 5' 6" (1.676 m)   Wt (!) 142 kg (314 lb)   SpO2 97%   BMI 50.68 kg/m²     Physical Exam  Vitals reviewed. Constitutional:       Appearance: She is well-developed. She is morbidly obese. She is not diaphoretic. HENT:      Head: Normocephalic and atraumatic. Eyes:      General: No scleral icterus. Conjunctiva/sclera: Conjunctivae normal.      Pupils: Pupils are equal, round, and reactive to light. Neck:      Thyroid: No thyromegaly. Cardiovascular:      Rate and Rhythm: Normal rate and regular rhythm. Heart sounds: Normal heart sounds. No murmur heard. Pulmonary:      Breath sounds: Normal breath sounds. Abdominal:      General: There is no distension. Palpations: Abdomen is soft. There is hepatomegaly. There is no splenomegaly. Tenderness: There is no abdominal tenderness. Musculoskeletal:         General: No swelling. Normal range of motion. Cervical back: Normal range of motion and neck supple. Lymphadenopathy:      Cervical: No cervical adenopathy. Upper Body:      Right upper body: No axillary adenopathy. Left upper body: No axillary adenopathy. Skin:     General: Skin is warm and dry. Coloration: Skin is not pale. Findings: No erythema or rash. Neurological:      General: No focal deficit present. Mental Status: She is alert and oriented to person, place, and time. Psychiatric:         Mood and Affect: Affect is blunt. Behavior: Behavior normal. Behavior is cooperative. Thought Content: Thought content normal.         Judgment: Judgment normal.           Labs:  Lab Results   Component Value Date    WBC 7.21 03/21/2023    HGB 15.2 03/21/2023    HCT 47.1 (H) 03/21/2023    MCV 98 03/21/2023     (H) 03/21/2023        Patient voiced understanding and agreement in the above discussion. Aware to contact our office with questions/symptoms in the interim. This note has been generated by voice recognition software system. Therefore, there may be spelling, grammar, and or syntax errors. Please contact if questions arise.

## 2023-08-08 ENCOUNTER — TELEPHONE (OUTPATIENT)
Dept: PULMONOLOGY | Facility: CLINIC | Age: 48
End: 2023-08-08

## 2023-08-08 NOTE — TELEPHONE ENCOUNTER
Per [employer]:     @Jaspal Wells Good afternoon, this order was voided as scheduling was unsuccessful at reaching the patient for a setup   I reached out to [address] [place] [address] Avenue her that [facility] has been unsuccessful in contacting her for scheduling. I provided her with [employer]'s number [phone] so she can call and get herself scheduled for DME set up.

## 2023-08-14 ENCOUNTER — CLINICAL SUPPORT (OUTPATIENT)
Dept: FAMILY MEDICINE CLINIC | Facility: CLINIC | Age: 48
End: 2023-08-14
Payer: COMMERCIAL

## 2023-08-14 DIAGNOSIS — Z23 ENCOUNTER FOR IMMUNIZATION: Primary | ICD-10-CM

## 2023-08-14 PROCEDURE — 90471 IMMUNIZATION ADMIN: CPT

## 2023-08-14 PROCEDURE — 90750 HZV VACC RECOMBINANT IM: CPT

## 2023-08-17 ENCOUNTER — TELEPHONE (OUTPATIENT)
Dept: PULMONOLOGY | Facility: CLINIC | Age: 48
End: 2023-08-17

## 2023-08-17 NOTE — TELEPHONE ENCOUNTER
Pt did not want to be triaged. She wanted a appt in [facility].  Pt scheduled for next week with Dr. Justus Heck

## 2023-08-17 NOTE — TELEPHONE ENCOUNTER
Pt calling in stating she is experiencing SOB and she's fatigued. She stated her medication does not help with the SOB.

## 2023-08-23 ENCOUNTER — OFFICE VISIT (OUTPATIENT)
Dept: PULMONOLOGY | Facility: CLINIC | Age: 48
End: 2023-08-23
Payer: COMMERCIAL

## 2023-08-23 VITALS
BODY MASS INDEX: 47.09 KG/M2 | HEIGHT: 66 IN | TEMPERATURE: 98.3 F | HEART RATE: 87 BPM | SYSTOLIC BLOOD PRESSURE: 122 MMHG | WEIGHT: 293 LBS | DIASTOLIC BLOOD PRESSURE: 88 MMHG | OXYGEN SATURATION: 97 %

## 2023-08-23 DIAGNOSIS — J96.11 CHRONIC RESPIRATORY FAILURE WITH HYPOXIA (HCC): ICD-10-CM

## 2023-08-23 DIAGNOSIS — E66.01 MORBID OBESITY (HCC): ICD-10-CM

## 2023-08-23 DIAGNOSIS — U09.9 POST-COVID-19 SYNDROME MANIFESTING AS CHRONIC COUGH: Primary | ICD-10-CM

## 2023-08-23 DIAGNOSIS — R05.3 POST-COVID-19 SYNDROME MANIFESTING AS CHRONIC COUGH: Primary | ICD-10-CM

## 2023-08-23 DIAGNOSIS — G47.33 OSA (OBSTRUCTIVE SLEEP APNEA): ICD-10-CM

## 2023-08-23 PROCEDURE — 99214 OFFICE O/P EST MOD 30 MIN: CPT | Performed by: INTERNAL MEDICINE

## 2023-08-23 RX ORDER — MOMETASONE FUROATE AND FORMOTEROL FUMARATE DIHYDRATE 200; 5 UG/1; UG/1
2 AEROSOL RESPIRATORY (INHALATION) 2 TIMES DAILY
Qty: 13 G | Refills: 3 | Status: SHIPPED | OUTPATIENT
Start: 2023-08-23

## 2023-08-23 NOTE — PROGRESS NOTES
Pulmonary Follow Up Note   Jelani Umaña 57 y.o. female MRN: [id number]  8/23/2023    Assessment:  1. CAR  a. Sleep study on 6/24/2021 showed mild sleep apnea (AHI 12.5). At last pulmonary visit patient noted that she did not receive CPAP machine as it was recalled before she could receive it. Was having PND. 2. Post-COVID syndrome  a. CT chest on 10/31/2022 showing improvement in pulmonary scarring since 7/21. CT abdomen and pelvis on 3/23 showing nonspecific patchy groundglass opacities visualized lung bases. 3. Dyspnea on exertion  4. Chronic cough  a. Currently using Trelegy daily and albuterol as needed  5. GERD  a. On Protonix  6. Vitamin D deficiency   a. Vitamin D level 16 (3/23)  7. Patent foramen ovale    Plan:  · Follow-up in pulmonary clinic in 3 months  · Repeat CT chest in 1 month  · Follow-up with [facility] regarding obtaining CPAP  · Instructed patient to reach out to the office if needed to obtain CPAP  · Continue with vitamin D supplement  · Start Dulera 2 puffs twice daily  · Continue to use albuterol inhaler as needed    History of Present Illness   HPI:  Antonio Parks is a 57 y.o. female with past medical history of post-COVID syndrome, CAR, patent foramen ovale, chronic cough, GERD, vitamin D deficiency who presents to the pulmonary clinic for follow-up. Patient notes that she has been feeling out of breath having a worsening dry cough over the past couple months since she ran out of her Breo. She was prescribed Trelegy afterwards, however she was unable to afford the medication. She describes a dry cough as worst in the evenings, and causes lightheadedness due to coughing fits. The patient notes that she has been requiring 2 L of oxygen at home, and if she anticipates needing to exert herself for more than 5 minutes she will prophylactically use 3 L of oxygen. She however does not measure her oxygen at home.   Patient is also complaining of morning headaches, morning dizziness, orthopnea. She notes that she is working with [email or web address] currently to obtain her CPAP (her last sleep study was performed in 2021: AHI 12), but has a was having some difficulty obtaining CPAP due to change in insurance. Review of Systems   Constitutional: Positive for activity change and fatigue. Negative for appetite change, chills, fever and unexpected weight change. Respiratory: Positive for apnea, cough (dry cough), choking and shortness of breath. Negative for wheezing and stridor. Cardiovascular: Negative for chest pain and leg swelling.          Historical Information   Past Medical History:   Diagnosis Date   • Abscess of labia    • Acute and chronic respiratory failure with hypoxia (HCC)    • Anemia 4/6/2021   • Anxiety    • Bipolar disorder (HCC)    • Breast lump    • COVID-19    • Frequent headaches    • Hemorrhoids    • Hypothyroidism 7/1/2013   • Migraine    • Morbid obesity ([address]) 7/1/2013   • Obesity    • Psychotic disorder ([address])      Past Surgical History:   Procedure Laterality Date   • BARIATRIC SURGERY     • BREAST BIOPSY Right 1999    benign   • BREAST LUMPECTOMY Left     benign   • CHOLECYSTECTOMY     • GALLBLADDER SURGERY       Family History   Problem Relation Age of Onset   • Liver cancer Mother    • Prostate cancer Father    • No Known Problems Sister    • No Known Problems Sister    • No Known Problems Sister    • No Known Problems Sister    • No Known Problems Sister    • No Known Problems Sister    • No Known Problems Sister    • No Known Problems Sister    • No Known Problems Sister    • No Known Problems Sister    • No Known Problems Maternal Grandmother    • No Known Problems Maternal Grandfather    • No Known Problems Paternal Grandmother    • No Known Problems Paternal Grandfather    • Cancer Maternal Aunt    • Cancer Maternal Aunt    • Cancer Maternal Aunt    • Cancer Maternal Aunt    • Cancer Maternal Aunt    • Cancer Maternal Aunt    • Cancer Maternal Aunt    • No Known Problems Paternal Aunt    • No Known Problems Paternal Aunt    • No Known Problems Paternal Aunt    • No Known Problems Paternal Aunt    • Cancer Paternal Aunt    • Cancer Paternal Aunt      Social History     Tobacco Use   Smoking Status Former   • Types: Cigarettes   • Quit date: 2008   • Years since quitting: 15.6   • Passive exposure: Past   Smokeless Tobacco Never   Tobacco Comments    smoker for 2-3 yrs and quit in 2008, smoked about 3 cigarettes a day       Meds/Allergies     Current Outpatient Medications:   •  albuterol (PROVENTIL HFA,VENTOLIN HFA) 90 mcg/act inhaler, Inhale 2 puffs every 6 (six) hours as needed for wheezing or shortness of breath, Disp: 8.5 g, Rfl: 5  •  atorvastatin (LIPITOR) 20 mg tablet, TAKE ONE TABLET (20 MG TOTAL) BY MOUTH DAILY, Disp: 30 tablet, Rfl: 6  •  benzonatate (TESSALON) 200 MG capsule, Take 1 capsule (200 mg total) by mouth daily at bedtime as needed for cough, Disp: 30 capsule, Rfl: 3  •  busPIRone (BUSPAR) 10 mg tablet, Take 1 tablet (10 mg total) by mouth 2 (two) times a day, Disp: 60 tablet, Rfl: 3  •  Calcium Citrate 200 MG TABS, 1 tablet 3 (three) times a day, Disp: , Rfl:   •  cyanocobalamin (VITAMIN B-12) 1000 MCG tablet, Take 1 tablet (1,000 mcg total) by mouth daily, Disp: 90 tablet, Rfl: 3  •  dicyclomine (BENTYL) 10 mg capsule, Take 1 capsule (10 mg total) by mouth 2 (two) times a day as needed (abd pain), Disp: 60 capsule, Rfl: 2  •  diphenhydrAMINE-acetaminophen (TYLENOL PM)  MG TABS, Take 1 tablet by mouth daily at bedtime as needed for sleep, Disp: , Rfl:   •  ergocalciferol (VITAMIN D2) 50,000 units, Take 1 capsule (50,000 Units total) by mouth once a week for 12 doses, Disp: 12 capsule, Rfl: 0  •  escitalopram (LEXAPRO) 20 mg tablet, TAKE ONE TABLET (20 MG TOTAL) BY MOUTH DAILY, Disp: 90 tablet, Rfl: 2  •  fluticasone-umeclidinium-vilanterol (Trelegy Ellipta) 200-62.5-25 mcg/actuation AEPB inhaler, Inhale 1 puff daily Rinse mouth after use., Disp: 60 blister, Rfl: 0  •  folic acid (FOLVITE) 1 mg tablet, Take 1 tablet (1 mg total) by mouth daily, Disp: 90 tablet, Rfl: 4  •  furosemide (LASIX) 20 mg tablet, TAKE ONE TABLET (20 MG TOTAL) BY MOUTH DAILY, Disp: 30 tablet, Rfl: 3  •  levothyroxine (Levo-T) 150 mcg tablet, Take 1 tablet (150 mcg total) by mouth daily in the early morning, Disp: 90 tablet, Rfl: 3  •  lidocaine (Lidoderm) 5 %, Apply 2 patches topically over 12 hours daily Remove & Discard patch within 12 hours or as directed by MD. Place 1 patch to right chest and 1 to back daily. , Disp: 15 patch, Rfl: 2  •  metoprolol tartrate (LOPRESSOR) 25 mg tablet, TAKE 1/2 TABLET(12.5 MG) BY MOUTH EVERY 12 HOURS, Disp: 90 tablet, Rfl: 4  •  Multiple Vitamins-Minerals (MULTIVITAMIN ADULT PO), every 24 hours, Disp: , Rfl:   •  pantoprazole (PROTONIX) 40 mg tablet, TAKE ONE TABLET (40 MG TOTAL) BY MOUTH EVERY 24 HOURS, Disp: 90 tablet, Rfl: 3  •  traZODone (DESYREL) 100 mg tablet, TAKE ONE TABLET (100 MG TOTAL) BY MOUTH DAILY AT BEDTIME, Disp: 90 tablet, Rfl: 1  •  valACYclovir (VALTREX) 500 mg tablet, TAKE 2 TABLET BY MOUTH THREE TIMES DAILY FOR 7 DAYS. TAKE IF YOU DEVELOP A RASH, Disp: , Rfl:   Allergies   Allergen Reactions   • No Active Allergies        Vitals: not currently breastfeeding. There is no height or weight on file to calculate BMI. Physical Exam  Physical Exam  Constitutional:       Appearance: She is obese. Comments: BMI 50.5   Pulmonary:      Effort: No respiratory distress. Breath sounds: No stridor. No wheezing, rhonchi or rales. Chest:      Chest wall: No tenderness. Neurological:      Mental Status: She is alert. Labs: I have personally reviewed pertinent lab results.   Lab Results   Component Value Date    WBC 7.21 03/21/2023    HGB 15.2 03/21/2023    HCT 47.1 (H) 03/21/2023    MCV 98 03/21/2023     (H) 03/21/2023     Lab Results   Component Value Date    GLUCOSE 89 10/14/2015    CALCIUM 9.2 03/21/2023     10/14/2015    K 4.1 03/21/2023    CO2 25 03/21/2023     03/21/2023    BUN 12 03/21/2023    CREATININE 0.96 03/21/2023     No results found for: "IGE"  Lab Results   Component Value Date    ALT 17 03/21/2023    AST 12 (L) 03/21/2023    ALKPHOS 124 (H) 03/21/2023    BILITOT 0.33 10/14/2015     Imaging and other studies: I have personally reviewed pertinent films in PACS  CT abdomen pelvis w contrast    Result Date: 3/29/2023  Impression: Nonspecific patchy groundglass densities in the visualized lung bases. Atypical infection or combination excluded. Status post gastric bypass. Small to moderate right and left paraumbilical hernias redemonstrated. No bowel involvement. Stable additional findings as above. The study was marked in EPIC for significant notification. Workstation performed: [id number]       Pulmonary function testing:   Spirometry 5/2021   Results:  FEV1/FVC Ratio: 52 %  Forced Vital Capacity: 1.30 L    32 % predicted  FEV1: 0.98 L     21 % predicted  After administration of bronchodilator   FVC: 1.14 L, 28 % predicted, -12 % change  FEV1: 0.87 L, 27 % predicted, +27 % change     Unable to perform maneuvers for lung volumes and diffusion capacity     Interpretation:     **Patient unable to perform pulmonary function testing due to poor, inconsistent efforts**     • Results likely do not reflect actual disease. Spirometry shows very severe obstructive airflow limitation with reduced vital capacity.     • Positive, but not significant response to the administration of bronchodilator per ATS standards     Unable to interpret flow volume loops due to difficulty with testing   EKG, Pathology, and Other Studies: I have personally reviewed pertinent films in PACS    Disclaimer: Portions of the record may have been created with voice recognition software.  Occasional wrong word or "sound a like" substitutions may have occurred due to the inherent limitations of voice recognition software. Careful consideration should be taken to recognize, using context, where substitutions have occurred.     Denny Oneill DO   Pulmonary & Critical Care Medicine Fellow PGY-IV  [facility]

## 2023-08-23 NOTE — PATIENT INSTRUCTIONS
Please complete Pulmonary function test in 2 months.   Follow up in clinic in 3 months  Start Dulera 2 puffs twice a day  Please let us know if you need help obtaining CPAP  Please attend pulmonary rehab

## 2023-08-23 NOTE — LETTER
August 23, 2023     [address] [place] [address]    Patient: Ofelia Umaña   YOB: 1975   Date of Visit: 8/23/2023       Dear Dr. Parham Larger: Thank you for referring Katalina Mendoza to me for evaluation. Below are my notes for this consultation. If you have questions, please do not hesitate to call me. I look forward to following your patient along with you. Sincerely,        Bebeto Pinto DO        CC: No Recipients    Cherrie Alfaro  8/23/2023  5:25 PM  Sign when Signing Visit  Pulmonary Follow Up Note   Ofelia Umaña 57 y.o. female MRN: [id number]  8/23/2023    Assessment:  CAR  Sleep study on 6/24/2021 showed mild sleep apnea (AHI 12.5). At last pulmonary visit patient noted that she did not receive CPAP machine as it was recalled before she could receive it. Was having PND. Post-COVID syndrome  CT chest on 10/31/2022 showing improvement in pulmonary scarring since 7/21. CT abdomen and pelvis on 3/23 showing nonspecific patchy groundglass opacities visualized lung bases. Dyspnea on exertion  Chronic cough  Currently using Trelegy daily and albuterol as needed  GERD  On Protonix  Vitamin D deficiency   Vitamin D level 16 (3/23)  Patent foramen ovale    Plan:  Follow-up in pulmonary clinic in 3 months  Repeat CT chest in 1 month  Follow-up with [facility] regarding obtaining CPAP  Instructed patient to reach out to the office if needed to obtain CPAP  Continue with vitamin D supplement  Start Dulera 2 puffs twice daily  Continue to use albuterol inhaler as needed      History of Present Illness   HPI:  Yahir Sequeira is a 57 y.o. female with past medical history of post-COVID syndrome, CAR, patent foramen ovale, chronic cough, GERD, vitamin D deficiency who presents to the pulmonary clinic for follow-up.   Patient notes that she has been feeling out of breath having a worsening dry cough over the past couple months since she ran out of her Breo. She was prescribed Trelegy afterwards, however she was unable to afford the medication. She describes a dry cough as worst in the evenings, and causes lightheadedness due to coughing fits. The patient notes that she has been requiring 2 L of oxygen at home, and if she anticipates needing to exert herself for more than 5 minutes she will prophylactically use 3 L of oxygen. She however does not measure her oxygen at home. Patient is also complaining of morning headaches, morning dizziness, orthopnea. She notes that she is working with [employer] currently to obtain her CPAP (her last sleep study was performed in 2021: AHI 12), but has a was having some difficulty obtaining CPAP due to change in insurance. Review of Systems   Constitutional: Positive for activity change and fatigue. Negative for appetite change, chills, fever and unexpected weight change. Respiratory: Positive for apnea, cough (dry cough), choking and shortness of breath. Negative for wheezing and stridor. Cardiovascular: Negative for chest pain and leg swelling.          Historical Information   Past Medical History:   Diagnosis Date   • Abscess of labia    • Acute and chronic respiratory failure with hypoxia (HCC)    • Anemia 4/6/2021   • Anxiety    • Bipolar disorder (HCC)    • Breast lump    • COVID-19    • Frequent headaches    • Hemorrhoids    • Hypothyroidism 7/1/2013   • Migraine    • Morbid obesity ([address]) 7/1/2013   • Obesity    • Psychotic disorder ([address])      Past Surgical History:   Procedure Laterality Date   • BARIATRIC SURGERY     • BREAST BIOPSY Right 1999    benign   • BREAST LUMPECTOMY Left     benign   • CHOLECYSTECTOMY     • GALLBLADDER SURGERY       Family History   Problem Relation Age of Onset   • Liver cancer Mother    • Prostate cancer Father    • No Known Problems Sister    • No Known Problems Sister    • No Known Problems Sister    • No Known Problems Sister    • No Known Problems Sister    • No Known Problems Sister    • No Known Problems Sister    • No Known Problems Sister    • No Known Problems Sister    • No Known Problems Sister    • No Known Problems Maternal Grandmother    • No Known Problems Maternal Grandfather    • No Known Problems Paternal Grandmother    • No Known Problems Paternal Grandfather    • Cancer Maternal Aunt    • Cancer Maternal Aunt    • Cancer Maternal Aunt    • Cancer Maternal Aunt    • Cancer Maternal Aunt    • Cancer Maternal Aunt    • Cancer Maternal Aunt    • No Known Problems Paternal Aunt    • No Known Problems Paternal Aunt    • No Known Problems Paternal Aunt    • No Known Problems Paternal Aunt    • Cancer Paternal Aunt    • Cancer Paternal Aunt      Social History     Tobacco Use   Smoking Status Former   • Types: Cigarettes   • Quit date: 2008   • Years since quitting: 15.6   • Passive exposure: Past   Smokeless Tobacco Never   Tobacco Comments    smoker for 2-3 yrs and quit in 2008, smoked about 3 cigarettes a day       Meds/Allergies     Current Outpatient Medications:   •  albuterol (PROVENTIL HFA,VENTOLIN HFA) 90 mcg/act inhaler, Inhale 2 puffs every 6 (six) hours as needed for wheezing or shortness of breath, Disp: 8.5 g, Rfl: 5  •  atorvastatin (LIPITOR) 20 mg tablet, TAKE ONE TABLET (20 MG TOTAL) BY MOUTH DAILY, Disp: 30 tablet, Rfl: 6  •  benzonatate (TESSALON) 200 MG capsule, Take 1 capsule (200 mg total) by mouth daily at bedtime as needed for cough, Disp: 30 capsule, Rfl: 3  •  busPIRone (BUSPAR) 10 mg tablet, Take 1 tablet (10 mg total) by mouth 2 (two) times a day, Disp: 60 tablet, Rfl: 3  •  Calcium Citrate 200 MG TABS, 1 tablet 3 (three) times a day, Disp: , Rfl:   •  cyanocobalamin (VITAMIN B-12) 1000 MCG tablet, Take 1 tablet (1,000 mcg total) by mouth daily, Disp: 90 tablet, Rfl: 3  •  dicyclomine (BENTYL) 10 mg capsule, Take 1 capsule (10 mg total) by mouth 2 (two) times a day as needed (abd pain), Disp: 60 capsule, Rfl: 2  • diphenhydrAMINE-acetaminophen (TYLENOL PM)  MG TABS, Take 1 tablet by mouth daily at bedtime as needed for sleep, Disp: , Rfl:   •  ergocalciferol (VITAMIN D2) 50,000 units, Take 1 capsule (50,000 Units total) by mouth once a week for 12 doses, Disp: 12 capsule, Rfl: 0  •  escitalopram (LEXAPRO) 20 mg tablet, TAKE ONE TABLET (20 MG TOTAL) BY MOUTH DAILY, Disp: 90 tablet, Rfl: 2  •  fluticasone-umeclidinium-vilanterol (Trelegy Ellipta) 200-62.5-25 mcg/actuation AEPB inhaler, Inhale 1 puff daily Rinse mouth after use., Disp: 60 blister, Rfl: 0  •  folic acid (FOLVITE) 1 mg tablet, Take 1 tablet (1 mg total) by mouth daily, Disp: 90 tablet, Rfl: 4  •  furosemide (LASIX) 20 mg tablet, TAKE ONE TABLET (20 MG TOTAL) BY MOUTH DAILY, Disp: 30 tablet, Rfl: 3  •  levothyroxine (Levo-T) 150 mcg tablet, Take 1 tablet (150 mcg total) by mouth daily in the early morning, Disp: 90 tablet, Rfl: 3  •  lidocaine (Lidoderm) 5 %, Apply 2 patches topically over 12 hours daily Remove & Discard patch within 12 hours or as directed by MD. Place 1 patch to right chest and 1 to back daily. , Disp: 15 patch, Rfl: 2  •  metoprolol tartrate (LOPRESSOR) 25 mg tablet, TAKE 1/2 TABLET(12.5 MG) BY MOUTH EVERY 12 HOURS, Disp: 90 tablet, Rfl: 4  •  Multiple Vitamins-Minerals (MULTIVITAMIN ADULT PO), every 24 hours, Disp: , Rfl:   •  pantoprazole (PROTONIX) 40 mg tablet, TAKE ONE TABLET (40 MG TOTAL) BY MOUTH EVERY 24 HOURS, Disp: 90 tablet, Rfl: 3  •  traZODone (DESYREL) 100 mg tablet, TAKE ONE TABLET (100 MG TOTAL) BY MOUTH DAILY AT BEDTIME, Disp: 90 tablet, Rfl: 1  •  valACYclovir (VALTREX) 500 mg tablet, TAKE 2 TABLET BY MOUTH THREE TIMES DAILY FOR 7 DAYS. TAKE IF YOU DEVELOP A RASH, Disp: , Rfl:   Allergies   Allergen Reactions   • No Active Allergies        Vitals: not currently breastfeeding. There is no height or weight on file to calculate BMI. Physical Exam  Physical Exam  Constitutional:       Appearance: She is obese. Comments: BMI 50.5   Pulmonary:      Effort: No respiratory distress. Breath sounds: No stridor. No wheezing, rhonchi or rales. Chest:      Chest wall: No tenderness. Neurological:      Mental Status: She is alert. Labs: I have personally reviewed pertinent lab results. Lab Results   Component Value Date    WBC 7.21 03/21/2023    HGB 15.2 03/21/2023    HCT 47.1 (H) 03/21/2023    MCV 98 03/21/2023     (H) 03/21/2023     Lab Results   Component Value Date    GLUCOSE 89 10/14/2015    CALCIUM 9.2 03/21/2023     10/14/2015    K 4.1 03/21/2023    CO2 25 03/21/2023     03/21/2023    BUN 12 03/21/2023    CREATININE 0.96 03/21/2023     No results found for: "IGE"  Lab Results   Component Value Date    ALT 17 03/21/2023    AST 12 (L) 03/21/2023    ALKPHOS 124 (H) 03/21/2023    BILITOT 0.33 10/14/2015     Imaging and other studies: I have personally reviewed pertinent films in PACS  CT abdomen pelvis w contrast    Result Date: 3/29/2023  Impression: Nonspecific patchy groundglass densities in the visualized lung bases. Atypical infection or combination excluded. Status post gastric bypass. Small to moderate right and left paraumbilical hernias redemonstrated. No bowel involvement. Stable additional findings as above. The study was marked in EPIC for significant notification. Workstation performed: [id number]       Pulmonary function testing:   Spirometry 5/2021   Results:  FEV1/FVC Ratio: 52 %  Forced Vital Capacity: 1.30 L    32 % predicted  FEV1: 0.98 L     21 % predicted  After administration of bronchodilator   FVC: 1.14 L, 28 % predicted, -12 % change  FEV1: 0.87 L, 27 % predicted, +27 % change     Unable to perform maneuvers for lung volumes and diffusion capacity     Interpretation:     **Patient unable to perform pulmonary function testing due to poor, inconsistent efforts**     Results likely do not reflect actual disease.   Spirometry shows very severe obstructive airflow limitation with reduced vital capacity. Positive, but not significant response to the administration of bronchodilator per ATS standards     Unable to interpret flow volume loops due to difficulty with testing   EKG, Pathology, and Other Studies: I have personally reviewed pertinent films in PACS    Disclaimer: Portions of the record may have been created with voice recognition software. Occasional wrong word or "sound a like" substitutions may have occurred due to the inherent limitations of voice recognition software. Careful consideration should be taken to recognize, using context, where substitutions have occurred.     Tejas Rico DO   Pulmonary & Critical Care Medicine Fellow PGY-IV  [facility]

## 2023-08-24 PROBLEM — U09.9 POST-COVID-19 SYNDROME MANIFESTING AS CHRONIC COUGH: Status: ACTIVE | Noted: 2021-05-26

## 2023-08-24 NOTE — ASSESSMENT & PLAN NOTE
Mild CAR AHI 12.5  APAP has been ordered and she is working w [employer] to get it approved with her new insurance

## 2023-08-24 NOTE — ASSESSMENT & PLAN NOTE
Breo no longer covered  Prescribed Dulera instead  Gave [facility] for [facility] and albuterol  Completed pulmonary rehab  CT chests 10/2022 shows improvement in opacities  Repeat PFTs

## 2023-11-13 DIAGNOSIS — U09.9 POST-COVID SYNDROME: ICD-10-CM

## 2023-11-13 RX ORDER — ALBUTEROL SULFATE 90 UG/1
2 AEROSOL, METERED RESPIRATORY (INHALATION) EVERY 6 HOURS PRN
Qty: 8.5 G | Refills: 5 | Status: SHIPPED | OUTPATIENT
Start: 2023-11-13

## 2023-12-10 DIAGNOSIS — R05.9 COUGH: ICD-10-CM

## 2023-12-11 DIAGNOSIS — E03.9 HYPOTHYROIDISM, UNSPECIFIED TYPE: ICD-10-CM

## 2023-12-11 RX ORDER — BENZONATATE 200 MG/1
200 CAPSULE ORAL
Qty: 30 CAPSULE | Refills: 0 | Status: SHIPPED | OUTPATIENT
Start: 2023-12-11

## 2023-12-11 RX ORDER — LEVOTHYROXINE SODIUM 0.15 MG/1
150 TABLET ORAL
Qty: 90 TABLET | Refills: 0 | Status: SHIPPED | OUTPATIENT
Start: 2023-12-11

## 2023-12-20 ENCOUNTER — OFFICE VISIT (OUTPATIENT)
Dept: PULMONOLOGY | Facility: CLINIC | Age: 48
End: 2023-12-20
Payer: MEDICARE

## 2023-12-20 VITALS
HEIGHT: 66 IN | HEART RATE: 81 BPM | BODY MASS INDEX: 47.09 KG/M2 | SYSTOLIC BLOOD PRESSURE: 118 MMHG | WEIGHT: 293 LBS | DIASTOLIC BLOOD PRESSURE: 64 MMHG | TEMPERATURE: 98.5 F | OXYGEN SATURATION: 100 %

## 2023-12-20 DIAGNOSIS — U09.9 POST-COVID SYNDROME: Primary | ICD-10-CM

## 2023-12-20 DIAGNOSIS — R05.3 CHRONIC COUGH: ICD-10-CM

## 2023-12-20 PROCEDURE — 99214 OFFICE O/P EST MOD 30 MIN: CPT | Performed by: INTERNAL MEDICINE

## 2023-12-20 RX ORDER — FLUTICASONE PROPIONATE 50 MCG
1 SPRAY, SUSPENSION (ML) NASAL DAILY
Qty: 9.9 ML | Refills: 2 | Status: SHIPPED | OUTPATIENT
Start: 2023-12-20

## 2023-12-20 NOTE — PROGRESS NOTES
Attending Statement:  I have seen and examined the patient. I have discussed the patient's care with the pulmonary fellow.    I agree with the findings, assessment, and plan as outlined in the note by Dr Sparks with the addition of the followin yo female w/ hx of post COVID, post COVID ILD, CAR PFO, who presents for follow up.     Imaging Studies were reviewed personally on the PAC system:   CT Chest : Mild diffuse interstitial reticulation persists though has decreased since 2021.  Prior groundglass opacities have significantly decreased.  The most conspicuous area of scarring is again in the right upper lobe (series 8, image 81).  There is no   honeycombing or bronchiectasis.  PFTs : Unable to perform due to poor, inconsistent effort  TTE : normal EF< normal RV, no PH.    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 Using albuterol frequently. Low eos recently, highest 400 in .   Nighttime symptoms could be related to untreated CAR    - continue Dulera, make sure taking twice a day  - continue rehab  - re-schedule PFTs (not done after last visit) with 6 min walk   - continue PPI    # Obesity  # CAR  AHI 12.5, APAP ordered, still not received APAP due to insurance being switched  - will have office check in with [employer]  - endo follow up for hashimoto/ trouble losing wt      Kate Lugo MD  [facility]

## 2023-12-20 NOTE — PROGRESS NOTES
Pulmonary Follow-up   Shelli Umaña 48 y.o. female MRN: [id number]  12/19/2023      Assessment:  Dyspnea on exertion  Morbid Obesity, Class III Obesity  History of gastric bypass  CAR  Sleep study on 6/24/2021 showed mild sleep apnea (AHI 12.5).  At last pulmonary visit patient noted that she did not receive CPAP machine as it was recalled before she could receive it.  Was having PND.  History of Hashimoto's  Currently on levothyroxine 150 mcg p.o. daily  TSH 9.8 back in September 2022  Post-COVID syndrome  CT chest on 10/31/2022 showing improvement in pulmonary scarring since 7/21.  CT abdomen and pelvis on 3/23 showing nonspecific patchy groundglass opacities visualized lung bases.   Eosinophils:   Chronic cough  Currently using Trelegy daily and albuterol as needed  GERD  On Protonix  Vitamin D deficiency   Vitamin D level 16 (3/23)    Plan:  Follow-up in pulmonary clinic in 3 months  Lifestyle modifications: Namely weight loss  Instructed patient to follow-up with PMD regarding follow-up testing for hypothyroidism due to difficulty losing weight. (Most recent TSH 9.8 in September 2022)  PFTs ordered, not completed. Advised patient to complete PFTs  6-minute walk test   Increase Dulera BID with Spacer  Start flonase    History of Present Illness   HPI:  Shelli Umaña is a 48 y.o. female with past medical history of morbid obesity status post gastric bypass, CAR, Hashimoto's, post-COVID syndrome, chronic cough, GERD, vitamin D deficiency who presents to the pulmonary clinic for follow-up.  Patient states that 1 week ago she developed a viral illness.  She had fevers, chills, worsening shortness of breath and productive cough for 3 days, however her symptoms have improved to her baseline chronic cough.  Her chronic cough has not been well controlled, but has not worsened since the last visit when her Breo was changed to Dulera.  She describes the chronic cough as nonproductive.  Additionally  she complains of dyspnea on exertion, requiring the use of  oxygen via nasal cannula.  She notes that when she has episodes of shortness of breath requiring oxygen for pulse ox shows an SpO2 heron of 92%.  Patient is requesting a prescription for home O2, however previous 6-minute walk test have not demonstrated hypoxia.      Additionally, patient has not received her CPAP due to issues with [employer] and her recent insurance adjustment to Medicare.  Patient denies morning headaches, daytime sleepiness.    Review of Systems   Constitutional:  Negative for fever.   HENT:  Positive for congestion. Negative for facial swelling.    Respiratory:  Positive for shortness of breath (on exertion). Negative for cough.    Cardiovascular:  Negative for chest pain, palpitations and leg swelling.   Gastrointestinal:  Negative for abdominal pain, diarrhea, nausea and vomiting.   Musculoskeletal:  Negative for arthralgias, back pain, joint swelling, myalgias, neck pain and neck stiffness.   Neurological:  Negative for dizziness.     Historical Information   Past Medical History:   Diagnosis Date    Abscess of labia     Acute and chronic respiratory failure with hypoxia (HCC)     Anemia 4/6/2021    Anxiety     Bipolar disorder (HCC)     Breast lump     COVID-19     Frequent headaches     Hemorrhoids     Hypothyroidism 7/1/2013    Migraine     Morbid obesity (HCC) 7/1/2013    Obesity     Psychotic disorder (HCC)      Past Surgical History:   Procedure Laterality Date    BARIATRIC SURGERY      BREAST BIOPSY Right 1999    benign    BREAST LUMPECTOMY Left     benign    CHOLECYSTECTOMY      GALLBLADDER SURGERY       Family History   Problem Relation Age of Onset    Liver cancer Mother     Prostate cancer Father     No Known Problems Sister     No Known Problems Sister     No Known Problems Sister     No Known Problems Sister     No Known Problems Sister     No Known Problems Sister     No Known Problems Sister     No Known Problems  Sister     No Known Problems Sister     No Known Problems Sister     No Known Problems Maternal Grandmother     No Known Problems Maternal Grandfather     No Known Problems Paternal Grandmother     No Known Problems Paternal Grandfather     Cancer Maternal Aunt     Cancer Maternal Aunt     Cancer Maternal Aunt     Cancer Maternal Aunt     Cancer Maternal Aunt     Cancer Maternal Aunt     Cancer Maternal Aunt     No Known Problems Paternal Aunt     No Known Problems Paternal Aunt     No Known Problems Paternal Aunt     No Known Problems Paternal Aunt     Cancer Paternal Aunt     Cancer Paternal Aunt        Social History:   Social History     Tobacco Use   Smoking Status Former    Current packs/day: 0.00    Types: Cigarettes    Quit date: 2008    Years since quitting: 15.9    Passive exposure: Past   Smokeless Tobacco Never   Tobacco Comments    smoker for 2-3 yrs and quit in 2008, smoked about 3 cigarettes a day       Meds/Allergies     Current Outpatient Medications:     albuterol (PROVENTIL HFA,VENTOLIN HFA) 90 mcg/act inhaler, INHALE 2 PUFFS EVERY 6 (SIX) HOURS AS NEEDED FOR WHEEZING OR SHORTNESS OF BREATH, Disp: 8.5 g, Rfl: 5    atorvastatin (LIPITOR) 20 mg tablet, TAKE ONE TABLET (20 MG TOTAL) BY MOUTH DAILY, Disp: 30 tablet, Rfl: 6    benzonatate (TESSALON) 200 MG capsule, Take 1 capsule (200 mg total) by mouth daily at bedtime as needed for cough, Disp: 30 capsule, Rfl: 0    busPIRone (BUSPAR) 10 mg tablet, Take 1 tablet (10 mg total) by mouth 2 (two) times a day, Disp: 60 tablet, Rfl: 3    Calcium Citrate 200 MG TABS, 1 tablet 3 (three) times a day, Disp: , Rfl:     cyanocobalamin (VITAMIN B-12) 1000 MCG tablet, Take 1 tablet (1,000 mcg total) by mouth daily, Disp: 90 tablet, Rfl: 3    dicyclomine (BENTYL) 10 mg capsule, Take 1 capsule (10 mg total) by mouth 2 (two) times a day as needed (abd pain), Disp: 60 capsule, Rfl: 2    diphenhydrAMINE-acetaminophen (TYLENOL PM)  MG TABS, Take 1 tablet by mouth  daily at bedtime as needed for sleep, Disp: , Rfl:     ergocalciferol (VITAMIN D2) 50,000 units, Take 1 capsule (50,000 Units total) by mouth once a week for 12 doses, Disp: 12 capsule, Rfl: 0    escitalopram (LEXAPRO) 20 mg tablet, TAKE ONE TABLET (20 MG TOTAL) BY MOUTH DAILY, Disp: 90 tablet, Rfl: 2    folic acid (FOLVITE) 1 mg tablet, Take 1 tablet (1 mg total) by mouth daily, Disp: 90 tablet, Rfl: 4    furosemide (LASIX) 20 mg tablet, TAKE ONE TABLET (20 MG TOTAL) BY MOUTH DAILY, Disp: 30 tablet, Rfl: 3    levothyroxine (Levo-T) 150 mcg tablet, Take 1 tablet (150 mcg total) by mouth daily in the early morning, Disp: 90 tablet, Rfl: 0    lidocaine (Lidoderm) 5 %, Apply 2 patches topically over 12 hours daily Remove & Discard patch within 12 hours or as directed by MD. Place 1 patch to right chest and 1 to back daily. (Patient not taking: Reported on 8/23/2023), Disp: 15 patch, Rfl: 2    metoprolol tartrate (LOPRESSOR) 25 mg tablet, TAKE 1/2 TABLET(12.5 MG) BY MOUTH EVERY 12 HOURS (Patient not taking: Reported on 8/23/2023), Disp: 90 tablet, Rfl: 4    mometasone-formoterol (Dulera) 200-5 MCG/ACT inhaler, Inhale 2 puffs 2 (two) times a day Rinse mouth after use., Disp: 13 g, Rfl: 3    Multiple Vitamins-Minerals (MULTIVITAMIN ADULT PO), every 24 hours, Disp: , Rfl:     pantoprazole (PROTONIX) 40 mg tablet, TAKE ONE TABLET (40 MG TOTAL) BY MOUTH EVERY 24 HOURS, Disp: 90 tablet, Rfl: 3    traZODone (DESYREL) 100 mg tablet, TAKE ONE TABLET (100 MG TOTAL) BY MOUTH DAILY AT BEDTIME, Disp: 90 tablet, Rfl: 1    valACYclovir (VALTREX) 500 mg tablet, TAKE 2 TABLET BY MOUTH THREE TIMES DAILY FOR 7 DAYS. TAKE IF YOU DEVELOP A RASH (Patient not taking: Reported on 8/23/2023), Disp: , Rfl:   Allergies   Allergen Reactions    No Active Allergies        Vitals: not currently breastfeeding., There is no height or weight on file to calculate BMI.      Physical Exam  Physical Exam  Constitutional:       General: She is not in acute  "distress.     Appearance: She is obese. She is not ill-appearing, toxic-appearing or diaphoretic.   HENT:      Nose: Congestion and rhinorrhea present.   Cardiovascular:      Rate and Rhythm: Normal rate and regular rhythm.      Pulses: Normal pulses.      Heart sounds: Normal heart sounds. No murmur heard.     No friction rub. No gallop.   Pulmonary:      Effort: Pulmonary effort is normal. No respiratory distress.      Breath sounds: No stridor. No wheezing, rhonchi or rales.      Comments: Frequent coughing in the room  Chest:      Chest wall: No tenderness.   Musculoskeletal:         General: Normal range of motion.   Skin:     Capillary Refill: Capillary refill takes less than 2 seconds.   Neurological:      General: No focal deficit present.      Mental Status: She is alert and oriented to person, place, and time.   Psychiatric:         Mood and Affect: Mood normal.         Labs: I have personally reviewed pertinent lab results.  Lab Results   Component Value Date    WBC 7.21 03/21/2023    HGB 15.2 03/21/2023    HCT 47.1 (H) 03/21/2023    MCV 98 03/21/2023     (H) 03/21/2023     Lab Results   Component Value Date    GLUCOSE 89 10/14/2015    CALCIUM 9.2 03/21/2023     10/14/2015    K 4.1 03/21/2023    CO2 25 03/21/2023     03/21/2023    BUN 12 03/21/2023    CREATININE 0.96 03/21/2023     No results found for: \"IGE\"  Lab Results   Component Value Date    ALT 17 03/21/2023    AST 12 (L) 03/21/2023    ALKPHOS 124 (H) 03/21/2023    BILITOT 0.33 10/14/2015       Imaging and other studies: I have personally reviewed pertinent films in PACS  CT abdomen pelvis w contrast    Result Date: 3/29/2023  Impression: Nonspecific patchy groundglass densities in the visualized lung bases.  Atypical infection or combination excluded. Status post gastric bypass. Small to moderate right and left paraumbilical hernias redemonstrated.  No bowel involvement. Stable additional findings as above. The study was marked " "in [facility] for significant notification. Workstation performed: [id number]         Pulmonary function testing:   PFTs 5/2021:  Results:  FEV1/FVC Ratio: 52 %  Forced Vital Capacity: 1.30 L    32 % predicted  FEV1: 0.98 L     21 % predicted  After administration of bronchodilator   FVC: 1.14 L, 28 % predicted, -12 % change  FEV1: 0.87 L, 27 % predicted, +27 % change     Unable to perform maneuvers for lung volumes and diffusion capacity     Interpretation:     **Patient unable to perform pulmonary function testing due to poor, inconsistent efforts**     Results likely do not reflect actual disease.  Spirometry shows very severe obstructive airflow limitation with reduced vital capacity.     Positive, but not significant response to the administration of bronchodilator per ATS standards     Unable to interpret flow volume loops due to difficulty with testing       EKG, Pathology, and Other Studies: I have personally reviewed pertinent films in PACS    Disclaimer: Portions of the record may have been created with voice recognition software. Occasional wrong word or \"sound a like\" substitutions may have occurred due to the inherent limitations of voice recognition software. Careful consideration should be taken to recognize, using context, where substitutions have occurred.    Terence Sparks,    Pulmonary & Critical Care Medicine Fellow PGY-IV  [facility]    "

## 2024-01-03 ENCOUNTER — HOSPITAL ENCOUNTER (OUTPATIENT)
Dept: MAMMOGRAPHY | Facility: CLINIC | Age: 49
Discharge: HOME | End: 2024-01-03
Payer: MEDICARE

## 2024-01-03 VITALS — BODY MASS INDEX: 47.09 KG/M2 | WEIGHT: 293 LBS | HEIGHT: 66 IN

## 2024-01-03 DIAGNOSIS — Z12.31 SCREENING MAMMOGRAM FOR BREAST CANCER: ICD-10-CM

## 2024-01-03 PROCEDURE — 77063 BREAST TOMOSYNTHESIS BI: CPT

## 2024-01-03 PROCEDURE — 77067 SCR MAMMO BI INCL CAD: CPT

## 2024-01-17 ENCOUNTER — OFFICE VISIT (OUTPATIENT)
Dept: FAMILY MEDICINE CLINIC | Facility: CLINIC | Age: 49
End: 2024-01-17
Payer: MEDICARE

## 2024-01-17 VITALS
TEMPERATURE: 97.2 F | OXYGEN SATURATION: 100 % | DIASTOLIC BLOOD PRESSURE: 70 MMHG | HEIGHT: 67 IN | SYSTOLIC BLOOD PRESSURE: 118 MMHG | HEART RATE: 96 BPM | BODY MASS INDEX: 45.99 KG/M2 | WEIGHT: 293 LBS

## 2024-01-17 DIAGNOSIS — R05.3 POST-COVID-19 SYNDROME MANIFESTING AS CHRONIC COUGH: ICD-10-CM

## 2024-01-17 DIAGNOSIS — Z23 ENCOUNTER FOR IMMUNIZATION: ICD-10-CM

## 2024-01-17 DIAGNOSIS — F32.A ANXIETY AND DEPRESSION: ICD-10-CM

## 2024-01-17 DIAGNOSIS — E66.01 MORBID OBESITY WITH BMI OF 45.0-49.9, ADULT (HCC): ICD-10-CM

## 2024-01-17 DIAGNOSIS — F41.9 ANXIETY: ICD-10-CM

## 2024-01-17 DIAGNOSIS — E03.8 HYPOTHYROIDISM DUE TO HASHIMOTO'S THYROIDITIS: ICD-10-CM

## 2024-01-17 DIAGNOSIS — R00.2 PALPITATIONS: ICD-10-CM

## 2024-01-17 DIAGNOSIS — U09.9 POST-COVID-19 SYNDROME MANIFESTING AS CHRONIC COUGH: ICD-10-CM

## 2024-01-17 DIAGNOSIS — F41.9 ANXIETY AND DEPRESSION: ICD-10-CM

## 2024-01-17 DIAGNOSIS — D50.8 IRON DEFICIENCY ANEMIA SECONDARY TO INADEQUATE DIETARY IRON INTAKE: ICD-10-CM

## 2024-01-17 DIAGNOSIS — E78.5 HYPERLIPIDEMIA, UNSPECIFIED HYPERLIPIDEMIA TYPE: ICD-10-CM

## 2024-01-17 DIAGNOSIS — R73.03 PREDIABETES: ICD-10-CM

## 2024-01-17 DIAGNOSIS — E23.7 LESION OF PITUITARY GLAND (HCC): ICD-10-CM

## 2024-01-17 DIAGNOSIS — G47.33 OSA (OBSTRUCTIVE SLEEP APNEA): ICD-10-CM

## 2024-01-17 DIAGNOSIS — Z12.11 SCREENING FOR MALIGNANT NEOPLASM OF COLON: ICD-10-CM

## 2024-01-17 DIAGNOSIS — J96.11 CHRONIC RESPIRATORY FAILURE WITH HYPOXIA (HCC): ICD-10-CM

## 2024-01-17 DIAGNOSIS — Z00.00 MEDICARE ANNUAL WELLNESS VISIT, SUBSEQUENT: Primary | ICD-10-CM

## 2024-01-17 DIAGNOSIS — E55.9 VITAMIN D DEFICIENCY: ICD-10-CM

## 2024-01-17 DIAGNOSIS — E66.01 MORBID OBESITY (HCC): ICD-10-CM

## 2024-01-17 DIAGNOSIS — E06.3 HYPOTHYROIDISM DUE TO HASHIMOTO'S THYROIDITIS: ICD-10-CM

## 2024-01-17 PROBLEM — F32.1 CURRENT MODERATE EPISODE OF MAJOR DEPRESSIVE DISORDER, UNSPECIFIED WHETHER RECURRENT (HCC): Status: ACTIVE | Noted: 2024-01-17

## 2024-01-17 PROBLEM — D50.9 IRON DEFICIENCY ANEMIA: Status: RESOLVED | Noted: 2021-05-26 | Resolved: 2024-01-17

## 2024-01-17 PROBLEM — D64.9 ANEMIA: Status: RESOLVED | Noted: 2021-04-06 | Resolved: 2024-01-17

## 2024-01-17 PROCEDURE — G0439 PPPS, SUBSEQ VISIT: HCPCS

## 2024-01-17 PROCEDURE — 99214 OFFICE O/P EST MOD 30 MIN: CPT

## 2024-01-17 PROCEDURE — 90471 IMMUNIZATION ADMIN: CPT

## 2024-01-17 PROCEDURE — 90750 HZV VACC RECOMBINANT IM: CPT

## 2024-01-17 NOTE — ASSESSMENT & PLAN NOTE
06/22 MRI Brain   There is a lesion within the pituitary gland measuring approximately 0.66 x 0.61cm  Past referral placed to endocrinology for management

## 2024-01-17 NOTE — ASSESSMENT & PLAN NOTE
Mild CAR  Needs to wear a CPAP at night, however has been having difficulties with insurance to receive equipment.   Advised to contact pulmonology in regards to the CPAP status as they are the ordering provider.

## 2024-01-17 NOTE — LETTER
To whom it may concern,     Shelli VILLA (1975) is under my professional care. She requires 2L of oxygen at night from her past respiratory illness including COVID. Please allow Shelli to have a change in her oxygen equipment for it to be portable in order to enhance her quality of life.     If you have any questions, please do not hesitate to reach out.       THOMAS Galvez

## 2024-01-17 NOTE — ASSESSMENT & PLAN NOTE
Post-Covid syndrome include cough and shortness of breath   Current medications  Albuterol 90mcg, Benzonatate 200mg, Flonase 50mcg, Furosemide 20mg, Lidocaine (Patch), and Dulera (Mometasone-Formoterol 200-5mcg) inhaler  Wears 2L of oxygen at night   Still gets fatigued after walking short distances   Follows with pulmonology     Recent imaging   CT Chest 2022: Mild diffuse interstitial reticulation persists though has decreased since July 1, 2021.  Prior groundglass opacities have significantly decreased.  The most conspicuous area of scarring is again in the right upper lobe.  There is no bronchiectasis.  PFTs 2021: Unable to perform due to poor, inconsistent effort  TTE 2021: normal EF< normal RV, no PH.

## 2024-01-17 NOTE — PATIENT INSTRUCTIONS
Medicare Preventive Visit Patient Instructions  Thank you for completing your Welcome to Medicare Visit or Medicare Annual Wellness Visit today. Your next wellness visit will be due in one year (1/17/2025).  The screening/preventive services that you may require over the next 5-10 years are detailed below. Some tests may not apply to you based off risk factors and/or age. Screening tests ordered at today's visit but not completed yet may show as past due. Also, please note that scanned in results may not display below.  Preventive Screenings:  Service Recommendations Previous Testing/Comments   Colorectal Cancer Screening  * Colonoscopy    * Fecal Occult Blood Test (FOBT)/Fecal Immunochemical Test (FIT)  * Fecal DNA/Cologuard Test  * Flexible Sigmoidoscopy Age: 45-75 years old   Colonoscopy: every 10 years (may be performed more frequently if at higher risk)  OR  FOBT/FIT: every 1 year  OR  Cologuard: every 3 years  OR  Sigmoidoscopy: every 5 years  Screening may be recommended earlier than age 45 if at higher risk for colorectal cancer. Also, an individualized decision between you and your healthcare provider will decide whether screening between the ages of 76-85 would be appropriate. Colonoscopy: 11/17/2022  FOBT/FIT: 11/17/2022  Cologuard: 11/17/2022  Sigmoidoscopy: 11/17/2022    Screening Current     Breast Cancer Screening Age: 40+ years old  Frequency: every 1-2 years  Not required if history of left and right mastectomy Mammogram: 01/03/2024    Screening Current   Cervical Cancer Screening Between the ages of 21-29, pap smear recommended once every 3 years.   Between the ages of 30-65, can perform pap smear with HPV co-testing every 5 years.   Recommendations may differ for women with a history of total hysterectomy, cervical cancer, or abnormal pap smears in past. Pap Smear: Not on file        Hepatitis C Screening Once for adults born between 1945 and 1965  More frequently in patients at high risk for  Hepatitis C Hep C Antibody: Not on file    Screening Current   Diabetes Screening 1-2 times per year if you're at risk for diabetes or have pre-diabetes Fasting glucose: 80 mg/dL (3/21/2023)  A1C: 5.6 % (3/9/2022)  Screening Current   Cholesterol Screening Once every 5 years if you don't have a lipid disorder. May order more often based on risk factors. Lipid panel: 12/03/2020    Screening Not Indicated  History Lipid Disorder     Other Preventive Screenings Covered by Medicare:  Abdominal Aortic Aneurysm (AAA) Screening: covered once if your at risk. You're considered to be at risk if you have a family history of AAA.  Lung Cancer Screening: covers low dose CT scan once per year if you meet all of the following conditions: (1) Age 55-77; (2) No signs or symptoms of lung cancer; (3) Current smoker or have quit smoking within the last 15 years; (4) You have a tobacco smoking history of at least 20 pack years (packs per day multiplied by number of years you smoked); (5) You get a written order from a healthcare provider.  Glaucoma Screening: covered annually if you're considered high risk: (1) You have diabetes OR (2) Family history of glaucoma OR (3)  aged 50 and older OR (4)  American aged 65 and older  Osteoporosis Screening: covered every 2 years if you meet one of the following conditions: (1) You're estrogen deficient and at risk for osteoporosis based off medical history and other findings; (2) Have a vertebral abnormality; (3) On glucocorticoid therapy for more than 3 months; (4) Have primary hyperparathyroidism; (5) On osteoporosis medications and need to assess response to drug therapy.   Last bone density test (DXA Scan): Not on file.  HIV Screening: covered annually if you're between the age of 15-65. Also covered annually if you are younger than 15 and older than 65 with risk factors for HIV infection. For pregnant patients, it is covered up to 3 times per  pregnancy.    Immunizations:  Immunization Recommendations   Influenza Vaccine Annual influenza vaccination during flu season is recommended for all persons aged >= 6 months who do not have contraindications   Pneumococcal Vaccine   * Pneumococcal conjugate vaccine = PCV13 (Prevnar 13), PCV15 (Vaxneuvance), PCV20 (Prevnar 20)  * Pneumococcal polysaccharide vaccine = PPSV23 (Pneumovax) Adults 19-63 yo with certain risk factors or if 65+ yo  If never received any pneumonia vaccine: recommend Prevnar 20 (PCV20)  Give PCV20 if previously received 1 dose of PCV13 or PPSV23   Hepatitis B Vaccine 3 dose series if at intermediate or high risk (ex: diabetes, end stage renal disease, liver disease)   Respiratory syncytial virus (RSV) Vaccine - COVERED BY MEDICARE PART D  * RSVPreF3 (Arexvy) CDC recommends that adults 60 years of age and older may receive a single dose of RSV vaccine using shared clinical decision-making (SCDM)   Tetanus (Td) Vaccine - COST NOT COVERED BY MEDICARE PART B Following completion of primary series, a booster dose should be given every 10 years to maintain immunity against tetanus. Td may also be given as tetanus wound prophylaxis.   Tdap Vaccine - COST NOT COVERED BY MEDICARE PART B Recommended at least once for all adults. For pregnant patients, recommended with each pregnancy.   Shingles Vaccine (Shingrix) - COST NOT COVERED BY MEDICARE PART B  2 shot series recommended in those 19 years and older who have or will have weakened immune systems or those 50 years and older     Health Maintenance Due:      Topic Date Due   • Cervical Cancer Screening  Never done   • Colorectal Cancer Screening  11/17/2023   • Breast Cancer Screening: Mammogram  01/03/2025   • HIV Screening  Completed   • Hepatitis C Screening  Completed     Immunizations Due:      Topic Date Due   • COVID-19 Vaccine (1) Never done   • Influenza Vaccine (1) Never done     Advance Directives   What are advance directives?  Advance  directives are legal documents that state your wishes and plans for medical care. These plans are made ahead of time in case you lose your ability to make decisions for yourself. Advance directives can apply to any medical decision, such as the treatments you want, and if you want to donate organs.   What are the types of advance directives?  There are many types of advance directives, and each state has rules about how to use them. You may choose a combination of any of the following:  Living will:  This is a written record of the treatment you want. You can also choose which treatments you do not want, which to limit, and which to stop at a certain time. This includes surgery, medicine, IV fluid, and tube feedings.   Durable power of  for healthcare (DPAHC):  This is a written record that states who you want to make healthcare choices for you when you are unable to make them for yourself. This person, called a proxy, is usually a family member or a friend. You may choose more than 1 proxy.  Do not resuscitate (DNR) order:  A DNR order is used in case your heart stops beating or you stop breathing. It is a request not to have certain forms of treatment, such as CPR. A DNR order may be included in other types of advance directives.  Medical directive:  This covers the care that you want if you are in a coma, near death, or unable to make decisions for yourself. You can list the treatments you want for each condition. Treatment may include pain medicine, surgery, blood transfusions, dialysis, IV or tube feedings, and a ventilator (breathing machine).  Values history:  This document has questions about your views, beliefs, and how you feel and think about life. This information can help others choose the care that you would choose.  Why are advance directives important?  An advance directive helps you control your care. Although spoken wishes may be used, it is better to have your wishes written down. Spoken  wishes can be misunderstood, or not followed. Treatments may be given even if you do not want them. An advance directive may make it easier for your family to make difficult choices about your care.   Weight Management   Why it is important to manage your weight:  Being overweight increases your risk of health conditions such as heart disease, high blood pressure, type 2 diabetes, and certain types of cancer. It can also increase your risk for osteoarthritis, sleep apnea, and other respiratory problems. Aim for a slow, steady weight loss. Even a small amount of weight loss can lower your risk of health problems.  How to lose weight safely:  A safe and healthy way to lose weight is to eat fewer calories and get regular exercise. You can lose up about 1 pound a week by decreasing the number of calories you eat by 500 calories each day.   Healthy meal plan for weight management:  A healthy meal plan includes a variety of foods, contains fewer calories, and helps you stay healthy. A healthy meal plan includes the following:  Eat whole-grain foods more often.  A healthy meal plan should contain fiber. Fiber is the part of grains, fruits, and vegetables that is not broken down by your body. Whole-grain foods are healthy and provide extra fiber in your diet. Some examples of whole-grain foods are whole-wheat breads and pastas, oatmeal, brown rice, and bulgur.  Eat a variety of vegetables every day.  Include dark, leafy greens such as spinach, kale, emma greens, and mustard greens. Eat yellow and orange vegetables such as carrots, sweet potatoes, and winter squash.   Eat a variety of fruits every day.  Choose fresh or canned fruit (canned in its own juice or light syrup) instead of juice. Fruit juice has very little or no fiber.  Eat low-fat dairy foods.  Drink fat-free (skim) milk or 1% milk. Eat fat-free yogurt and low-fat cottage cheese. Try low-fat cheeses such as mozzarella and other reduced-fat cheeses.  Choose  meat and other protein foods that are low in fat.  Choose beans or other legumes such as split peas or lentils. Choose fish, skinless poultry (chicken or turkey), or lean cuts of red meat (beef or pork). Before you cook meat or poultry, cut off any visible fat.   Use less fat and oil.  Try baking foods instead of frying them. Add less fat, such as margarine, sour cream, regular salad dressing and mayonnaise to foods. Eat fewer high-fat foods. Some examples of high-fat foods include french fries, doughnuts, ice cream, and cakes.  Eat fewer sweets.  Limit foods and drinks that are high in sugar. This includes candy, cookies, regular soda, and sweetened drinks.  Exercise:  Exercise at least 30 minutes per day on most days of the week. Some examples of exercise include walking, biking, dancing, and swimming. You can also fit in more physical activity by taking the stairs instead of the elevator or parking farther away from stores. Ask your healthcare provider about the best exercise plan for you.      © Copyright [employer] 2018 Information is for End User's use only and may not be sold, redistributed or otherwise used for commercial purposes. All illustrations and images included in CareNotes® are the copyrighted property of [employer] or [employer]

## 2024-01-17 NOTE — PROGRESS NOTES
Assessment and Plan:     Problem List Items Addressed This Visit          Endocrine    Hypothyroidism due to Hashimoto's thyroiditis     06/23 Last TSH 0.43  New TSH lab order placed   Current medication: Levothyroxine 150mcg          Relevant Orders    TSH, 3rd generation with Free T4 reflex    Lesion of pituitary gland (HCC)     06/22 MRI Brain   There is a lesion within the pituitary gland measuring approximately 0.66 x 0.61cm  Past referral placed to endocrinology for management               Respiratory    CAR (obstructive sleep apnea)     Mild CAR  Needs to wear a CPAP at night, however has been having difficulties with insurance to receive equipment.   Advised to contact pulmonology in regards to the CPAP status as they are the ordering provider.             Other    RESOLVED: Anxiety    Anxiety and depression     PHQ9 score 21  Current medication: Buspirone, Escitalopram, Trazodone   Positive screening today but denies symptoms          Hyperlipidemia     11/22 Lipid panel   Cholesterol 223, Triglycerides 125, , HDL 59    Current Medications: Atorvastatin 20mg     Lifestyle modification: Incorporate regular exercise, avoid sugars and simple carbohydrates, weight loss and choosing healthier fats.           RESOLVED: Iron deficiency anemia secondary to inadequate dietary iron intake    Morbid obesity (HCC)     BMI 48          Palpitations     Current medications: Metoprolol 25mg          Post-COVID-19 syndrome manifesting as chronic cough     Post-Covid syndrome include cough and shortness of breath   Current medications  Albuterol 90mcg, Benzonatate 200mg, Flonase 50mcg, Furosemide 20mg, Lidocaine (Patch), and Dulera (Mometasone-Formoterol 200-5mcg) inhaler  Wears 2L of oxygen at night   Still gets fatigued after walking short distances   Follows with pulmonology     Recent imaging   CT Chest 2022: Mild diffuse interstitial reticulation persists though has decreased since July 1, 2021.  Prior  groundglass opacities have significantly decreased.  The most conspicuous area of scarring is again in the right upper lobe.  There is no bronchiectasis.  PFTs 2021: Unable to perform due to poor, inconsistent effort  TTE 2021: normal EF< normal RV, no PH.           Prediabetes     03/22 Last A1C 5.6          Vitamin D deficiency     Current medication: Ergocalciferol 50,000units weekly           Other Visit Diagnoses       Medicare annual wellness visit, subsequent    -  Primary    Encounter for immunization        Relevant Orders    Zoster Vaccine Recombinant (SHINGRIX) - IN OFFICE (Completed)    Chronic respiratory failure with hypoxia (HCC)        Screening for malignant neoplasm of colon        Relevant Orders    Ambulatory referral to Gastroenterology    Morbid obesity with BMI of 45.0-49.9, adult (HCC)                 Preventive health issues were discussed with patient, and age appropriate screening tests were ordered as noted in patient's After Visit Summary.  Personalized health advice and appropriate referrals for health education or preventive services given if needed, as noted in patient's After Visit Summary.     History of Present Illness:     Patient presents for a Medicare Wellness Visit    AWV        Patient Care Team:  THOMAS Galvez as PCP - General (Nurse Practitioner)  Oneil Mondragon MD as PCP - PCP-[employer] (RTE)     Review of Systems:     Review of Systems   Constitutional:  Negative for activity change, chills, fatigue and fever.   HENT:  Negative for congestion, ear pain, rhinorrhea, sore throat and trouble swallowing.    Eyes:  Negative for pain and visual disturbance.   Respiratory:  Negative for cough, chest tightness and shortness of breath.    Cardiovascular:  Negative for chest pain, palpitations and leg swelling.   Gastrointestinal:  Negative for abdominal pain, constipation, diarrhea, nausea and vomiting.   Genitourinary:  Negative for difficulty urinating,  dysuria, hematuria and urgency.   Musculoskeletal:  Negative for arthralgias and back pain.   Skin:  Negative for color change and rash.   Neurological:  Negative for dizziness, seizures, syncope and headaches.   Psychiatric/Behavioral:  Negative for dysphoric mood. The patient is not nervous/anxious.    All other systems reviewed and are negative.     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    CAR (obstructive sleep apnea)    Anxiety and depression    Palpitations    Patent foramen ovale    Notalgia    Lesion of pituitary gland (HCC)      Past Medical and Surgical History:     Past Medical History:   Diagnosis Date    Abscess of labia     Acute and chronic respiratory failure with hypoxia (HCC)     Anemia 04/06/2021    Anemia     Anxiety     Atopic dermatitis     Bipolar disorder (HCC)     Breast lump     Congenital anomaly of breast     COVID-19     Depression, recurrent (HCC) 09/21/2021    Frequent headaches     Goiter     07/29/2020-ultrasound of the thyroid done by endocrinology shows no evidence of a discrete nodule.    Hemorrhoids     Hypothyroidism 07/01/2013    Iron deficiency anemia 05/26/2021    Iron deficiency anemia secondary to inadequate dietary iron intake 06/25/2021    Migraine     Morbid obesity (HCC) 07/01/2013    Obesity     Psychotic disorder (HCC)      Past Surgical History:   Procedure Laterality Date    BARIATRIC SURGERY      BREAST BIOPSY Right 1999    benign    BREAST LUMPECTOMY Left     benign    CHOLECYSTECTOMY      GALLBLADDER SURGERY        Family History:     Family History   Problem Relation Age of Onset    Liver cancer Mother     Prostate cancer Father     No Known Problems Sister     No Known Problems Sister     No Known Problems Sister     No  Known Problems Sister     No Known Problems Sister     No Known Problems Sister     No Known Problems Sister     No Known Problems Sister     No Known Problems Sister     No Known Problems Sister     No Known Problems Maternal Grandmother     No Known Problems Maternal Grandfather     No Known Problems Paternal Grandmother     No Known Problems Paternal Grandfather     Cancer Maternal Aunt     Cancer Maternal Aunt     Cancer Maternal Aunt     Cancer Maternal Aunt     Cancer Maternal Aunt     Cancer Maternal Aunt     Cancer Maternal Aunt     No Known Problems Paternal Aunt     No Known Problems Paternal Aunt     No Known Problems Paternal Aunt     No Known Problems Paternal Aunt     Cancer Paternal Aunt     Cancer Paternal Aunt     Breast cancer Neg Hx       Social History:     Social History     Socioeconomic History    Marital status: Single     Spouse name: None    Number of children: 1    Years of education: None    Highest education level: None   Occupational History    None   Tobacco Use    Smoking status: Former     Current packs/day: 0.00     Types: Cigarettes     Quit date:      Years since quittin.0     Passive exposure: Past    Smokeless tobacco: Never    Tobacco comments:     smoker for 2-3 yrs and quit in , smoked about 3 cigarettes a day   Vaping Use    Vaping status: Never Used   Substance and Sexual Activity    Alcohol use: Never    Drug use: No    Sexual activity: Not Currently   Other Topics Concern    None   Social History Narrative    None     Social Determinants of Health     Financial Resource Strain: Low Risk  (2024)    Overall Financial Resource Strain (CARDIA)     Difficulty of Paying Living Expenses: Not hard at all   Food Insecurity: Not on file   Transportation Needs: No Transportation Needs (2024)    PRAPARE - Transportation     Lack of Transportation (Medical): No     Lack of Transportation (Non-Medical): No   Physical Activity: Not on file   Stress: Not on file    Social Connections: Not on file   Intimate Partner Violence: Not on file   Housing Stability: Not on file      Medications and Allergies:     Current Outpatient Medications   Medication Sig Dispense Refill    albuterol (PROVENTIL HFA,VENTOLIN HFA) 90 mcg/act inhaler INHALE 2 PUFFS EVERY 6 (SIX) HOURS AS NEEDED FOR WHEEZING OR SHORTNESS OF BREATH 8.5 g 5    atorvastatin (LIPITOR) 20 mg tablet TAKE ONE TABLET (20 MG TOTAL) BY MOUTH DAILY 30 tablet 6    benzonatate (TESSALON) 200 MG capsule Take 1 capsule (200 mg total) by mouth daily at bedtime as needed for cough 30 capsule 0    busPIRone (BUSPAR) 10 mg tablet Take 1 tablet (10 mg total) by mouth 2 (two) times a day 60 tablet 3    cyanocobalamin (VITAMIN B-12) 1000 MCG tablet Take 1 tablet (1,000 mcg total) by mouth daily 90 tablet 3    dicyclomine (BENTYL) 10 mg capsule Take 1 capsule (10 mg total) by mouth 2 (two) times a day as needed (abd pain) 60 capsule 2    escitalopram (LEXAPRO) 20 mg tablet TAKE ONE TABLET (20 MG TOTAL) BY MOUTH DAILY 90 tablet 2    fluticasone (FLONASE) 50 mcg/act nasal spray 1 spray into each nostril daily 9.9 mL 2    folic acid (FOLVITE) 1 mg tablet Take 1 tablet (1 mg total) by mouth daily 90 tablet 4    furosemide (LASIX) 20 mg tablet TAKE ONE TABLET (20 MG TOTAL) BY MOUTH DAILY 30 tablet 3    levothyroxine (Levo-T) 150 mcg tablet Take 1 tablet (150 mcg total) by mouth daily in the early morning 90 tablet 0    lidocaine (Lidoderm) 5 % Apply 2 patches topically over 12 hours daily Remove & Discard patch within 12 hours or as directed by MD. Place 1 patch to right chest and 1 to back daily. 15 patch 2    metoprolol tartrate (LOPRESSOR) 25 mg tablet TAKE 1/2 TABLET(12.5 MG) BY MOUTH EVERY 12 HOURS 90 tablet 4    mometasone-formoterol (Dulera) 200-5 MCG/ACT inhaler Inhale 2 puffs 2 (two) times a day Rinse mouth after use. 13 g 3    Multiple Vitamins-Minerals (MULTIVITAMIN ADULT PO) every 24 hours      pantoprazole (PROTONIX) 40 mg tablet  TAKE ONE TABLET (40 MG TOTAL) BY MOUTH EVERY 24 HOURS 90 tablet 3    traZODone (DESYREL) 100 mg tablet TAKE ONE TABLET (100 MG TOTAL) BY MOUTH DAILY AT BEDTIME 90 tablet 1    valACYclovir (VALTREX) 500 mg tablet       ergocalciferol (VITAMIN D2) 50,000 units Take 1 capsule (50,000 Units total) by mouth once a week for 12 doses 12 capsule 0     No current facility-administered medications for this visit.     Allergies   Allergen Reactions    No Active Allergies       Immunizations:     Immunization History   Administered Date(s) Administered    Tdap 01/08/2020    Zoster Vaccine Recombinant 08/14/2023, 01/17/2024      Health Maintenance:         Topic Date Due    Cervical Cancer Screening  Never done    Colorectal Cancer Screening  11/17/2023    Breast Cancer Screening: Mammogram  01/03/2025    HIV Screening  Completed    Hepatitis C Screening  Completed         Topic Date Due    COVID-19 Vaccine (1) Never done    Influenza Vaccine (1) Never done      Medicare Screening Tests and Risk Assessments:     Ada is here for her Subsequent Wellness visit. Last Medicare Wellness visit information reviewed, patient interviewed and updates made to the record today.      Health Risk Assessment:   Patient rates overall health as good. Patient feels that their physical health rating is same. Patient is dissatisfied with their life. Eyesight was rated as slightly worse. Hearing was rated as same. Patient feels that their emotional and mental health rating is same. Patients states they are sometimes angry. Patient states they are always unusually tired/fatigued. Pain experienced in the last 7 days has been some. Patient's pain rating has been 7/10. Patient states that she has experienced no weight loss or gain in last 6 months.     Depression Screening:   PHQ-9 Score: 21      Fall Risk Screening:   In the past year, patient has experienced: no history of falling in past year      Urinary Incontinence Screening:   Patient has not  leaked urine accidently in the last six months.     Home Safety:  Patient has trouble with stairs inside or outside of their home. Patient has working smoke alarms and has no working carbon monoxide detector. Home safety hazards include: none.     Nutrition:   Current diet is Regular.     Medications:   Patient is currently taking over-the-counter supplements. OTC medications include: see medication list. Patient is able to manage medications.     Activities of Daily Living (ADLs)/Instrumental Activities of Daily Living (IADLs):   Walk and transfer into and out of bed and chair?: Yes  Dress and groom yourself?: Yes    Bathe or shower yourself?: Yes    Feed yourself? Yes  Do your laundry/housekeeping?: Yes  Manage your money, pay your bills and track your expenses?: Yes  Make your own meals?: Yes    Do your own shopping?: Yes    Previous Hospitalizations:   Any hospitalizations or ED visits within the last 12 months?: No      Advance Care Planning:   Living will: No    Advanced directive: Yes      PREVENTIVE SCREENINGS      Cardiovascular Screening:    General: Screening Not Indicated and History Lipid Disorder      Diabetes Screening:     General: Screening Current      Colorectal Cancer Screening:     General: Screening Current      Breast Cancer Screening:     General: Screening Current      Lung Cancer Screening:     General: Screening Not Indicated      Hepatitis C Screening:    General: Screening Current    Screening, Brief Intervention, and Referral to Treatment (SBIRT)    Screening      Single Item Drug Screening:  How often have you used an illegal drug (including marijuana) or a prescription medication for non-medical reasons in the past year? never    Single Item Drug Screen Score: 0  Interpretation: Negative screen for possible drug use disorder    No results found.     Physical Exam:     /70 (BP Location: Left arm, Patient Position: Sitting, Cuff Size: Adult)   Pulse 96   Temp (!) 97.2 °F (36.2  "°C) (Temporal)   Ht 5' 7\" (1.702 m)   Wt (!) 139 kg (306 lb 9.6 oz)   LMP 12/18/2023   SpO2 100%   BMI 48.02 kg/m²     Physical Exam  Vitals and nursing note reviewed.   Constitutional:       General: She is not in acute distress.     Appearance: Normal appearance. She is well-developed.   HENT:      Head: Normocephalic and atraumatic.      Right Ear: Tympanic membrane, ear canal and external ear normal. There is no impacted cerumen.      Left Ear: Tympanic membrane, ear canal and external ear normal. There is no impacted cerumen.      Nose: Nose normal.      Mouth/Throat:      Mouth: Mucous membranes are moist.      Pharynx: Oropharynx is clear.   Eyes:      Extraocular Movements: Extraocular movements intact.      Conjunctiva/sclera: Conjunctivae normal.      Pupils: Pupils are equal, round, and reactive to light.   Cardiovascular:      Rate and Rhythm: Normal rate and regular rhythm.      Pulses: Normal pulses.      Heart sounds: Normal heart sounds. No murmur heard.  Pulmonary:      Effort: Pulmonary effort is normal. No respiratory distress.      Breath sounds: Normal breath sounds.   Abdominal:      General: Bowel sounds are normal.      Palpations: Abdomen is soft.      Tenderness: There is no abdominal tenderness.   Musculoskeletal:         General: Normal range of motion.      Cervical back: Neck supple.      Right lower leg: No edema.      Left lower leg: No edema.   Skin:     General: Skin is warm and dry.      Capillary Refill: Capillary refill takes less than 2 seconds.   Neurological:      General: No focal deficit present.      Mental Status: She is alert and oriented to person, place, and time. Mental status is at baseline.   Psychiatric:         Mood and Affect: Mood normal.         Behavior: Behavior normal.         Thought Content: Thought content normal.         Judgment: Judgment normal.        Patient Instructions   Medicare Preventive Visit Patient Instructions  Thank you for completing " your Welcome to Medicare Visit or Medicare Annual Wellness Visit today. Your next wellness visit will be due in one year (1/17/2025).  The screening/preventive services that you may require over the next 5-10 years are detailed below. Some tests may not apply to you based off risk factors and/or age. Screening tests ordered at today's visit but not completed yet may show as past due. Also, please note that scanned in results may not display below.  Preventive Screenings:  Service Recommendations Previous Testing/Comments   Colorectal Cancer Screening  * Colonoscopy    * Fecal Occult Blood Test (FOBT)/Fecal Immunochemical Test (FIT)  * Fecal DNA/Cologuard Test  * Flexible Sigmoidoscopy Age: 45-75 years old   Colonoscopy: every 10 years (may be performed more frequently if at higher risk)  OR  FOBT/FIT: every 1 year  OR  Cologuard: every 3 years  OR  Sigmoidoscopy: every 5 years  Screening may be recommended earlier than age 45 if at higher risk for colorectal cancer. Also, an individualized decision between you and your healthcare provider will decide whether screening between the ages of 76-85 would be appropriate. Colonoscopy: 11/17/2022  FOBT/FIT: 11/17/2022  Cologuard: 11/17/2022  Sigmoidoscopy: 11/17/2022    Screening Current     Breast Cancer Screening Age: 40+ years old  Frequency: every 1-2 years  Not required if history of left and right mastectomy Mammogram: 01/03/2024    Screening Current   Cervical Cancer Screening Between the ages of 21-29, pap smear recommended once every 3 years.   Between the ages of 30-65, can perform pap smear with HPV co-testing every 5 years.   Recommendations may differ for women with a history of total hysterectomy, cervical cancer, or abnormal pap smears in past. Pap Smear: Not on file        Hepatitis C Screening Once for adults born between 1945 and 1965  More frequently in patients at high risk for Hepatitis C Hep C Antibody: Not on file    Screening Current   Diabetes  Screening 1-2 times per year if you're at risk for diabetes or have pre-diabetes Fasting glucose: 80 mg/dL (3/21/2023)  A1C: 5.6 % (3/9/2022)  Screening Current   Cholesterol Screening Once every 5 years if you don't have a lipid disorder. May order more often based on risk factors. Lipid panel: 12/03/2020    Screening Not Indicated  History Lipid Disorder     Other Preventive Screenings Covered by Medicare:  Abdominal Aortic Aneurysm (AAA) Screening: covered once if your at risk. You're considered to be at risk if you have a family history of AAA.  Lung Cancer Screening: covers low dose CT scan once per year if you meet all of the following conditions: (1) Age 55-77; (2) No signs or symptoms of lung cancer; (3) Current smoker or have quit smoking within the last 15 years; (4) You have a tobacco smoking history of at least 20 pack years (packs per day multiplied by number of years you smoked); (5) You get a written order from a healthcare provider.  Glaucoma Screening: covered annually if you're considered high risk: (1) You have diabetes OR (2) Family history of glaucoma OR (3)  aged 50 and older OR (4)  American aged 65 and older  Osteoporosis Screening: covered every 2 years if you meet one of the following conditions: (1) You're estrogen deficient and at risk for osteoporosis based off medical history and other findings; (2) Have a vertebral abnormality; (3) On glucocorticoid therapy for more than 3 months; (4) Have primary hyperparathyroidism; (5) On osteoporosis medications and need to assess response to drug therapy.   Last bone density test (DXA Scan): Not on file.  HIV Screening: covered annually if you're between the age of 15-65. Also covered annually if you are younger than 15 and older than 65 with risk factors for HIV infection. For pregnant patients, it is covered up to 3 times per pregnancy.    Immunizations:  Immunization Recommendations   Influenza Vaccine Annual influenza  vaccination during flu season is recommended for all persons aged >= 6 months who do not have contraindications   Pneumococcal Vaccine   * Pneumococcal conjugate vaccine = PCV13 (Prevnar 13), PCV15 (Vaxneuvance), PCV20 (Prevnar 20)  * Pneumococcal polysaccharide vaccine = PPSV23 (Pneumovax) Adults 19-63 yo with certain risk factors or if 65+ yo  If never received any pneumonia vaccine: recommend Prevnar 20 (PCV20)  Give PCV20 if previously received 1 dose of PCV13 or PPSV23   Hepatitis B Vaccine 3 dose series if at intermediate or high risk (ex: diabetes, end stage renal disease, liver disease)   Respiratory syncytial virus (RSV) Vaccine - COVERED BY MEDICARE PART D  * RSVPreF3 (Arexvy) CDC recommends that adults 60 years of age and older may receive a single dose of RSV vaccine using shared clinical decision-making (SCDM)   Tetanus (Td) Vaccine - COST NOT COVERED BY MEDICARE PART B Following completion of primary series, a booster dose should be given every 10 years to maintain immunity against tetanus. Td may also be given as tetanus wound prophylaxis.   Tdap Vaccine - COST NOT COVERED BY MEDICARE PART B Recommended at least once for all adults. For pregnant patients, recommended with each pregnancy.   Shingles Vaccine (Shingrix) - COST NOT COVERED BY MEDICARE PART B  2 shot series recommended in those 19 years and older who have or will have weakened immune systems or those 50 years and older     Health Maintenance Due:      Topic Date Due    Cervical Cancer Screening  Never done    Colorectal Cancer Screening  11/17/2023    Breast Cancer Screening: Mammogram  01/03/2025    HIV Screening  Completed    Hepatitis C Screening  Completed     Immunizations Due:      Topic Date Due    COVID-19 Vaccine (1) Never done    Influenza Vaccine (1) Never done     Advance Directives   What are advance directives?  Advance directives are legal documents that state your wishes and plans for medical care. These plans are made  ahead of time in case you lose your ability to make decisions for yourself. Advance directives can apply to any medical decision, such as the treatments you want, and if you want to donate organs.   What are the types of advance directives?  There are many types of advance directives, and each state has rules about how to use them. You may choose a combination of any of the following:  Living will:  This is a written record of the treatment you want. You can also choose which treatments you do not want, which to limit, and which to stop at a certain time. This includes surgery, medicine, IV fluid, and tube feedings.   Durable power of  for healthcare (DPAHC):  This is a written record that states who you want to make healthcare choices for you when you are unable to make them for yourself. This person, called a proxy, is usually a family member or a friend. You may choose more than 1 proxy.  Do not resuscitate (DNR) order:  A DNR order is used in case your heart stops beating or you stop breathing. It is a request not to have certain forms of treatment, such as CPR. A DNR order may be included in other types of advance directives.  Medical directive:  This covers the care that you want if you are in a coma, near death, or unable to make decisions for yourself. You can list the treatments you want for each condition. Treatment may include pain medicine, surgery, blood transfusions, dialysis, IV or tube feedings, and a ventilator (breathing machine).  Values history:  This document has questions about your views, beliefs, and how you feel and think about life. This information can help others choose the care that you would choose.  Why are advance directives important?  An advance directive helps you control your care. Although spoken wishes may be used, it is better to have your wishes written down. Spoken wishes can be misunderstood, or not followed. Treatments may be given even if you do not want them. An  advance directive may make it easier for your family to make difficult choices about your care.   Weight Management   Why it is important to manage your weight:  Being overweight increases your risk of health conditions such as heart disease, high blood pressure, type 2 diabetes, and certain types of cancer. It can also increase your risk for osteoarthritis, sleep apnea, and other respiratory problems. Aim for a slow, steady weight loss. Even a small amount of weight loss can lower your risk of health problems.  How to lose weight safely:  A safe and healthy way to lose weight is to eat fewer calories and get regular exercise. You can lose up about 1 pound a week by decreasing the number of calories you eat by 500 calories each day.   Healthy meal plan for weight management:  A healthy meal plan includes a variety of foods, contains fewer calories, and helps you stay healthy. A healthy meal plan includes the following:  Eat whole-grain foods more often.  A healthy meal plan should contain fiber. Fiber is the part of grains, fruits, and vegetables that is not broken down by your body. Whole-grain foods are healthy and provide extra fiber in your diet. Some examples of whole-grain foods are whole-wheat breads and pastas, oatmeal, brown rice, and bulgur.  Eat a variety of vegetables every day.  Include dark, leafy greens such as spinach, kale, emma greens, and mustard greens. Eat yellow and orange vegetables such as carrots, sweet potatoes, and winter squash.   Eat a variety of fruits every day.  Choose fresh or canned fruit (canned in its own juice or light syrup) instead of juice. Fruit juice has very little or no fiber.  Eat low-fat dairy foods.  Drink fat-free (skim) milk or 1% milk. Eat fat-free yogurt and low-fat cottage cheese. Try low-fat cheeses such as mozzarella and other reduced-fat cheeses.  Choose meat and other protein foods that are low in fat.  Choose beans or other legumes such as split peas or  lentils. Choose fish, skinless poultry (chicken or turkey), or lean cuts of red meat (beef or pork). Before you cook meat or poultry, cut off any visible fat.   Use less fat and oil.  Try baking foods instead of frying them. Add less fat, such as margarine, sour cream, regular salad dressing and mayonnaise to foods. Eat fewer high-fat foods. Some examples of high-fat foods include french fries, doughnuts, ice cream, and cakes.  Eat fewer sweets.  Limit foods and drinks that are high in sugar. This includes candy, cookies, regular soda, and sweetened drinks.  Exercise:  Exercise at least 30 minutes per day on most days of the week. Some examples of exercise include walking, biking, dancing, and swimming. You can also fit in more physical activity by taking the stairs instead of the elevator or parking farther away from stores. Ask your healthcare provider about the best exercise plan for you.      © Copyright [employer] 2018 Information is for End User's use only and may not be sold, redistributed or otherwise used for commercial purposes. All illustrations and images included in CareNotes® are the copyrighted property of [employer] or [employer]           THOMAS Galvez    Depression Screening Follow-up Plan: Patient's depression screening was positive with a PHQ-2 score of . Their PHQ-9 score was 21. Patient assessed for underlying major depression. They have no active suicidal ideations. Brief counseling provided and recommend additional follow-up/re-evaluation next office visit.

## 2024-01-17 NOTE — ASSESSMENT & PLAN NOTE
11/22 Lipid panel   Cholesterol 223, Triglycerides 125, , HDL 59    Current Medications: Atorvastatin 20mg     Lifestyle modification: Incorporate regular exercise, avoid sugars and simple carbohydrates, weight loss and choosing healthier fats.

## 2024-01-17 NOTE — ASSESSMENT & PLAN NOTE
PHQ9 score 21  Current medication: Buspirone, Escitalopram, Trazodone   Positive screening today but denies symptoms

## 2024-01-19 ENCOUNTER — TELEPHONE (OUTPATIENT)
Dept: HEMATOLOGY ONCOLOGY | Facility: CLINIC | Age: 49
End: 2024-01-19

## 2024-01-22 ENCOUNTER — TELEPHONE (OUTPATIENT)
Dept: HEMATOLOGY ONCOLOGY | Facility: CLINIC | Age: 49
End: 2024-01-22

## 2024-01-22 NOTE — TELEPHONE ENCOUNTER
Called to assist patient with rescheduling upcoming appt. Pt rescheduled appt to [place] location in April due to her [place] of [place]. Confirmed new appt details including location.

## 2024-01-24 LAB
DME PARACHUTE DELIVERY DATE ACTUAL: NORMAL
DME PARACHUTE DELIVERY DATE REQUESTED: NORMAL
DME PARACHUTE ITEM DESCRIPTION: NORMAL
DME PARACHUTE ORDER STATUS: NORMAL
DME PARACHUTE SUPPLIER NAME: NORMAL
DME PARACHUTE SUPPLIER PHONE: NORMAL

## 2024-01-31 ENCOUNTER — APPOINTMENT (OUTPATIENT)
Dept: LAB | Facility: HOSPITAL | Age: 49
End: 2024-01-31
Payer: COMMERCIAL

## 2024-01-31 ENCOUNTER — TELEPHONE (OUTPATIENT)
Dept: HEMATOLOGY ONCOLOGY | Facility: CLINIC | Age: 49
End: 2024-01-31

## 2024-01-31 DIAGNOSIS — E53.8 B12 DEFICIENCY: ICD-10-CM

## 2024-01-31 DIAGNOSIS — E53.8 VITAMIN B12 DEFICIENCY: Primary | ICD-10-CM

## 2024-01-31 DIAGNOSIS — E06.3 HYPOTHYROIDISM DUE TO HASHIMOTO'S THYROIDITIS: ICD-10-CM

## 2024-01-31 DIAGNOSIS — E03.8 HYPOTHYROIDISM DUE TO HASHIMOTO'S THYROIDITIS: ICD-10-CM

## 2024-01-31 DIAGNOSIS — E53.8 FOLIC ACID DEFICIENCY: ICD-10-CM

## 2024-01-31 DIAGNOSIS — D50.8 IRON DEFICIENCY ANEMIA FOLLOWING BARIATRIC SURGERY: ICD-10-CM

## 2024-01-31 DIAGNOSIS — D75.838 REACTIVE THROMBOCYTOSIS: ICD-10-CM

## 2024-01-31 DIAGNOSIS — K95.89 IRON DEFICIENCY ANEMIA FOLLOWING BARIATRIC SURGERY: ICD-10-CM

## 2024-01-31 DIAGNOSIS — Z98.84 HISTORY OF GASTRIC BYPASS: ICD-10-CM

## 2024-01-31 DIAGNOSIS — D50.8 IRON DEFICIENCY ANEMIA SECONDARY TO INADEQUATE DIETARY IRON INTAKE: ICD-10-CM

## 2024-01-31 LAB
ALBUMIN SERPL BCP-MCNC: 3.7 G/DL (ref 3.5–5)
ALP SERPL-CCNC: 133 U/L (ref 34–104)
ALT SERPL W P-5'-P-CCNC: 12 U/L (ref 7–52)
ANION GAP SERPL CALCULATED.3IONS-SCNC: 5 MMOL/L
AST SERPL W P-5'-P-CCNC: 13 U/L (ref 13–39)
BASOPHILS # BLD AUTO: 0.1 THOUSANDS/ÂΜL (ref 0–0.1)
BASOPHILS NFR BLD AUTO: 1 % (ref 0–1)
BILIRUB SERPL-MCNC: 0.28 MG/DL (ref 0.2–1)
BUN SERPL-MCNC: 10 MG/DL (ref 5–25)
CALCIUM SERPL-MCNC: 8.5 MG/DL (ref 8.4–10.2)
CHLORIDE SERPL-SCNC: 106 MMOL/L (ref 96–108)
CO2 SERPL-SCNC: 25 MMOL/L (ref 21–32)
CREAT SERPL-MCNC: 0.89 MG/DL (ref 0.6–1.3)
CRP SERPL QL: 7.1 MG/L
EOSINOPHIL # BLD AUTO: 0.12 THOUSAND/ÂΜL (ref 0–0.61)
EOSINOPHIL NFR BLD AUTO: 1 % (ref 0–6)
ERYTHROCYTE [DISTWIDTH] IN BLOOD BY AUTOMATED COUNT: 14.8 % (ref 11.6–15.1)
ERYTHROCYTE [SEDIMENTATION RATE] IN BLOOD: 50 MM/HOUR (ref 0–19)
FERRITIN SERPL-MCNC: 7 NG/ML (ref 11–307)
FOLATE SERPL-MCNC: 8 NG/ML
GFR SERPL CREATININE-BSD FRML MDRD: 77 ML/MIN/1.73SQ M
GLUCOSE P FAST SERPL-MCNC: 79 MG/DL (ref 65–99)
HCT VFR BLD AUTO: 36.5 % (ref 34.8–46.1)
HGB BLD-MCNC: 11.4 G/DL (ref 11.5–15.4)
IMM GRANULOCYTES # BLD AUTO: 0.03 THOUSAND/UL (ref 0–0.2)
IMM GRANULOCYTES NFR BLD AUTO: 0 % (ref 0–2)
IRON SATN MFR SERPL: 4 % (ref 15–50)
IRON SERPL-MCNC: 16 UG/DL (ref 50–212)
LDH SERPL-CCNC: 134 U/L (ref 140–271)
LYMPHOCYTES # BLD AUTO: 2.29 THOUSANDS/ÂΜL (ref 0.6–4.47)
LYMPHOCYTES NFR BLD AUTO: 24 % (ref 14–44)
MCH RBC QN AUTO: 26.9 PG (ref 26.8–34.3)
MCHC RBC AUTO-ENTMCNC: 31.2 G/DL (ref 31.4–37.4)
MCV RBC AUTO: 86 FL (ref 82–98)
MONOCYTES # BLD AUTO: 0.67 THOUSAND/ÂΜL (ref 0.17–1.22)
MONOCYTES NFR BLD AUTO: 7 % (ref 4–12)
NEUTROPHILS # BLD AUTO: 6.4 THOUSANDS/ÂΜL (ref 1.85–7.62)
NEUTS SEG NFR BLD AUTO: 67 % (ref 43–75)
NRBC BLD AUTO-RTO: 0 /100 WBCS
PLATELET # BLD AUTO: 581 THOUSANDS/UL (ref 149–390)
PMV BLD AUTO: 8.8 FL (ref 8.9–12.7)
POTASSIUM SERPL-SCNC: 4.2 MMOL/L (ref 3.5–5.3)
PROT SERPL-MCNC: 6.7 G/DL (ref 6.4–8.4)
RBC # BLD AUTO: 4.24 MILLION/UL (ref 3.81–5.12)
SODIUM SERPL-SCNC: 136 MMOL/L (ref 135–147)
T4 FREE SERPL-MCNC: 0.35 NG/DL (ref 0.61–1.12)
TIBC SERPL-MCNC: 442 UG/DL (ref 250–450)
TSH SERPL DL<=0.05 MIU/L-ACNC: 46.58 UIU/ML (ref 0.45–4.5)
UIBC SERPL-MCNC: 426 UG/DL (ref 155–355)
VIT B12 SERPL-MCNC: 120 PG/ML (ref 180–914)
WBC # BLD AUTO: 9.61 THOUSAND/UL (ref 4.31–10.16)

## 2024-01-31 PROCEDURE — 85652 RBC SED RATE AUTOMATED: CPT

## 2024-01-31 PROCEDURE — 83550 IRON BINDING TEST: CPT

## 2024-01-31 PROCEDURE — 82746 ASSAY OF FOLIC ACID SERUM: CPT

## 2024-01-31 PROCEDURE — 86140 C-REACTIVE PROTEIN: CPT

## 2024-01-31 PROCEDURE — 84439 ASSAY OF FREE THYROXINE: CPT

## 2024-01-31 PROCEDURE — 80053 COMPREHEN METABOLIC PANEL: CPT

## 2024-01-31 PROCEDURE — 36415 COLL VENOUS BLD VENIPUNCTURE: CPT

## 2024-01-31 PROCEDURE — 82728 ASSAY OF FERRITIN: CPT

## 2024-01-31 PROCEDURE — 84443 ASSAY THYROID STIM HORMONE: CPT

## 2024-01-31 PROCEDURE — 83540 ASSAY OF IRON: CPT

## 2024-01-31 PROCEDURE — 82607 VITAMIN B-12: CPT

## 2024-01-31 PROCEDURE — 85025 COMPLETE CBC W/AUTO DIFF WBC: CPT

## 2024-01-31 PROCEDURE — 83615 LACTATE (LD) (LDH) ENZYME: CPT

## 2024-01-31 RX ORDER — SODIUM CHLORIDE 9 MG/ML
20 INJECTION, SOLUTION INTRAVENOUS ONCE
OUTPATIENT
Start: 2024-02-07

## 2024-01-31 RX ORDER — CYANOCOBALAMIN 1000 UG/ML
1000 INJECTION, SOLUTION INTRAMUSCULAR; SUBCUTANEOUS ONCE
OUTPATIENT
Start: 2024-03-06

## 2024-01-31 RX ORDER — CYANOCOBALAMIN 1000 UG/ML
1000 INJECTION, SOLUTION INTRAMUSCULAR; SUBCUTANEOUS ONCE
OUTPATIENT
Start: 2024-02-07 | End: 2024-02-07

## 2024-01-31 NOTE — TELEPHONE ENCOUNTER
While we try to accommodate patient requests, our priority is to schedule treatment according to Doctor's orders and site availability.    Does the Provider use the intake sheet or checkout note?  What would be a preferred day of the week that would work best for your infusion appointment? Any day   Do you prefer mornings or afternoons for your appointments? 1PM   Are there any days or dates that do not work for your schedule, including any upcoming vacations?   We are going to try our best to schedule you at the infusion center closest to your home.  In the event that we are unable to what would be your next preferred infusion site or sites?     1. [facility] infusion   2. [facility]     Do you have transportation to take you to all of your appointments? Yes

## 2024-01-31 NOTE — TELEPHONE ENCOUNTER
Please schedule- Venofer x 5, vitamin B12 x 5 followed by vitamin B12 injections on a monthly basis     Call out to patient and reviewed recommendations by Dr. Ojeda. Patient agreeable and would like to go to [facility] infusion center.     Venofer x 5, vitamin B12 x 5 followed by vitamin B12 injections on a monthly basis

## 2024-02-01 DIAGNOSIS — E06.3 HYPOTHYROIDISM DUE TO HASHIMOTO'S THYROIDITIS: Primary | ICD-10-CM

## 2024-02-01 DIAGNOSIS — E03.8 HYPOTHYROIDISM DUE TO HASHIMOTO'S THYROIDITIS: Primary | ICD-10-CM

## 2024-02-01 RX ORDER — LEVOTHYROXINE SODIUM 175 UG/1
175 TABLET ORAL
Qty: 90 TABLET | Refills: 0 | Status: SHIPPED | OUTPATIENT
Start: 2024-02-01

## 2024-02-19 ENCOUNTER — HOSPITAL ENCOUNTER (OUTPATIENT)
Dept: INFUSION CENTER | Facility: HOSPITAL | Age: 49
Discharge: HOME | End: 2024-02-19
Attending: INTERNAL MEDICINE

## 2024-02-19 VITALS
SYSTOLIC BLOOD PRESSURE: 115 MMHG | RESPIRATION RATE: 18 BRPM | OXYGEN SATURATION: 97 % | TEMPERATURE: 97.6 F | DIASTOLIC BLOOD PRESSURE: 78 MMHG | HEART RATE: 73 BPM

## 2024-02-19 DIAGNOSIS — K95.89 IRON DEFICIENCY ANEMIA FOLLOWING BARIATRIC SURGERY: Primary | ICD-10-CM

## 2024-02-19 DIAGNOSIS — E53.8 VITAMIN B12 DEFICIENCY: ICD-10-CM

## 2024-02-19 DIAGNOSIS — Z98.84 HISTORY OF GASTRIC BYPASS: ICD-10-CM

## 2024-02-19 DIAGNOSIS — D50.8 IRON DEFICIENCY ANEMIA FOLLOWING BARIATRIC SURGERY: Primary | ICD-10-CM

## 2024-02-19 PROCEDURE — 96365 THER/PROPH/DIAG IV INF INIT: CPT

## 2024-02-19 PROCEDURE — 96372 THER/PROPH/DIAG INJ SC/IM: CPT

## 2024-02-19 RX ORDER — CYANOCOBALAMIN 1000 UG/ML
1000 INJECTION, SOLUTION INTRAMUSCULAR; SUBCUTANEOUS ONCE
Status: COMPLETED | OUTPATIENT
Start: 2024-02-19 | End: 2024-02-19

## 2024-02-19 RX ORDER — SODIUM CHLORIDE 9 MG/ML
20 INJECTION, SOLUTION INTRAVENOUS ONCE
Status: COMPLETED | OUTPATIENT
Start: 2024-02-19 | End: 2024-02-19

## 2024-02-19 RX ORDER — CYANOCOBALAMIN 1000 UG/ML
1000 INJECTION, SOLUTION INTRAMUSCULAR; SUBCUTANEOUS ONCE
Status: CANCELLED | OUTPATIENT
Start: 2024-02-26 | End: 2024-02-26

## 2024-02-19 RX ORDER — SODIUM CHLORIDE 9 MG/ML
20 INJECTION, SOLUTION INTRAVENOUS ONCE
Status: CANCELLED | OUTPATIENT
Start: 2024-02-26

## 2024-02-19 RX ADMIN — SODIUM CHLORIDE 20 ML/HR: 0.9 INJECTION, SOLUTION INTRAVENOUS at 13:27

## 2024-02-19 RX ADMIN — CYANOCOBALAMIN 1000 MCG: 1000 INJECTION INTRAMUSCULAR; SUBCUTANEOUS at 13:30

## 2024-02-19 RX ADMIN — IRON SUCROSE 200 MG: 20 INJECTION, SOLUTION INTRAVENOUS at 13:28

## 2024-02-19 NOTE — PROGRESS NOTES
Pt tolerated  Venofer and B12 today with no adverse reactions. Declined AVS. Next apt conifrmed for 2/26 @ 1:00pm. Left unit ambulatory with a steady gait.

## 2024-02-22 ENCOUNTER — TELEPHONE (OUTPATIENT)
Age: 49
End: 2024-02-22

## 2024-02-22 ENCOUNTER — DOCUMENTATION (OUTPATIENT)
Dept: HEMATOLOGY ONCOLOGY | Facility: CLINIC | Age: 49
End: 2024-02-22

## 2024-02-22 NOTE — PROGRESS NOTES
Eligibility Period  02/01/2024 - 12/31/2024  Tracking ID #: [id number]        Sandi Sherwood MPH  Phone:[phone]  Email: [email or web address]

## 2024-02-22 NOTE — TELEPHONE ENCOUNTER
Spoke with the patient on 02/06/2024  Labs reviewed. TSH is elevated. She is currently taking Levothyroxine 150mcg. We are going to increase your medication to 175mcg and repeat your TSH in 12 weeks which is in the beginning of May.

## 2024-02-26 ENCOUNTER — HOSPITAL ENCOUNTER (OUTPATIENT)
Dept: INFUSION CENTER | Facility: HOSPITAL | Age: 49
Discharge: HOME | End: 2024-02-26
Attending: INTERNAL MEDICINE

## 2024-02-26 VITALS
HEART RATE: 81 BPM | OXYGEN SATURATION: 97 % | RESPIRATION RATE: 18 BRPM | TEMPERATURE: 97.6 F | DIASTOLIC BLOOD PRESSURE: 80 MMHG | SYSTOLIC BLOOD PRESSURE: 118 MMHG

## 2024-02-26 DIAGNOSIS — D50.8 IRON DEFICIENCY ANEMIA FOLLOWING BARIATRIC SURGERY: Primary | ICD-10-CM

## 2024-02-26 DIAGNOSIS — K95.89 IRON DEFICIENCY ANEMIA FOLLOWING BARIATRIC SURGERY: Primary | ICD-10-CM

## 2024-02-26 DIAGNOSIS — Z98.84 HISTORY OF GASTRIC BYPASS: ICD-10-CM

## 2024-02-26 DIAGNOSIS — E53.8 VITAMIN B12 DEFICIENCY: ICD-10-CM

## 2024-02-26 PROCEDURE — 96372 THER/PROPH/DIAG INJ SC/IM: CPT

## 2024-02-26 PROCEDURE — 96365 THER/PROPH/DIAG IV INF INIT: CPT

## 2024-02-26 RX ORDER — CYANOCOBALAMIN 1000 UG/ML
1000 INJECTION, SOLUTION INTRAMUSCULAR; SUBCUTANEOUS ONCE
Status: COMPLETED | OUTPATIENT
Start: 2024-02-26 | End: 2024-02-26

## 2024-02-26 RX ORDER — CYANOCOBALAMIN 1000 UG/ML
1000 INJECTION, SOLUTION INTRAMUSCULAR; SUBCUTANEOUS ONCE
Status: CANCELLED | OUTPATIENT
Start: 2024-03-04 | End: 2024-03-04

## 2024-02-26 RX ORDER — SODIUM CHLORIDE 9 MG/ML
20 INJECTION, SOLUTION INTRAVENOUS ONCE
Status: CANCELLED | OUTPATIENT
Start: 2024-03-04

## 2024-02-26 RX ORDER — SODIUM CHLORIDE 9 MG/ML
20 INJECTION, SOLUTION INTRAVENOUS ONCE
Status: COMPLETED | OUTPATIENT
Start: 2024-02-26 | End: 2024-02-26

## 2024-02-26 RX ADMIN — IRON SUCROSE 200 MG: 20 INJECTION, SOLUTION INTRAVENOUS at 13:17

## 2024-02-26 RX ADMIN — SODIUM CHLORIDE 20 ML/HR: 9 INJECTION, SOLUTION INTRAVENOUS at 13:17

## 2024-02-26 RX ADMIN — CYANOCOBALAMIN 1000 MCG: 1000 INJECTION INTRAMUSCULAR; SUBCUTANEOUS at 13:51

## 2024-02-26 NOTE — PLAN OF CARE
Problem: Potential for Falls  Goal: Patient will remain free of falls  Description: INTERVENTIONS:  - Educate patient/family on patient safety including physical limitations  - Instruct patient to call for assistance with activity   - Apply yellow socks and bracelet for high fall risk patients  - Consider moving patient to room near nurses station  Outcome: Progressing

## 2024-02-26 NOTE — PROGRESS NOTES
Shelli Umaña  tolerated IV venofer and  L arm B12 injection treatment well with no complications.      Shelli Umaña is aware of future appt on March 11 at 1300-[facility] infusion.     AVS printed and given to Shelli Umaña:  No (Declined by Shelli Umaña)

## 2024-02-28 ENCOUNTER — TELEPHONE (OUTPATIENT)
Age: 49
End: 2024-02-28

## 2024-02-28 NOTE — TELEPHONE ENCOUNTER
Spoke with patient and interpretor [id number]. Patient asked if an order can be placed for a CT scan of the lungs because she states that she has been experiencing a lot of lung pain. Please advise.

## 2024-03-04 ENCOUNTER — HOSPITAL ENCOUNTER (OUTPATIENT)
Dept: INFUSION CENTER | Facility: HOSPITAL | Age: 49
Discharge: HOME | End: 2024-03-04
Attending: INTERNAL MEDICINE
Payer: MEDICARE

## 2024-03-04 VITALS
RESPIRATION RATE: 18 BRPM | DIASTOLIC BLOOD PRESSURE: 78 MMHG | OXYGEN SATURATION: 99 % | TEMPERATURE: 97.6 F | SYSTOLIC BLOOD PRESSURE: 126 MMHG | HEART RATE: 93 BPM

## 2024-03-04 DIAGNOSIS — D50.8 IRON DEFICIENCY ANEMIA FOLLOWING BARIATRIC SURGERY: Primary | ICD-10-CM

## 2024-03-04 DIAGNOSIS — Z98.84 HISTORY OF GASTRIC BYPASS: ICD-10-CM

## 2024-03-04 DIAGNOSIS — E53.8 VITAMIN B12 DEFICIENCY: ICD-10-CM

## 2024-03-04 DIAGNOSIS — K95.89 IRON DEFICIENCY ANEMIA FOLLOWING BARIATRIC SURGERY: Primary | ICD-10-CM

## 2024-03-04 PROCEDURE — 96365 THER/PROPH/DIAG IV INF INIT: CPT

## 2024-03-04 PROCEDURE — 96372 THER/PROPH/DIAG INJ SC/IM: CPT

## 2024-03-04 RX ORDER — SODIUM CHLORIDE 9 MG/ML
20 INJECTION, SOLUTION INTRAVENOUS ONCE
Status: CANCELLED | OUTPATIENT
Start: 2024-03-11

## 2024-03-04 RX ORDER — CYANOCOBALAMIN 1000 UG/ML
1000 INJECTION, SOLUTION INTRAMUSCULAR; SUBCUTANEOUS ONCE
Status: CANCELLED | OUTPATIENT
Start: 2024-03-11 | End: 2024-03-11

## 2024-03-04 RX ORDER — CYANOCOBALAMIN 1000 UG/ML
1000 INJECTION, SOLUTION INTRAMUSCULAR; SUBCUTANEOUS ONCE
Status: COMPLETED | OUTPATIENT
Start: 2024-03-04 | End: 2024-03-04

## 2024-03-04 RX ORDER — SODIUM CHLORIDE 9 MG/ML
20 INJECTION, SOLUTION INTRAVENOUS ONCE
Status: COMPLETED | OUTPATIENT
Start: 2024-03-04 | End: 2024-03-04

## 2024-03-04 RX ADMIN — IRON SUCROSE 200 MG: 20 INJECTION, SOLUTION INTRAVENOUS at 14:23

## 2024-03-04 RX ADMIN — CYANOCOBALAMIN 1000 MCG: 1000 INJECTION INTRAMUSCULAR; SUBCUTANEOUS at 14:24

## 2024-03-04 RX ADMIN — SODIUM CHLORIDE 20 ML/HR: 0.9 INJECTION, SOLUTION INTRAVENOUS at 14:23

## 2024-03-04 NOTE — PROGRESS NOTES
Shelli Umaña  tolerated treatment well with no complications.  B 12 admin in L deltoid    Shelli Umaña is aware of future appt on 3/11/24 at 1pm.     AVS printed and given to Shelli Umaña:  No (Declined by Shelli Umaña)

## 2024-03-05 ENCOUNTER — TELEPHONE (OUTPATIENT)
Age: 49
End: 2024-03-05

## 2024-03-05 NOTE — TELEPHONE ENCOUNTER
Incoming call:    Re: Pt of Dr. Lugo     Called last week to request order for CT scan, but has not yet received a call back. Pt has been having lung pain, but does not want to be seen for an earlier visit without the CT scan results.      Please call pt and advise:  [phone]

## 2024-03-06 ENCOUNTER — NURSE TRIAGE (OUTPATIENT)
Age: 49
End: 2024-03-06

## 2024-03-06 DIAGNOSIS — U09.9 POST-COVID SYNDROME: Primary | ICD-10-CM

## 2024-03-06 NOTE — TELEPHONE ENCOUNTER
[facility] insurance would not cover this imaging I did order a chest x-ray, if patient calls back would like to clarify little bit more if she has had any recent injuries or falls also patient has not been seen since December would likely benefit from a follow-up appointment to better guide our treatment

## 2024-03-06 NOTE — TELEPHONE ENCOUNTER
"Answer Assessment - Initial Assessment Questions  1. ONSET: \"When did the pain begin?\"       About a month ago  2. LOCATION: \"Where does it hurt?\" (upper, mid or lower back)      Right mid back  3. SEVERITY: \"How bad is the pain?\"  (e.g., Scale 1-10; mild, moderate, or severe)    - MILD (1-3): doesn't interfere with normal activities     - MODERATE (4-7): interferes with normal activities or awakens from sleep     - SEVERE (8-10): excruciating pain, unable to do any normal activities       10/10  4. PATTERN: \"Is the pain constant?\" (e.g., yes, no; constant, intermittent)       Constant  5. RADIATION: \"Does the pain shoot into your legs or elsewhere?\"      Unspecified  6. CAUSE:  \"What do you think is causing the back pain?\"       Unsure  7. BACK OVERUSE:  \"Any recent lifting of heavy objects, strenuous work or exercise?\"      Unspecified  8. MEDICATIONS: \"What have you taken so far for the pain?\" (e.g., nothing, acetaminophen, NSAIDS)      Unspecified  9. NEUROLOGIC SYMPTOMS: \"Do you have any weakness, numbness, or problems with bowel/bladder control?\"      No  10. OTHER SYMPTOMS: \"Do you have any other symptoms?\" (e.g., fever, abdominal pain, burning with urination, blood in urine)        SOB, dry NP cough    Protocols used: Back Pain-ADULT-[place]    "

## 2024-03-06 NOTE — TELEPHONE ENCOUNTER
Pt stated Pulm Provider: Dr. Sparks    Actionable item: Imaging/recs    What is the reason for the call/chief complaint?  Pt c/o right sided back pain, SOB and dry cough. Started about a month ago. Recommended [facility] in office today but pt does not want to come to the office until imaging is completed. She is requesting chest CT. Advised pt to proceed to ER if her pain is severe and uncontrolled or if she develops chest pain or severe SOB.    Priority:

## 2024-03-11 ENCOUNTER — HOSPITAL ENCOUNTER (OUTPATIENT)
Dept: INFUSION CENTER | Facility: HOSPITAL | Age: 49
Discharge: HOME | End: 2024-03-11
Attending: INTERNAL MEDICINE
Payer: COMMERCIAL

## 2024-03-11 VITALS
SYSTOLIC BLOOD PRESSURE: 132 MMHG | OXYGEN SATURATION: 97 % | RESPIRATION RATE: 18 BRPM | HEART RATE: 83 BPM | DIASTOLIC BLOOD PRESSURE: 85 MMHG | TEMPERATURE: 98 F

## 2024-03-11 DIAGNOSIS — E53.8 VITAMIN B12 DEFICIENCY: ICD-10-CM

## 2024-03-11 DIAGNOSIS — Z98.84 HISTORY OF GASTRIC BYPASS: ICD-10-CM

## 2024-03-11 DIAGNOSIS — K95.89 IRON DEFICIENCY ANEMIA FOLLOWING BARIATRIC SURGERY: Primary | ICD-10-CM

## 2024-03-11 DIAGNOSIS — D50.8 IRON DEFICIENCY ANEMIA FOLLOWING BARIATRIC SURGERY: Primary | ICD-10-CM

## 2024-03-11 PROCEDURE — 96365 THER/PROPH/DIAG IV INF INIT: CPT

## 2024-03-11 PROCEDURE — 96372 THER/PROPH/DIAG INJ SC/IM: CPT

## 2024-03-11 RX ORDER — SODIUM CHLORIDE 9 MG/ML
20 INJECTION, SOLUTION INTRAVENOUS ONCE
Status: CANCELLED | OUTPATIENT
Start: 2024-03-18

## 2024-03-11 RX ORDER — SODIUM CHLORIDE 9 MG/ML
20 INJECTION, SOLUTION INTRAVENOUS ONCE
Status: COMPLETED | OUTPATIENT
Start: 2024-03-11 | End: 2024-03-11

## 2024-03-11 RX ORDER — CYANOCOBALAMIN 1000 UG/ML
1000 INJECTION, SOLUTION INTRAMUSCULAR; SUBCUTANEOUS ONCE
Status: CANCELLED | OUTPATIENT
Start: 2024-03-18 | End: 2024-03-18

## 2024-03-11 RX ORDER — CYANOCOBALAMIN 1000 UG/ML
1000 INJECTION, SOLUTION INTRAMUSCULAR; SUBCUTANEOUS ONCE
Status: COMPLETED | OUTPATIENT
Start: 2024-03-11 | End: 2024-03-11

## 2024-03-11 RX ADMIN — SODIUM CHLORIDE 20 ML/HR: 0.9 INJECTION, SOLUTION INTRAVENOUS at 13:26

## 2024-03-11 RX ADMIN — IRON SUCROSE 200 MG: 20 INJECTION, SOLUTION INTRAVENOUS at 13:27

## 2024-03-11 RX ADMIN — CYANOCOBALAMIN 1000 MCG: 1000 INJECTION INTRAMUSCULAR; SUBCUTANEOUS at 13:22

## 2024-03-11 NOTE — PROGRESS NOTES
Received venofer infusion w/out any adverse effects, offers no complaints. Confirmed next appt, declined AVS

## 2024-03-12 DIAGNOSIS — K21.9 GASTROESOPHAGEAL REFLUX DISEASE WITHOUT ESOPHAGITIS: ICD-10-CM

## 2024-03-12 RX ORDER — PANTOPRAZOLE SODIUM 40 MG/1
40 TABLET, DELAYED RELEASE ORAL EVERY 24 HOURS
Qty: 90 TABLET | Refills: 1 | Status: SHIPPED | OUTPATIENT
Start: 2024-03-12

## 2024-03-12 NOTE — TELEPHONE ENCOUNTER
Medication: pantoprazole (PROTONIX) 40 mg tablet     Dose/Frequency: TAKE ONE TABLET (40 MG TOTAL) BY MOUTH EVERY 24 HOURS     Quantity: 90 tablet     Pharmacy: [facility] - [facility] - [id number]     Office:   [x] PCP/Provider - THOMAS Daniels   [] Speciality/Provider -     Does the patient have enough for 3 days?   [] Yes   [x] No - Send as HP to POD

## 2024-03-13 DIAGNOSIS — U09.9 POST-COVID SYNDROME: ICD-10-CM

## 2024-03-13 RX ORDER — ALBUTEROL SULFATE 90 UG/1
2 AEROSOL, METERED RESPIRATORY (INHALATION) EVERY 6 HOURS PRN
Qty: 8.5 G | Refills: 5 | Status: SHIPPED | OUTPATIENT
Start: 2024-03-13

## 2024-03-18 ENCOUNTER — HOSPITAL ENCOUNTER (OUTPATIENT)
Dept: INFUSION CENTER | Facility: HOSPITAL | Age: 49
Discharge: HOME | End: 2024-03-18
Attending: INTERNAL MEDICINE
Payer: COMMERCIAL

## 2024-03-18 VITALS
OXYGEN SATURATION: 97 % | DIASTOLIC BLOOD PRESSURE: 81 MMHG | TEMPERATURE: 97.9 F | HEART RATE: 75 BPM | RESPIRATION RATE: 18 BRPM | SYSTOLIC BLOOD PRESSURE: 124 MMHG

## 2024-03-18 DIAGNOSIS — K95.89 IRON DEFICIENCY ANEMIA FOLLOWING BARIATRIC SURGERY: Primary | ICD-10-CM

## 2024-03-18 DIAGNOSIS — E53.8 VITAMIN B12 DEFICIENCY: ICD-10-CM

## 2024-03-18 DIAGNOSIS — D50.8 IRON DEFICIENCY ANEMIA FOLLOWING BARIATRIC SURGERY: Primary | ICD-10-CM

## 2024-03-18 DIAGNOSIS — Z98.84 HISTORY OF GASTRIC BYPASS: ICD-10-CM

## 2024-03-18 PROCEDURE — 96372 THER/PROPH/DIAG INJ SC/IM: CPT

## 2024-03-18 PROCEDURE — 96365 THER/PROPH/DIAG IV INF INIT: CPT

## 2024-03-18 RX ORDER — CYANOCOBALAMIN 1000 UG/ML
1000 INJECTION, SOLUTION INTRAMUSCULAR; SUBCUTANEOUS ONCE
Status: COMPLETED | OUTPATIENT
Start: 2024-03-18 | End: 2024-03-18

## 2024-03-18 RX ORDER — CYANOCOBALAMIN 1000 UG/ML
1000 INJECTION, SOLUTION INTRAMUSCULAR; SUBCUTANEOUS ONCE
Status: CANCELLED | OUTPATIENT
Start: 2024-03-18 | End: 2024-03-18

## 2024-03-18 RX ORDER — SODIUM CHLORIDE 9 MG/ML
20 INJECTION, SOLUTION INTRAVENOUS ONCE
Status: COMPLETED | OUTPATIENT
Start: 2024-03-18 | End: 2024-03-18

## 2024-03-18 RX ORDER — SODIUM CHLORIDE 9 MG/ML
20 INJECTION, SOLUTION INTRAVENOUS ONCE
Status: CANCELLED | OUTPATIENT
Start: 2024-03-18

## 2024-03-18 RX ADMIN — SODIUM CHLORIDE 20 ML/HR: 0.9 INJECTION, SOLUTION INTRAVENOUS at 09:29

## 2024-03-18 RX ADMIN — IRON SUCROSE 200 MG: 20 INJECTION, SOLUTION INTRAVENOUS at 09:29

## 2024-03-18 RX ADMIN — CYANOCOBALAMIN 1000 MCG: 1000 INJECTION INTRAMUSCULAR; SUBCUTANEOUS at 09:33

## 2024-03-18 NOTE — PROGRESS NOTES
Shelli Umaña  tolerated treatment well with no complications.      Shelli Umaña is aware of future appt on 4/15/24 at 9am.     AVS printed and given to Shelli Umaña:  No (Declined by Shelli Umaña)

## 2024-04-01 LAB

## 2024-04-11 DIAGNOSIS — Z98.84 HISTORY OF GASTRIC BYPASS: ICD-10-CM

## 2024-04-11 DIAGNOSIS — E53.8 VITAMIN B12 DEFICIENCY: Primary | ICD-10-CM

## 2024-04-11 RX ORDER — CYANOCOBALAMIN 1000 UG/ML
1000 INJECTION, SOLUTION INTRAMUSCULAR; SUBCUTANEOUS ONCE
OUTPATIENT
Start: 2024-04-15

## 2024-04-26 DIAGNOSIS — E03.8 HYPOTHYROIDISM DUE TO HASHIMOTO'S THYROIDITIS: ICD-10-CM

## 2024-04-26 DIAGNOSIS — E06.3 HYPOTHYROIDISM DUE TO HASHIMOTO'S THYROIDITIS: ICD-10-CM

## 2024-04-27 RX ORDER — LEVOTHYROXINE SODIUM 175 UG/1
175 TABLET ORAL
Qty: 90 TABLET | Refills: 0 | Status: SHIPPED | OUTPATIENT
Start: 2024-04-27

## 2024-04-29 ENCOUNTER — TELEPHONE (OUTPATIENT)
Dept: HEMATOLOGY ONCOLOGY | Facility: CLINIC | Age: 49
End: 2024-04-29

## 2024-04-29 NOTE — TELEPHONE ENCOUNTER
Appointment Change  Cancel, Reschedule, Change to Virtual      Who are you speaking with? Patient   If it is not the patient, is the caller listed on the communication consent form? Yes   Which provider is the appointment scheduled with? Dr. Ojeda   When was the original appointment scheduled?    Please list date and time 4/29/24   At which location is the appointment scheduled to take place? [place]   Was the appointment rescheduled?     Was the appointment changed from an in person visit to a virtual visit?    If so, please list the details of the change. No   What is the reason for the appointment change? Patient wants appt around 5/6 to 5/21 but u advised there was nothing open. She will try another time to r/s       Was STAR transport scheduled? No   Does STAR transport need to be scheduled for the new visit (if applicable) No   Does the patient need an infusion appointment rescheduled? No   Does the patient have an upcoming infusion appointment scheduled? If so, when? No   Is the patient undergoing chemotherapy? No   For appointments cancelled with less than 24 hours:  Was the no-show policy reviewed? Yes

## 2024-05-08 ENCOUNTER — OFFICE VISIT (OUTPATIENT)
Dept: GASTROENTEROLOGY | Facility: CLINIC | Age: 49
End: 2024-05-08
Payer: COMMERCIAL

## 2024-05-08 VITALS
HEART RATE: 73 BPM | TEMPERATURE: 98.6 F | BODY MASS INDEX: 47.09 KG/M2 | SYSTOLIC BLOOD PRESSURE: 111 MMHG | DIASTOLIC BLOOD PRESSURE: 78 MMHG | OXYGEN SATURATION: 98 % | WEIGHT: 293 LBS | HEIGHT: 66 IN

## 2024-05-08 DIAGNOSIS — E66.01 MORBID OBESITY (HCC): Primary | ICD-10-CM

## 2024-05-08 DIAGNOSIS — R10.11 POSTPRANDIAL ABDOMINAL PAIN IN RIGHT UPPER QUADRANT: ICD-10-CM

## 2024-05-08 DIAGNOSIS — K21.9 GASTROESOPHAGEAL REFLUX DISEASE WITHOUT ESOPHAGITIS: ICD-10-CM

## 2024-05-08 DIAGNOSIS — Z12.11 SCREENING FOR MALIGNANT NEOPLASM OF COLON: ICD-10-CM

## 2024-05-08 PROCEDURE — 99214 OFFICE O/P EST MOD 30 MIN: CPT | Performed by: INTERNAL MEDICINE

## 2024-05-08 RX ORDER — DICYCLOMINE HYDROCHLORIDE 10 MG/1
10 CAPSULE ORAL 2 TIMES DAILY PRN
Qty: 60 CAPSULE | Refills: 2 | Status: SHIPPED | OUTPATIENT
Start: 2024-05-08

## 2024-05-08 RX ORDER — PANTOPRAZOLE SODIUM 40 MG/1
40 TABLET, DELAYED RELEASE ORAL EVERY 24 HOURS
Qty: 90 TABLET | Refills: 1 | Status: SHIPPED | OUTPATIENT
Start: 2024-05-08 | End: 2024-05-20 | Stop reason: SDUPTHER

## 2024-05-08 NOTE — H&P (VIEW-ONLY)
[facility] - Outpatient Follow-up Note  Shelli Umaña 48 y.o. female MRN: [id number]  Encounter: [id number]          ASSESSMENT AND PLAN:    Shelli Umaña is a 48 y.o. female with obesity, BMI 48.  She is status post gastric bypass surgery but has regained weight.  She has chronic abdominal pain which is controlled with lactose-free diet and dicyclomine.  She is on pantoprazole for reflux.  She has history of multiple colonic adenomas and is due for repeat colonoscopy.  She is interested in losing weight.  1.  Continue dicyclomine 10 mg twice daily as needed.  2.  Continue pantoprazole 40 mg daily.  3.  3.  Schedule colonoscopy.  4.  Referral to weight management.  In her to continue caloric restriction and maintain an active lifestyle.      1. Morbid obesity (HCC)    2. Screening for malignant neoplasm of colon    3. Postprandial abdominal pain in right upper quadrant    4. Gastroesophageal reflux disease without esophagitis        Orders Placed This Encounter   Procedures    Ambulatory Referral to Weight Management    Colonoscopy     ______________________________________________________________________    SUBJECTIVE:    Shelli Umaña is a 48 y.o. female with history of gastric bypass, morbid obesity, prior cholecystectomy, presenting for follow-up of chronic abdominal pain and diarrhea.  She also has history of multiple colonic adenomas and fair prep in 2022 and is due for repeat colonoscopy.    Last visit with me was in June.  At that time, she reported significant improvement in abdominal pain with lactose avoidance.  She had also been working on weight loss.  We added dicyclomine which she says has been helping.  She is also on pantoprazole, which was also helping but she ran out.  Currently, she does not have significant abdominal pain.  Her main concern is obesity and she would like to lose weight.  She has managed to lose 12 pounds this year.    LFTs  are normal.    REVIEW OF SYSTEMS IS OTHERWISE NEGATIVE.      Historical Information   Past Medical History:   Diagnosis Date    Abscess of labia     Acute and chronic respiratory failure with hypoxia (HCC)     Anemia 2021    Anemia     Anxiety     Atopic dermatitis     Bipolar disorder (HCC)     Breast lump     Congenital anomaly of breast     COVID-19     Depression, recurrent (HCC) 2021    Frequent headaches     Goiter     2020-ultrasound of the thyroid done by endocrinology shows no evidence of a discrete nodule.    Hemorrhoids     Hypothyroidism 2013    Iron deficiency anemia 2021    Iron deficiency anemia secondary to inadequate dietary iron intake 2021    Migraine     Morbid obesity (HCC) 2013    Obesity     Psychotic disorder (HCC)      Past Surgical History:   Procedure Laterality Date    BARIATRIC SURGERY      BREAST BIOPSY Right 1999    benign    BREAST LUMPECTOMY Left     benign    CHOLECYSTECTOMY      GALLBLADDER SURGERY       Social History   Social History     Substance and Sexual Activity   Alcohol Use Never     Social History     Substance and Sexual Activity   Drug Use No     Social History     Tobacco Use   Smoking Status Former    Current packs/day: 0.00    Types: Cigarettes    Quit date:     Years since quittin.3    Passive exposure: Past   Smokeless Tobacco Never   Tobacco Comments    smoker for 2-3 yrs and quit in , smoked about 3 cigarettes a day     Family History   Problem Relation Age of Onset    Liver cancer Mother     Prostate cancer Father     No Known Problems Sister     No Known Problems Sister     No Known Problems Sister     No Known Problems Sister     No Known Problems Sister     No Known Problems Sister     No Known Problems Sister     No Known Problems Sister     No Known Problems Sister     No Known Problems Sister     No Known Problems Maternal Grandmother     No Known Problems Maternal Grandfather     No Known Problems  "Paternal Grandmother     No Known Problems Paternal Grandfather     Cancer Maternal Aunt     Cancer Maternal Aunt     Cancer Maternal Aunt     Cancer Maternal Aunt     Cancer Maternal Aunt     Cancer Maternal Aunt     Cancer Maternal Aunt     No Known Problems Paternal Aunt     No Known Problems Paternal Aunt     No Known Problems Paternal Aunt     No Known Problems Paternal Aunt     Cancer Paternal Aunt     Cancer Paternal Aunt     Breast cancer Neg Hx        Meds/Allergies       Current Outpatient Medications:     albuterol (PROVENTIL HFA,VENTOLIN HFA) 90 mcg/act inhaler    atorvastatin (LIPITOR) 20 mg tablet    benzonatate (TESSALON) 200 MG capsule    busPIRone (BUSPAR) 10 mg tablet    cyanocobalamin (VITAMIN B-12) 1000 MCG tablet    dicyclomine (BENTYL) 10 mg capsule    escitalopram (LEXAPRO) 20 mg tablet    fluticasone (FLONASE) 50 mcg/act nasal spray    folic acid (FOLVITE) 1 mg tablet    furosemide (LASIX) 20 mg tablet    levothyroxine 175 mcg tablet    lidocaine (Lidoderm) 5 %    metoprolol tartrate (LOPRESSOR) 25 mg tablet    mometasone-formoterol (Dulera) 200-5 MCG/ACT inhaler    Multiple Vitamins-Minerals (MULTIVITAMIN ADULT PO)    pantoprazole (PROTONIX) 40 mg tablet    traZODone (DESYREL) 100 mg tablet    valACYclovir (VALTREX) 500 mg tablet    ergocalciferol (VITAMIN D2) 50,000 units  No current facility-administered medications for this visit.    Allergies   Allergen Reactions    No Active Allergies            Objective     Blood pressure 111/78, pulse 73, temperature 98.6 °F (37 °C), temperature source Tympanic, height 5' 6\" (1.676 m), weight (!) 137 kg (301 lb), SpO2 98%, not currently breastfeeding. Body mass index is 48.58 kg/m².      PHYSICAL EXAM:      General Appearance:   Alert, cooperative, no distress   HEENT:   Normocephalic, atraumatic, anicteric.     Neck:  Supple, symmetrical, trachea midline   Lungs:   Clear to auscultation bilaterally; no rales, rhonchi or wheezing; respirations " unlabored    Heart::   Regular rate and rhythm; no murmur, rub, or gallop.   Abdomen:   Soft, non-tender, non-distended; normal bowel sounds; no masses, no organomegaly    Genitalia:   Deferred    Rectal:   Deferred    Extremities:  No cyanosis, clubbing or edema    Pulses:  2+ and symmetric    Skin:  No jaundice, rashes, or lesions    Lymph nodes:  No palpable cervical lymphadenopathy        Lab Results:   No visits with results within 1 Day(s) from this visit.   Latest known visit with results is:   Appointment on 01/31/2024   Component Date Value    WBC 01/31/2024 9.61     RBC 01/31/2024 4.24     Hemoglobin 01/31/2024 11.4 (L)     Hematocrit 01/31/2024 36.5     MCV 01/31/2024 86     MCH 01/31/2024 26.9     MCHC 01/31/2024 31.2 (L)     RDW 01/31/2024 14.8     MPV 01/31/2024 8.8 (L)     Platelets 01/31/2024 581 (H)     nRBC 01/31/2024 0     Segmented % 01/31/2024 67     Immature Grans % 01/31/2024 0     Lymphocytes % 01/31/2024 24     Monocytes % 01/31/2024 7     Eosinophils Relative 01/31/2024 1     Basophils Relative 01/31/2024 1     Absolute Neutrophils 01/31/2024 6.40     Absolute Immature Grans 01/31/2024 0.03     Absolute Lymphocytes 01/31/2024 2.29     Absolute Monocytes 01/31/2024 0.67     Eosinophils Absolute 01/31/2024 0.12     Basophils Absolute 01/31/2024 0.10     Sodium 01/31/2024 136     Potassium 01/31/2024 4.2     Chloride 01/31/2024 106     CO2 01/31/2024 25     ANION GAP 01/31/2024 5     BUN 01/31/2024 10     Creatinine 01/31/2024 0.89     Glucose, Fasting 01/31/2024 79     Calcium 01/31/2024 8.5     AST 01/31/2024 13     ALT 01/31/2024 12     Alkaline Phosphatase 01/31/2024 133 (H)     Total Protein 01/31/2024 6.7     Albumin 01/31/2024 3.7     Total Bilirubin 01/31/2024 0.28     eGFR 01/31/2024 77     CRP 01/31/2024 7.1 (H)     Sed Rate 01/31/2024 50 (H)     Vitamin B-12 01/31/2024 120 (L)     LD 01/31/2024 134 (L)     Folate 01/31/2024 8.0     TSH 3RD GENERATON 01/31/2024 46.582 (H)     Iron  Saturation 01/31/2024 4 (L)     TIBC 01/31/2024 442     Iron 01/31/2024 16 (L)     UIBC 01/31/2024 426 (H)     Ferritin 01/31/2024 7 (L)     Free T4 01/31/2024 0.35 (L)        Lab Results   Component Value Date    WBC 9.61 01/31/2024    HGB 11.4 (L) 01/31/2024    HCT 36.5 01/31/2024    MCV 86 01/31/2024     (H) 01/31/2024       Lab Results   Component Value Date     10/14/2015    SODIUM 136 01/31/2024    K 4.2 01/31/2024     01/31/2024    CO2 25 01/31/2024    ANIONGAP 7 10/14/2015    AGAP 5 01/31/2024    BUN 10 01/31/2024    CREATININE 0.89 01/31/2024    GLUC 90 06/22/2023    GLUF 79 01/31/2024    CALCIUM 8.5 01/31/2024    AST 13 01/31/2024    ALT 12 01/31/2024    ALKPHOS 133 (H) 01/31/2024    PROT 7.2 10/14/2015    TP 6.7 01/31/2024    BILITOT 0.33 10/14/2015    TBILI 0.28 01/31/2024    EGFR 77 01/31/2024       Lab Results   Component Value Date    CRP 7.1 (H) 01/31/2024       Lab Results   Component Value Date    ZYY3ZZPXFMGF 46.582 (H) 01/31/2024    TSH 0.43 (L) 06/22/2023       Lab Results   Component Value Date    IRON 16 (L) 01/31/2024    TIBC 442 01/31/2024    FERRITIN 7 (L) 01/31/2024       Radiology Results:   No results found.

## 2024-05-08 NOTE — PROGRESS NOTES
[facility] - Outpatient Follow-up Note  Shelli Umaña 48 y.o. female MRN: [id number]  Encounter: [id number]          ASSESSMENT AND PLAN:    Shelli Umaña is a 48 y.o. female with obesity, BMI 48.  She is status post gastric bypass surgery but has regained weight.  She has chronic abdominal pain which is controlled with lactose-free diet and dicyclomine.  She is on pantoprazole for reflux.  She has history of multiple colonic adenomas and is due for repeat colonoscopy.  She is interested in losing weight.  1.  Continue dicyclomine 10 mg twice daily as needed.  2.  Continue pantoprazole 40 mg daily.  3.  3.  Schedule colonoscopy.  4.  Referral to weight management.  In her to continue caloric restriction and maintain an active lifestyle.      1. Morbid obesity (HCC)    2. Screening for malignant neoplasm of colon    3. Postprandial abdominal pain in right upper quadrant    4. Gastroesophageal reflux disease without esophagitis        Orders Placed This Encounter   Procedures    Ambulatory Referral to Weight Management    Colonoscopy     ______________________________________________________________________    SUBJECTIVE:    Shelli Umaña is a 48 y.o. female with history of gastric bypass, morbid obesity, prior cholecystectomy, presenting for follow-up of chronic abdominal pain and diarrhea.  She also has history of multiple colonic adenomas and fair prep in 2022 and is due for repeat colonoscopy.    Last visit with me was in June.  At that time, she reported significant improvement in abdominal pain with lactose avoidance.  She had also been working on weight loss.  We added dicyclomine which she says has been helping.  She is also on pantoprazole, which was also helping but she ran out.  Currently, she does not have significant abdominal pain.  Her main concern is obesity and she would like to lose weight.  She has managed to lose 12 pounds this year.    LFTs  are normal.    REVIEW OF SYSTEMS IS OTHERWISE NEGATIVE.      Historical Information   Past Medical History:   Diagnosis Date    Abscess of labia     Acute and chronic respiratory failure with hypoxia (HCC)     Anemia 2021    Anemia     Anxiety     Atopic dermatitis     Bipolar disorder (HCC)     Breast lump     Congenital anomaly of breast     COVID-19     Depression, recurrent (HCC) 2021    Frequent headaches     Goiter     2020-ultrasound of the thyroid done by endocrinology shows no evidence of a discrete nodule.    Hemorrhoids     Hypothyroidism 2013    Iron deficiency anemia 2021    Iron deficiency anemia secondary to inadequate dietary iron intake 2021    Migraine     Morbid obesity (HCC) 2013    Obesity     Psychotic disorder (HCC)      Past Surgical History:   Procedure Laterality Date    BARIATRIC SURGERY      BREAST BIOPSY Right 1999    benign    BREAST LUMPECTOMY Left     benign    CHOLECYSTECTOMY      GALLBLADDER SURGERY       Social History   Social History     Substance and Sexual Activity   Alcohol Use Never     Social History     Substance and Sexual Activity   Drug Use No     Social History     Tobacco Use   Smoking Status Former    Current packs/day: 0.00    Types: Cigarettes    Quit date:     Years since quittin.3    Passive exposure: Past   Smokeless Tobacco Never   Tobacco Comments    smoker for 2-3 yrs and quit in , smoked about 3 cigarettes a day     Family History   Problem Relation Age of Onset    Liver cancer Mother     Prostate cancer Father     No Known Problems Sister     No Known Problems Sister     No Known Problems Sister     No Known Problems Sister     No Known Problems Sister     No Known Problems Sister     No Known Problems Sister     No Known Problems Sister     No Known Problems Sister     No Known Problems Sister     No Known Problems Maternal Grandmother     No Known Problems Maternal Grandfather     No Known Problems  "Paternal Grandmother     No Known Problems Paternal Grandfather     Cancer Maternal Aunt     Cancer Maternal Aunt     Cancer Maternal Aunt     Cancer Maternal Aunt     Cancer Maternal Aunt     Cancer Maternal Aunt     Cancer Maternal Aunt     No Known Problems Paternal Aunt     No Known Problems Paternal Aunt     No Known Problems Paternal Aunt     No Known Problems Paternal Aunt     Cancer Paternal Aunt     Cancer Paternal Aunt     Breast cancer Neg Hx        Meds/Allergies       Current Outpatient Medications:     albuterol (PROVENTIL HFA,VENTOLIN HFA) 90 mcg/act inhaler    atorvastatin (LIPITOR) 20 mg tablet    benzonatate (TESSALON) 200 MG capsule    busPIRone (BUSPAR) 10 mg tablet    cyanocobalamin (VITAMIN B-12) 1000 MCG tablet    dicyclomine (BENTYL) 10 mg capsule    escitalopram (LEXAPRO) 20 mg tablet    fluticasone (FLONASE) 50 mcg/act nasal spray    folic acid (FOLVITE) 1 mg tablet    furosemide (LASIX) 20 mg tablet    levothyroxine 175 mcg tablet    lidocaine (Lidoderm) 5 %    metoprolol tartrate (LOPRESSOR) 25 mg tablet    mometasone-formoterol (Dulera) 200-5 MCG/ACT inhaler    Multiple Vitamins-Minerals (MULTIVITAMIN ADULT PO)    pantoprazole (PROTONIX) 40 mg tablet    traZODone (DESYREL) 100 mg tablet    valACYclovir (VALTREX) 500 mg tablet    ergocalciferol (VITAMIN D2) 50,000 units  No current facility-administered medications for this visit.    Allergies   Allergen Reactions    No Active Allergies            Objective     Blood pressure 111/78, pulse 73, temperature 98.6 °F (37 °C), temperature source Tympanic, height 5' 6\" (1.676 m), weight (!) 137 kg (301 lb), SpO2 98%, not currently breastfeeding. Body mass index is 48.58 kg/m².      PHYSICAL EXAM:      General Appearance:   Alert, cooperative, no distress   HEENT:   Normocephalic, atraumatic, anicteric.     Neck:  Supple, symmetrical, trachea midline   Lungs:   Clear to auscultation bilaterally; no rales, rhonchi or wheezing; respirations " unlabored    Heart::   Regular rate and rhythm; no murmur, rub, or gallop.   Abdomen:   Soft, non-tender, non-distended; normal bowel sounds; no masses, no organomegaly    Genitalia:   Deferred    Rectal:   Deferred    Extremities:  No cyanosis, clubbing or edema    Pulses:  2+ and symmetric    Skin:  No jaundice, rashes, or lesions    Lymph nodes:  No palpable cervical lymphadenopathy        Lab Results:   No visits with results within 1 Day(s) from this visit.   Latest known visit with results is:   Appointment on 01/31/2024   Component Date Value    WBC 01/31/2024 9.61     RBC 01/31/2024 4.24     Hemoglobin 01/31/2024 11.4 (L)     Hematocrit 01/31/2024 36.5     MCV 01/31/2024 86     MCH 01/31/2024 26.9     MCHC 01/31/2024 31.2 (L)     RDW 01/31/2024 14.8     MPV 01/31/2024 8.8 (L)     Platelets 01/31/2024 581 (H)     nRBC 01/31/2024 0     Segmented % 01/31/2024 67     Immature Grans % 01/31/2024 0     Lymphocytes % 01/31/2024 24     Monocytes % 01/31/2024 7     Eosinophils Relative 01/31/2024 1     Basophils Relative 01/31/2024 1     Absolute Neutrophils 01/31/2024 6.40     Absolute Immature Grans 01/31/2024 0.03     Absolute Lymphocytes 01/31/2024 2.29     Absolute Monocytes 01/31/2024 0.67     Eosinophils Absolute 01/31/2024 0.12     Basophils Absolute 01/31/2024 0.10     Sodium 01/31/2024 136     Potassium 01/31/2024 4.2     Chloride 01/31/2024 106     CO2 01/31/2024 25     ANION GAP 01/31/2024 5     BUN 01/31/2024 10     Creatinine 01/31/2024 0.89     Glucose, Fasting 01/31/2024 79     Calcium 01/31/2024 8.5     AST 01/31/2024 13     ALT 01/31/2024 12     Alkaline Phosphatase 01/31/2024 133 (H)     Total Protein 01/31/2024 6.7     Albumin 01/31/2024 3.7     Total Bilirubin 01/31/2024 0.28     eGFR 01/31/2024 77     CRP 01/31/2024 7.1 (H)     Sed Rate 01/31/2024 50 (H)     Vitamin B-12 01/31/2024 120 (L)     LD 01/31/2024 134 (L)     Folate 01/31/2024 8.0     TSH 3RD GENERATON 01/31/2024 46.582 (H)     Iron  Saturation 01/31/2024 4 (L)     TIBC 01/31/2024 442     Iron 01/31/2024 16 (L)     UIBC 01/31/2024 426 (H)     Ferritin 01/31/2024 7 (L)     Free T4 01/31/2024 0.35 (L)        Lab Results   Component Value Date    WBC 9.61 01/31/2024    HGB 11.4 (L) 01/31/2024    HCT 36.5 01/31/2024    MCV 86 01/31/2024     (H) 01/31/2024       Lab Results   Component Value Date     10/14/2015    SODIUM 136 01/31/2024    K 4.2 01/31/2024     01/31/2024    CO2 25 01/31/2024    ANIONGAP 7 10/14/2015    AGAP 5 01/31/2024    BUN 10 01/31/2024    CREATININE 0.89 01/31/2024    GLUC 90 06/22/2023    GLUF 79 01/31/2024    CALCIUM 8.5 01/31/2024    AST 13 01/31/2024    ALT 12 01/31/2024    ALKPHOS 133 (H) 01/31/2024    PROT 7.2 10/14/2015    TP 6.7 01/31/2024    BILITOT 0.33 10/14/2015    TBILI 0.28 01/31/2024    EGFR 77 01/31/2024       Lab Results   Component Value Date    CRP 7.1 (H) 01/31/2024       Lab Results   Component Value Date    VDI8RPYVHQZV 46.582 (H) 01/31/2024    TSH 0.43 (L) 06/22/2023       Lab Results   Component Value Date    IRON 16 (L) 01/31/2024    TIBC 442 01/31/2024    FERRITIN 7 (L) 01/31/2024       Radiology Results:   No results found.

## 2024-05-09 ENCOUNTER — TELEPHONE (OUTPATIENT)
Dept: GASTROENTEROLOGY | Facility: CLINIC | Age: 49
End: 2024-05-09

## 2024-05-09 DIAGNOSIS — E53.8 VITAMIN B12 DEFICIENCY: Primary | ICD-10-CM

## 2024-05-09 DIAGNOSIS — Z98.84 HISTORY OF GASTRIC BYPASS: ICD-10-CM

## 2024-05-09 RX ORDER — CYANOCOBALAMIN 1000 UG/ML
1000 INJECTION, SOLUTION INTRAMUSCULAR; SUBCUTANEOUS ONCE
Status: CANCELLED | OUTPATIENT
Start: 2024-05-13

## 2024-05-09 NOTE — TELEPHONE ENCOUNTER
Spoke with [facility] gi lab clinic coordinator - approval received to schedule per Dr. Dooley during rotation at [facility]    Procedure: colon  Date: May 20th  Physician performing: Dr. Dooley   Location of procedure:  [facility]  Instructions given to patient: Miralax  Diabetic: n/a  Clearances: n/a

## 2024-05-13 ENCOUNTER — HOSPITAL ENCOUNTER (OUTPATIENT)
Dept: INFUSION CENTER | Facility: HOSPITAL | Age: 49
Discharge: HOME | End: 2024-05-13
Attending: INTERNAL MEDICINE
Payer: COMMERCIAL

## 2024-05-13 DIAGNOSIS — Z98.84 HISTORY OF GASTRIC BYPASS: Primary | ICD-10-CM

## 2024-05-13 DIAGNOSIS — E53.8 VITAMIN B12 DEFICIENCY: ICD-10-CM

## 2024-05-13 RX ORDER — CYANOCOBALAMIN 1000 UG/ML
1000 INJECTION, SOLUTION INTRAMUSCULAR; SUBCUTANEOUS ONCE
OUTPATIENT
Start: 2024-06-10

## 2024-05-13 RX ORDER — CYANOCOBALAMIN 1000 UG/ML
1000 INJECTION, SOLUTION INTRAMUSCULAR; SUBCUTANEOUS ONCE
Status: COMPLETED | OUTPATIENT
Start: 2024-05-13 | End: 2024-05-13

## 2024-05-13 RX ADMIN — CYANOCOBALAMIN 1000 MCG: 1000 INJECTION, SOLUTION INTRAMUSCULAR; SUBCUTANEOUS at 09:10

## 2024-05-13 NOTE — PROGRESS NOTES
Pt tolerated B12 injection to L deltoid without difficulty.  Confirmed next appt on 6/10 at 0900.  AVS declined.  Left ambulatory in stable condition.

## 2024-05-16 RX ORDER — SODIUM CHLORIDE 9 MG/ML
125 INJECTION, SOLUTION INTRAVENOUS CONTINUOUS
Status: CANCELLED | OUTPATIENT
Start: 2024-05-16

## 2024-05-20 ENCOUNTER — ANESTHESIA (OUTPATIENT)
Dept: GASTROENTEROLOGY | Facility: HOSPITAL | Age: 49
End: 2024-05-20

## 2024-05-20 ENCOUNTER — HOSPITAL ENCOUNTER (OUTPATIENT)
Dept: GASTROENTEROLOGY | Facility: HOSPITAL | Age: 49
Setting detail: OUTPATIENT SURGERY
Discharge: HOME | End: 2024-05-20
Attending: INTERNAL MEDICINE
Payer: COMMERCIAL

## 2024-05-20 ENCOUNTER — ANESTHESIA EVENT (OUTPATIENT)
Dept: GASTROENTEROLOGY | Facility: HOSPITAL | Age: 49
End: 2024-05-20

## 2024-05-20 VITALS
DIASTOLIC BLOOD PRESSURE: 63 MMHG | SYSTOLIC BLOOD PRESSURE: 112 MMHG | OXYGEN SATURATION: 100 % | HEART RATE: 60 BPM | RESPIRATION RATE: 16 BRPM | TEMPERATURE: 96.8 F

## 2024-05-20 DIAGNOSIS — E06.3 HYPOTHYROIDISM DUE TO HASHIMOTO'S THYROIDITIS: ICD-10-CM

## 2024-05-20 DIAGNOSIS — Z12.11 SCREENING FOR MALIGNANT NEOPLASM OF COLON: ICD-10-CM

## 2024-05-20 DIAGNOSIS — E03.8 HYPOTHYROIDISM DUE TO HASHIMOTO'S THYROIDITIS: ICD-10-CM

## 2024-05-20 DIAGNOSIS — K21.9 GASTROESOPHAGEAL REFLUX DISEASE WITHOUT ESOPHAGITIS: ICD-10-CM

## 2024-05-20 LAB
EXT PREGNANCY TEST URINE: NEGATIVE
EXT. CONTROL: NORMAL

## 2024-05-20 PROCEDURE — 88305 TISSUE EXAM BY PATHOLOGIST: CPT | Performed by: PATHOLOGY

## 2024-05-20 PROCEDURE — 81025 URINE PREGNANCY TEST: CPT | Performed by: ANESTHESIOLOGY

## 2024-05-20 PROCEDURE — 45380 COLONOSCOPY AND BIOPSY: CPT | Performed by: INTERNAL MEDICINE

## 2024-05-20 RX ORDER — PANTOPRAZOLE SODIUM 40 MG/1
40 TABLET, DELAYED RELEASE ORAL EVERY 24 HOURS
Qty: 90 TABLET | Refills: 1 | Status: CANCELLED | OUTPATIENT
Start: 2024-05-20

## 2024-05-20 RX ORDER — SODIUM CHLORIDE 9 MG/ML
INJECTION, SOLUTION INTRAVENOUS CONTINUOUS PRN
Status: DISCONTINUED | OUTPATIENT
Start: 2024-05-20 | End: 2024-05-20

## 2024-05-20 RX ORDER — SODIUM CHLORIDE 9 MG/ML
125 INJECTION, SOLUTION INTRAVENOUS CONTINUOUS
Status: DISCONTINUED | OUTPATIENT
Start: 2024-05-20 | End: 2024-05-24 | Stop reason: HOSPADM

## 2024-05-20 RX ORDER — PROPOFOL 10 MG/ML
INJECTION, EMULSION INTRAVENOUS AS NEEDED
Status: DISCONTINUED | OUTPATIENT
Start: 2024-05-20 | End: 2024-05-20

## 2024-05-20 RX ORDER — LEVOTHYROXINE SODIUM 175 UG/1
175 TABLET ORAL
Qty: 90 TABLET | Refills: 1 | Status: SHIPPED | OUTPATIENT
Start: 2024-05-20

## 2024-05-20 RX ADMIN — SODIUM CHLORIDE 125 ML/HR: 0.9 INJECTION, SOLUTION INTRAVENOUS at 10:46

## 2024-05-20 RX ADMIN — PROPOFOL 50 MG: 10 INJECTION, EMULSION INTRAVENOUS at 10:58

## 2024-05-20 RX ADMIN — PROPOFOL 50 MG: 10 INJECTION, EMULSION INTRAVENOUS at 11:08

## 2024-05-20 RX ADMIN — PROPOFOL 50 MG: 10 INJECTION, EMULSION INTRAVENOUS at 11:12

## 2024-05-20 RX ADMIN — PROPOFOL 100 MG: 10 INJECTION, EMULSION INTRAVENOUS at 10:54

## 2024-05-20 RX ADMIN — PROPOFOL 50 MG: 10 INJECTION, EMULSION INTRAVENOUS at 11:03

## 2024-05-20 RX ADMIN — PROPOFOL 50 MG: 10 INJECTION, EMULSION INTRAVENOUS at 11:25

## 2024-05-20 RX ADMIN — SODIUM CHLORIDE: 0.9 INJECTION, SOLUTION INTRAVENOUS at 10:25

## 2024-05-20 RX ADMIN — PROPOFOL 50 MG: 10 INJECTION, EMULSION INTRAVENOUS at 11:22

## 2024-05-20 RX ADMIN — Medication 40 MG: at 11:10

## 2024-05-20 RX ADMIN — PROPOFOL 50 MG: 10 INJECTION, EMULSION INTRAVENOUS at 11:17

## 2024-05-20 NOTE — INTERVAL H&P NOTE
H&P reviewed. After examining the patient I find no changes in the patients condition since the H&P had been written.    Vitals:    05/20/24 1041   BP: 132/82   Pulse: 70   Resp: 16   Temp: (!) 97.1 °F (36.2 °C)   SpO2: 99%

## 2024-05-20 NOTE — TELEPHONE ENCOUNTER
Patient completely out of these pills, asking that we please call her back when the meds have been renewed. Pantoprozole and levothyroxine. It looks like she may have refills on one of these meds, but she can no longer use we [facility]. She is requesting francis alberto

## 2024-05-20 NOTE — ANESTHESIA POSTPROCEDURE EVALUATION
Post-Op Assessment Note    CV Status:  Stable    Pain management: adequate       Mental Status:  Alert and awake   Hydration Status:  Euvolemic   PONV Controlled:  Controlled   Airway Patency:  Patent     Post Op Vitals Reviewed: Yes    No anethesia notable event occurred.    Staff: Anesthesiologist               BP      Temp      Pulse     Resp      SpO2      /63   Pulse 60   Temp (!) 96.8 °F (36 °C) (Temporal)   Resp 16   LMP 05/17/2024 (Exact Date) Comment: on her period  SpO2 100%

## 2024-05-20 NOTE — ANESTHESIA PREPROCEDURE EVALUATION
Procedure:  COLONOSCOPY    Relevant Problems   CARDIO   (+) Hyperlipidemia      ENDO   (+) Hypothyroidism due to Hashimoto's thyroiditis      HEMATOLOGY   (+) Iron deficiency anemia following bariatric surgery      MUSCULOSKELETAL   (+) Notalgia      NEURO/PSYCH   (+) Anxiety and depression   (+) Headache      PULMONARY   (+) CAR (obstructive sleep apnea)      Surgery/Wound/Pain   (+) History of gastric bypass        Physical Exam    Airway    Mallampati score: III         Dental   No notable dental hx     Cardiovascular  Cardiovascular exam normal    Pulmonary  Pulmonary exam normal     Other Findings  post-pubertal.      Anesthesia Plan  ASA Score- 3     Anesthesia Type- general with ASA Monitors.         Additional Monitors:     Airway Plan:            Plan Factors-Exercise tolerance (METS): >4 METS.    Chart reviewed.        Patient is not a current smoker.  Patient instructed to abstain from smoking on day of procedure. Patient did not smoke on day of surgery.            Induction- intravenous.    Postoperative Plan-         Informed Consent- Anesthetic plan and risks discussed with patient.

## 2024-05-21 RX ORDER — PANTOPRAZOLE SODIUM 40 MG/1
40 TABLET, DELAYED RELEASE ORAL EVERY 24 HOURS
Qty: 90 TABLET | Refills: 1 | Status: SHIPPED | OUTPATIENT
Start: 2024-05-21

## 2024-05-22 ENCOUNTER — TELEPHONE (OUTPATIENT)
Dept: GASTROENTEROLOGY | Facility: CLINIC | Age: 49
End: 2024-05-22

## 2024-05-22 PROCEDURE — 88305 TISSUE EXAM BY PATHOLOGIST: CPT | Performed by: PATHOLOGY

## 2024-05-31 ENCOUNTER — TELEPHONE (OUTPATIENT)
Dept: GASTROENTEROLOGY | Facility: CLINIC | Age: 49
End: 2024-05-31

## 2024-05-31 NOTE — TELEPHONE ENCOUNTER
LVM due to provider schedule change appt was move to 11/13/24 at 1:20, left call back number in case any questions

## 2024-06-10 ENCOUNTER — HOSPITAL ENCOUNTER (OUTPATIENT)
Dept: INFUSION CENTER | Facility: HOSPITAL | Age: 49
Discharge: HOME | End: 2024-06-10
Attending: INTERNAL MEDICINE
Payer: COMMERCIAL

## 2024-06-10 DIAGNOSIS — E53.8 VITAMIN B12 DEFICIENCY: Primary | ICD-10-CM

## 2024-06-10 DIAGNOSIS — Z98.84 HISTORY OF GASTRIC BYPASS: ICD-10-CM

## 2024-06-10 PROCEDURE — 96372 THER/PROPH/DIAG INJ SC/IM: CPT

## 2024-06-10 RX ORDER — CYANOCOBALAMIN 1000 UG/ML
1000 INJECTION, SOLUTION INTRAMUSCULAR; SUBCUTANEOUS ONCE
OUTPATIENT
Start: 2024-07-08

## 2024-06-10 RX ORDER — CYANOCOBALAMIN 1000 UG/ML
1000 INJECTION, SOLUTION INTRAMUSCULAR; SUBCUTANEOUS ONCE
Status: COMPLETED | OUTPATIENT
Start: 2024-06-10 | End: 2024-06-10

## 2024-06-10 RX ADMIN — CYANOCOBALAMIN 1000 MCG: 1000 INJECTION INTRAMUSCULAR; SUBCUTANEOUS at 09:00

## 2024-06-10 NOTE — PROGRESS NOTES
Pt tolerated B12 injection to L deltoid without difficulty.  Confirmed next appt on 7/8 at 0830.  AVS declined.  Left ambulatory in stable condition.

## 2024-07-02 DIAGNOSIS — E06.3 HYPOTHYROIDISM DUE TO HASHIMOTO'S THYROIDITIS: ICD-10-CM

## 2024-07-02 DIAGNOSIS — E03.8 HYPOTHYROIDISM DUE TO HASHIMOTO'S THYROIDITIS: ICD-10-CM

## 2024-07-02 RX ORDER — LEVOTHYROXINE SODIUM 175 UG/1
175 TABLET ORAL
Qty: 90 TABLET | Refills: 1 | Status: SHIPPED | OUTPATIENT
Start: 2024-07-02

## 2024-07-05 DIAGNOSIS — U09.9 POST-COVID-19 SYNDROME MANIFESTING AS CHRONIC COUGH: ICD-10-CM

## 2024-07-05 DIAGNOSIS — R05.3 POST-COVID-19 SYNDROME MANIFESTING AS CHRONIC COUGH: ICD-10-CM

## 2024-07-05 RX ORDER — MOMETASONE FUROATE AND FORMOTEROL FUMARATE DIHYDRATE 200; 5 UG/1; UG/1
2 AEROSOL RESPIRATORY (INHALATION) 2 TIMES DAILY
Qty: 13 G | Refills: 2 | Status: SHIPPED | OUTPATIENT
Start: 2024-07-05

## 2024-07-11 DIAGNOSIS — U09.9 POST-COVID SYNDROME: ICD-10-CM

## 2024-07-11 RX ORDER — ALBUTEROL SULFATE 90 UG/1
2 AEROSOL, METERED RESPIRATORY (INHALATION) EVERY 6 HOURS PRN
Qty: 8.5 G | Refills: 5 | Status: SHIPPED | OUTPATIENT
Start: 2024-07-11

## 2024-07-22 DIAGNOSIS — R10.11 POSTPRANDIAL ABDOMINAL PAIN IN RIGHT UPPER QUADRANT: ICD-10-CM

## 2024-07-22 RX ORDER — DICYCLOMINE HYDROCHLORIDE 10 MG/1
10 CAPSULE ORAL 2 TIMES DAILY PRN
Qty: 60 CAPSULE | Refills: 5 | Status: SHIPPED | OUTPATIENT
Start: 2024-07-22

## 2024-08-13 ENCOUNTER — OFFICE VISIT (OUTPATIENT)
Dept: FAMILY MEDICINE CLINIC | Facility: CLINIC | Age: 49
End: 2024-08-13
Payer: COMMERCIAL

## 2024-08-13 VITALS
SYSTOLIC BLOOD PRESSURE: 110 MMHG | BODY MASS INDEX: 45.99 KG/M2 | DIASTOLIC BLOOD PRESSURE: 80 MMHG | HEIGHT: 67 IN | OXYGEN SATURATION: 98 % | WEIGHT: 293 LBS | TEMPERATURE: 97.5 F | HEART RATE: 81 BPM

## 2024-08-13 DIAGNOSIS — Z91.199 NONCOMPLIANCE WITH DIAGNOSTIC TEST: ICD-10-CM

## 2024-08-13 DIAGNOSIS — Z13.6 SCREENING FOR CARDIOVASCULAR CONDITION: ICD-10-CM

## 2024-08-13 DIAGNOSIS — R00.2 PALPITATIONS: ICD-10-CM

## 2024-08-13 DIAGNOSIS — E78.5 HYPERLIPIDEMIA, UNSPECIFIED HYPERLIPIDEMIA TYPE: ICD-10-CM

## 2024-08-13 DIAGNOSIS — E23.7 LESION OF PITUITARY GLAND (HCC): ICD-10-CM

## 2024-08-13 DIAGNOSIS — E06.3 HYPOTHYROIDISM DUE TO HASHIMOTO'S THYROIDITIS: ICD-10-CM

## 2024-08-13 DIAGNOSIS — D50.8 IRON DEFICIENCY ANEMIA FOLLOWING BARIATRIC SURGERY: ICD-10-CM

## 2024-08-13 DIAGNOSIS — E66.01 MORBID OBESITY (HCC): ICD-10-CM

## 2024-08-13 DIAGNOSIS — G47.33 OSA (OBSTRUCTIVE SLEEP APNEA): ICD-10-CM

## 2024-08-13 DIAGNOSIS — Z00.00 ANNUAL PHYSICAL EXAM: Primary | ICD-10-CM

## 2024-08-13 DIAGNOSIS — E55.9 VITAMIN D DEFICIENCY: ICD-10-CM

## 2024-08-13 DIAGNOSIS — F32.A ANXIETY AND DEPRESSION: ICD-10-CM

## 2024-08-13 DIAGNOSIS — E66.01 MORBID OBESITY WITH BMI OF 45.0-49.9, ADULT (HCC): ICD-10-CM

## 2024-08-13 DIAGNOSIS — U09.9 POST-COVID-19 SYNDROME MANIFESTING AS CHRONIC COUGH: ICD-10-CM

## 2024-08-13 DIAGNOSIS — E03.8 HYPOTHYROIDISM DUE TO HASHIMOTO'S THYROIDITIS: ICD-10-CM

## 2024-08-13 DIAGNOSIS — R05.3 POST-COVID-19 SYNDROME MANIFESTING AS CHRONIC COUGH: ICD-10-CM

## 2024-08-13 DIAGNOSIS — K95.89 IRON DEFICIENCY ANEMIA FOLLOWING BARIATRIC SURGERY: ICD-10-CM

## 2024-08-13 DIAGNOSIS — F41.9 ANXIETY AND DEPRESSION: ICD-10-CM

## 2024-08-13 DIAGNOSIS — Z13.1 SCREENING FOR DIABETES MELLITUS: ICD-10-CM

## 2024-08-13 PROCEDURE — 99396 PREV VISIT EST AGE 40-64: CPT

## 2024-08-13 PROCEDURE — 99214 OFFICE O/P EST MOD 30 MIN: CPT

## 2024-08-13 RX ORDER — BENZONATATE 200 MG/1
200 CAPSULE ORAL
Qty: 30 CAPSULE | Refills: 0 | Status: SHIPPED | OUTPATIENT
Start: 2024-08-13

## 2024-08-13 NOTE — ASSESSMENT & PLAN NOTE
06/23 TSH 0.43  01/24 TSH 46.582   Current medication: Levothyroxine 175mcg  At last visit patients Levothyroxine was increased from 150mcg to 175mcg with instructions to repeat TSH in 12 weeks  Patient has not rechecked TSH since January   Education provided and encouraged follow up with lab work

## 2024-08-13 NOTE — ASSESSMENT & PLAN NOTE
The following diagnoses have been discussed at prior appointments without follow up     CAR   Needs to wear a CPAP at night but having difficulties with equipment.   Encouraged pulmonary follow up but was a no show appointment on 04/24/24     Hypothyroidism   01/24 TSH 46.582   Levothyroxine was increased from 150mcg to 175mcg in January 2024 with instructions to repeat the TSH lab in 12 weeks   Lab never collected      Lesion of pituitary gland   06/22 MRI Brain   There is a lesion within the pituitary gland measuring approximately 0.66 x 0.61cm  Past referral placed to endocrinology for management   Patient never made an appointment   New referral placed

## 2024-08-13 NOTE — ASSESSMENT & PLAN NOTE
PHQ9 score 0  Current medication: Buspirone 10mg, Escitalopram 20mg, Trazodone 100mg  Continue current medication regimen

## 2024-08-13 NOTE — PROGRESS NOTES
Adult Annual Physical  Name: Shelli Umaña      : 1975      MRN: [id number]  Encounter Provider: THOMAS Galvez  Encounter Date: 2024   Encounter department: [facility]    Assessment & Plan   1. Annual physical exam  2. CAR (obstructive sleep apnea)  Assessment & Plan:  Mild CAR  Needs to wear a CPAP at night, however has been having difficulties with insurance to receive equipment.   Advised to contact pulmonology in regards to the CPAP status as they are the ordering provider.   Patient was a no show to her pulmonology appointment on 24   Encouraged follow up   3. Hypothyroidism due to Hashimoto's thyroiditis  Assessment & Plan:   TSH 0.43   TSH 46.582   Current medication: Levothyroxine 175mcg  At last visit patients Levothyroxine was increased from 150mcg to 175mcg with instructions to repeat TSH in 12 weeks  Patient has not rechecked TSH since January   Education provided and encouraged follow up with lab work    Orders:  -     TSH, 3rd generation with Free T4 reflex; Future  -     Ambulatory Referral to Endocrinology; Future  4. Lesion of pituitary gland (HCC)  Assessment & Plan:   MRI Brain   There is a lesion within the pituitary gland measuring approximately 0.66 x 0.61cm  Past referral placed to endocrinology for management   Patient never made an appointment   New referral placed- Risks and benefits discussed. Education and counseling provided.       Orders:  -     Ambulatory Referral to Endocrinology; Future  5. Anxiety and depression  Assessment & Plan:  PHQ9 score 0  Current medication: Buspirone 10mg, Escitalopram 20mg, Trazodone 100mg  Continue current medication regimen   6. Hyperlipidemia, unspecified hyperlipidemia type  Assessment & Plan:   Lipid panel   Cholesterol 223, Triglycerides 125, , HDL 59    Current Medications: Atorvastatin 20mg     Lifestyle modification: Incorporate regular exercise, avoid  sugars and simple carbohydrates, weight loss and choosing healthier fats.    7. Morbid obesity (HCC)  Assessment & Plan:  08/24 Weight 305lbs   BMI 47.80   Has appointment with bariatrics   History of gastric bypass surgery   8. Palpitations  Assessment & Plan:  Current medications: Metoprolol 25mg   9. Vitamin D deficiency  Assessment & Plan:  Current medication: Ergocalciferol 50,000units weekly   10. Iron deficiency anemia following bariatric surgery  Assessment & Plan:  Follows with hematology and oncology   Receives infusion   Orders:  -     CBC and differential; Future  -     Iron Panel (Includes Ferritin, Iron Sat%, Iron, and TIBC); Future  -     Vitamin B12; Future  -     Vitamin D 25 hydroxy; Future  11. 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  Prior groundglass opacities have significantly decreased.  The most conspicuous area of scarring is again in the right upper lobe.  There is no bronchiectasis.  PFTs 2021: Unable to perform due to poor, inconsistent effort  TTE 2021: normal EF< normal RV, no PH.  Orders:  -     benzonatate (TESSALON) 200 MG capsule; Take 1 capsule (200 mg total) by mouth daily at bedtime as needed for cough  12. Noncompliance with diagnostic test  Assessment & Plan:  The following diagnoses have been discussed at prior appointments without follow up     CAR   Needs to wear a CPAP at night but having difficulties with equipment.   Encouraged pulmonary follow up but was a no show appointment on 04/24/24      Hypothyroidism   01/24 TSH 46.582   Levothyroxine was increased from 150mcg to 175mcg in January 2024 with instructions to repeat the TSH lab in 12 weeks   Lab never collected      Lesion of pituitary gland   06/22 MRI Brain   There is a lesion within the pituitary gland measuring approximately 0.66 x 0.61cm  Past referral placed to endocrinology for management   Patient never made an appointment   New referral placed  13. Screening for diabetes mellitus  -     Comprehensive metabolic panel; Future  14. Screening for cardiovascular condition  15. Morbid obesity with BMI of 45.0-49.9, adult (HCC)  -     Lipid panel; Future  -     Hemoglobin A1C; Future    Immunizations and preventive care screenings were discussed with patient today. Appropriate education was printed on patient's after visit summary.    Counseling:  Alcohol/drug use: discussed moderation in alcohol intake, the recommendations for healthy alcohol use, and avoidance of illicit drug use.  Dental Health: discussed importance of regular tooth brushing, flossing, and dental visits.  Injury prevention: discussed safety/seat belts, safety helmets, smoke detectors, carbon dioxide detectors, and smoking near bedding or upholstery.  Sexual health: discussed sexually transmitted diseases, partner selection, use of condoms, avoidance of unintended pregnancy, and contraceptive alternatives.  Exercise: the importance of regular exercise/physical activity was discussed. Recommend exercise 3-5 times per week for at least 30 minutes.          History of Present Illness     Adult Annual Physical:  Patient presents for annual physical.     Diet and Physical Activity:  - Diet/Nutrition: poor diet.  - Exercise: no formal exercise.    General Health:  - Sleep: sleeps poorly.  - Hearing: normal hearing bilateral ears.  - Vision: no vision problems.  - Dental: no dental visits for > 1 year.    /GYN Health:  - Follows with GYN: no.     Review of Systems  "  Constitutional:  Negative for activity change, chills, fatigue and fever.   HENT:  Negative for congestion, ear pain, rhinorrhea, sore throat and trouble swallowing.    Eyes:  Negative for pain and visual disturbance.   Respiratory:  Negative for cough, chest tightness and shortness of breath.    Cardiovascular:  Negative for chest pain, palpitations and leg swelling.   Gastrointestinal:  Negative for abdominal pain, constipation, diarrhea, nausea and vomiting.   Genitourinary:  Negative for difficulty urinating, dysuria, hematuria and urgency.   Musculoskeletal:  Negative for arthralgias and back pain.   Skin:  Negative for color change and rash.   Neurological:  Negative for dizziness, seizures, syncope and headaches.   Psychiatric/Behavioral:  Negative for dysphoric mood. The patient is not nervous/anxious.    All other systems reviewed and are negative.    Medical History Reviewed by provider this encounter:  Tobacco  Allergies  Meds  Problems  Med Hx  Surg Hx  Fam Hx         Objective     /80 (BP Location: Left arm, Patient Position: Sitting, Cuff Size: Standard)   Pulse 81   Temp 97.5 °F (36.4 °C) (Temporal)   Ht 5' 7\" (1.702 m)   Wt (!) 138 kg (305 lb 3.2 oz)   SpO2 98%   BMI 47.80 kg/m²     Physical Exam  Vitals and nursing note reviewed.   Constitutional:       General: She is not in acute distress.     Appearance: Normal appearance. She is well-developed and normal weight.   HENT:      Head: Normocephalic and atraumatic.      Right Ear: Tympanic membrane, ear canal and external ear normal. There is no impacted cerumen.      Left Ear: Tympanic membrane, ear canal and external ear normal. There is no impacted cerumen.      Nose: Nose normal.      Mouth/Throat:      Mouth: Mucous membranes are moist.      Pharynx: Oropharynx is clear.   Eyes:      Extraocular Movements: Extraocular movements intact.      Conjunctiva/sclera: Conjunctivae normal.      Pupils: Pupils are equal, round, and " "reactive to light.   Cardiovascular:      Rate and Rhythm: Normal rate and regular rhythm.      Pulses: Normal pulses.      Heart sounds: Normal heart sounds. No murmur heard.  Pulmonary:      Effort: Pulmonary effort is normal. No respiratory distress.      Breath sounds: Normal breath sounds.   Abdominal:      General: Bowel sounds are normal.      Palpations: Abdomen is soft.      Tenderness: There is no abdominal tenderness.   Musculoskeletal:         General: Normal range of motion.      Cervical back: Normal range of motion and neck supple.      Right lower leg: No edema.      Left lower leg: No edema.   Skin:     General: Skin is warm and dry.      Capillary Refill: Capillary refill takes less than 2 seconds.   Neurological:      General: No focal deficit present.      Mental Status: She is alert and oriented to person, place, and time. Mental status is at baseline.   Psychiatric:         Mood and Affect: Mood normal.         Behavior: Behavior normal.         Thought Content: Thought content normal.         Judgment: Judgment normal.       Administrative Statements   I have spent a total time of 40 minutes in caring for this patient on the day of the visit/encounter including Diagnostic results, Prognosis, Risks and benefits of tx options, Instructions for management, Patient and family education, Importance of tx compliance, Risk factor reductions, Impressions, Counseling / Coordination of care, Documenting in the medical record, Reviewing / ordering tests, medicine, procedures  , and Obtaining or reviewing history  .    Patient Instructions   Patient Education     Routine physical for adults   The Basics   Written by the doctors and editors at [facility]   What is a physical? -- A physical is a routine visit, or \"check-up,\" with your doctor. You might also hear it called a \"wellness visit\" or \"preventive visit.\"  During each visit, the doctor will:   Ask about your physical and mental health   Ask about your " "habits, behaviors, and lifestyle   Do an exam   Give you vaccines if needed   Talk to you about any medicines you take   Give advice about your health   Answer your questions  Getting regular check-ups is an important part of taking care of your health. It can help your doctor find and treat any problems you have. But it's also important for preventing health problems.  A routine physical is different from a \"sick visit.\" A sick visit is when you see a doctor because of a health concern or problem. Since physicals are scheduled ahead of time, you can think about what you want to ask the doctor.  How often should I get a physical? -- It depends on your age and health. In general, for people age 21 years and older:   If you are younger than 50 years, you might be able to get a physical every 3 years.   If you are 50 years or older, your doctor might recommend a physical every year.  If you have an ongoing health condition, like diabetes or high blood pressure, your doctor will probably want to see you more often.  What happens during a physical? -- In general, each visit will include:   Physical exam - The doctor or nurse will check your height, weight, heart rate, and blood pressure. They will also look at your eyes and ears. They will ask about how you are feeling and whether you have any symptoms that bother you.   Medicines - It's a good idea to bring a list of all the medicines you take to each doctor visit. Your doctor will talk to you about your medicines and answer any questions. Tell them if you are having any side effects that bother you. You should also tell them if you are having trouble paying for any of your medicines.   Habits and behaviors - This includes:   Your diet   Your exercise habits   Whether you smoke, drink alcohol, or use drugs   Whether you are sexually active   Whether you feel safe at home  Your doctor will talk to you about things you can do to improve your health and lower your risk of " "health problems. They will also offer help and support. For example, if you want to quit smoking, they can give you advice and might prescribe medicines. If you want to improve your diet or get more physical activity, they can help you with this, too.   Lab tests, if needed - The tests you get will depend on your age and situation. For example, your doctor might want to check your:   Cholesterol   Blood sugar   Iron level   Vaccines - The recommended vaccines will depend on your age, health, and what vaccines you already had. Vaccines are very important because they can prevent certain serious or deadly infections.   Discussion of screening - \"Screening\" means checking for diseases or other health problems before they cause symptoms. Your doctor can recommend screening based on your age, risk, and preferences. This might include tests to check for:   Cancer, such as breast, prostate, cervical, ovarian, colorectal, prostate, lung, or skin cancer   Sexually transmitted infections, such as chlamydia and gonorrhea   Mental health conditions like depression and anxiety  Your doctor will talk to you about the different types of screening tests. They can help you decide which screenings to have. They can also explain what the results might mean.   Answering questions - The physical is a good time to ask the doctor or nurse questions about your health. If needed, they can refer you to other doctors or specialists, too.  Adults older than 65 years often need other care, too. As you get older, your doctor will talk to you about:   How to prevent falling at home   Hearing or vision tests   Memory testing   How to take your medicines safely   Making sure that you have the help and support you need at home  All topics are updated as new evidence becomes available and our peer review process is complete.  This topic retrieved from [employer] on: May 02, 2024.  Topic 184465 Version 1.0  Release: 32.4.3 - C32.122  © 2024 [employer], " Inc. and/or its affiliates. All rights reserved.  Consumer Information Use and Disclaimer   Disclaimer: This generalized information is a limited summary of diagnosis, treatment, and/or medication information. It is not meant to be comprehensive and should be used as a tool to help the user understand and/or assess potential diagnostic and treatment options. It does NOT include all information about conditions, treatments, medications, side effects, or risks that may apply to a specific patient. It is not intended to be medical advice or a substitute for the medical advice, diagnosis, or treatment of a health care provider based on the health care provider's examination and assessment of a patient's specific and unique circumstances. Patients must speak with a health care provider for complete information about their health, medical questions, and treatment options, including any risks or benefits regarding use of medications. This information does not endorse any treatments or medications as safe, effective, or approved for treating a specific patient. UpToDate, Inc. and its affiliates disclaim any warranty or liability relating to this information or the use thereof.The use of this information is governed by the Terms of Use, available at https://www.woltersCheckInPageuwer.com/en/know/clinical-effectiveness-terms. 2024© UpToDate, Inc. and its affiliates and/or licensors. All rights reserved.  Copyright   © 2024 UpToDate, Inc. and/or its affiliates. All rights reserved.

## 2024-08-13 NOTE — ASSESSMENT & PLAN NOTE
08/24 Weight 305lbs   BMI 47.80   Has appointment with bariatrics   History of gastric bypass surgery

## 2024-08-13 NOTE — PATIENT INSTRUCTIONS
"Patient Education     Routine physical for adults   The Basics   Written by the doctors and editors at [facility]   What is a physical? -- A physical is a routine visit, or \"check-up,\" with your doctor. You might also hear it called a \"wellness visit\" or \"preventive visit.\"  During each visit, the doctor will:   Ask about your physical and mental health   Ask about your habits, behaviors, and lifestyle   Do an exam   Give you vaccines if needed   Talk to you about any medicines you take   Give advice about your health   Answer your questions  Getting regular check-ups is an important part of taking care of your health. It can help your doctor find and treat any problems you have. But it's also important for preventing health problems.  A routine physical is different from a \"sick visit.\" A sick visit is when you see a doctor because of a health concern or problem. Since physicals are scheduled ahead of time, you can think about what you want to ask the doctor.  How often should I get a physical? -- It depends on your age and health. In general, for people age 21 years and older:   If you are younger than 50 years, you might be able to get a physical every 3 years.   If you are 50 years or older, your doctor might recommend a physical every year.  If you have an ongoing health condition, like diabetes or high blood pressure, your doctor will probably want to see you more often.  What happens during a physical? -- In general, each visit will include:   Physical exam - The doctor or nurse will check your height, weight, heart rate, and blood pressure. They will also look at your eyes and ears. They will ask about how you are feeling and whether you have any symptoms that bother you.   Medicines - It's a good idea to bring a list of all the medicines you take to each doctor visit. Your doctor will talk to you about your medicines and answer any questions. Tell them if you are having any side effects that bother you. You " "should also tell them if you are having trouble paying for any of your medicines.   Habits and behaviors - This includes:   Your diet   Your exercise habits   Whether you smoke, drink alcohol, or use drugs   Whether you are sexually active   Whether you feel safe at home  Your doctor will talk to you about things you can do to improve your health and lower your risk of health problems. They will also offer help and support. For example, if you want to quit smoking, they can give you advice and might prescribe medicines. If you want to improve your diet or get more physical activity, they can help you with this, too.   Lab tests, if needed - The tests you get will depend on your age and situation. For example, your doctor might want to check your:   Cholesterol   Blood sugar   Iron level   Vaccines - The recommended vaccines will depend on your age, health, and what vaccines you already had. Vaccines are very important because they can prevent certain serious or deadly infections.   Discussion of screening - \"Screening\" means checking for diseases or other health problems before they cause symptoms. Your doctor can recommend screening based on your age, risk, and preferences. This might include tests to check for:   Cancer, such as breast, prostate, cervical, ovarian, colorectal, prostate, lung, or skin cancer   Sexually transmitted infections, such as chlamydia and gonorrhea   Mental health conditions like depression and anxiety  Your doctor will talk to you about the different types of screening tests. They can help you decide which screenings to have. They can also explain what the results might mean.   Answering questions - The physical is a good time to ask the doctor or nurse questions about your health. If needed, they can refer you to other doctors or specialists, too.  Adults older than 65 years often need other care, too. As you get older, your doctor will talk to you about:   How to prevent falling at " home   Hearing or vision tests   Memory testing   How to take your medicines safely   Making sure that you have the help and support you need at home  All topics are updated as new evidence becomes available and our peer review process is complete.  This topic retrieved from [employer] on: May 02, 2024.  Topic 013129 Version 1.0  Release: 32.4.3 - C32.122  © 2024 UpToDate, Inc. and/or its affiliates. All rights reserved.  Consumer Information Use and Disclaimer   Disclaimer: This generalized information is a limited summary of diagnosis, treatment, and/or medication information. It is not meant to be comprehensive and should be used as a tool to help the user understand and/or assess potential diagnostic and treatment options. It does NOT include all information about conditions, treatments, medications, side effects, or risks that may apply to a specific patient. It is not intended to be medical advice or a substitute for the medical advice, diagnosis, or treatment of a health care provider based on the health care provider's examination and assessment of a patient's specific and unique circumstances. Patients must speak with a health care provider for complete information about their health, medical questions, and treatment options, including any risks or benefits regarding use of medications. This information does not endorse any treatments or medications as safe, effective, or approved for treating a specific patient. UpToDate, Inc. and its affiliates disclaim any warranty or liability relating to this information or the use thereof.The use of this information is governed by the Terms of Use, available at https://www.woltersRaincrow Studiosuwer.com/en/know/clinical-effectiveness-terms. 2024© UpToDate, Inc. and its affiliates and/or licensors. All rights reserved.  Copyright   © 2024 UpToDate, Inc. and/or its affiliates. All rights reserved.

## 2024-08-13 NOTE — ASSESSMENT & PLAN NOTE
06/22 MRI Brain   There is a lesion within the pituitary gland measuring approximately 0.66 x 0.61cm  Past referral placed to endocrinology for management   Patient never made an appointment   New referral placed- Risks and benefits discussed. Education and counseling provided.

## 2024-08-14 ENCOUNTER — OFFICE VISIT (OUTPATIENT)
Dept: BARIATRICS | Facility: CLINIC | Age: 49
End: 2024-08-14
Payer: COMMERCIAL

## 2024-08-14 VITALS
WEIGHT: 293 LBS | HEIGHT: 66 IN | HEART RATE: 77 BPM | TEMPERATURE: 99 F | DIASTOLIC BLOOD PRESSURE: 78 MMHG | SYSTOLIC BLOOD PRESSURE: 102 MMHG | BODY MASS INDEX: 47.09 KG/M2

## 2024-08-14 DIAGNOSIS — E66.01 MORBID OBESITY (HCC): ICD-10-CM

## 2024-08-14 DIAGNOSIS — E53.8 VITAMIN B12 DEFICIENCY: ICD-10-CM

## 2024-08-14 DIAGNOSIS — K95.89 IRON DEFICIENCY ANEMIA FOLLOWING BARIATRIC SURGERY: ICD-10-CM

## 2024-08-14 DIAGNOSIS — D50.8 IRON DEFICIENCY ANEMIA FOLLOWING BARIATRIC SURGERY: ICD-10-CM

## 2024-08-14 DIAGNOSIS — Z48.815 ENCOUNTER FOR SURGICAL AFTERCARE FOLLOWING SURGERY OF DIGESTIVE SYSTEM: Primary | ICD-10-CM

## 2024-08-14 DIAGNOSIS — Z98.84 BARIATRIC SURGERY STATUS: ICD-10-CM

## 2024-08-14 DIAGNOSIS — K91.2 POSTSURGICAL MALABSORPTION: ICD-10-CM

## 2024-08-14 PROCEDURE — 99204 OFFICE O/P NEW MOD 45 MIN: CPT | Performed by: NURSE PRACTITIONER

## 2024-08-14 PROCEDURE — 96372 THER/PROPH/DIAG INJ SC/IM: CPT | Performed by: NURSE PRACTITIONER

## 2024-08-14 RX ORDER — CYANOCOBALAMIN 1000 UG/ML
1000 INJECTION, SOLUTION INTRAMUSCULAR; SUBCUTANEOUS ONCE
Status: COMPLETED | OUTPATIENT
Start: 2024-08-14 | End: 2024-08-14

## 2024-08-14 RX ADMIN — CYANOCOBALAMIN 1000 MCG: 1000 INJECTION, SOLUTION INTRAMUSCULAR; SUBCUTANEOUS at 14:38

## 2024-08-14 NOTE — PATIENT INSTRUCTIONS
- please start tracking a caloric intake of 5018-2514 calories per day. I suspect you are undereating too much.     - download [employer] or baritastic perez. Start food logging and tracking    - order a UGI (xray) to evaluate your stomach    - refer you to [facility] - medical side    - Follow up with endocrinology to see if they can start you on antiobesity meds since your weight gain is likely from altered thyroid function

## 2024-08-14 NOTE — PROGRESS NOTES
Date of surgery: 10/16/2017  Procedure:RNY  Performing surgeon:Sarwat    Initial Weight - 350lb   Current Weight - 302lb  Total Body Weight Loss (EWL)- 48.1  EWL% - 25%  TWB % - 14%

## 2024-08-14 NOTE — PROGRESS NOTES
Assessment/Plan:     Patient ID: Shelli Umaña is a 49 y.o. female.     Bariatric Surgery Status/BMI 48/iron deficiency anemia/B12 deficiency    -s/p Efrain-En-Y Gastric Bypass with Dr. Chan at [facility] in 2016. Presents to the office today to establish care with concerns of weight gain. She is interested in medications to help with weight loss. Feels often full however frustrated weight still increasing. She reports she will be seeing endocrinology for hx of hashimoto and her thyroid function is altered. She does not often feel hungry - at times eating only 1-2 times per day. Does not always focus on protein intake. Lack of exercise due to lack of energy. Often feels very fatigue. Of note, she is following with hematology for IV iron infusions and b12 injections. She is not consistent with any vitamins.       PLAN:     - discussed to track caloric intake of 4622-5624 calories per day.   - encouraged compliance of vitamins - list provided.   - Repeat labs in 3 months after starting vitamins  - MWM referred.   - UGI order to check anatomy. Concern for fistula due to excessive weight gain.   - Routine follow up in 1 year for annual visit  - Continue with healthy lifestyle, adequate protein intake of 60 gm, fluid intake of at least 64 oz.   - Continue with MVI daily.   - Activity as tolerated.   - Labs ordered and will adjust accordingly if any deficiency.   - Follow up with RD and SW as needed.       Continued/Maintain healthy weight loss with good nutrition intakes.  Adequate hydration with at least 64oz. fluid intake.  Follow diet as discussed.  Follow vitamin and mineral recommendations as reviewed with you.  Exercise as tolerated.    Colonoscopy referral made: UTD  Mammogram - UTD     Follow-up in 1 year for annual visit. We kindly ask that your arrive 15 minutes before your scheduled appointment time with your provider to allow our staff to room you, get your vital signs and update your chart.    Get lab work  done prior to annual visit. Please call the office if you need a script.  It is recommended to check with your insurance BEFORE getting labs done to make sure they are covered by your policy.      Call our office if you have any problems with abdominal pain especially associated with fever, chills, nausea, vomiting or any other concerns.    All  Post-bariatric surgery patients should be aware that very small quantities of any alcohol can cause impairment and it is very possible not to feel the effect. The effect can be in the system for several hours.  It is also a stomach irritant.     It is advised to AVOID alcohol, Nonsteroidal antiinflammatory drugs (NSAIDS) and nicotine of all forms . Any of these can cause stomach irritation/pain.    Discussed the effects of alcohol on a bariatric patient and the increased impairment risk.     Keep up the good work!     Postsurgical Malabsorption   -At risk for malabsorption of vitamins/minerals secondary to malabsorption and restriction of intake from bariatric surgery  -NOT Currently taking adequate postop bariatric surgery vitamin supplementation  -Last set of bariatric labs completed showed iron deficiency and B12 deficiency   -Next set of bariatric labs ordered for approximately 3 months  -Patient received education about the importance of adhering to a lifelong supplementation regimen to avoid vitamin/mineral deficiencies      Diagnoses and all orders for this visit:    Encounter for surgical aftercare following surgery of digestive system  -     FL upper GI UGI; Future  -     CBC; Future  -     Folate; Future  -     PTH, intact; Future  -     Methylmalonic acid, serum; Future  -     Vitamin A; Future  -     Vitamin B1, whole blood; Future  -     Zinc; Future    Bariatric surgery status  -     FL upper GI UGI; Future  -     CBC; Future  -     Folate; Future  -     PTH, intact; Future  -     Methylmalonic acid, serum; Future  -     Vitamin A; Future  -     Vitamin B1,  whole blood; Future  -     Zinc; Future  -     cyanocobalamin injection 1,000 mcg    Postsurgical malabsorption  -     FL upper GI UGI; Future  -     CBC; Future  -     Folate; Future  -     PTH, intact; Future  -     Methylmalonic acid, serum; Future  -     Vitamin A; Future  -     Vitamin B1, whole blood; Future  -     Zinc; Future  -     cyanocobalamin injection 1,000 mcg    Morbid obesity (HCC)  -     Ambulatory Referral to Weight Management  -     FL upper GI UGI; Future  -     CBC; Future  -     Folate; Future  -     PTH, intact; Future  -     Methylmalonic acid, serum; Future  -     Vitamin A; Future  -     Vitamin B1, whole blood; Future  -     Zinc; Future    BMI 45.0-49.9, adult (HCC)  -     FL upper GI UGI; Future  -     CBC; Future  -     Folate; Future  -     PTH, intact; Future  -     Methylmalonic acid, serum; Future  -     Vitamin A; Future  -     Vitamin B1, whole blood; Future  -     Zinc; Future    Vitamin B12 deficiency  -     cyanocobalamin injection 1,000 mcg    Iron deficiency anemia following bariatric surgery         Subjective:      Patient ID: Shelli Umaña is a 49 y.o. female.    -s/p Efrain-En-Y Gastric Bypass with Dr. Chan at [facility] in 2016. Presents to the office today to establish care with concerns of weight gain. She is interested in medications to help with weight loss. Feels often full however frustrated weight still increasing. She reports she will be seeing endocrinology for hx of hashimoto and her thyroid function is altered. She does not often feel hungry - at times eating only 1-2 times per day. Does not always focus on protein intake. Lack of exercise due to lack of energy. Often feels very fatigue. Of note, she is following with hematology for IV iron infusions and b12 injections. She is not consistent with any vitamins.     Initial:  305 lbs   Current: 302 lbs  EWL: (Weight loss is ahead of schedule at this post surgical period.)  Maicol: 260 lbs   Current BMI is Body  "mass index is 48.54 kg/m².    Tolerating a regular diet-yes  Eating at least 60 grams of protein per day- no  Following 30/60 minute rule with liquids-no  Drinking at least 64 ounces of fluid per day-no  Drinking carbonated beverages-no  Sufficient exercise-no  Using NSAIDs regularly-no  Using nicotine-no  Using alcohol-no  Supplements:  none  - LIST OF VITAMINS PROVIDED.     The following portions of the patient's history were reviewed and updated as appropriate: allergies, current medications, past family history, past medical history, past social history, past surgical history and problem list.    Review of Systems   Constitutional:  Positive for fatigue and unexpected weight change.   Respiratory: Negative.     Cardiovascular: Negative.    Gastrointestinal:  Positive for abdominal pain (post prandial with diarrhea.) and diarrhea. Negative for constipation, nausea and vomiting.   Musculoskeletal: Negative.    Neurological: Negative.    Psychiatric/Behavioral: Negative.           Objective:    /78   Pulse 77   Temp 99 °F (37.2 °C) (Tympanic)   Ht 5' 6.14\" (1.68 m)   Wt (!) 137 kg (302 lb)   BMI 48.54 kg/m²      Physical Exam  Vitals and nursing note reviewed.   Constitutional:       Appearance: Normal appearance. She is obese.   Cardiovascular:      Rate and Rhythm: Normal rate and regular rhythm.      Pulses: Normal pulses.      Heart sounds: Normal heart sounds.   Pulmonary:      Effort: Pulmonary effort is normal.      Breath sounds: Normal breath sounds.   Abdominal:      General: Bowel sounds are normal.      Palpations: Abdomen is soft.      Tenderness: There is no abdominal tenderness.   Musculoskeletal:         General: Normal range of motion.   Skin:     General: Skin is warm and dry.   Neurological:      General: No focal deficit present.      Mental Status: She is alert and oriented to person, place, and time.   Psychiatric:         Mood and Affect: Mood normal.         Behavior: Behavior " normal.         Thought Content: Thought content normal.         Judgment: Judgment normal.         I have spent a total time of 60 minutes in caring for this patient on the day of the visit/encounter including Prognosis, Risks and benefits of tx options, Instructions for management, Patient and family education, Importance of tx compliance, Risk factor reductions, Impressions, Counseling / Coordination of care, Documenting in the medical record, Reviewing / ordering tests, medicine, procedures  , Obtaining or reviewing history  , and reviewing vitamin requirements, food diary, healthy eating habits.  .

## 2024-08-26 ENCOUNTER — APPOINTMENT (OUTPATIENT)
Dept: LAB | Facility: HOSPITAL | Age: 49
End: 2024-08-26

## 2024-08-26 ENCOUNTER — TELEPHONE (OUTPATIENT)
Dept: FAMILY MEDICINE CLINIC | Facility: CLINIC | Age: 49
End: 2024-08-26

## 2024-08-26 ENCOUNTER — HOSPITAL ENCOUNTER (OUTPATIENT)
Dept: RADIOLOGY | Facility: HOSPITAL | Age: 49
Discharge: HOME | End: 2024-08-26

## 2024-08-26 DIAGNOSIS — K91.2 POSTSURGICAL MALABSORPTION: ICD-10-CM

## 2024-08-26 DIAGNOSIS — E66.01 MORBID OBESITY (HCC): ICD-10-CM

## 2024-08-26 DIAGNOSIS — E03.8 HYPOTHYROIDISM DUE TO HASHIMOTO'S THYROIDITIS: ICD-10-CM

## 2024-08-26 DIAGNOSIS — E06.3 HYPOTHYROIDISM DUE TO HASHIMOTO'S THYROIDITIS: ICD-10-CM

## 2024-08-26 DIAGNOSIS — Z48.815 ENCOUNTER FOR SURGICAL AFTERCARE FOLLOWING SURGERY OF DIGESTIVE SYSTEM: ICD-10-CM

## 2024-08-26 DIAGNOSIS — K95.89 IRON DEFICIENCY ANEMIA FOLLOWING BARIATRIC SURGERY: ICD-10-CM

## 2024-08-26 DIAGNOSIS — Z98.84 BARIATRIC SURGERY STATUS: ICD-10-CM

## 2024-08-26 DIAGNOSIS — E66.01 MORBID OBESITY WITH BMI OF 45.0-49.9, ADULT (HCC): ICD-10-CM

## 2024-08-26 DIAGNOSIS — D50.8 IRON DEFICIENCY ANEMIA FOLLOWING BARIATRIC SURGERY: ICD-10-CM

## 2024-08-26 DIAGNOSIS — Z13.1 SCREENING FOR DIABETES MELLITUS: ICD-10-CM

## 2024-08-26 LAB
25(OH)D3 SERPL-MCNC: 11.3 NG/ML (ref 30–100)
ALBUMIN SERPL BCG-MCNC: 4 G/DL (ref 3.5–5)
ALP SERPL-CCNC: 145 U/L (ref 34–104)
ALT SERPL W P-5'-P-CCNC: 8 U/L (ref 7–52)
ANION GAP SERPL CALCULATED.3IONS-SCNC: 10 MMOL/L (ref 4–13)
AST SERPL W P-5'-P-CCNC: 11 U/L (ref 13–39)
BASOPHILS # BLD AUTO: 0.04 THOUSANDS/ÂΜL (ref 0–0.1)
BASOPHILS NFR BLD AUTO: 1 % (ref 0–1)
BILIRUB SERPL-MCNC: 0.41 MG/DL (ref 0.2–1)
BUN SERPL-MCNC: 12 MG/DL (ref 5–25)
CALCIUM SERPL-MCNC: 8.9 MG/DL (ref 8.4–10.2)
CHLORIDE SERPL-SCNC: 105 MMOL/L (ref 96–108)
CHOLEST SERPL-MCNC: 195 MG/DL
CO2 SERPL-SCNC: 23 MMOL/L (ref 21–32)
CREAT SERPL-MCNC: 0.92 MG/DL (ref 0.6–1.3)
EOSINOPHIL # BLD AUTO: 0.09 THOUSAND/ÂΜL (ref 0–0.61)
EOSINOPHIL NFR BLD AUTO: 1 % (ref 0–6)
ERYTHROCYTE [DISTWIDTH] IN BLOOD BY AUTOMATED COUNT: 18.4 % (ref 11.6–15.1)
EST. AVERAGE GLUCOSE BLD GHB EST-MCNC: 114 MG/DL
FERRITIN SERPL-MCNC: 5 NG/ML (ref 11–307)
FOLATE SERPL-MCNC: 6.5 NG/ML
GFR SERPL CREATININE-BSD FRML MDRD: 74 ML/MIN/1.73SQ M
GLUCOSE P FAST SERPL-MCNC: 80 MG/DL (ref 65–99)
HBA1C MFR BLD: 5.6 %
HCT VFR BLD AUTO: 29.9 % (ref 34.8–46.1)
HDLC SERPL-MCNC: 63 MG/DL
HGB BLD-MCNC: 9 G/DL (ref 11.5–15.4)
IMM GRANULOCYTES # BLD AUTO: 0.02 THOUSAND/UL (ref 0–0.2)
IMM GRANULOCYTES NFR BLD AUTO: 0 % (ref 0–2)
IRON SATN MFR SERPL: 3 % (ref 15–50)
IRON SERPL-MCNC: 21 UG/DL (ref 50–212)
LDLC SERPL CALC-MCNC: 110 MG/DL (ref 0–100)
LYMPHOCYTES # BLD AUTO: 1.78 THOUSANDS/ÂΜL (ref 0.6–4.47)
LYMPHOCYTES NFR BLD AUTO: 25 % (ref 14–44)
MCH RBC QN AUTO: 22.2 PG (ref 26.8–34.3)
MCHC RBC AUTO-ENTMCNC: 30.1 G/DL (ref 31.4–37.4)
MCV RBC AUTO: 74 FL (ref 82–98)
MONOCYTES # BLD AUTO: 0.67 THOUSAND/ÂΜL (ref 0.17–1.22)
MONOCYTES NFR BLD AUTO: 10 % (ref 4–12)
NEUTROPHILS # BLD AUTO: 4.46 THOUSANDS/ÂΜL (ref 1.85–7.62)
NEUTS SEG NFR BLD AUTO: 63 % (ref 43–75)
NONHDLC SERPL-MCNC: 132 MG/DL
NRBC BLD AUTO-RTO: 0 /100 WBCS
PLATELET # BLD AUTO: 600 THOUSANDS/UL (ref 149–390)
PMV BLD AUTO: 8.9 FL (ref 8.9–12.7)
POTASSIUM SERPL-SCNC: 4.1 MMOL/L (ref 3.5–5.3)
PROT SERPL-MCNC: 6.9 G/DL (ref 6.4–8.4)
PTH-INTACT SERPL-MCNC: 228.2 PG/ML (ref 12–88)
RBC # BLD AUTO: 4.06 MILLION/UL (ref 3.81–5.12)
SODIUM SERPL-SCNC: 138 MMOL/L (ref 135–147)
TIBC SERPL-MCNC: 697 UG/DL (ref 250–450)
TRIGL SERPL-MCNC: 108 MG/DL
TSH SERPL DL<=0.05 MIU/L-ACNC: 2.3 UIU/ML (ref 0.45–4.5)
UIBC SERPL-MCNC: 676 UG/DL (ref 155–355)
VIT B12 SERPL-MCNC: 247 PG/ML (ref 180–914)
WBC # BLD AUTO: 7.06 THOUSAND/UL (ref 4.31–10.16)

## 2024-08-26 PROCEDURE — 85025 COMPLETE CBC W/AUTO DIFF WBC: CPT

## 2024-08-26 PROCEDURE — 83036 HEMOGLOBIN GLYCOSYLATED A1C: CPT

## 2024-08-26 PROCEDURE — 83970 ASSAY OF PARATHORMONE: CPT

## 2024-08-26 PROCEDURE — 83540 ASSAY OF IRON: CPT

## 2024-08-26 PROCEDURE — 80061 LIPID PANEL: CPT

## 2024-08-26 PROCEDURE — 83918 ORGANIC ACIDS TOTAL QUANT: CPT

## 2024-08-26 PROCEDURE — 82306 VITAMIN D 25 HYDROXY: CPT

## 2024-08-26 PROCEDURE — 84590 ASSAY OF VITAMIN A: CPT

## 2024-08-26 PROCEDURE — 82728 ASSAY OF FERRITIN: CPT

## 2024-08-26 PROCEDURE — 82607 VITAMIN B-12: CPT

## 2024-08-26 PROCEDURE — 84425 ASSAY OF VITAMIN B-1: CPT

## 2024-08-26 PROCEDURE — 84630 ASSAY OF ZINC: CPT

## 2024-08-26 PROCEDURE — 80053 COMPREHEN METABOLIC PANEL: CPT

## 2024-08-26 PROCEDURE — 74240 X-RAY XM UPR GI TRC 1CNTRST: CPT

## 2024-08-26 PROCEDURE — 83550 IRON BINDING TEST: CPT

## 2024-08-26 PROCEDURE — 36415 COLL VENOUS BLD VENIPUNCTURE: CPT

## 2024-08-26 PROCEDURE — 82746 ASSAY OF FOLIC ACID SERUM: CPT

## 2024-08-26 PROCEDURE — 84443 ASSAY THYROID STIM HORMONE: CPT

## 2024-08-26 NOTE — TELEPHONE ENCOUNTER
Patient would like for provider to review lab results. She is very concern with CBC results. Please advise

## 2024-08-27 ENCOUNTER — TELEPHONE (OUTPATIENT)
Dept: HEMATOLOGY ONCOLOGY | Facility: CLINIC | Age: 49
End: 2024-08-27

## 2024-08-27 ENCOUNTER — TELEPHONE (OUTPATIENT)
Dept: BARIATRICS | Facility: CLINIC | Age: 49
End: 2024-08-27

## 2024-08-27 ENCOUNTER — TELEPHONE (OUTPATIENT)
Age: 49
End: 2024-08-27

## 2024-08-27 DIAGNOSIS — E55.9 VITAMIN D DEFICIENCY: ICD-10-CM

## 2024-08-27 DIAGNOSIS — D75.839 THROMBOCYTOSIS: Primary | ICD-10-CM

## 2024-08-27 DIAGNOSIS — E61.1 IRON DEFICIENCY: ICD-10-CM

## 2024-08-27 RX ORDER — ERGOCALCIFEROL 1.25 MG/1
50000 CAPSULE, LIQUID FILLED ORAL WEEKLY
Qty: 12 CAPSULE | Refills: 0 | Status: SHIPPED | OUTPATIENT
Start: 2024-08-27 | End: 2024-11-13

## 2024-08-27 NOTE — TELEPHONE ENCOUNTER
PHONED PT AND HAD TO LEAVE A VOICE MESSAGE ABOUT HER LABS AND THAT I WILL HAVE DR KIM REVIEW HER LABS TO SEE IF HE WILL ORDER IRON WITHOUT APPT AS PT WAS A NO SHOW FOR HER OV 4/29/24. I ASKED THAT PT RETURN A ALL [phone] AND ASK FOR DR KIM'S TEAM TO LET US KNOW IF SHE HAS INS AS IT IS NOT CLEAR IN EPIC.

## 2024-08-27 NOTE — TELEPHONE ENCOUNTER
Lab results reviewed. Patient needs to reach out to hematology as her cbc is showing anemia and will probably require transfusions.  Vitamin D level is also low, I reordered her vitamin d to the pharmacy, please start taking this medication weekly.

## 2024-08-27 NOTE — PROGRESS NOTES
Pulmonary Follow-up   Shelli Umaña 49 y.o. female MRN: [id number]  8/27/2024    Assessment:  Chronic Cough  PFT 6/2021: Poor study due to inconsistent effort. FEV1/FVC: 52% FEV1 0.68L 21%, FVC 1.30L 32%. Bronchodilator response (change in FEV1 +27%)  Eos: 420 in 2014. Otherwise, Eos range from 0-120  Morbid Obesity  History of jinny-en-Y gastric bypass in 2016  Mild CAR  Sleep study on 6/24/2021 showed mild sleep apnea (AHI 12.5).  At last pulmonary visit patient noted that she did not receive CPAP machine as it was recalled before she could receive it.  Was having PND.  Hypothyroidism, Hashimoto's  Levothyroxine increased recently from 150 mcg to 175mcg daily  Post-COVID syndrome  CT chest on 10/31/2022 showing improvement in pulmonary scarring since 7/21.  CT abdomen and pelvis on 3/23 showing nonspecific patchy groundglass opacities visualized lung bases.   Eosinophils:   GERD  On Protonix  Vitamin D deficiency   Vitamin D level 16 (3/23)  Iron deficiency anemia following bariatric surgery  Ferritin 5    Plan:  Return to pulmonary clinic in 6 months  Schedule PFT in office as patient has still not completed PFT over 2 pulm visits.  Referral to sleep medicine  Lifestyle modifications: weight loss  Had extensive discussion on importance of weight loss, increasing exercise  Cw Dulera  Cw albuterol PRN  Cw PPI  Vitamin replacement per bariatrics  F/u with bariatric surgery    History of Present Illness   HPI:  Shelli Umaña is a 49 y.o. female with PMHx morbid obesity s/p jinny-en-y gastric bypass 2016, hypothyroidism, GERD, vitamin D deficiency, TAYLOR of who presents to the pulmonary clinic for NOWAK, Chronic cough, CAR. Patient continues to complain of SOB with exertion and leg swelling. Pt denies chest pain, cough, back pain, lightheadedness. Pt notes that she was unable to obtain her PFT performed, and that she was unable to obtain her CPAP due to insurance changes.      Review of Systems    Constitutional:  Positive for fatigue (day time sleepiness). Negative for chills and fever.        Difficulty sleeping   HENT:  Negative for congestion.    Eyes:  Negative for visual disturbance.   Respiratory:  Positive for shortness of breath. Negative for apnea and cough.    Cardiovascular:  Positive for leg swelling. Negative for chest pain.   Gastrointestinal:  Negative for abdominal pain.   Genitourinary:  Negative for decreased urine volume and difficulty urinating.   Musculoskeletal:  Negative for back pain and myalgias.   Skin:  Negative for color change.   Neurological:  Negative for dizziness, speech difficulty and light-headedness.   Psychiatric/Behavioral:  Positive for sleep disturbance.        Preventive   Most Recent Immunizations   Administered Date(s) Administered    Tdap 01/08/2020    Zoster Vaccine Recombinant 01/17/2024     Historical Information   Past Medical History:   Diagnosis Date    Abscess of labia     Acute and chronic respiratory failure with hypoxia (HCC)     Anemia 04/06/2021    Anemia     Anxiety     Atopic dermatitis     Bipolar disorder (HCC)     Breast lump     Colon polyps     Congenital anomaly of breast     COVID-19     Depression, recurrent (HCC) 09/21/2021    Frequent headaches     Goiter     07/29/2020-ultrasound of the thyroid done by endocrinology shows no evidence of a discrete nodule.    Hemorrhoids     Hypothyroidism 07/01/2013    Iron deficiency anemia 05/26/2021    Iron deficiency anemia secondary to inadequate dietary iron intake 06/25/2021    Migraine     Morbid obesity (HCC) 07/01/2013    Obesity     Psychotic disorder (HCC)      Past Surgical History:   Procedure Laterality Date    BARIATRIC SURGERY      BREAST BIOPSY Right 1999    benign    BREAST LUMPECTOMY Left     benign    CHOLECYSTECTOMY      COLONOSCOPY W/ POLYPECTOMY      GALLBLADDER SURGERY       Family History   Problem Relation Age of Onset    Liver cancer Mother     Prostate cancer Father     No  Known Problems Sister     No Known Problems Sister     No Known Problems Sister     No Known Problems Sister     No Known Problems Sister     No Known Problems Sister     No Known Problems Sister     No Known Problems Sister     No Known Problems Sister     No Known Problems Sister     No Known Problems Maternal Grandmother     No Known Problems Maternal Grandfather     No Known Problems Paternal Grandmother     No Known Problems Paternal Grandfather     Cancer Maternal Aunt     Cancer Maternal Aunt     Cancer Maternal Aunt     Cancer Maternal Aunt     Cancer Maternal Aunt     Cancer Maternal Aunt     Cancer Maternal Aunt     No Known Problems Paternal Aunt     No Known Problems Paternal Aunt     No Known Problems Paternal Aunt     No Known Problems Paternal Aunt     Cancer Paternal Aunt     Cancer Paternal Aunt     Breast cancer Neg Hx        Social History:   Social History     Tobacco Use   Smoking Status Former    Current packs/day: 0.00    Types: Cigarettes    Quit date:     Years since quittin.6    Passive exposure: Past   Smokeless Tobacco Never   Tobacco Comments    smoker for 2-3 yrs and quit in , smoked about 3 cigarettes a day       Meds/Allergies     Current Outpatient Medications:     albuterol (PROVENTIL HFA,VENTOLIN HFA) 90 mcg/act inhaler, INHALE 2 PUFFS EVERY 6 (SIX) HOURS AS NEEDED FOR WHEEZING OR SHORTNESS OF BREATH, Disp: 8.5 g, Rfl: 5    atorvastatin (LIPITOR) 20 mg tablet, TAKE ONE TABLET (20 MG TOTAL) BY MOUTH DAILY, Disp: 30 tablet, Rfl: 6    benzonatate (TESSALON) 200 MG capsule, Take 1 capsule (200 mg total) by mouth daily at bedtime as needed for cough, Disp: 30 capsule, Rfl: 0    busPIRone (BUSPAR) 10 mg tablet, Take 1 tablet (10 mg total) by mouth 2 (two) times a day, Disp: 60 tablet, Rfl: 3    cyanocobalamin (VITAMIN B-12) 1000 MCG tablet, Take 1 tablet (1,000 mcg total) by mouth daily, Disp: 90 tablet, Rfl: 3    dicyclomine (BENTYL) 10 mg capsule, TAKE 1 CAPSULE (10 MG  TOTAL) BY MOUTH 2 (TWO) TIMES A DAY AS NEEDED (ABD PAIN), Disp: 60 capsule, Rfl: 5    ergocalciferol (VITAMIN D2) 50,000 units, Take 1 capsule (50,000 Units total) by mouth once a week for 12 doses, Disp: 12 capsule, Rfl: 0    escitalopram (LEXAPRO) 20 mg tablet, TAKE ONE TABLET (20 MG TOTAL) BY MOUTH DAILY, Disp: 90 tablet, Rfl: 2    fluticasone (FLONASE) 50 mcg/act nasal spray, 1 spray into each nostril daily, Disp: 9.9 mL, Rfl: 2    folic acid (FOLVITE) 1 mg tablet, Take 1 tablet (1 mg total) by mouth daily, Disp: 90 tablet, Rfl: 4    furosemide (LASIX) 20 mg tablet, TAKE ONE TABLET (20 MG TOTAL) BY MOUTH DAILY (Patient not taking: Reported on 8/14/2024), Disp: 30 tablet, Rfl: 3    levothyroxine 175 mcg tablet, Take 1 tablet (175 mcg total) by mouth daily in the early morning, Disp: 90 tablet, Rfl: 1    lidocaine (Lidoderm) 5 %, Apply 2 patches topically over 12 hours daily Remove & Discard patch within 12 hours or as directed by MD. Place 1 patch to right chest and 1 to back daily., Disp: 15 patch, Rfl: 2    metoprolol tartrate (LOPRESSOR) 25 mg tablet, TAKE 1/2 TABLET(12.5 MG) BY MOUTH EVERY 12 HOURS, Disp: 90 tablet, Rfl: 4    mometasone-formoterol (Dulera) 200-5 MCG/ACT inhaler, INHALE 2 PUFFS 2 (TWO) TIMES A DAY RINSE MOUTH AFTER USE., Disp: 13 g, Rfl: 2    Multiple Vitamins-Minerals (MULTIVITAMIN ADULT PO), every 24 hours, Disp: , Rfl:     pantoprazole (PROTONIX) 40 mg tablet, Take 1 tablet (40 mg total) by mouth every 24 hours, Disp: 90 tablet, Rfl: 1    traZODone (DESYREL) 100 mg tablet, TAKE ONE TABLET (100 MG TOTAL) BY MOUTH DAILY AT BEDTIME, Disp: 90 tablet, Rfl: 1    valACYclovir (VALTREX) 500 mg tablet, , Disp: , Rfl:   Allergies   Allergen Reactions    No Active Allergies        Vitals: not currently breastfeeding., There is no height or weight on file to calculate BMI.      Physical Exam  Physical Exam  Constitutional:       Comments: BMI 48.9    HENT:      Head: Normocephalic and atraumatic.       "Nose: No congestion.      Mouth/Throat:      Mouth: Mucous membranes are moist.   Eyes:      Conjunctiva/sclera: Conjunctivae normal.   Cardiovascular:      Rate and Rhythm: Normal rate.      Pulses: Normal pulses.      Heart sounds: No murmur heard.     No friction rub. No gallop.   Pulmonary:      Effort: Pulmonary effort is normal. No respiratory distress.      Breath sounds: Normal breath sounds. No stridor. No wheezing, rhonchi or rales.   Chest:      Chest wall: No tenderness.   Musculoskeletal:      Cervical back: Normal range of motion.      Right lower leg: Edema present.      Left lower leg: Edema present.   Skin:     General: Skin is warm.      Capillary Refill: Capillary refill takes less than 2 seconds.   Neurological:      General: No focal deficit present.      Mental Status: She is alert and oriented to person, place, and time.       Labs: I have personally reviewed pertinent lab results.  Lab Results   Component Value Date    WBC 7.06 08/26/2024    HGB 9.0 (L) 08/26/2024    HCT 29.9 (L) 08/26/2024    MCV 74 (L) 08/26/2024     (H) 08/26/2024     Lab Results   Component Value Date    GLUCOSE 89 10/14/2015    CALCIUM 8.9 08/26/2024     10/14/2015    K 4.1 08/26/2024    CO2 23 08/26/2024     08/26/2024    BUN 12 08/26/2024    CREATININE 0.92 08/26/2024     No results found for: \"IGE\"  Lab Results   Component Value Date    ALT 8 08/26/2024    AST 11 (L) 08/26/2024    ALKPHOS 145 (H) 08/26/2024    BILITOT 0.33 10/14/2015     Imaging and other studies: I have personally reviewed pertinent reports.    FL upper GI UGI    Result Date: 8/26/2024  Impression: Postsurgical changes of Efrain-en-Y gastric bypass without evidence of complication. Workstation performed: [id number]     Pulmonary function testing:   PFTs 5/2021:  Results:  FEV1/FVC Ratio: 52 %  Forced Vital Capacity: 1.30 L    32 % predicted  FEV1: 0.98 L     21 % predicted  After administration of bronchodilator   FVC: 1.14 L, 28 % " "predicted, -12 % change  FEV1: 0.87 L, 27 % predicted, +27 % change     Unable to perform maneuvers for lung volumes and diffusion capacity     Interpretation:     **Patient unable to perform pulmonary function testing due to poor, inconsistent efforts**     Results likely do not reflect actual disease.  Spirometry shows very severe obstructive airflow limitation with reduced vital capacity.     Positive, but not significant response to the administration of bronchodilator per ATS standards     Unable to interpret flow volume loops due to difficulty with testing       EKG, Pathology, and Other Studies: I have personally reviewed pertinent films in PACS    ECHO 8/2021: LVEF60%. Borderline criteria for aneursym in atrial septum, but a small PFT could not be exluded. Mild MR, mild TR, peak PA pressure 25mmHg.    Disclaimer: Portions of the record may have been created with voice recognition software. Occasional wrong word or \"sound a like\" substitutions may have occurred due to the inherent limitations of voice recognition software. Careful consideration should be taken to recognize, using context, where substitutions have occurred.    Terence Sparks,    Pulmonary & Critical Care Medicine Fellow PGY-V  [facility]    "

## 2024-08-27 NOTE — TELEPHONE ENCOUNTER
used #[id number]  Relayed results to patient as per provider message. Patient expressed understanding and will call to schedule the infusions.

## 2024-08-27 NOTE — TELEPHONE ENCOUNTER
Pt called requesting to speak to Sadie.  No information was given.  Pt was warm transferred to [facility].

## 2024-08-27 NOTE — TELEPHONE ENCOUNTER
Spoke w/ Pt re: labs. Advised Pt per Rossy to make an appt with hematology. Pt verbalized understanding and was agreeable to plan.

## 2024-08-27 NOTE — TELEPHONE ENCOUNTER
Pt calling to request referral for Iron infus. Pt was told the order will be placed and will be informed.

## 2024-08-28 ENCOUNTER — TELEPHONE (OUTPATIENT)
Dept: HEMATOLOGY ONCOLOGY | Facility: CLINIC | Age: 49
End: 2024-08-28

## 2024-08-28 ENCOUNTER — OFFICE VISIT (OUTPATIENT)
Dept: PULMONOLOGY | Facility: CLINIC | Age: 49
End: 2024-08-28

## 2024-08-28 VITALS
SYSTOLIC BLOOD PRESSURE: 118 MMHG | WEIGHT: 293 LBS | RESPIRATION RATE: 16 BRPM | HEART RATE: 78 BPM | BODY MASS INDEX: 47.09 KG/M2 | DIASTOLIC BLOOD PRESSURE: 80 MMHG | TEMPERATURE: 97.4 F | HEIGHT: 66 IN | OXYGEN SATURATION: 98 %

## 2024-08-28 DIAGNOSIS — E53.8 VITAMIN B12 DEFICIENCY: Primary | ICD-10-CM

## 2024-08-28 DIAGNOSIS — U09.9 POST-COVID SYNDROME: ICD-10-CM

## 2024-08-28 DIAGNOSIS — Z98.84 HISTORY OF GASTRIC BYPASS: ICD-10-CM

## 2024-08-28 DIAGNOSIS — U09.9 POST-COVID-19 SYNDROME MANIFESTING AS CHRONIC COUGH: ICD-10-CM

## 2024-08-28 DIAGNOSIS — R05.3 POST-COVID-19 SYNDROME MANIFESTING AS CHRONIC COUGH: ICD-10-CM

## 2024-08-28 DIAGNOSIS — D50.8 IRON DEFICIENCY ANEMIA FOLLOWING BARIATRIC SURGERY: ICD-10-CM

## 2024-08-28 DIAGNOSIS — K95.89 IRON DEFICIENCY ANEMIA FOLLOWING BARIATRIC SURGERY: ICD-10-CM

## 2024-08-28 DIAGNOSIS — G47.33 OBSTRUCTIVE SLEEP APNEA: Primary | ICD-10-CM

## 2024-08-28 PROCEDURE — 99214 OFFICE O/P EST MOD 30 MIN: CPT | Performed by: INTERNAL MEDICINE

## 2024-08-28 RX ORDER — ALBUTEROL SULFATE 90 UG/1
2 AEROSOL, METERED RESPIRATORY (INHALATION) EVERY 6 HOURS PRN
Qty: 8.5 G | Refills: 5 | Status: SHIPPED | OUTPATIENT
Start: 2024-08-28

## 2024-08-28 RX ORDER — CYANOCOBALAMIN 1000 UG/ML
1000 INJECTION, SOLUTION INTRAMUSCULAR; SUBCUTANEOUS ONCE
OUTPATIENT
Start: 2024-09-03 | End: 2024-09-03

## 2024-08-28 RX ORDER — MOMETASONE FUROATE AND FORMOTEROL FUMARATE DIHYDRATE 200; 5 UG/1; UG/1
2 AEROSOL RESPIRATORY (INHALATION) 2 TIMES DAILY
Qty: 13 G | Refills: 2 | Status: SHIPPED | OUTPATIENT
Start: 2024-08-28

## 2024-08-28 RX ORDER — SODIUM CHLORIDE 9 MG/ML
20 INJECTION, SOLUTION INTRAVENOUS ONCE
OUTPATIENT
Start: 2024-09-03

## 2024-08-28 NOTE — TELEPHONE ENCOUNTER
Phoned pt and left a voice message that she needs six weekly doses of Venofer and B 12 at  inf and then a F/u appt 3 mios after last venofer with repeat labs one week before.

## 2024-08-29 LAB — ZINC SERPL-MCNC: 68 UG/DL (ref 44–115)

## 2024-09-01 LAB — METHYLMALONATE SERPL-SCNC: 83 NMOL/L (ref 0–378)

## 2024-09-03 LAB — VIT A SERPL-MCNC: 28.5 UG/DL (ref 20.1–62)

## 2024-09-05 ENCOUNTER — HOSPITAL ENCOUNTER (EMERGENCY)
Facility: HOSPITAL | Age: 49
Discharge: HOME | End: 2024-09-05
Attending: EMERGENCY MEDICINE
Payer: COMMERCIAL

## 2024-09-05 ENCOUNTER — HOSPITAL ENCOUNTER (OUTPATIENT)
Dept: PULMONOLOGY | Facility: HOSPITAL | Age: 49
End: 2024-09-05
Payer: COMMERCIAL

## 2024-09-05 VITALS
WEIGHT: 293 LBS | HEART RATE: 70 BPM | SYSTOLIC BLOOD PRESSURE: 106 MMHG | BODY MASS INDEX: 49.07 KG/M2 | TEMPERATURE: 97.8 F | RESPIRATION RATE: 20 BRPM | DIASTOLIC BLOOD PRESSURE: 69 MMHG | OXYGEN SATURATION: 100 %

## 2024-09-05 DIAGNOSIS — U09.9 POST-COVID SYNDROME: ICD-10-CM

## 2024-09-05 DIAGNOSIS — R06.00 DYSPNEA: Primary | ICD-10-CM

## 2024-09-05 LAB
ANION GAP SERPL CALCULATED.3IONS-SCNC: 6 MMOL/L (ref 4–13)
ATRIAL RATE: 68 BPM
BASOPHILS # BLD AUTO: 0.07 THOUSANDS/ÂΜL (ref 0–0.1)
BASOPHILS NFR BLD AUTO: 1 % (ref 0–1)
BUN SERPL-MCNC: 8 MG/DL (ref 5–25)
CALCIUM SERPL-MCNC: 8.8 MG/DL (ref 8.4–10.2)
CHLORIDE SERPL-SCNC: 105 MMOL/L (ref 96–108)
CO2 SERPL-SCNC: 24 MMOL/L (ref 21–32)
CREAT SERPL-MCNC: 0.89 MG/DL (ref 0.6–1.3)
EOSINOPHIL # BLD AUTO: 0.08 THOUSAND/ÂΜL (ref 0–0.61)
EOSINOPHIL NFR BLD AUTO: 1 % (ref 0–6)
ERYTHROCYTE [DISTWIDTH] IN BLOOD BY AUTOMATED COUNT: 18.4 % (ref 11.6–15.1)
GFR SERPL CREATININE-BSD FRML MDRD: 77 ML/MIN/1.73SQ M
GLUCOSE SERPL-MCNC: 82 MG/DL (ref 65–140)
HCT VFR BLD AUTO: 30.3 % (ref 34.8–46.1)
HGB BLD-MCNC: 8.7 G/DL (ref 11.5–15.4)
IMM GRANULOCYTES # BLD AUTO: 0.02 THOUSAND/UL (ref 0–0.2)
IMM GRANULOCYTES NFR BLD AUTO: 0 % (ref 0–2)
LYMPHOCYTES # BLD AUTO: 2.04 THOUSANDS/ÂΜL (ref 0.6–4.47)
LYMPHOCYTES NFR BLD AUTO: 21 % (ref 14–44)
MCH RBC QN AUTO: 20.9 PG (ref 26.8–34.3)
MCHC RBC AUTO-ENTMCNC: 28.7 G/DL (ref 31.4–37.4)
MCV RBC AUTO: 73 FL (ref 82–98)
MONOCYTES # BLD AUTO: 0.83 THOUSAND/ÂΜL (ref 0.17–1.22)
MONOCYTES NFR BLD AUTO: 9 % (ref 4–12)
NEUTROPHILS # BLD AUTO: 6.68 THOUSANDS/ÂΜL (ref 1.85–7.62)
NEUTS SEG NFR BLD AUTO: 68 % (ref 43–75)
NRBC BLD AUTO-RTO: 0 /100 WBCS
P AXIS: 29 DEGREES
PLATELET # BLD AUTO: 665 THOUSANDS/UL (ref 149–390)
PMV BLD AUTO: 8.4 FL (ref 8.9–12.7)
POTASSIUM SERPL-SCNC: 4 MMOL/L (ref 3.5–5.3)
PR INTERVAL: 138 MS
QRS AXIS: 44 DEGREES
QRSD INTERVAL: 74 MS
QT INTERVAL: 392 MS
QTC INTERVAL: 416 MS
RBC # BLD AUTO: 4.16 MILLION/UL (ref 3.81–5.12)
SODIUM SERPL-SCNC: 135 MMOL/L (ref 135–147)
T WAVE AXIS: 41 DEGREES
VENTRICULAR RATE: 68 BPM
WBC # BLD AUTO: 9.72 THOUSAND/UL (ref 4.31–10.16)

## 2024-09-05 PROCEDURE — 93005 ELECTROCARDIOGRAM TRACING: CPT

## 2024-09-05 PROCEDURE — 80048 BASIC METABOLIC PNL TOTAL CA: CPT | Performed by: EMERGENCY MEDICINE

## 2024-09-05 PROCEDURE — 99285 EMERGENCY DEPT VISIT HI MDM: CPT

## 2024-09-05 PROCEDURE — 85025 COMPLETE CBC W/AUTO DIFF WBC: CPT | Performed by: EMERGENCY MEDICINE

## 2024-09-05 PROCEDURE — 94760 N-INVAS EAR/PLS OXIMETRY 1: CPT

## 2024-09-05 PROCEDURE — 94010 BREATHING CAPACITY TEST: CPT

## 2024-09-05 PROCEDURE — 99284 EMERGENCY DEPT VISIT MOD MDM: CPT | Performed by: EMERGENCY MEDICINE

## 2024-09-05 PROCEDURE — 94640 AIRWAY INHALATION TREATMENT: CPT

## 2024-09-05 PROCEDURE — 36415 COLL VENOUS BLD VENIPUNCTURE: CPT | Performed by: EMERGENCY MEDICINE

## 2024-09-05 PROCEDURE — 93010 ELECTROCARDIOGRAM REPORT: CPT | Performed by: INTERNAL MEDICINE

## 2024-09-05 RX ORDER — ALBUTEROL SULFATE 0.83 MG/ML
5 SOLUTION RESPIRATORY (INHALATION) ONCE
Status: COMPLETED | OUTPATIENT
Start: 2024-09-05 | End: 2024-09-05

## 2024-09-05 RX ORDER — ALBUTEROL SULFATE 0.83 MG/ML
2.5 SOLUTION RESPIRATORY (INHALATION) ONCE AS NEEDED
Status: DISCONTINUED | OUTPATIENT
Start: 2024-09-05 | End: 2024-09-05

## 2024-09-05 RX ADMIN — IPRATROPIUM BROMIDE 0.5 MG: 0.5 SOLUTION RESPIRATORY (INHALATION) at 15:30

## 2024-09-05 RX ADMIN — ALBUTEROL SULFATE 5 MG: 2.5 SOLUTION RESPIRATORY (INHALATION) at 15:30

## 2024-09-05 NOTE — ED PROVIDER NOTES
History  Chief Complaint   Patient presents with    Dizziness     Patient was having a pulmonary function test, developed dizziness, nausea and increased SOB     48 yo F with pulmonary fibrosis, sent to ED from pulmonary lab where she was having PFTs, and did not complete the test due to onset of dizziness, shortness of breath, and chest tightness, without chest pain, symptoms typical of her pulmonary condition for which she uses albuterol and Dulera.  The test was terminated and she was sent to the ED, and she has been improving since then.  She has not otherwise been sick, no fever, no chills, no cough.        History provided by:  Patient  Dizziness      Prior to Admission Medications   Prescriptions Last Dose Informant Patient Reported? Taking?   Multiple Vitamins-Minerals (MULTIVITAMIN ADULT PO)  Self Yes No   Sig: every 24 hours   albuterol (PROVENTIL HFA,VENTOLIN HFA) 90 mcg/act inhaler   No No   Sig: Inhale 2 puffs every 6 (six) hours as needed for wheezing or shortness of breath   atorvastatin (LIPITOR) 20 mg tablet  Self No No   Sig: TAKE ONE TABLET (20 MG TOTAL) BY MOUTH DAILY   benzonatate (TESSALON) 200 MG capsule  Self No No   Sig: Take 1 capsule (200 mg total) by mouth daily at bedtime as needed for cough   busPIRone (BUSPAR) 10 mg tablet  Self No No   Sig: Take 1 tablet (10 mg total) by mouth 2 (two) times a day   cyanocobalamin (VITAMIN B-12) 1000 MCG tablet  Self No No   Sig: Take 1 tablet (1,000 mcg total) by mouth daily   dicyclomine (BENTYL) 10 mg capsule  Self No No   Sig: TAKE 1 CAPSULE (10 MG TOTAL) BY MOUTH 2 (TWO) TIMES A DAY AS NEEDED (ABD PAIN)   ergocalciferol (VITAMIN D2) 50,000 units  Self No No   Sig: Take 1 capsule (50,000 Units total) by mouth once a week for 12 doses   escitalopram (LEXAPRO) 20 mg tablet  Self No No   Sig: TAKE ONE TABLET (20 MG TOTAL) BY MOUTH DAILY   fluticasone (FLONASE) 50 mcg/act nasal spray  Self No No   Si spray into each nostril daily   folic acid  (FOLVITE) 1 mg tablet  Self No No   Sig: Take 1 tablet (1 mg total) by mouth daily   furosemide (LASIX) 20 mg tablet  Self No No   Sig: TAKE ONE TABLET (20 MG TOTAL) BY MOUTH DAILY   Patient not taking: Reported on 8/14/2024   levothyroxine 175 mcg tablet  Self No No   Sig: Take 1 tablet (175 mcg total) by mouth daily in the early morning   lidocaine (Lidoderm) 5 %  Self No No   Sig: Apply 2 patches topically over 12 hours daily Remove & Discard patch within 12 hours or as directed by MD. Place 1 patch to right chest and 1 to back daily.   metoprolol tartrate (LOPRESSOR) 25 mg tablet  Self No No   Sig: TAKE 1/2 TABLET(12.5 MG) BY MOUTH EVERY 12 HOURS   mometasone-formoterol (Dulera) 200-5 MCG/ACT inhaler   No No   Sig: Inhale 2 puffs 2 (two) times a day Rinse mouth after use.   pantoprazole (PROTONIX) 40 mg tablet  Self No No   Sig: Take 1 tablet (40 mg total) by mouth every 24 hours   traZODone (DESYREL) 100 mg tablet  Self No No   Sig: TAKE ONE TABLET (100 MG TOTAL) BY MOUTH DAILY AT BEDTIME   valACYclovir (VALTREX) 500 mg tablet  Self Yes No      Facility-Administered Medications: None       Past Medical History:   Diagnosis Date    Abscess of labia     Acute and chronic respiratory failure with hypoxia (HCC)     Anemia 04/06/2021    Anemia     Anxiety     Atopic dermatitis     Bipolar disorder (HCC)     Breast lump     Colon polyps     Congenital anomaly of breast     COVID-19     Depression, recurrent (HCC) 09/21/2021    Frequent headaches     Goiter     07/29/2020-ultrasound of the thyroid done by endocrinology shows no evidence of a discrete nodule.    Hemorrhoids     Hypothyroidism 07/01/2013    Iron deficiency anemia 05/26/2021    Iron deficiency anemia secondary to inadequate dietary iron intake 06/25/2021    Migraine     Morbid obesity (HCC) 07/01/2013    Obesity     Psychotic disorder (HCC)        Past Surgical History:   Procedure Laterality Date    BARIATRIC SURGERY      BREAST BIOPSY Right 1999     benign    BREAST LUMPECTOMY Left     benign    CHOLECYSTECTOMY      COLONOSCOPY W/ POLYPECTOMY      GALLBLADDER SURGERY         Family History   Problem Relation Age of Onset    Liver cancer Mother     Prostate cancer Father     No Known Problems Sister     No Known Problems Sister     No Known Problems Sister     No Known Problems Sister     No Known Problems Sister     No Known Problems Sister     No Known Problems Sister     No Known Problems Sister     No Known Problems Sister     No Known Problems Sister     No Known Problems Maternal Grandmother     No Known Problems Maternal Grandfather     No Known Problems Paternal Grandmother     No Known Problems Paternal Grandfather     Cancer Maternal Aunt     Cancer Maternal Aunt     Cancer Maternal Aunt     Cancer Maternal Aunt     Cancer Maternal Aunt     Cancer Maternal Aunt     Cancer Maternal Aunt     No Known Problems Paternal Aunt     No Known Problems Paternal Aunt     No Known Problems Paternal Aunt     No Known Problems Paternal Aunt     Cancer Paternal Aunt     Cancer Paternal Aunt     Breast cancer Neg Hx      I have reviewed and agree with the history as documented.    E-Cigarette/Vaping    E-Cigarette Use Never User      E-Cigarette/Vaping Substances    Nicotine No     THC No     CBD No     Flavoring No     Other No     Unknown No      Social History     Tobacco Use    Smoking status: Former     Current packs/day: 0.00     Types: Cigarettes     Quit date:      Years since quittin.6     Passive exposure: Past    Smokeless tobacco: Never    Tobacco comments:     smoker for 2-3 yrs and quit in , smoked about 3 cigarettes a day   Vaping Use    Vaping status: Never Used   Substance Use Topics    Alcohol use: Never    Drug use: No       Review of Systems   Neurological:  Positive for dizziness.       Physical Exam  Physical Exam  Vitals and nursing note reviewed.   Constitutional:       General: She is not in acute distress.     Appearance: She is  well-developed. She is obese. She is not diaphoretic.   HENT:      Head: Normocephalic and atraumatic.      Right Ear: External ear normal.      Left Ear: External ear normal.      Nose: Nose normal.      Mouth/Throat:      Mouth: Mucous membranes are moist.   Eyes:      Conjunctiva/sclera: Conjunctivae normal.      Pupils: Pupils are equal, round, and reactive to light.   Cardiovascular:      Rate and Rhythm: Normal rate and regular rhythm.   Pulmonary:      Effort: Pulmonary effort is normal.   Abdominal:      Palpations: Abdomen is soft.      Tenderness: There is no abdominal tenderness.   Musculoskeletal:         General: Normal range of motion.      Cervical back: Normal range of motion and neck supple.   Skin:     General: Skin is warm and dry.      Capillary Refill: Capillary refill takes less than 2 seconds.      Findings: No rash.   Neurological:      Mental Status: She is alert and oriented to person, place, and time.      Gait: Gait normal.   Psychiatric:         Behavior: Behavior normal.         Thought Content: Thought content normal.         Judgment: Judgment normal.         Vital Signs  ED Triage Vitals   Temperature Pulse Respirations Blood Pressure SpO2   09/05/24 1353 09/05/24 1353 09/05/24 1353 09/05/24 1354 09/05/24 1354   97.8 °F (36.6 °C) 69 20 116/78 99 %      Temp Source Heart Rate Source Patient Position - Orthostatic VS BP Location FiO2 (%)   09/05/24 1353 09/05/24 1607 09/05/24 1607 09/05/24 1354 --   Oral Monitor Lying Right arm       Pain Score       09/05/24 1354       No Pain           Vitals:    09/05/24 1353 09/05/24 1354 09/05/24 1607   BP:  116/78 106/69   Pulse: 69  70   Patient Position - Orthostatic VS:   Lying         Visual Acuity  Visual Acuity      Flowsheet Row Most Recent Value   L Pupil Size (mm) 4   R Pupil Size (mm) 4            ED Medications  Medications   albuterol inhalation solution 5 mg (5 mg Nebulization Given 9/5/24 1530)   ipratropium (ATROVENT) 0.02 %  inhalation solution 0.5 mg (0.5 mg Nebulization Given 9/5/24 1530)       Diagnostic Studies  Results Reviewed       Procedure Component Value Units Date/Time    Basic metabolic panel [[id number]] Collected: 09/05/24 1528    Lab Status: Final result Specimen: Blood from Arm, Right Updated: 09/05/24 1604     Sodium 135 mmol/L      Potassium 4.0 mmol/L      Chloride 105 mmol/L      CO2 24 mmol/L      ANION GAP 6 mmol/L      BUN 8 mg/dL      Creatinine 0.89 mg/dL      Glucose 82 mg/dL      Calcium 8.8 mg/dL      eGFR 77 ml/min/1.73sq m     Narrative:      National Kid[employer] Disease Foundation guidelines for Chronic Kidney Disease (CKD):     Stage 1 with normal or high GFR (GFR > 90 mL/min/1.73 square meters)    Stage 2 Mild CKD (GFR = 60-89 mL/min/1.73 square meters)    Stage 3A Moderate CKD (GFR = 45-59 mL/min/1.73 square meters)    Stage 3B Moderate CKD (GFR = 30-44 mL/min/1.73 square meters)    Stage 4 Severe CKD (GFR = 15-29 mL/min/1.73 square meters)    Stage 5 End Stage CKD (GFR <15 mL/min/1.73 square meters)  Note: GFR calculation is accurate only with a steady state creatinine    CBC and differential [[id number]]  (Abnormal) Collected: 09/05/24 1528    Lab Status: Final result Specimen: Blood from Arm, Right Updated: 09/05/24 1538     WBC 9.72 Thousand/uL      RBC 4.16 Million/uL      Hemoglobin 8.7 g/dL      Hematocrit 30.3 %      MCV 73 fL      MCH 20.9 pg      MCHC 28.7 g/dL      RDW 18.4 %      MPV 8.4 fL      Platelets 665 Thousands/uL      nRBC 0 /100 WBCs      Segmented % 68 %      Immature Grans % 0 %      Lymphocytes % 21 %      Monocytes % 9 %      Eosinophils Relative 1 %      Basophils Relative 1 %      Absolute Neutrophils 6.68 Thousands/µL      Absolute Immature Grans 0.02 Thousand/uL      Absolute Lymphocytes 2.04 Thousands/µL      Absolute Monocytes 0.83 Thousand/µL      Eosinophils Absolute 0.08 Thousand/µL      Basophils Absolute 0.07 Thousands/µL                    No orders to display               Procedures  ECG 12 Lead Documentation Only    Date/Time: 9/5/2024 2:21 PM    Performed by: Bob Saleh MD  Authorized by: Bob Saleh MD    Rate:     ECG rate:  68  Rhythm:     Rhythm: sinus rhythm    Ectopy:     Ectopy: none    QRS:     QRS axis:  Normal    QRS intervals:  Normal  Conduction:     Conduction: normal    ST segments:     ST segments:  Normal  T waves:     T waves: normal             ED Course  ED Course as of 09/05/24 2043   Thu Sep 05, 2024   1539 Hemoglobin(!): 8.7  C/w recent baseline, although downtrend over several months, possibly absorption, h/o R&Y   1623 Reviewed results with patient at bedside and updated on the plan.  She continues to feel well in the ED. No concerning findings.  She can be discharged.  Follow up pulmonology                                 SBIRT 20yo+      Flowsheet Row Most Recent Value   Initial Alcohol Screen: US AUDIT-C     1. How often do you have a drink containing alcohol? 0 Filed at: 09/05/2024 1354   2. How many drinks containing alcohol do you have on a typical day you are drinking?  0 Filed at: 09/05/2024 1354   3a. Male UNDER 65: How often do you have five or more drinks on one occasion? 0 Filed at: 09/05/2024 1354   3b. FEMALE Any Age, or MALE 65+: How often do you have 4 or more drinks on one occassion? 0 Filed at: 09/05/2024 1354   Audit-C Score 0 Filed at: 09/05/2024 1354   ARIAN: How many times in the past year have you...    Used an illegal drug or used a prescription medication for non-medical reasons? Never Filed at: 09/05/2024 1354                      Medical Decision Making  Amount and/or Complexity of Data Reviewed  Labs: ordered. Decision-making details documented in ED Course.    Risk  Prescription drug management.                 Disposition  Final diagnoses:   Dyspnea     Time reflects when diagnosis was documented in both MDM as applicable and the Disposition within this note       Time User Action Codes Description  Comment    9/5/2024  4:24 PM oBb Saleh Add [R06.00] Dyspnea           ED Disposition       ED Disposition   Discharge    Condition   Good    Date/Time   Thu Sep 5, 2024 1624    Comment   Ada Donya De DiosaMoraljin discharge to home/self care.                   Follow-up Information       Follow up With Specialties Details Why Contact Info Additional Information    [facility] Pulmonary [facility] Pulmonology Call  For followup [address]  [address]  [facility] [address] [phone] [facility], [address], [place], [place], [address] [phone]            Discharge Medication List as of 9/5/2024  4:24 PM        CONTINUE these medications which have NOT CHANGED    Details   albuterol (PROVENTIL HFA,VENTOLIN HFA) 90 mcg/act inhaler Inhale 2 puffs every 6 (six) hours as needed for wheezing or shortness of breath, Starting Wed 8/28/2024, Normal      atorvastatin (LIPITOR) 20 mg tablet TAKE ONE TABLET (20 MG TOTAL) BY MOUTH DAILY, Normal      benzonatate (TESSALON) 200 MG capsule Take 1 capsule (200 mg total) by mouth daily at bedtime as needed for cough, Starting Tue 8/13/2024, Normal      busPIRone (BUSPAR) 10 mg tablet Take 1 tablet (10 mg total) by mouth 2 (two) times a day, Starting Wed 3/30/2022, Normal      cyanocobalamin (VITAMIN B-12) 1000 MCG tablet Take 1 tablet (1,000 mcg total) by mouth daily, Starting Mon 7/31/2023, Normal      dicyclomine (BENTYL) 10 mg capsule TAKE 1 CAPSULE (10 MG TOTAL) BY MOUTH 2 (TWO) TIMES A DAY AS NEEDED (ABD PAIN), Starting Mon 7/22/2024, Normal      ergocalciferol (VITAMIN D2) 50,000 units Take 1 capsule (50,000 Units total) by mouth once a week for 12 doses, Starting Tue 8/27/2024, Until Wed 11/13/2024, Normal      escitalopram (LEXAPRO) 20 mg tablet TAKE ONE TABLET (20 MG TOTAL) BY MOUTH DAILY, Normal      fluticasone (FLONASE) 50 mcg/act nasal spray 1 spray into each nostril daily, Starting  Wed 12/20/2023, Normal      folic acid (FOLVITE) 1 mg tablet Take 1 tablet (1 mg total) by mouth daily, Starting Mon 7/31/2023, Normal      furosemide (LASIX) 20 mg tablet TAKE ONE TABLET (20 MG TOTAL) BY MOUTH DAILY, Normal      levothyroxine 175 mcg tablet Take 1 tablet (175 mcg total) by mouth daily in the early morning, Starting Tue 7/2/2024, Normal      lidocaine (Lidoderm) 5 % Apply 2 patches topically over 12 hours daily Remove & Discard patch within 12 hours or as directed by MD. Place 1 patch to right chest and 1 to back daily., Starting Fri 3/31/2023, Normal      metoprolol tartrate (LOPRESSOR) 25 mg tablet TAKE 1/2 TABLET(12.5 MG) BY MOUTH EVERY 12 HOURS, Normal      mometasone-formoterol (Dulera) 200-5 MCG/ACT inhaler Inhale 2 puffs 2 (two) times a day Rinse mouth after use., Starting Wed 8/28/2024, Normal      Multiple Vitamins-Minerals (MULTIVITAMIN ADULT PO) every 24 hours, Starting Wed 1/17/2018, Historical Med      pantoprazole (PROTONIX) 40 mg tablet Take 1 tablet (40 mg total) by mouth every 24 hours, Starting Tue 5/21/2024, Normal      traZODone (DESYREL) 100 mg tablet TAKE ONE TABLET (100 MG TOTAL) BY MOUTH DAILY AT BEDTIME, Normal      valACYclovir (VALTREX) 500 mg tablet Historical Med             No discharge procedures on file.    PDMP Review       None            ED Provider  Electronically Signed by             Bob Saleh MD  09/05/24 2043

## 2024-09-06 ENCOUNTER — TELEPHONE (OUTPATIENT)
Age: 49
End: 2024-09-06

## 2024-09-06 ENCOUNTER — TELEPHONE (OUTPATIENT)
Dept: BARIATRICS | Facility: CLINIC | Age: 49
End: 2024-09-06

## 2024-09-06 ENCOUNTER — HOSPITAL ENCOUNTER (OUTPATIENT)
Dept: INFUSION CENTER | Facility: HOSPITAL | Age: 49
End: 2024-09-06
Attending: INTERNAL MEDICINE
Payer: COMMERCIAL

## 2024-09-06 VITALS
RESPIRATION RATE: 20 BRPM | HEART RATE: 93 BPM | TEMPERATURE: 97.8 F | DIASTOLIC BLOOD PRESSURE: 80 MMHG | SYSTOLIC BLOOD PRESSURE: 118 MMHG

## 2024-09-06 DIAGNOSIS — R05.3 POST-COVID-19 SYNDROME MANIFESTING AS CHRONIC COUGH: ICD-10-CM

## 2024-09-06 DIAGNOSIS — U09.9 POST-COVID-19 SYNDROME MANIFESTING AS CHRONIC COUGH: ICD-10-CM

## 2024-09-06 DIAGNOSIS — Z98.84 HISTORY OF GASTRIC BYPASS: ICD-10-CM

## 2024-09-06 DIAGNOSIS — Z98.84 BARIATRIC SURGERY STATUS: Primary | ICD-10-CM

## 2024-09-06 DIAGNOSIS — D50.8 IRON DEFICIENCY ANEMIA FOLLOWING BARIATRIC SURGERY: ICD-10-CM

## 2024-09-06 DIAGNOSIS — E55.9 VITAMIN D DEFICIENCY: ICD-10-CM

## 2024-09-06 DIAGNOSIS — E53.8 VITAMIN B12 DEFICIENCY: Primary | ICD-10-CM

## 2024-09-06 DIAGNOSIS — K95.89 IRON DEFICIENCY ANEMIA FOLLOWING BARIATRIC SURGERY: ICD-10-CM

## 2024-09-06 DIAGNOSIS — E56.9 INADEQUATE VITAMIN INTAKE: ICD-10-CM

## 2024-09-06 LAB — VIT B1 BLD-SCNC: 68.2 NMOL/L (ref 66.5–200)

## 2024-09-06 PROCEDURE — 96372 THER/PROPH/DIAG INJ SC/IM: CPT

## 2024-09-06 PROCEDURE — 96365 THER/PROPH/DIAG IV INF INIT: CPT

## 2024-09-06 RX ORDER — ERGOCALCIFEROL 1.25 MG/1
50000 CAPSULE, LIQUID FILLED ORAL 2 TIMES WEEKLY
Qty: 24 CAPSULE | Refills: 0 | Status: SHIPPED | OUTPATIENT
Start: 2024-09-09

## 2024-09-06 RX ORDER — MULTIVIT-MIN/IRON/FOLIC ACID/K 45-800-120
1 CAPSULE ORAL DAILY
Qty: 90 CAPSULE | Refills: 3 | Status: SHIPPED | OUTPATIENT
Start: 2024-09-06

## 2024-09-06 RX ORDER — SODIUM CHLORIDE 9 MG/ML
20 INJECTION, SOLUTION INTRAVENOUS ONCE
Status: COMPLETED | OUTPATIENT
Start: 2024-09-06 | End: 2024-09-06

## 2024-09-06 RX ORDER — CYANOCOBALAMIN 1000 UG/ML
1000 INJECTION, SOLUTION INTRAMUSCULAR; SUBCUTANEOUS ONCE
Status: CANCELLED | OUTPATIENT
Start: 2024-09-18 | End: 2024-09-13

## 2024-09-06 RX ORDER — CYANOCOBALAMIN 1000 UG/ML
1000 INJECTION, SOLUTION INTRAMUSCULAR; SUBCUTANEOUS ONCE
Status: COMPLETED | OUTPATIENT
Start: 2024-09-06 | End: 2024-09-06

## 2024-09-06 RX ORDER — SODIUM CHLORIDE 9 MG/ML
20 INJECTION, SOLUTION INTRAVENOUS ONCE
Status: CANCELLED | OUTPATIENT
Start: 2024-09-18

## 2024-09-06 RX ADMIN — IRON SUCROSE 200 MG: 20 INJECTION, SOLUTION INTRAVENOUS at 13:40

## 2024-09-06 RX ADMIN — SODIUM CHLORIDE 20 ML/HR: 9 INJECTION, SOLUTION INTRAVENOUS at 13:40

## 2024-09-06 RX ADMIN — CYANOCOBALAMIN 1000 MCG: 1000 INJECTION, SOLUTION INTRAMUSCULAR; SUBCUTANEOUS at 13:40

## 2024-09-06 NOTE — PROGRESS NOTES
Patient tolerated IV venofer and L arm B12 injection without issue. Next appointment confirmed 9/18 at 1200-[facility]. AVS declined.

## 2024-09-06 NOTE — TELEPHONE ENCOUNTER
----- Message from THOMAS Douglsa sent at 9/6/2024  8:52 AM EDT -----  Please call pt to let her know I would really like her to start on bariatric multivitamins as her levels shows multiple vitamin deficiencies.     - iron and vitamin B12 is low along with anemia. I would like her to follow up with her hematologist. She would benefit from IV iron infusions.     - vitamin D level is low with elevated PTH. I saw her PCP added vitamin D2 weekly. I will adjust to twice a week for 12 weeks. Please repeat vitamin D levels in 3 months. Along with this, please start on calcium 500 mg three times per day.     THOMAS Blair

## 2024-09-06 NOTE — TELEPHONE ENCOUNTER
Spoke with pt and read the note to her also informed the provider to send a script into her pharmacy, pt had no further questions and understood the note.

## 2024-09-06 NOTE — TELEPHONE ENCOUNTER
Pt called to find out why Jamila wants her to start the Vitamin D2 on Monday and not over the weekend.  She asked if it was because she had her iron infusion today.  I tried the office but they were gone for the day.  I called Katie in triage and asked her thoughts. I told the pt Talia and I believe that Jamila has it for Monday just so it is easier to track for the 2 weeks to start at the beginning of the week instead of over a weekend.  I also told her we did not know for sure this is the reason.  Talia did not think it would interact with her infusion but we were not positive.  Pt understood and said she will just wait and take on Monday

## 2024-09-09 RX ORDER — BENZONATATE 200 MG/1
200 CAPSULE ORAL
Qty: 30 CAPSULE | Refills: 0 | Status: SHIPPED | OUTPATIENT
Start: 2024-09-09

## 2024-09-17 DIAGNOSIS — Z98.84 HISTORY OF GASTRIC BYPASS: ICD-10-CM

## 2024-09-17 DIAGNOSIS — E53.8 VITAMIN B12 DEFICIENCY: Primary | ICD-10-CM

## 2024-09-17 DIAGNOSIS — D50.8 IRON DEFICIENCY ANEMIA FOLLOWING BARIATRIC SURGERY: ICD-10-CM

## 2024-09-17 DIAGNOSIS — K95.89 IRON DEFICIENCY ANEMIA FOLLOWING BARIATRIC SURGERY: ICD-10-CM

## 2024-09-17 RX ORDER — SODIUM CHLORIDE 9 MG/ML
20 INJECTION, SOLUTION INTRAVENOUS ONCE
Status: CANCELLED | OUTPATIENT
Start: 2024-09-18

## 2024-09-17 RX ORDER — CYANOCOBALAMIN 1000 UG/ML
1000 INJECTION, SOLUTION INTRAMUSCULAR; SUBCUTANEOUS ONCE
Status: CANCELLED | OUTPATIENT
Start: 2024-09-18 | End: 2024-09-18

## 2024-09-18 ENCOUNTER — HOSPITAL ENCOUNTER (OUTPATIENT)
Dept: INFUSION CENTER | Facility: HOSPITAL | Age: 49
Discharge: HOME | End: 2024-09-18
Attending: INTERNAL MEDICINE

## 2024-09-18 VITALS
DIASTOLIC BLOOD PRESSURE: 76 MMHG | HEART RATE: 76 BPM | RESPIRATION RATE: 18 BRPM | SYSTOLIC BLOOD PRESSURE: 110 MMHG | TEMPERATURE: 97.6 F

## 2024-09-18 DIAGNOSIS — E53.8 VITAMIN B12 DEFICIENCY: ICD-10-CM

## 2024-09-18 DIAGNOSIS — K95.89 IRON DEFICIENCY ANEMIA FOLLOWING BARIATRIC SURGERY: ICD-10-CM

## 2024-09-18 DIAGNOSIS — D50.8 IRON DEFICIENCY ANEMIA FOLLOWING BARIATRIC SURGERY: ICD-10-CM

## 2024-09-18 DIAGNOSIS — Z98.84 HISTORY OF GASTRIC BYPASS: Primary | ICD-10-CM

## 2024-09-18 PROCEDURE — 96365 THER/PROPH/DIAG IV INF INIT: CPT

## 2024-09-18 PROCEDURE — 96372 THER/PROPH/DIAG INJ SC/IM: CPT

## 2024-09-18 RX ORDER — SODIUM CHLORIDE 9 MG/ML
20 INJECTION, SOLUTION INTRAVENOUS ONCE
Status: CANCELLED | OUTPATIENT
Start: 2024-09-25

## 2024-09-18 RX ORDER — CYANOCOBALAMIN 1000 UG/ML
1000 INJECTION, SOLUTION INTRAMUSCULAR; SUBCUTANEOUS ONCE
Status: COMPLETED | OUTPATIENT
Start: 2024-09-18 | End: 2024-09-18

## 2024-09-18 RX ORDER — CYANOCOBALAMIN 1000 UG/ML
1000 INJECTION, SOLUTION INTRAMUSCULAR; SUBCUTANEOUS ONCE
Status: CANCELLED | OUTPATIENT
Start: 2024-09-25 | End: 2024-09-25

## 2024-09-18 RX ORDER — SODIUM CHLORIDE 9 MG/ML
20 INJECTION, SOLUTION INTRAVENOUS ONCE
Status: COMPLETED | OUTPATIENT
Start: 2024-09-18 | End: 2024-09-18

## 2024-09-18 RX ADMIN — SODIUM CHLORIDE 20 ML/HR: 0.9 INJECTION, SOLUTION INTRAVENOUS at 12:43

## 2024-09-18 RX ADMIN — IRON SUCROSE 200 MG: 20 INJECTION, SOLUTION INTRAVENOUS at 12:44

## 2024-09-18 RX ADMIN — CYANOCOBALAMIN 1000 MCG: 1000 INJECTION, SOLUTION INTRAMUSCULAR; SUBCUTANEOUS at 12:46

## 2024-09-18 NOTE — PROGRESS NOTES
Shelli Umaña  tolerated treatment well with no complications.      Shelli Umaña is aware of future appt on 9/25/24 at 12pm.     AVS printed and given to Shelli Umaña:  Mateusz (Declined by Shelli Umaña)

## 2024-09-25 ENCOUNTER — HOSPITAL ENCOUNTER (OUTPATIENT)
Dept: INFUSION CENTER | Facility: HOSPITAL | Age: 49
Discharge: HOME | End: 2024-09-25
Attending: INTERNAL MEDICINE
Payer: COMMERCIAL

## 2024-09-25 VITALS
SYSTOLIC BLOOD PRESSURE: 102 MMHG | HEART RATE: 74 BPM | TEMPERATURE: 97.6 F | RESPIRATION RATE: 20 BRPM | DIASTOLIC BLOOD PRESSURE: 69 MMHG

## 2024-09-25 DIAGNOSIS — K95.89 IRON DEFICIENCY ANEMIA FOLLOWING BARIATRIC SURGERY: ICD-10-CM

## 2024-09-25 DIAGNOSIS — E53.8 VITAMIN B12 DEFICIENCY: Primary | ICD-10-CM

## 2024-09-25 DIAGNOSIS — Z98.84 HISTORY OF GASTRIC BYPASS: ICD-10-CM

## 2024-09-25 DIAGNOSIS — D50.8 IRON DEFICIENCY ANEMIA FOLLOWING BARIATRIC SURGERY: ICD-10-CM

## 2024-09-25 PROCEDURE — 96365 THER/PROPH/DIAG IV INF INIT: CPT

## 2024-09-25 PROCEDURE — 96372 THER/PROPH/DIAG INJ SC/IM: CPT

## 2024-09-25 RX ORDER — SODIUM CHLORIDE 9 MG/ML
20 INJECTION, SOLUTION INTRAVENOUS ONCE
Status: COMPLETED | OUTPATIENT
Start: 2024-09-25 | End: 2024-09-25

## 2024-09-25 RX ORDER — CYANOCOBALAMIN 1000 UG/ML
1000 INJECTION, SOLUTION INTRAMUSCULAR; SUBCUTANEOUS ONCE
Status: CANCELLED | OUTPATIENT
Start: 2024-10-02 | End: 2024-10-02

## 2024-09-25 RX ORDER — CYANOCOBALAMIN 1000 UG/ML
1000 INJECTION, SOLUTION INTRAMUSCULAR; SUBCUTANEOUS ONCE
Status: COMPLETED | OUTPATIENT
Start: 2024-09-25 | End: 2024-09-25

## 2024-09-25 RX ORDER — SODIUM CHLORIDE 9 MG/ML
20 INJECTION, SOLUTION INTRAVENOUS ONCE
Status: CANCELLED | OUTPATIENT
Start: 2024-10-02

## 2024-09-25 RX ADMIN — SODIUM CHLORIDE 20 ML/HR: 9 INJECTION, SOLUTION INTRAVENOUS at 12:22

## 2024-09-25 RX ADMIN — CYANOCOBALAMIN 1000 MCG: 1000 INJECTION, SOLUTION INTRAMUSCULAR; SUBCUTANEOUS at 12:22

## 2024-09-25 RX ADMIN — IRON SUCROSE 200 MG: 20 INJECTION, SOLUTION INTRAVENOUS at 12:22

## 2024-09-25 NOTE — PROGRESS NOTES
Patient tolerated IV venofer and L arm B12 injection without issue. Next appointment confirmed October 2nd at 12:00- infusion center. AVS declined.

## 2024-10-02 ENCOUNTER — HOSPITAL ENCOUNTER (OUTPATIENT)
Dept: INFUSION CENTER | Facility: HOSPITAL | Age: 49
Discharge: HOME | End: 2024-10-02
Attending: INTERNAL MEDICINE
Payer: COMMERCIAL

## 2024-10-02 VITALS
SYSTOLIC BLOOD PRESSURE: 104 MMHG | HEART RATE: 76 BPM | RESPIRATION RATE: 18 BRPM | TEMPERATURE: 97.8 F | DIASTOLIC BLOOD PRESSURE: 71 MMHG

## 2024-10-02 DIAGNOSIS — D50.8 IRON DEFICIENCY ANEMIA FOLLOWING BARIATRIC SURGERY: ICD-10-CM

## 2024-10-02 DIAGNOSIS — Z98.84 HISTORY OF GASTRIC BYPASS: ICD-10-CM

## 2024-10-02 DIAGNOSIS — E53.8 VITAMIN B12 DEFICIENCY: Primary | ICD-10-CM

## 2024-10-02 DIAGNOSIS — K95.89 IRON DEFICIENCY ANEMIA FOLLOWING BARIATRIC SURGERY: ICD-10-CM

## 2024-10-02 PROCEDURE — 96365 THER/PROPH/DIAG IV INF INIT: CPT

## 2024-10-02 PROCEDURE — 96372 THER/PROPH/DIAG INJ SC/IM: CPT

## 2024-10-02 RX ORDER — SODIUM CHLORIDE 9 MG/ML
20 INJECTION, SOLUTION INTRAVENOUS ONCE
Status: CANCELLED | OUTPATIENT
Start: 2024-10-09

## 2024-10-02 RX ORDER — CYANOCOBALAMIN 1000 UG/ML
1000 INJECTION, SOLUTION INTRAMUSCULAR; SUBCUTANEOUS ONCE
Status: CANCELLED | OUTPATIENT
Start: 2024-10-09 | End: 2024-10-09

## 2024-10-02 RX ORDER — SODIUM CHLORIDE 9 MG/ML
20 INJECTION, SOLUTION INTRAVENOUS ONCE
Status: COMPLETED | OUTPATIENT
Start: 2024-10-02 | End: 2024-10-02

## 2024-10-02 RX ORDER — CYANOCOBALAMIN 1000 UG/ML
1000 INJECTION, SOLUTION INTRAMUSCULAR; SUBCUTANEOUS ONCE
Status: COMPLETED | OUTPATIENT
Start: 2024-10-02 | End: 2024-10-02

## 2024-10-02 RX ADMIN — CYANOCOBALAMIN 1000 MCG: 1000 INJECTION, SOLUTION INTRAMUSCULAR at 14:51

## 2024-10-02 RX ADMIN — IRON SUCROSE 200 MG: 20 INJECTION, SOLUTION INTRAVENOUS at 14:51

## 2024-10-02 RX ADMIN — SODIUM CHLORIDE 20 ML/HR: 9 INJECTION, SOLUTION INTRAVENOUS at 14:48

## 2024-10-02 NOTE — PROGRESS NOTES
Pt tolerated  Venofer and B12 today with no adverse reactions.  B12 given in the left deltoid. Declined AVS. Next apt conifrmed for 10/9/24  @ 12:00pm. Left unit ambulatory with a steady gait.

## 2024-10-09 ENCOUNTER — HOSPITAL ENCOUNTER (OUTPATIENT)
Dept: INFUSION CENTER | Facility: HOSPITAL | Age: 49
Discharge: HOME | End: 2024-10-09
Attending: INTERNAL MEDICINE
Payer: COMMERCIAL

## 2024-10-09 VITALS
HEART RATE: 87 BPM | DIASTOLIC BLOOD PRESSURE: 76 MMHG | RESPIRATION RATE: 18 BRPM | TEMPERATURE: 97.8 F | SYSTOLIC BLOOD PRESSURE: 117 MMHG

## 2024-10-09 DIAGNOSIS — Z98.84 HISTORY OF GASTRIC BYPASS: ICD-10-CM

## 2024-10-09 DIAGNOSIS — K95.89 IRON DEFICIENCY ANEMIA FOLLOWING BARIATRIC SURGERY: ICD-10-CM

## 2024-10-09 DIAGNOSIS — D50.8 IRON DEFICIENCY ANEMIA FOLLOWING BARIATRIC SURGERY: ICD-10-CM

## 2024-10-09 DIAGNOSIS — E53.8 VITAMIN B12 DEFICIENCY: Primary | ICD-10-CM

## 2024-10-09 PROCEDURE — 96365 THER/PROPH/DIAG IV INF INIT: CPT

## 2024-10-09 PROCEDURE — 96372 THER/PROPH/DIAG INJ SC/IM: CPT

## 2024-10-09 RX ORDER — CYANOCOBALAMIN 1000 UG/ML
1000 INJECTION, SOLUTION INTRAMUSCULAR; SUBCUTANEOUS ONCE
Status: CANCELLED | OUTPATIENT
Start: 2024-10-16 | End: 2024-10-16

## 2024-10-09 RX ORDER — SODIUM CHLORIDE 9 MG/ML
20 INJECTION, SOLUTION INTRAVENOUS ONCE
Status: CANCELLED | OUTPATIENT
Start: 2024-10-16

## 2024-10-09 RX ORDER — CYANOCOBALAMIN 1000 UG/ML
1000 INJECTION, SOLUTION INTRAMUSCULAR; SUBCUTANEOUS ONCE
Status: COMPLETED | OUTPATIENT
Start: 2024-10-09 | End: 2024-10-09

## 2024-10-09 RX ORDER — SODIUM CHLORIDE 9 MG/ML
20 INJECTION, SOLUTION INTRAVENOUS ONCE
Status: COMPLETED | OUTPATIENT
Start: 2024-10-09 | End: 2024-10-09

## 2024-10-09 RX ADMIN — CYANOCOBALAMIN 1000 MCG: 1000 INJECTION INTRAMUSCULAR; SUBCUTANEOUS at 12:27

## 2024-10-09 RX ADMIN — SODIUM CHLORIDE 20 ML/HR: 0.9 INJECTION, SOLUTION INTRAVENOUS at 12:19

## 2024-10-09 RX ADMIN — IRON SUCROSE 200 MG: 20 INJECTION, SOLUTION INTRAVENOUS at 12:23

## 2024-10-09 NOTE — PROGRESS NOTES
Pt tolerated  Venofer and B12 today with no adverse reactions.  B12 given in the left deltoid. Declined AVS. Next apt conifrmed for 10/16/24  @ 12:00pm. Left unit ambulatory with a steady gait.

## 2024-10-16 ENCOUNTER — HOSPITAL ENCOUNTER (OUTPATIENT)
Dept: INFUSION CENTER | Facility: HOSPITAL | Age: 49
Discharge: HOME | End: 2024-10-16
Attending: INTERNAL MEDICINE
Payer: COMMERCIAL

## 2024-10-16 VITALS
HEART RATE: 85 BPM | SYSTOLIC BLOOD PRESSURE: 111 MMHG | RESPIRATION RATE: 18 BRPM | TEMPERATURE: 96.5 F | DIASTOLIC BLOOD PRESSURE: 63 MMHG

## 2024-10-16 DIAGNOSIS — K95.89 IRON DEFICIENCY ANEMIA FOLLOWING BARIATRIC SURGERY: ICD-10-CM

## 2024-10-16 DIAGNOSIS — E53.8 VITAMIN B12 DEFICIENCY: Primary | ICD-10-CM

## 2024-10-16 DIAGNOSIS — Z98.84 HISTORY OF GASTRIC BYPASS: ICD-10-CM

## 2024-10-16 DIAGNOSIS — D50.8 IRON DEFICIENCY ANEMIA FOLLOWING BARIATRIC SURGERY: ICD-10-CM

## 2024-10-16 PROCEDURE — 96365 THER/PROPH/DIAG IV INF INIT: CPT

## 2024-10-16 PROCEDURE — 96372 THER/PROPH/DIAG INJ SC/IM: CPT

## 2024-10-16 RX ORDER — CYANOCOBALAMIN 1000 UG/ML
1000 INJECTION, SOLUTION INTRAMUSCULAR; SUBCUTANEOUS ONCE
Status: COMPLETED | OUTPATIENT
Start: 2024-10-16 | End: 2024-10-16

## 2024-10-16 RX ORDER — SODIUM CHLORIDE 9 MG/ML
20 INJECTION, SOLUTION INTRAVENOUS ONCE
Status: COMPLETED | OUTPATIENT
Start: 2024-10-16 | End: 2024-10-16

## 2024-10-16 RX ORDER — SODIUM CHLORIDE 9 MG/ML
20 INJECTION, SOLUTION INTRAVENOUS ONCE
Status: CANCELLED | OUTPATIENT
Start: 2024-10-23

## 2024-10-16 RX ORDER — CYANOCOBALAMIN 1000 UG/ML
1000 INJECTION, SOLUTION INTRAMUSCULAR; SUBCUTANEOUS ONCE
Status: CANCELLED | OUTPATIENT
Start: 2024-10-23 | End: 2024-10-23

## 2024-10-16 RX ADMIN — CYANOCOBALAMIN 1000 MCG: 1000 INJECTION INTRAMUSCULAR; SUBCUTANEOUS at 13:57

## 2024-10-16 RX ADMIN — SODIUM CHLORIDE 20 ML/HR: 0.9 INJECTION, SOLUTION INTRAVENOUS at 13:57

## 2024-10-16 RX ADMIN — IRON SUCROSE 200 MG: 20 INJECTION, SOLUTION INTRAVENOUS at 13:57

## 2024-10-16 NOTE — PROGRESS NOTES
Pt tolerated venofer infusion and B12 injection to L deltoid without difficulty.  No s/s reaction noted.  Confirmed next appt on 11/13 at 1100.  AVS declined.  Left ambulatory in stable condition.

## 2024-11-06 NOTE — PROGRESS NOTES
Shelli Umaña 49 y.o. female MRN: [id number]    Encounter: [id number]      Assessment & Plan     Assessment:  This is a 49 y.o.-year-old female with incidental pituitary microadenoma, Hashimoto's, presenting in consult.     Plan:  1. Pituitary microadenoma (HCC)  Assessment & Plan:  Likely 6mm pituitary microadenoma incidentally discovered on brain imaging June 2023    Discussed with patient anatomy, incidence of microadenomas, most often nonfunctional, surgical indications  Will obtain functional workup  Will obtain dedicated MRI pituitary    Will schedule for follow-up in 3 months but if functional workup returns normal and MRI stable, we will plan for 1 year follow-up  2. Hypothyroidism due to Hashimoto's thyroiditis  Assessment & Plan:  Longstanding  Intermittently poor control likely due to weight gain and underdosing  Patient does take bariatric vitamins but these are later in the afternoon and she does take levothyroxine appropriately  Last TSH level was normal on levothyroxine 175 mcg  Orders:  -     Ambulatory Referral to Endocrinology  -     TSH + Free T4; Future  3. Lesion of pituitary gland (HCC)  Assessment & Plan:  Likely 6mm pituitary microadenoma incidentally discovered on brain imaging June 2023    Discussed with patient anatomy, incidence of microadenomas, most often nonfunctional, surgical indications  Will obtain functional workup  Will obtain dedicated MRI pituitary    Will schedule for follow-up in 3 months but if functional workup returns normal and MRI stable, we will plan for 1 year follow-up  Orders:  -     Ambulatory Referral to Endocrinology  -     MRI brain pituitary wo and w contrast; Future; Expected date: 11/07/2024  -     TSH + Free T4; Future  -     IGF-1; Future  -     Prolactin; Future  -     Dexamethasone; Future  -     Cortisol Level,7-9 AM Specimen; Future  -     dexamethasone (DECADRON) 1 mg tablet; Take 1 tablet (1 mg total) by mouth 1 (one) time for 1 dose Take 1  The patient is a 89y Female complaining of fall. tablet at 11pm, then get labs next day 7-9am  4. Menorrhagia with irregular cycle  -     Ambulatory Referral to Obstetrics / Gynecology; Future  5. Iron deficiency anemia following bariatric surgery  Assessment & Plan:  Following with hematology, also getting iron infusions  Does also appear to have menorrhagia, have referred to gynecology for this  6. History of gastric bypass  Assessment & Plan:  2017, following w bariatrics      3m fu., may move to 1yr if nonfunctional and stable     CC: Pituitary microadenoma     History of Present Illness     HPI:  49 y.o. female with past medical history significant for morbid obesity s/p gastric bypass 2017 with weight regain, malabsorption, secondary hyperparathyroidism, anxiety, depression, bipolar, Hashimoto's, prediabetes, CAR, GERD, presenting in consult for hypothyroidism and pituitary lesion.    Hashimoto's hypothyroidism diagnosed 2005  On levothyroxine 175 mcg daily.  Has been on 125-175 in the last 6 years, dose increasing over time.  Takes vitamins later in day   2 oter sisters and mom maybe thyroid issues , underactivity. No family hx thyroid cancers.     8/26/2024 TSH 2.299  1/31/2024 TSH 46.582, free T 40.35 Levothyroxine increased to 175  6/22/2023 TSH 0.43  11/10/2022 TSH 3.88  9/27/2022 TSH 9.960, free T40.69  8/11/2022 TSH 7.3, free T40.61  3/9/2022 TSH 19, free T40.53 Levothyroxine increased to 150  9/2021 TSH 48.158, free T40.43  TSH historically ranging from 0.6-50 going back to 2014    Pituitary lesion  Patient had MRI/MRA brain 6/22/2023 for ataxia incidentally noted with a 0.66 x 0.61 cm pituitary lesion, possibly a pituitary adenoma.  Patient was originally referred for endocrine follow-up after this but did not go. No family history brain tumors, hormonal tumors, pituitary issues.   Still gets periods. Irregular. Sometomes twice a month, sometimes bleeds for weeks at a time.   1 child, age 29    Review of Systems   Constitutional:  Positive for  unexpected weight change (slow gain). Negative for activity change and appetite change (still limited postop).   Eyes:  Positive for visual disturbance (hard time reading up close).   Gastrointestinal:  Positive for abdominal pain (after eating and drinking) and diarrhea. Negative for abdominal distention, nausea and vomiting.   Neurological:  Positive for dizziness (with and without headaches) and headaches (occasional, occipital, a few times a week, sometimes migraines). Negative for weakness and light-headedness.       Historical Information   Past Medical History:   Diagnosis Date    Abscess of labia     Acute and chronic respiratory failure with hypoxia (HCC)     Anemia 2021    Anemia     Anxiety     Atopic dermatitis     Bipolar disorder (HCC)     Breast lump     Colon polyps     Congenital anomaly of breast     COVID-19     Depression, recurrent (HCC) 2021    Frequent headaches     Goiter     2020-ultrasound of the thyroid done by endocrinology shows no evidence of a discrete nodule.    Hemorrhoids     Hypothyroidism 2013    Iron deficiency anemia 2021    Iron deficiency anemia secondary to inadequate dietary iron intake 2021    Migraine     Morbid obesity (HCC) 2013    Obesity     Psychotic disorder (HCC)      Past Surgical History:   Procedure Laterality Date    BARIATRIC SURGERY  2017    gastric bypass    BREAST BIOPSY Right 1999    benign    BREAST LUMPECTOMY Left     benign    CHOLECYSTECTOMY      COLONOSCOPY W/ POLYPECTOMY      GALLBLADDER SURGERY       Social History   Social History     Substance and Sexual Activity   Alcohol Use Never     Social History     Substance and Sexual Activity   Drug Use No     Social History     Tobacco Use   Smoking Status Former    Current packs/day: 0.00    Types: Cigarettes    Quit date:     Years since quittin.8    Passive exposure: Past   Smokeless Tobacco Never   Tobacco Comments    smoker for 2-3 yrs and quit in  2008, smoked about 3 cigarettes a day     Family History:   Family History   Problem Relation Age of Onset    Liver cancer Mother     Prostate cancer Father     No Known Problems Sister     No Known Problems Sister     No Known Problems Sister     No Known Problems Sister     No Known Problems Sister     No Known Problems Sister     No Known Problems Sister     No Known Problems Sister     No Known Problems Sister     No Known Problems Sister     No Known Problems Maternal Grandmother     No Known Problems Maternal Grandfather     No Known Problems Paternal Grandmother     No Known Problems Paternal Grandfather     Cancer Maternal Aunt     Cancer Maternal Aunt     Cancer Maternal Aunt     Cancer Maternal Aunt     Cancer Maternal Aunt     Cancer Maternal Aunt     Cancer Maternal Aunt     No Known Problems Paternal Aunt     No Known Problems Paternal Aunt     No Known Problems Paternal Aunt     No Known Problems Paternal Aunt     Cancer Paternal Aunt     Cancer Paternal Aunt     Breast cancer Neg Hx        Meds/Allergies   Current Outpatient Medications   Medication Sig Dispense Refill    albuterol (PROVENTIL HFA,VENTOLIN HFA) 90 mcg/act inhaler Inhale 2 puffs every 6 (six) hours as needed for wheezing or shortness of breath 8.5 g 5    atorvastatin (LIPITOR) 20 mg tablet TAKE ONE TABLET (20 MG TOTAL) BY MOUTH DAILY 30 tablet 6    benzonatate (TESSALON) 200 MG capsule TAKE 1 CAPSULE (200 MG TOTAL) BY MOUTH DAILY AT BEDTIME AS NEEDED FOR COUGH 30 capsule 0    busPIRone (BUSPAR) 10 mg tablet Take 1 tablet (10 mg total) by mouth 2 (two) times a day 60 tablet 3    cyanocobalamin (VITAMIN B-12) 1000 MCG tablet Take 1 tablet (1,000 mcg total) by mouth daily 90 tablet 3    dexamethasone (DECADRON) 1 mg tablet Take 1 tablet (1 mg total) by mouth 1 (one) time for 1 dose Take 1 tablet at 11pm, then get labs next day 7-9am 1 tablet 0    dicyclomine (BENTYL) 10 mg capsule TAKE 1 CAPSULE (10 MG TOTAL) BY MOUTH 2 (TWO) TIMES A DAY  "AS NEEDED (ABD PAIN) 60 capsule 5    ergocalciferol (VITAMIN D2) 50,000 units Take 1 capsule (50,000 Units total) by mouth 2 (two) times a week 24 capsule 0    escitalopram (LEXAPRO) 20 mg tablet TAKE ONE TABLET (20 MG TOTAL) BY MOUTH DAILY 90 tablet 2    fluticasone (FLONASE) 50 mcg/act nasal spray 1 spray into each nostril daily 9.9 mL 2    folic acid (FOLVITE) 1 mg tablet Take 1 tablet (1 mg total) by mouth daily 90 tablet 4    levothyroxine 175 mcg tablet Take 1 tablet (175 mcg total) by mouth daily in the early morning 90 tablet 1    lidocaine (Lidoderm) 5 % Apply 2 patches topically over 12 hours daily Remove & Discard patch within 12 hours or as directed by MD. Place 1 patch to right chest and 1 to back daily. 15 patch 2    metoprolol tartrate (LOPRESSOR) 25 mg tablet TAKE 1/2 TABLET(12.5 MG) BY MOUTH EVERY 12 HOURS 90 tablet 4    mometasone-formoterol (Dulera) 200-5 MCG/ACT inhaler Inhale 2 puffs 2 (two) times a day Rinse mouth after use. 13 g 2    Multiple Vitamins-Minerals (Bariatric Multivitamins/Iron) CAPS Take 1 capsule by mouth in the morning 90 capsule 3    pantoprazole (PROTONIX) 40 mg tablet Take 1 tablet (40 mg total) by mouth every 24 hours 90 tablet 1    traZODone (DESYREL) 100 mg tablet TAKE ONE TABLET (100 MG TOTAL) BY MOUTH DAILY AT BEDTIME 90 tablet 1    valACYclovir (VALTREX) 500 mg tablet        No current facility-administered medications for this visit.     No Known Allergies    Objective   Vitals: Blood pressure 116/70, height 5' 8\" (1.727 m), weight (!) 136 kg (300 lb 9.6 oz), not currently breastfeeding.    Physical Exam  Constitutional:       General: She is not in acute distress.     Appearance: Normal appearance. She is morbidly obese. She is not ill-appearing, toxic-appearing or diaphoretic.   HENT:      Head: Normocephalic and atraumatic.      Nose: Nose normal.   Eyes:      Extraocular Movements: Extraocular movements intact.      Conjunctiva/sclera: Conjunctivae normal.      " Pupils: Pupils are equal, round, and reactive to light.   Neck:      Thyroid: No thyroid mass, thyromegaly or thyroid tenderness.      Comments: + Skin tags and acanthosis nigricans  Pulmonary:      Effort: Pulmonary effort is normal. No respiratory distress.   Abdominal:      General: There is no distension.   Musculoskeletal:         General: No deformity.      Right lower leg: No edema.      Left lower leg: No edema.   Skin:     General: Skin is warm and dry.   Neurological:      General: No focal deficit present.      Mental Status: She is alert. Mental status is at baseline.   Psychiatric:         Mood and Affect: Mood normal.         Behavior: Behavior normal.         Thought Content: Thought content normal.         The history was obtained from the review of the chart, patient.    Lab Results:   Lab Results   Component Value Date/Time    Potassium 4.0 09/05/2024 03:28 PM    Potassium 4.1 08/26/2024 09:40 AM    Potassium 4.2 01/31/2024 08:31 AM    Chloride 105 09/05/2024 03:28 PM    Chloride 105 08/26/2024 09:40 AM    Chloride 106 01/31/2024 08:31 AM    CO2 24 09/05/2024 03:28 PM    CO2 23 08/26/2024 09:40 AM    CO2 25 01/31/2024 08:31 AM    BUN 8 09/05/2024 03:28 PM    BUN 12 08/26/2024 09:40 AM    BUN 10 01/31/2024 08:31 AM    Creatinine 0.89 09/05/2024 03:28 PM    Creatinine 0.92 08/26/2024 09:40 AM    Creatinine 0.89 01/31/2024 08:31 AM    Glucose, Fasting 80 08/26/2024 09:40 AM    Glucose, Fasting 79 01/31/2024 08:31 AM    Calcium 8.8 09/05/2024 03:28 PM    Calcium 8.9 08/26/2024 09:40 AM    Calcium 8.5 01/31/2024 08:31 AM    eGFR 77 09/05/2024 03:28 PM    eGFR 74 08/26/2024 09:40 AM    eGFR 77 01/31/2024 08:31 AM    TSH 3RD GENERATON 2.299 08/26/2024 09:40 AM    TSH 3RD GENERATON 46.582 (H) 01/31/2024 08:31 AM    Free T4 0.35 (L) 01/31/2024 08:31 AM             Imaging Studies:    MRA AND OR MRV HEAD WO CONTRAST  Order: [id number]  Impression    IMPRESSION:    1. No evidence of acute/subacute ischemia or  pathologic intracranial  enhancement.  2. Pituitary region lesion, likely adenoma, within the central pituitary gland  measuring 0.66 x 0.61 cm. Suggest endocrinologic correlation. This can be  further evaluated with a dedicated pituitary study, in the proper clinical  setting.  3. No evidence of high-grade stenosis or large vessel occlusion within the  cervical or intracranial vascular injury.              Workstation:[id number]  Narrative    Examination: MRI brain with and without contrast. MRA head without contrast. MRA  neck, with and without contrast.    INDICATION: Ataxia, right hip burning sensation    TECHNIQUE: Multiplanar multi sequential imaging performed of the brain  parenchyma, prior to 20 cc Dotarem. 3-D time-of-flight MR angiography performed  the intracranial circulation. Time-of-flight and gadolinium-enhanced MR  angiography performed of the cervical vasculature.    FINDINGS:    MRI BRAIN: No evidence of acute intracranial hemorrhage, mass effect, midline  shift, or acute/subacute infarction. The sulci and the ventricles are normal in  size for age. No extra-axial collections.    No pathologic enhancement is visualized within the brain parenchyma or  extra-axial spaces.    The flow voids at the skull base remain preserved. Orbits are normal. The  paranasal sinuses are clear.    There is a lesion within the pituitary gland measuring approximately 0.66 x 0.61  cm. This could reflect a pituitary adenoma the proper clinical setting. This can  be further evaluated with a dedicated MRI through the pituitary gland as needed.      MRA head:    Intracranial portion of the internal carotid arteries, middle cerebral arteries,  and anterior cerebral arteries normal in course and contour.    The distal vertebral arteries, basilar artery, and posterior cerebral arteries  are patent. There are fetal origin to the posterior cerebral artery with  developmentally small vertebral basilar system.    MRA NECK: There is a  "three-vessel origin to the aortic arch. Innominate,  proximal right subclavian, right common, and right internal carotid arteries  normal in course and contour. Left common carotid artery and left internal  carotid artery are normal in course and contour.    Proximal left subclavian artery is normal.    The vertebral arteries are patent bilaterally. Vertebral arteries are  developmentally small.    Portions of the record may have been created with voice recognition software. Occasional wrong word or \"sound a like\" substitutions may have occurred due to the inherent limitations of voice recognition software. Read the chart carefully and recognize, using context, where substitutions have occurred.    "

## 2024-11-07 ENCOUNTER — CONSULT (OUTPATIENT)
Dept: ENDOCRINOLOGY | Facility: CLINIC | Age: 49
End: 2024-11-07
Payer: COMMERCIAL

## 2024-11-07 VITALS
DIASTOLIC BLOOD PRESSURE: 70 MMHG | HEIGHT: 68 IN | BODY MASS INDEX: 44.41 KG/M2 | SYSTOLIC BLOOD PRESSURE: 116 MMHG | WEIGHT: 293 LBS

## 2024-11-07 DIAGNOSIS — D35.2 PITUITARY MICROADENOMA (HCC): Primary | ICD-10-CM

## 2024-11-07 DIAGNOSIS — E23.7 LESION OF PITUITARY GLAND (HCC): ICD-10-CM

## 2024-11-07 DIAGNOSIS — Z98.84 HISTORY OF GASTRIC BYPASS: ICD-10-CM

## 2024-11-07 DIAGNOSIS — N92.1 MENORRHAGIA WITH IRREGULAR CYCLE: ICD-10-CM

## 2024-11-07 DIAGNOSIS — K95.89 IRON DEFICIENCY ANEMIA FOLLOWING BARIATRIC SURGERY: ICD-10-CM

## 2024-11-07 DIAGNOSIS — E06.3 HYPOTHYROIDISM DUE TO HASHIMOTO'S THYROIDITIS: ICD-10-CM

## 2024-11-07 DIAGNOSIS — D50.8 IRON DEFICIENCY ANEMIA FOLLOWING BARIATRIC SURGERY: ICD-10-CM

## 2024-11-07 PROCEDURE — 99244 OFF/OP CNSLTJ NEW/EST MOD 40: CPT | Performed by: STUDENT IN AN ORGANIZED HEALTH CARE EDUCATION/TRAINING PROGRAM

## 2024-11-07 RX ORDER — DEXAMETHASONE 1 MG
1 TABLET ORAL ONCE
Qty: 1 TABLET | Refills: 0 | Status: SHIPPED | OUTPATIENT
Start: 2024-11-07 | End: 2024-11-07

## 2024-11-07 NOTE — ASSESSMENT & PLAN NOTE
Following with hematology, also getting iron infusions  Does also appear to have menorrhagia, have referred to gynecology for this

## 2024-11-07 NOTE — ASSESSMENT & PLAN NOTE
Longstanding  Intermittently poor control likely due to weight gain and underdosing  Patient does take bariatric vitamins but these are later in the afternoon and she does take levothyroxine appropriately  Last TSH level was normal on levothyroxine 175 mcg

## 2024-11-07 NOTE — ASSESSMENT & PLAN NOTE
Likely 6mm pituitary microadenoma incidentally discovered on brain imaging June 2023    Discussed with patient anatomy, incidence of microadenomas, most often nonfunctional, surgical indications  Will obtain functional workup  Will obtain dedicated MRI pituitary    Will schedule for follow-up in 3 months but if functional workup returns normal and MRI stable, we will plan for 1 year follow-up

## 2024-11-07 NOTE — PATIENT INSTRUCTIONS
--get MRI  --Gets labs  --follow up for now in 3 months, if everything normal, will move to 1 year     Dexamethasone suppression test for cortisol excess/Cushing's syndrome:     a 1mg tablet of dexamethasone from the pharmacy.      Take the 1 mg dexamethasone tablet at 11 PM, night before you plan to get labs.      Obtain bloodwork by 9 AM the next morning; for serum cortisol, the bloodwork must be done early in the morning, otherwise the tablet will wear off.     A NORMAL test result is a LOW cortisol level; this means that your body was able to successfully suppress your natural secretion in response to the steroid pill.

## 2024-11-09 DIAGNOSIS — E06.3 HYPOTHYROIDISM DUE TO HASHIMOTO'S THYROIDITIS: ICD-10-CM

## 2024-11-11 ENCOUNTER — OFFICE VISIT (OUTPATIENT)
Dept: BARIATRICS | Facility: CLINIC | Age: 49
End: 2024-11-11
Payer: COMMERCIAL

## 2024-11-11 VITALS
HEIGHT: 66 IN | SYSTOLIC BLOOD PRESSURE: 120 MMHG | BODY MASS INDEX: 47.09 KG/M2 | HEART RATE: 79 BPM | DIASTOLIC BLOOD PRESSURE: 78 MMHG | TEMPERATURE: 98.5 F | WEIGHT: 293 LBS

## 2024-11-11 DIAGNOSIS — E78.5 HYPERLIPIDEMIA, UNSPECIFIED HYPERLIPIDEMIA TYPE: ICD-10-CM

## 2024-11-11 DIAGNOSIS — Z48.815 ENCOUNTER FOR SURGICAL AFTERCARE FOLLOWING SURGERY OF DIGESTIVE SYSTEM: Primary | ICD-10-CM

## 2024-11-11 DIAGNOSIS — E66.01 OBESITY, CLASS III, BMI 40-49.9 (MORBID OBESITY) (HCC): ICD-10-CM

## 2024-11-11 DIAGNOSIS — E53.8 VITAMIN B12 DEFICIENCY: Primary | ICD-10-CM

## 2024-11-11 DIAGNOSIS — Z98.84 BARIATRIC SURGERY STATUS: ICD-10-CM

## 2024-11-11 DIAGNOSIS — Z98.84 HISTORY OF GASTRIC BYPASS: ICD-10-CM

## 2024-11-11 DIAGNOSIS — G47.33 OSA (OBSTRUCTIVE SLEEP APNEA): ICD-10-CM

## 2024-11-11 PROCEDURE — 99214 OFFICE O/P EST MOD 30 MIN: CPT | Performed by: NURSE PRACTITIONER

## 2024-11-11 RX ORDER — TIRZEPATIDE 2.5 MG/.5ML
2.5 INJECTION, SOLUTION SUBCUTANEOUS WEEKLY
Qty: 2 ML | Refills: 0 | Status: SHIPPED | OUTPATIENT
Start: 2024-11-11 | End: 2024-12-09

## 2024-11-11 RX ORDER — CYANOCOBALAMIN 1000 UG/ML
1000 INJECTION, SOLUTION INTRAMUSCULAR; SUBCUTANEOUS ONCE
Status: CANCELLED | OUTPATIENT
Start: 2024-11-13

## 2024-11-11 RX ORDER — LEVOTHYROXINE SODIUM 175 UG/1
175 TABLET ORAL
Qty: 90 TABLET | Refills: 1 | Status: SHIPPED | OUTPATIENT
Start: 2024-11-11 | End: 2024-11-18 | Stop reason: SDUPTHER

## 2024-11-11 NOTE — PATIENT INSTRUCTIONS
I have sent Zebound to your pharmacy. The prior authorization process will been done through our prior authorization team and can take up to 3-4 weeks to process through the insurance.     - Start Zepbound 2.5 mg subcutaneously weekly. After you have taken the second pen, please give me an update, as we will likely increase the dose the next month if you are tolerating it well.     - Side effects of Zepbound include nausea, vomiting, diarrhea, or constipation. Keep an eye on your heart rate while on Zepbound. If you resting heart rate is greater than 100 beats per minutes, please notify me. If you develop severe abdominal pain, stop Zepbound and go to the emergency room, as that could be a sign of pancreatitis.     - Please notify me if you have surgery, upper endoscopy, or colonoscopy scheduled, as we typically hold Zepbound for one week prior to the procedure.     - Zepbound can reduce the effectiveness of oral hormonal birth control (birth control pills). Recommend a barrier backup method such as condoms to prevent pregnancy.

## 2024-11-11 NOTE — PROGRESS NOTES
Date of surgery:2017  Procedure: RNY   Performing surgeon:?    Initial Weight - 305 lb  Current Weight -303.5 lb  [place] Weight - 264 lb  Total Body Weight Loss (EWL)-46.5%   EWL% - 24%  TWB % -13%

## 2024-11-11 NOTE — PROGRESS NOTES
Assessment/Plan:    OBESITY/BMI 48  -Discussed role of weight loss medications.  -Initial weight loss goal of 5-10% weight loss for improved overall health  -Reviewed Screening labs  -Patient is interested in pursuing Zepbound  Discussed medication options  Recommend treatment with Zepbound  Medication Contract Signed   Discussed expected weight loss of approximately 20% along with lifestyle modifications  Discussed risks/side effects of medication and demonstrated pen device  Recommend small/low fat meals and stop eating when full to avoid side effects.  Discussed importance of adequate protein intake and strength training to reduce risk of muscle loss.   Discussed need to stop medication for at least 1 week prior to planned surgery/endoscopy  Denies hx Pancreatitis or FH of Medullary cell Thyroid CA/MEN2 syndrome    Start Zepbound  2.5mg weekly x 4 weeks  Advised to contact office in 2 weeks with update regarding tolerability and at that time will increase to 5mg dose if tolerating medication with no significant side effects.      Follow up in approximately  4 months  with Surgical Advanced Practitioner.  Goals:  Food log (ie.) www.[email or web address].[email or web address],sparkpeople.com,loseit.com,calorieking.com,etc. baritastic  No sugary beverages. At least 64oz of water daily.  Increase physical activity by 10 minutes daily. Gradually increase physical activity to a goal of 5 days per week for 30 minutes of MODERATE intensity PLUS 2 days per week of FULL BODY resistance training  5-10 servings of fruits and vegetables per day and 25-35 grams of dietary fiber per day, gradually increasing  Food log (ie.) www.[email or web address].[email or web address],[email or web address],[email or web address],calorieking.com,etc. , No sugary beverages. At least 64oz of water daily., Practice 30/60 rule, Gradually increase physical activity to a goal of 5 days per week for 30 minutes of MODERATE intensity PLUS 2 days per week of FULL BODY resistance training, Continue to take recommended  bariatric vitamins as directed, Goal protein intake of 60-80 grams per day, 5-10 servings of fruits and vegetables per day, 25-35 grams of dietary fiber per day, and 0691-2380 calories per day  - discussed to NOT SKIP MEALS. Break up meals into small portions to eat throughout the day to help with increase in protein intake.       CAR - has a hx of sleep apnea. Had a complete PFT done however was unable to complete d/t intolerance. She developed nasuea and lightheadedness and was taken to the ER. Anticipate improvement of her breathing with further weight loss.     HLD - high LDL in 08/2024 - 110.0. Recently started on lipitor 20 mg PO QD. Anticipate improvement with further weight loss.     Diagnoses and all orders for this visit:    Encounter for surgical aftercare following surgery of digestive system    Bariatric surgery status    Obesity, Class III, BMI 40-49.9 (morbid obesity) (HCC)  -     tirzepatide (Zepbound) 2.5 mg/0.5 mL auto-injector; Inject 0.5 mL (2.5 mg total) under the skin once a week for 28 days    Body mass index 45.0-49.9, adult (HCC)  -     tirzepatide (Zepbound) 2.5 mg/0.5 mL auto-injector; Inject 0.5 mL (2.5 mg total) under the skin once a week for 28 days    CAR (obstructive sleep apnea)  -     tirzepatide (Zepbound) 2.5 mg/0.5 mL auto-injector; Inject 0.5 mL (2.5 mg total) under the skin once a week for 28 days    Hyperlipidemia, unspecified hyperlipidemia type  -     tirzepatide (Zepbound) 2.5 mg/0.5 mL auto-injector; Inject 0.5 mL (2.5 mg total) under the skin once a week for 28 days        Subjective:   Chief Complaint   Patient presents with    Consult     PO Wt /Gain - Consult      Patient ID: Shelli Umaña  is a 49 y.o. female with excess weight/obesity here to pursue medical weight management. -s/p-s/p Efrain-En-Y Gastric Bypass with Dr. Chan at [facility] in 2016.  Here for MWM consult.     Past Medical History:   Diagnosis Date    Abscess of labia     Acute and chronic respiratory  failure with hypoxia (HCC)     Anemia 04/06/2021    Anemia     Anxiety     Atopic dermatitis     Bipolar disorder (HCC)     Breast lump     Colon polyps     Congenital anomaly of breast     COVID-19     Depression, recurrent (HCC) 09/21/2021    Frequent headaches     Goiter     07/29/2020-ultrasound of the thyroid done by endocrinology shows no evidence of a discrete nodule.    Hemorrhoids     Hypothyroidism 07/01/2013    Iron deficiency anemia 05/26/2021    Iron deficiency anemia secondary to inadequate dietary iron intake 06/25/2021    Migraine     Morbid obesity (HCC) 07/01/2013    Obesity     Psychotic disorder (HCC)        HPI:  Obesity/Excess Weight:   BMI 48.84  Severity: Very Severe  Onset:  since 2005 when she found out she had thyroid issues - hashimotos.    Modifiers: Diet and Exercise and BARIATRIC SURGERY   Contributing factors: Insufficient Physical Activity, Stress/Emotional Eating, Lack of knowledge of appropriate lifestyle changes, Medications, Depression, Insufficient time to make appropriate lifestyle changes, and hashimotos/thyroid issues.   Associated symptoms: comorbid conditions, fatigue, increased joint pain, decreased exercise capacity, body image issues, decreased self esteem, increased shortness of breath, decreased mobility, depression, inability to do certain activities, and clothes do not fit  Colonoscopy-Completed    Highest weight 305 LBS  Current Weight 303.5 LBS  Maicol 260 lbs   Goal - No goal - but wants to feel better and thinner. Possibly 160 lbs  5% weight loss - 15.75 lbs (288.3 lbs  20% weight loss - 60.7 lbs (242.8 lbs)    Hydration: 32 OZ OF WATER, GATORADE ZERO OR ZERO SUGAR/CALORIE DRINKS  Alcohol: NONE  Exercise: walking but at times limited due to fatigue.   Dining out:  BARELY   Occupation: disabled  Sleep: 5 hrs  STOPBANG: has CAR.     B: scrambled eggs, goodson, bread  S: skip  L:  skip  S: crackers and cheese  D: rice, protein, beans  S: Sweets - donuts, sometimes ice  cream    Wt Readings from Last 3 Encounters:   11/11/24 (!) 138 kg (303 lb 8 oz)   11/07/24 (!) 136 kg (300 lb 9.6 oz)   09/05/24 (!) 138 kg (304 lb)           Patient is not pregnant/breastfeeding (metformin only)  Patient denies personal and family history of  pancreatitis, thyroid cancer, MEN-2 tumors. (Consider for GLP-1 Agonists)  Denies any hx of glaucoma, seizures, kidney stones, gallstones. (Consider for topamax)  Has Hx of Palpitations, HTN, (contraindicated for phentermine or Wellbutrin)    The following portions of the patient's history were reviewed and updated as appropriate: allergies, current medications, past family history, past medical history, past social history, past surgical history, and problem list.    Past Medical History:   Diagnosis Date    Abscess of labia     Acute and chronic respiratory failure with hypoxia (HCC)     Anemia 04/06/2021    Anemia     Anxiety     Atopic dermatitis     Bipolar disorder (HCC)     Breast lump     Colon polyps     Congenital anomaly of breast     COVID-19     Depression, recurrent (HCC) 09/21/2021    Frequent headaches     Goiter     07/29/2020-ultrasound of the thyroid done by endocrinology shows no evidence of a discrete nodule.    Hemorrhoids     Hypothyroidism 07/01/2013    Iron deficiency anemia 05/26/2021    Iron deficiency anemia secondary to inadequate dietary iron intake 06/25/2021    Migraine     Morbid obesity (HCC) 07/01/2013    Obesity     Psychotic disorder (HCC)      Past Surgical History:   Procedure Laterality Date    BARIATRIC SURGERY  2017    gastric bypass    BREAST BIOPSY Right 1999    benign    BREAST LUMPECTOMY Left     benign    CHOLECYSTECTOMY      COLONOSCOPY W/ POLYPECTOMY      GALLBLADDER SURGERY         Current Outpatient Medications:     albuterol (PROVENTIL HFA,VENTOLIN HFA) 90 mcg/act inhaler, Inhale 2 puffs every 6 (six) hours as needed for wheezing or shortness of breath, Disp: 8.5 g, Rfl: 5    atorvastatin (LIPITOR) 20 mg  tablet, TAKE ONE TABLET (20 MG TOTAL) BY MOUTH DAILY, Disp: 30 tablet, Rfl: 6    benzonatate (TESSALON) 200 MG capsule, TAKE 1 CAPSULE (200 MG TOTAL) BY MOUTH DAILY AT BEDTIME AS NEEDED FOR COUGH, Disp: 30 capsule, Rfl: 0    busPIRone (BUSPAR) 10 mg tablet, Take 1 tablet (10 mg total) by mouth 2 (two) times a day, Disp: 60 tablet, Rfl: 3    cyanocobalamin (VITAMIN B-12) 1000 MCG tablet, Take 1 tablet (1,000 mcg total) by mouth daily, Disp: 90 tablet, Rfl: 3    dexamethasone (DECADRON) 1 mg tablet, TAKE 1 TABLET (1 MG TOTAL) BY MOUTH 1 (ONE) TIME FOR 1 DOSE TAKE 1 TABLET AT 11PM, THEN GET LABS NEXT DAY 7-9AM, Disp: , Rfl:     dicyclomine (BENTYL) 10 mg capsule, TAKE 1 CAPSULE (10 MG TOTAL) BY MOUTH 2 (TWO) TIMES A DAY AS NEEDED (ABD PAIN), Disp: 60 capsule, Rfl: 5    ergocalciferol (VITAMIN D2) 50,000 units, Take 1 capsule (50,000 Units total) by mouth 2 (two) times a week, Disp: 24 capsule, Rfl: 0    escitalopram (LEXAPRO) 20 mg tablet, TAKE ONE TABLET (20 MG TOTAL) BY MOUTH DAILY, Disp: 90 tablet, Rfl: 2    fluticasone (FLONASE) 50 mcg/act nasal spray, 1 spray into each nostril daily, Disp: 9.9 mL, Rfl: 2    folic acid (FOLVITE) 1 mg tablet, Take 1 tablet (1 mg total) by mouth daily, Disp: 90 tablet, Rfl: 4    levothyroxine 175 mcg tablet, Take 1 tablet (175 mcg total) by mouth daily in the early morning, Disp: 90 tablet, Rfl: 1    lidocaine (Lidoderm) 5 %, Apply 2 patches topically over 12 hours daily Remove & Discard patch within 12 hours or as directed by MD. Place 1 patch to right chest and 1 to back daily., Disp: 15 patch, Rfl: 2    metoprolol tartrate (LOPRESSOR) 25 mg tablet, TAKE 1/2 TABLET(12.5 MG) BY MOUTH EVERY 12 HOURS, Disp: 90 tablet, Rfl: 4    mometasone-formoterol (Dulera) 200-5 MCG/ACT inhaler, Inhale 2 puffs 2 (two) times a day Rinse mouth after use., Disp: 13 g, Rfl: 2    Multiple Vitamins-Minerals (Bariatric Multivitamins/Iron) CAPS, Take 1 capsule by mouth in the morning, Disp: 90 capsule, Rfl:  "3    pantoprazole (PROTONIX) 40 mg tablet, Take 1 tablet (40 mg total) by mouth every 24 hours, Disp: 90 tablet, Rfl: 1    tirzepatide (Zepbound) 2.5 mg/0.5 mL auto-injector, Inject 0.5 mL (2.5 mg total) under the skin once a week for 28 days, Disp: 2 mL, Rfl: 0    traZODone (DESYREL) 100 mg tablet, TAKE ONE TABLET (100 MG TOTAL) BY MOUTH DAILY AT BEDTIME, Disp: 90 tablet, Rfl: 1    valACYclovir (VALTREX) 500 mg tablet, , Disp: , Rfl:     Review of Systems   Constitutional:  Positive for activity change, appetite change, fatigue and unexpected weight change.   Respiratory: Negative.     Cardiovascular: Negative.    Gastrointestinal: Negative.    Musculoskeletal:  Positive for arthralgias and back pain.   Neurological: Negative.    Psychiatric/Behavioral: Negative.         Objective:    /78   Pulse 79   Temp 98.5 °F (36.9 °C) (Tympanic)   Ht 5' 6.1\" (1.679 m)   Wt (!) 138 kg (303 lb 8 oz)   BMI 48.84 kg/m²     Physical Exam  Vitals and nursing note reviewed.   Constitutional:       Appearance: Normal appearance. She is obese.   Cardiovascular:      Rate and Rhythm: Normal rate and regular rhythm.      Pulses: Normal pulses.      Heart sounds: Normal heart sounds.   Pulmonary:      Effort: Pulmonary effort is normal.      Breath sounds: Normal breath sounds.   Abdominal:      General: Bowel sounds are normal.      Palpations: Abdomen is soft.      Tenderness: There is no abdominal tenderness.   Musculoskeletal:         General: Normal range of motion.   Skin:     General: Skin is warm and dry.   Neurological:      General: No focal deficit present.      Mental Status: She is alert and oriented to person, place, and time.   Psychiatric:         Mood and Affect: Mood normal.         Behavior: Behavior normal.         Thought Content: Thought content normal.         Judgment: Judgment normal.         "

## 2024-11-12 ENCOUNTER — TELEPHONE (OUTPATIENT)
Dept: BARIATRICS | Facility: CLINIC | Age: 49
End: 2024-11-12

## 2024-11-12 NOTE — TELEPHONE ENCOUNTER
LVM to Pt ,her pharmacy request refill for Vitamin D ,Pt has to do blood work first , order  for Blood work is in .

## 2024-11-13 ENCOUNTER — HOSPITAL ENCOUNTER (OUTPATIENT)
Dept: INFUSION CENTER | Facility: HOSPITAL | Age: 49
Discharge: HOME | End: 2024-11-13
Attending: INTERNAL MEDICINE
Payer: COMMERCIAL

## 2024-11-13 ENCOUNTER — TELEPHONE (OUTPATIENT)
Dept: BARIATRICS | Facility: CLINIC | Age: 49
End: 2024-11-13

## 2024-11-13 DIAGNOSIS — Z98.84 HISTORY OF GASTRIC BYPASS: Primary | ICD-10-CM

## 2024-11-13 DIAGNOSIS — E53.8 VITAMIN B12 DEFICIENCY: ICD-10-CM

## 2024-11-13 PROCEDURE — 96372 THER/PROPH/DIAG INJ SC/IM: CPT

## 2024-11-13 RX ORDER — CYANOCOBALAMIN 1000 UG/ML
1000 INJECTION, SOLUTION INTRAMUSCULAR; SUBCUTANEOUS ONCE
OUTPATIENT
Start: 2024-12-11

## 2024-11-13 RX ORDER — CYANOCOBALAMIN 1000 UG/ML
1000 INJECTION, SOLUTION INTRAMUSCULAR; SUBCUTANEOUS ONCE
Status: COMPLETED | OUTPATIENT
Start: 2024-11-13 | End: 2024-11-13

## 2024-11-13 RX ADMIN — CYANOCOBALAMIN 1000 MCG: 1000 INJECTION INTRAMUSCULAR; SUBCUTANEOUS at 11:10

## 2024-11-13 NOTE — TELEPHONE ENCOUNTER
PA for Zepbound 2.5mgSUBMITTED to Medicare RX    via    [x]CMM-KEY: [id number]  []Surescripts-Case ID #    []Availity-Auth ID #  NDC #    []Faxed to plan   []Other website    []Phone call Case ID #      []PA sent as URGENT    All office notes, labs and other pertaining documents and studies sent. Clinical questions answered. Awaiting determination from insurance company.     Turnaround time for your insurance to make a decision on your Prior Authorization can take 7-21 business days.

## 2024-11-13 NOTE — PROGRESS NOTES
Shelli Umaña  tolerated b12 well with no complications. Aware of future appt on 12/11/24 at 1100. AVS declined. Patient left clinic ambulatory.

## 2024-11-15 NOTE — TELEPHONE ENCOUNTER
PA for Zepbound 2.5 mg DENIED    Reason:(Screenshot if applicable)        Message sent to office clinical pool Yes    Denial letter scanned into Media Yes    Appeal started No (Provider will need to decide if appeal is warranted and send clinical documentation to Prior Authorization Team for initiation.)    **Please follow up with your patient regarding denial and next steps**   LVM to Pt to  call office back , to let her know  Insurance deny our request for Zepbound 2.5 mg

## 2024-11-18 ENCOUNTER — OFFICE VISIT (OUTPATIENT)
Dept: FAMILY MEDICINE CLINIC | Facility: CLINIC | Age: 49
End: 2024-11-18
Payer: COMMERCIAL

## 2024-11-18 VITALS
OXYGEN SATURATION: 99 % | HEIGHT: 66 IN | TEMPERATURE: 97.1 F | WEIGHT: 293 LBS | HEART RATE: 88 BPM | SYSTOLIC BLOOD PRESSURE: 108 MMHG | DIASTOLIC BLOOD PRESSURE: 80 MMHG | BODY MASS INDEX: 47.09 KG/M2

## 2024-11-18 DIAGNOSIS — E78.5 HYPERLIPIDEMIA, UNSPECIFIED HYPERLIPIDEMIA TYPE: ICD-10-CM

## 2024-11-18 DIAGNOSIS — E53.8 VITAMIN B12 DEFICIENCY: ICD-10-CM

## 2024-11-18 DIAGNOSIS — K95.89 IRON DEFICIENCY ANEMIA FOLLOWING BARIATRIC SURGERY: ICD-10-CM

## 2024-11-18 DIAGNOSIS — F32.A ANXIETY AND DEPRESSION: ICD-10-CM

## 2024-11-18 DIAGNOSIS — U09.9 POST-COVID-19 SYNDROME MANIFESTING AS CHRONIC COUGH: ICD-10-CM

## 2024-11-18 DIAGNOSIS — E06.3 HYPOTHYROIDISM DUE TO HASHIMOTO'S THYROIDITIS: ICD-10-CM

## 2024-11-18 DIAGNOSIS — E55.9 VITAMIN D DEFICIENCY: ICD-10-CM

## 2024-11-18 DIAGNOSIS — D50.8 IRON DEFICIENCY ANEMIA FOLLOWING BARIATRIC SURGERY: ICD-10-CM

## 2024-11-18 DIAGNOSIS — R73.03 PREDIABETES: ICD-10-CM

## 2024-11-18 DIAGNOSIS — F41.9 ANXIETY AND DEPRESSION: ICD-10-CM

## 2024-11-18 DIAGNOSIS — G47.33 OSA (OBSTRUCTIVE SLEEP APNEA): ICD-10-CM

## 2024-11-18 DIAGNOSIS — Z98.84 HISTORY OF GASTRIC BYPASS: ICD-10-CM

## 2024-11-18 DIAGNOSIS — Z09 FOLLOW-UP EXAM: Primary | ICD-10-CM

## 2024-11-18 DIAGNOSIS — R05.3 POST-COVID-19 SYNDROME MANIFESTING AS CHRONIC COUGH: ICD-10-CM

## 2024-11-18 PROCEDURE — 99214 OFFICE O/P EST MOD 30 MIN: CPT

## 2024-11-18 RX ORDER — BENZONATATE 200 MG/1
200 CAPSULE ORAL
Qty: 30 CAPSULE | Refills: 0 | Status: SHIPPED | OUTPATIENT
Start: 2024-11-18

## 2024-11-18 RX ORDER — LEVOTHYROXINE SODIUM 175 UG/1
175 TABLET ORAL
Qty: 90 TABLET | Refills: 1 | Status: SHIPPED | OUTPATIENT
Start: 2024-11-18

## 2024-11-18 NOTE — PROGRESS NOTES
Name: Shelli Umaña      : 1975      MRN: [id number]  Encounter Provider: THOMAS Galvez  Encounter Date: 2024   Encounter department: [facility]  :  Assessment & Plan  Follow-up exam         Hypothyroidism due to Hashimoto's thyroiditis  Under the management of endocrinology    TSH 46.582    TSH 2.299    Current medication: Levothyroxine 175mcg    Orders:    levothyroxine 175 mcg tablet; Take 1 tablet (175 mcg total) by mouth daily in the early morning    CAR (obstructive sleep apnea)  Under the management of pulmonolgy  Mild CAR  Previously wore CPAP at night but no longer wears   Failed PFT on 24 due to nausea and lightheadedness       Iron deficiency anemia following bariatric surgery  Under the management of hematology   Receives infusions        Anxiety and depression  Current medication: Buspirone 10mg, Escitalopram 20mg, Trazodone 100mg  Continue current medication regimen        History of gastric bypass  Surgery in    Under the management of Bariatrics   Recently started on Zepbound        Hyperlipidemia, unspecified hyperlipidemia type   Lipid panel   Cholesterol 223, Triglycerides 125, , HDL 59   Lipid panel   Cholesterol 195, Triglycerides 108, , HDL 63  Current Medications: Atorvastatin 20mg   Continue current medication regimen   Lifestyle modification: Incorporate regular exercise, avoid sugars and simple carbohydrates, weight loss and choosing healthier fats.         Prediabetes   Last A1C 5.6    A1       Vitamin B12 deficiency  Receives infusion        Vitamin D deficiency  Current medication: Ergocalciferol 50,000units weekly        Post-COVID-19 syndrome manifesting as chronic cough  Post-Covid syndrome include cough and shortness of breath   Current medications  Albuterol 90mcg, Benzonatate 200mg, Flonase 50mcg, Furosemide 20mg, Lidocaine (Patch), and Dulera (Mometasone-Formoterol  200-5mcg) inhaler  Wears 2L of oxygen at night   Still gets fatigued after walking short distances   Follows with pulmonology     Recent imaging   CT Chest 2022: Mild diffuse interstitial reticulation persists though has decreased since July 1, 2021.  Prior groundglass opacities have significantly decreased.  The most conspicuous area of scarring is again in the right upper lobe.  There is no bronchiectasis.  PFTs 2021: Unable to perform due to poor, inconsistent effort  TTE 2021: normal EF< normal RV, no PH.    Orders:    benzonatate (TESSALON) 200 MG capsule; Take 1 capsule (200 mg total) by mouth daily at bedtime as needed for cough        BMI Counseling: Body mass index is 49.18 kg/m². The BMI is above normal. Exercise recommendations include exercising 3-5 times per week and strength training exercises.       History of Present Illness     Follow up exam      Review of Systems   Constitutional:  Negative for activity change, chills, fatigue and fever.   HENT:  Negative for congestion, ear pain, rhinorrhea, sore throat and trouble swallowing.    Eyes:  Negative for pain and visual disturbance.   Respiratory:  Negative for cough, chest tightness and shortness of breath.    Cardiovascular:  Negative for chest pain, palpitations and leg swelling.   Gastrointestinal:  Negative for abdominal pain, constipation, diarrhea, nausea and vomiting.   Genitourinary:  Negative for difficulty urinating, dysuria, hematuria and urgency.   Musculoskeletal:  Negative for arthralgias and back pain.   Skin:  Negative for color change and rash.   Neurological:  Negative for dizziness, seizures, syncope and headaches.   Psychiatric/Behavioral:  Negative for dysphoric mood. The patient is not nervous/anxious.    All other systems reviewed and are negative.    Medical History Reviewed by provider this encounter:  Tobacco  Allergies  Meds  Problems  Med Hx  Surg Hx  Fam Hx     .     Objective   /80 (BP Location: Left arm,  "Patient Position: Sitting, Cuff Size: Large)   Pulse 88   Temp (!) 97.1 °F (36.2 °C) (Temporal)   Ht 5' 6.1\" (1.679 m)   Wt (!) 139 kg (305 lb 9.6 oz)   SpO2 99%   BMI 49.18 kg/m²      Physical Exam  Vitals and nursing note reviewed.   Constitutional:       General: She is not in acute distress.     Appearance: Normal appearance. She is well-developed and normal weight.   HENT:      Head: Normocephalic and atraumatic.      Right Ear: Tympanic membrane, ear canal and external ear normal. There is no impacted cerumen.      Left Ear: Tympanic membrane, ear canal and external ear normal. There is no impacted cerumen.      Nose: Nose normal.      Mouth/Throat:      Mouth: Mucous membranes are moist.      Pharynx: Oropharynx is clear.   Eyes:      Extraocular Movements: Extraocular movements intact.      Conjunctiva/sclera: Conjunctivae normal.      Pupils: Pupils are equal, round, and reactive to light.   Cardiovascular:      Rate and Rhythm: Normal rate and regular rhythm.      Pulses: Normal pulses.      Heart sounds: Normal heart sounds. No murmur heard.  Pulmonary:      Effort: Pulmonary effort is normal. No respiratory distress.      Breath sounds: Normal breath sounds.   Abdominal:      General: Bowel sounds are normal.      Palpations: Abdomen is soft.      Tenderness: There is no abdominal tenderness.   Musculoskeletal:         General: Normal range of motion.      Cervical back: Normal range of motion and neck supple.      Right lower leg: No edema.      Left lower leg: No edema.   Skin:     General: Skin is warm and dry.      Capillary Refill: Capillary refill takes less than 2 seconds.   Neurological:      General: No focal deficit present.      Mental Status: She is alert and oriented to person, place, and time. Mental status is at baseline.   Psychiatric:         Mood and Affect: Mood normal.         Behavior: Behavior normal.         Thought Content: Thought content normal.         Judgment: Judgment " normal.       Administrative Statements   I have spent a total time of 20 minutes in caring for this patient on the day of the visit/encounter including Diagnostic results, Prognosis, Risks and benefits of tx options, Instructions for management, Patient and family education, Importance of tx compliance, Risk factor reductions, Impressions, Counseling / Coordination of care, Documenting in the medical record, Reviewing / ordering tests, medicine, procedures  , and Obtaining or reviewing history  .     BMI Counseling: Body mass index is 49.18 kg/m². The BMI is above normal. Nutrition recommendations include reducing portion sizes, decreasing overall calorie intake, 3-5 servings of fruits/vegetables daily, reducing fast food intake, consuming healthier snacks, decreasing soda and/or juice intake, moderation in carbohydrate intake, increasing intake of lean protein, reducing intake of saturated fat and trans fat, and reducing intake of cholesterol. Exercise recommendations include moderate aerobic physical activity for 150 minutes/week, vigorous aerobic physical activity for 75 minutes/week, exercising 3-5 times per week, joining a gym, and strength training exercises.

## 2024-11-18 NOTE — ASSESSMENT & PLAN NOTE
Under the management of endocrinology   01/24 TSH 46.582   08/24 TSH 2.299    Current medication: Levothyroxine 175mcg    Orders:    levothyroxine 175 mcg tablet; Take 1 tablet (175 mcg total) by mouth daily in the early morning

## 2024-11-18 NOTE — ASSESSMENT & PLAN NOTE
Post-Covid syndrome include cough and shortness of breath   Current medications  Albuterol 90mcg, Benzonatate 200mg, Flonase 50mcg, Furosemide 20mg, Lidocaine (Patch), and Dulera (Mometasone-Formoterol 200-5mcg) inhaler  Wears 2L of oxygen at night   Still gets fatigued after walking short distances   Follows with pulmonology     Recent imaging   CT Chest 2022: Mild diffuse interstitial reticulation persists though has decreased since July 1, 2021.  Prior groundglass opacities have significantly decreased.  The most conspicuous area of scarring is again in the right upper lobe.  There is no bronchiectasis.  PFTs 2021: Unable to perform due to poor, inconsistent effort  TTE 2021: normal EF< normal RV, no PH.    Orders:    benzonatate (TESSALON) 200 MG capsule; Take 1 capsule (200 mg total) by mouth daily at bedtime as needed for cough

## 2024-11-18 NOTE — TELEPHONE ENCOUNTER
Pt called because even though the Zepbound was denied by insurance, she said to still send it and she will roderick ecare of payment with pharmacy.

## 2024-11-18 NOTE — ASSESSMENT & PLAN NOTE
11/22 Lipid panel   Cholesterol 223, Triglycerides 125, , HDL 59  08/24 Lipid panel   Cholesterol 195, Triglycerides 108, , HDL 63  Current Medications: Atorvastatin 20mg   Continue current medication regimen   Lifestyle modification: Incorporate regular exercise, avoid sugars and simple carbohydrates, weight loss and choosing healthier fats.

## 2024-11-18 NOTE — ASSESSMENT & PLAN NOTE
Current medication: Buspirone 10mg, Escitalopram 20mg, Trazodone 100mg  Continue current medication regimen

## 2024-11-18 NOTE — ASSESSMENT & PLAN NOTE
Under the management of pulmonolgy  Mild CAR  Previously wore CPAP at night but no longer wears   Failed PFT on 09/05/24 due to nausea and lightheadedness

## 2024-11-19 ENCOUNTER — TELEPHONE (OUTPATIENT)
Dept: BARIATRICS | Facility: CLINIC | Age: 49
End: 2024-11-19

## 2024-11-19 NOTE — TELEPHONE ENCOUNTER
Spoke to Pt about her MSG she sent [employer] for Zepbound 2.5mg she will see with her Pharmacy to pay  on her own .

## 2024-11-25 ENCOUNTER — DOCUMENTATION (OUTPATIENT)
Dept: HEMATOLOGY ONCOLOGY | Facility: CLINIC | Age: 49
End: 2024-11-25

## 2024-11-25 NOTE — PROGRESS NOTES
Writer received fax from [facility] confirming replacement product of Venofer to be sent for the PT DOS listed below.        Dates of service:    09/06/24  09/18/24  09/25/24  10/02/24  10/09/24  1016/24            Hunter Strange  Phone:[phone]  Email:[email or web address]

## 2024-11-26 ENCOUNTER — TELEPHONE (OUTPATIENT)
Dept: BARIATRICS | Facility: CLINIC | Age: 49
End: 2024-11-26

## 2024-11-26 NOTE — TELEPHONE ENCOUNTER
Spoke to Pt ,let her know  her Pre -AUTH  from her secondary Insurance was denied [facility]  as  August 1 st 2024 . Pt stated she will apply again for her coverage  and let us know .

## 2024-11-30 DIAGNOSIS — U09.9 POST-COVID-19 SYNDROME MANIFESTING AS CHRONIC COUGH: ICD-10-CM

## 2024-11-30 DIAGNOSIS — E55.9 VITAMIN D DEFICIENCY: ICD-10-CM

## 2024-11-30 DIAGNOSIS — R05.3 POST-COVID-19 SYNDROME MANIFESTING AS CHRONIC COUGH: ICD-10-CM

## 2024-12-02 ENCOUNTER — APPOINTMENT (OUTPATIENT)
Dept: LAB | Facility: HOSPITAL | Age: 49
End: 2024-12-02
Payer: COMMERCIAL

## 2024-12-02 DIAGNOSIS — K95.89 IRON DEFICIENCY ANEMIA FOLLOWING BARIATRIC SURGERY: ICD-10-CM

## 2024-12-02 DIAGNOSIS — E53.8 VITAMIN B12 DEFICIENCY: ICD-10-CM

## 2024-12-02 DIAGNOSIS — E55.9 VITAMIN D DEFICIENCY: ICD-10-CM

## 2024-12-02 DIAGNOSIS — Z98.84 BARIATRIC SURGERY STATUS: ICD-10-CM

## 2024-12-02 DIAGNOSIS — D50.8 IRON DEFICIENCY ANEMIA FOLLOWING BARIATRIC SURGERY: ICD-10-CM

## 2024-12-02 DIAGNOSIS — E23.7 LESION OF PITUITARY GLAND (HCC): ICD-10-CM

## 2024-12-02 DIAGNOSIS — E06.3 HYPOTHYROIDISM DUE TO HASHIMOTO'S THYROIDITIS: ICD-10-CM

## 2024-12-02 LAB
25(OH)D3 SERPL-MCNC: 18.6 NG/ML (ref 30–100)
ALBUMIN SERPL BCG-MCNC: 3.7 G/DL (ref 3.5–5)
ALP SERPL-CCNC: 134 U/L (ref 34–104)
ALT SERPL W P-5'-P-CCNC: 11 U/L (ref 7–52)
ANION GAP SERPL CALCULATED.3IONS-SCNC: 8 MMOL/L (ref 4–13)
AST SERPL W P-5'-P-CCNC: 11 U/L (ref 13–39)
BASOPHILS # BLD AUTO: 0.07 THOUSANDS/ΜL (ref 0–0.1)
BASOPHILS NFR BLD AUTO: 1 % (ref 0–1)
BILIRUB SERPL-MCNC: 0.39 MG/DL (ref 0.2–1)
BUN SERPL-MCNC: 11 MG/DL (ref 5–25)
CALCIUM SERPL-MCNC: 8.8 MG/DL (ref 8.4–10.2)
CHLORIDE SERPL-SCNC: 106 MMOL/L (ref 96–108)
CO2 SERPL-SCNC: 23 MMOL/L (ref 21–32)
CORTIS AM PEAK SERPL-MCNC: 9.7 UG/DL (ref 6.7–22.6)
CREAT SERPL-MCNC: 0.93 MG/DL (ref 0.6–1.3)
EOSINOPHIL # BLD AUTO: 0.09 THOUSAND/ΜL (ref 0–0.61)
EOSINOPHIL NFR BLD AUTO: 1 % (ref 0–6)
ERYTHROCYTE [DISTWIDTH] IN BLOOD BY AUTOMATED COUNT: 24.1 % (ref 11.6–15.1)
FERRITIN SERPL-MCNC: 11 NG/ML (ref 11–307)
GFR SERPL CREATININE-BSD FRML MDRD: 73 ML/MIN/1.73SQ M
GLUCOSE P FAST SERPL-MCNC: 79 MG/DL (ref 65–99)
HCT VFR BLD AUTO: 38.2 % (ref 34.8–46.1)
HGB BLD-MCNC: 12.1 G/DL (ref 11.5–15.4)
IMM GRANULOCYTES # BLD AUTO: 0.02 THOUSAND/UL (ref 0–0.2)
IMM GRANULOCYTES NFR BLD AUTO: 0 % (ref 0–2)
IRON SATN MFR SERPL: 9 % (ref 15–50)
IRON SERPL-MCNC: 41 UG/DL (ref 50–212)
LYMPHOCYTES # BLD AUTO: 1.88 THOUSANDS/ΜL (ref 0.6–4.47)
LYMPHOCYTES NFR BLD AUTO: 22 % (ref 14–44)
MCH RBC QN AUTO: 27.2 PG (ref 26.8–34.3)
MCHC RBC AUTO-ENTMCNC: 31.7 G/DL (ref 31.4–37.4)
MCV RBC AUTO: 86 FL (ref 82–98)
MONOCYTES # BLD AUTO: 0.68 THOUSAND/ΜL (ref 0.17–1.22)
MONOCYTES NFR BLD AUTO: 8 % (ref 4–12)
NEUTROPHILS # BLD AUTO: 5.95 THOUSANDS/ΜL (ref 1.85–7.62)
NEUTS SEG NFR BLD AUTO: 68 % (ref 43–75)
NRBC BLD AUTO-RTO: 0 /100 WBCS
PLATELET # BLD AUTO: 591 THOUSANDS/UL (ref 149–390)
PMV BLD AUTO: 9 FL (ref 8.9–12.7)
POTASSIUM SERPL-SCNC: 4.7 MMOL/L (ref 3.5–5.3)
PROLACTIN SERPL-MCNC: 24.96 NG/ML (ref 3.34–26.72)
PROT SERPL-MCNC: 6.4 G/DL (ref 6.4–8.4)
PTH-INTACT SERPL-MCNC: 155 PG/ML (ref 12–88)
RBC # BLD AUTO: 4.45 MILLION/UL (ref 3.81–5.12)
SODIUM SERPL-SCNC: 137 MMOL/L (ref 135–147)
T4 FREE SERPL-MCNC: 0.37 NG/DL (ref 0.61–1.12)
TIBC SERPL-MCNC: 456 UG/DL (ref 250–450)
TSH SERPL DL<=0.05 MIU/L-ACNC: 26.34 UIU/ML (ref 0.45–4.5)
UIBC SERPL-MCNC: 415 UG/DL (ref 155–355)
VIT B12 SERPL-MCNC: 371 PG/ML (ref 180–914)
WBC # BLD AUTO: 8.69 THOUSAND/UL (ref 4.31–10.16)

## 2024-12-02 PROCEDURE — 36415 COLL VENOUS BLD VENIPUNCTURE: CPT

## 2024-12-02 PROCEDURE — 85025 COMPLETE CBC W/AUTO DIFF WBC: CPT

## 2024-12-02 PROCEDURE — 84146 ASSAY OF PROLACTIN: CPT

## 2024-12-02 PROCEDURE — 82533 TOTAL CORTISOL: CPT

## 2024-12-02 PROCEDURE — 80299 QUANTITATIVE ASSAY DRUG: CPT

## 2024-12-02 PROCEDURE — 84443 ASSAY THYROID STIM HORMONE: CPT

## 2024-12-02 PROCEDURE — 82306 VITAMIN D 25 HYDROXY: CPT

## 2024-12-02 PROCEDURE — 83540 ASSAY OF IRON: CPT

## 2024-12-02 PROCEDURE — 83970 ASSAY OF PARATHORMONE: CPT

## 2024-12-02 PROCEDURE — 84305 ASSAY OF SOMATOMEDIN: CPT

## 2024-12-02 PROCEDURE — 80053 COMPREHEN METABOLIC PANEL: CPT

## 2024-12-02 PROCEDURE — 82728 ASSAY OF FERRITIN: CPT

## 2024-12-02 PROCEDURE — 83550 IRON BINDING TEST: CPT

## 2024-12-02 PROCEDURE — 82607 VITAMIN B-12: CPT

## 2024-12-02 PROCEDURE — 84439 ASSAY OF FREE THYROXINE: CPT

## 2024-12-02 RX ORDER — MOMETASONE FUROATE AND FORMOTEROL FUMARATE DIHYDRATE 200; 5 UG/1; UG/1
2 AEROSOL RESPIRATORY (INHALATION) 2 TIMES DAILY
Qty: 13 G | Refills: 8 | Status: SHIPPED | OUTPATIENT
Start: 2024-12-02

## 2024-12-02 NOTE — TELEPHONE ENCOUNTER
Requested medication(s) are due for refill today: Yes  Patient has already received a courtesy refill: No  Other reason request has been forwarded to provider: please advise

## 2024-12-03 ENCOUNTER — RESULTS FOLLOW-UP (OUTPATIENT)
Dept: BARIATRICS | Facility: CLINIC | Age: 49
End: 2024-12-03

## 2024-12-04 ENCOUNTER — TELEPHONE (OUTPATIENT)
Dept: BARIATRICS | Facility: CLINIC | Age: 49
End: 2024-12-04

## 2024-12-04 DIAGNOSIS — E66.01 OBESITY, CLASS III, BMI 40-49.9 (MORBID OBESITY) (HCC): ICD-10-CM

## 2024-12-04 DIAGNOSIS — G47.33 OSA (OBSTRUCTIVE SLEEP APNEA): ICD-10-CM

## 2024-12-04 DIAGNOSIS — E78.5 HYPERLIPIDEMIA, UNSPECIFIED HYPERLIPIDEMIA TYPE: ICD-10-CM

## 2024-12-04 LAB — IGF-I SERPL-MCNC: 80 NG/ML (ref 70–225)

## 2024-12-04 RX ORDER — TIRZEPATIDE 2.5 MG/.5ML
2.5 INJECTION, SOLUTION SUBCUTANEOUS WEEKLY
Qty: 2 ML | Refills: 3 | Status: SHIPPED | OUTPATIENT
Start: 2024-12-04 | End: 2024-12-05

## 2024-12-04 RX ORDER — ERGOCALCIFEROL 1.25 MG/1
50000 CAPSULE, LIQUID FILLED ORAL 2 TIMES WEEKLY
Qty: 24 CAPSULE | Refills: 0 | Status: SHIPPED | OUTPATIENT
Start: 2024-12-05

## 2024-12-04 NOTE — TELEPHONE ENCOUNTER
Patient calling back. Missed prior phone call from office. Attempted to warm transfer call to office. Call dropped. Relayed message to patient that was sent through her [employer] regarding the Vitamin D and Calcium levels. Will  prescription at her pharmacy. States she cannot afford to pay for the injections out of pocket. Patient states she spoke to her Pharmacy and they were to call provider with other options?

## 2024-12-05 ENCOUNTER — TELEPHONE (OUTPATIENT)
Dept: BARIATRICS | Facility: CLINIC | Age: 49
End: 2024-12-05

## 2024-12-05 DIAGNOSIS — G47.33 OSA (OBSTRUCTIVE SLEEP APNEA): ICD-10-CM

## 2024-12-05 DIAGNOSIS — E66.01 OBESITY, CLASS III, BMI 40-49.9 (MORBID OBESITY) (HCC): Primary | ICD-10-CM

## 2024-12-05 DIAGNOSIS — E78.5 HYPERLIPIDEMIA, UNSPECIFIED HYPERLIPIDEMIA TYPE: ICD-10-CM

## 2024-12-05 RX ORDER — TOPIRAMATE 50 MG/1
50 TABLET, FILM COATED ORAL
Qty: 30 TABLET | Refills: 1 | Status: SHIPPED | OUTPATIENT
Start: 2024-12-05

## 2024-12-05 NOTE — TELEPHONE ENCOUNTER
Called pt to  inform & insrtuct her how to take the topamax and asked if she would like to start that also sent the providers note into her my chart to review pt had no further questions and forward the message to the provider

## 2024-12-05 NOTE — PROGRESS NOTES
Unable to afford zepbound - lack insurance coverage for this. She is interested in oral options in the meantime. Will start on topamax 25 mg QHS for 3 days, then increase to 50 mg PO QHS.

## 2024-12-11 ENCOUNTER — HOSPITAL ENCOUNTER (OUTPATIENT)
Dept: INFUSION CENTER | Facility: HOSPITAL | Age: 49
Discharge: HOME | End: 2024-12-11
Attending: INTERNAL MEDICINE

## 2024-12-12 ENCOUNTER — HOSPITAL ENCOUNTER (OUTPATIENT)
Dept: INFUSION CENTER | Facility: HOSPITAL | Age: 49
End: 2024-12-12
Attending: INTERNAL MEDICINE
Payer: COMMERCIAL

## 2024-12-12 DIAGNOSIS — Z98.84 HISTORY OF GASTRIC BYPASS: ICD-10-CM

## 2024-12-12 DIAGNOSIS — E53.8 VITAMIN B12 DEFICIENCY: Primary | ICD-10-CM

## 2024-12-12 PROCEDURE — 96372 THER/PROPH/DIAG INJ SC/IM: CPT

## 2024-12-12 RX ORDER — CYANOCOBALAMIN 1000 UG/ML
1000 INJECTION, SOLUTION INTRAMUSCULAR; SUBCUTANEOUS ONCE
OUTPATIENT
Start: 2025-01-08

## 2024-12-12 RX ORDER — CYANOCOBALAMIN 1000 UG/ML
1000 INJECTION, SOLUTION INTRAMUSCULAR; SUBCUTANEOUS ONCE
Status: COMPLETED | OUTPATIENT
Start: 2024-12-12 | End: 2024-12-12

## 2024-12-12 RX ADMIN — CYANOCOBALAMIN 1000 MCG: 1000 INJECTION INTRAMUSCULAR; SUBCUTANEOUS at 11:00

## 2024-12-12 NOTE — PROGRESS NOTES
Pt tolerated B12 injection to R deltoid without difficulty.  Confirmed next appt on 1/9 at 1100.  [facility]S declined.  Left ambulatory in stable condition.

## 2024-12-14 LAB — DEXAMETHASONE SERPL-MCNC: <30 NG/DL

## 2024-12-19 DIAGNOSIS — R10.11 POSTPRANDIAL ABDOMINAL PAIN IN RIGHT UPPER QUADRANT: ICD-10-CM

## 2024-12-19 RX ORDER — DICYCLOMINE HYDROCHLORIDE 10 MG/1
10 CAPSULE ORAL 2 TIMES DAILY PRN
Qty: 180 CAPSULE | Refills: 1 | Status: SHIPPED | OUTPATIENT
Start: 2024-12-19

## 2025-01-06 ENCOUNTER — TELEPHONE (OUTPATIENT)
Dept: HEMATOLOGY ONCOLOGY | Facility: CLINIC | Age: 50
End: 2025-01-06

## 2025-01-06 NOTE — TELEPHONE ENCOUNTER
Spoke with patient to reschedule her appointment she missed with dr urrutia on 01/06/2025, new appointment is on 04/21/2025 @ 9:40.

## 2025-01-07 DIAGNOSIS — U09.9 POST-COVID SYNDROME: ICD-10-CM

## 2025-01-08 RX ORDER — ALBUTEROL SULFATE 90 UG/1
2 INHALANT RESPIRATORY (INHALATION) EVERY 6 HOURS PRN
Qty: 8.5 G | Refills: 1 | Status: SHIPPED | OUTPATIENT
Start: 2025-01-08

## 2025-01-09 ENCOUNTER — HOSPITAL ENCOUNTER (OUTPATIENT)
Dept: INFUSION CENTER | Facility: HOSPITAL | Age: 50
Discharge: HOME | End: 2025-01-09
Attending: INTERNAL MEDICINE

## 2025-01-13 DIAGNOSIS — E53.8 VITAMIN B12 DEFICIENCY: Primary | ICD-10-CM

## 2025-01-13 DIAGNOSIS — Z98.84 HISTORY OF GASTRIC BYPASS: ICD-10-CM

## 2025-01-13 RX ORDER — CYANOCOBALAMIN 1000 UG/ML
1000 INJECTION, SOLUTION INTRAMUSCULAR; SUBCUTANEOUS ONCE
OUTPATIENT
Start: 2025-01-15

## 2025-01-15 ENCOUNTER — HOSPITAL ENCOUNTER (OUTPATIENT)
Dept: INFUSION CENTER | Facility: HOSPITAL | Age: 50
End: 2025-01-15
Attending: INTERNAL MEDICINE

## 2025-01-24 DIAGNOSIS — G47.33 OSA (OBSTRUCTIVE SLEEP APNEA): ICD-10-CM

## 2025-01-24 DIAGNOSIS — E78.5 HYPERLIPIDEMIA, UNSPECIFIED HYPERLIPIDEMIA TYPE: ICD-10-CM

## 2025-01-24 DIAGNOSIS — E66.01 OBESITY, CLASS III, BMI 40-49.9 (MORBID OBESITY) (HCC): ICD-10-CM

## 2025-01-24 RX ORDER — TOPIRAMATE 50 MG/1
50 TABLET, FILM COATED ORAL
Qty: 30 TABLET | Refills: 1 | Status: SHIPPED | OUTPATIENT
Start: 2025-01-24

## 2025-02-17 DIAGNOSIS — U09.9 POST-COVID SYNDROME: ICD-10-CM

## 2025-02-17 RX ORDER — ALBUTEROL SULFATE 90 UG/1
2 INHALANT RESPIRATORY (INHALATION) EVERY 6 HOURS PRN
Qty: 8.5 G | Refills: 5 | Status: SHIPPED | OUTPATIENT
Start: 2025-02-17

## 2025-02-25 ENCOUNTER — TELEPHONE (OUTPATIENT)
Dept: HEMATOLOGY ONCOLOGY | Facility: CLINIC | Age: 50
End: 2025-02-25

## 2025-02-25 NOTE — TELEPHONE ENCOUNTER
Writer left  for PT on 2/25/25 @ 9:35 AM for introductions and to open dialogue regarding her current financial status. Writer informed PT that he is only showing Medicare [place] (Family planning services ONLY) as active. Writer informed PT that he is here to help and support her with any financial stressors she may encounter. Writer strongly encouraged a call back from the PT and provided all contact information to do so. Writer will follow accordingly.            Hunter Strange  Phone:[phone]  Email:[email or web address]

## 2025-03-03 ENCOUNTER — PATIENT OUTREACH (OUTPATIENT)
Dept: HEMATOLOGY ONCOLOGY | Facility: CLINIC | Age: 50
End: 2025-03-03

## 2025-03-03 NOTE — PROGRESS NOTES
Outreached PT to discuss financial assistance opportunities. PT did not answer. Voicemail was left detailing the reason for the call, this writer's contact information, and a high-level overview of options.     Sandi Sherwood MPH  Phone: [phone]  Email: [email or web address]   2.11

## 2025-03-07 DIAGNOSIS — E53.8 VITAMIN B12 DEFICIENCY: Primary | ICD-10-CM

## 2025-03-07 DIAGNOSIS — Z98.84 HISTORY OF GASTRIC BYPASS: ICD-10-CM

## 2025-03-07 RX ORDER — CYANOCOBALAMIN 1000 UG/ML
1000 INJECTION, SOLUTION INTRAMUSCULAR; SUBCUTANEOUS ONCE
OUTPATIENT
Start: 2025-03-11

## 2025-03-13 ENCOUNTER — TELEPHONE (OUTPATIENT)
Dept: BARIATRICS | Facility: CLINIC | Age: 50
End: 2025-03-13

## 2025-03-15 DIAGNOSIS — G47.33 OSA (OBSTRUCTIVE SLEEP APNEA): ICD-10-CM

## 2025-03-15 DIAGNOSIS — E78.5 HYPERLIPIDEMIA, UNSPECIFIED HYPERLIPIDEMIA TYPE: ICD-10-CM

## 2025-03-15 DIAGNOSIS — E66.01 OBESITY, CLASS III, BMI 40-49.9 (MORBID OBESITY) (HCC): ICD-10-CM

## 2025-03-17 RX ORDER — TOPIRAMATE 50 MG/1
50 TABLET, FILM COATED ORAL
Qty: 90 TABLET | Refills: 1 | Status: SHIPPED | OUTPATIENT
Start: 2025-03-17

## 2025-05-05 NOTE — ASSESSMENT & PLAN NOTE
· Microcytic, appears chronic   · Patient does reports some back stool but on iron supplementation  · Hemoglobin has been stable 72.6

## 2025-05-06 DIAGNOSIS — U09.9 POST-COVID SYNDROME: ICD-10-CM

## 2025-05-06 DIAGNOSIS — Z98.84 BARIATRIC SURGERY STATUS: ICD-10-CM

## 2025-05-06 DIAGNOSIS — E55.9 VITAMIN D DEFICIENCY: ICD-10-CM

## 2025-05-06 DIAGNOSIS — F32.1 CURRENT MODERATE EPISODE OF MAJOR DEPRESSIVE DISORDER, UNSPECIFIED WHETHER RECURRENT (HCC): ICD-10-CM

## 2025-05-06 DIAGNOSIS — F41.9 ANXIETY: ICD-10-CM

## 2025-05-06 DIAGNOSIS — E56.9 INADEQUATE VITAMIN INTAKE: ICD-10-CM

## 2025-05-07 RX ORDER — ESCITALOPRAM OXALATE 20 MG/1
TABLET ORAL
Qty: 30 TABLET | Refills: 0 | Status: SHIPPED | OUTPATIENT
Start: 2025-05-07

## 2025-05-07 RX ORDER — MULTIVIT-MIN/IRON/FOLIC ACID/K 45-800-120
1 CAPSULE ORAL DAILY
Qty: 90 CAPSULE | Refills: 0 | Status: SHIPPED | OUTPATIENT
Start: 2025-05-07

## 2025-05-08 DIAGNOSIS — E55.9 VITAMIN D DEFICIENCY: Primary | ICD-10-CM

## 2025-05-08 RX ORDER — ERGOCALCIFEROL 1.25 MG/1
50000 CAPSULE, LIQUID FILLED ORAL 2 TIMES WEEKLY
Qty: 24 CAPSULE | Refills: 0 | OUTPATIENT
Start: 2025-05-08

## 2025-06-01 DIAGNOSIS — U09.9 POST-COVID SYNDROME: ICD-10-CM

## 2025-06-01 DIAGNOSIS — F32.1 CURRENT MODERATE EPISODE OF MAJOR DEPRESSIVE DISORDER, UNSPECIFIED WHETHER RECURRENT (HCC): ICD-10-CM

## 2025-06-01 DIAGNOSIS — F41.9 ANXIETY: ICD-10-CM

## 2025-06-02 RX ORDER — ESCITALOPRAM OXALATE 20 MG/1
20 TABLET ORAL DAILY
Qty: 90 TABLET | Refills: 0 | Status: SHIPPED | OUTPATIENT
Start: 2025-06-02

## 2025-06-07 DIAGNOSIS — R10.11 POSTPRANDIAL ABDOMINAL PAIN IN RIGHT UPPER QUADRANT: ICD-10-CM

## 2025-06-09 RX ORDER — DICYCLOMINE HYDROCHLORIDE 10 MG/1
10 CAPSULE ORAL 2 TIMES DAILY PRN
Qty: 180 CAPSULE | Refills: 1 | Status: SHIPPED | OUTPATIENT
Start: 2025-06-09

## 2025-06-17 DIAGNOSIS — U09.9 POST-COVID SYNDROME: ICD-10-CM

## 2025-06-17 RX ORDER — ALBUTEROL SULFATE 90 UG/1
2 INHALANT RESPIRATORY (INHALATION) EVERY 6 HOURS PRN
Qty: 8.5 G | Refills: 5 | Status: SHIPPED | OUTPATIENT
Start: 2025-06-17

## 2025-06-17 NOTE — TELEPHONE ENCOUNTER
Requested medication(s) are due for refill today: Yes  Patient has already received a courtesy refill: No  Other reason request has been forwarded to provider: Patient has appointment 8/28. Please advise

## 2025-07-17 ENCOUNTER — TELEPHONE (OUTPATIENT)
Dept: PULMONOLOGY | Facility: CLINIC | Age: 50
End: 2025-07-17

## 2025-08-01 ENCOUNTER — TELEPHONE (OUTPATIENT)
Dept: BARIATRICS | Facility: CLINIC | Age: 50
End: 2025-08-01

## 2025-08-12 ENCOUNTER — APPOINTMENT (OUTPATIENT)
Dept: LAB | Facility: HOSPITAL | Age: 50
End: 2025-08-12

## 2025-08-12 ENCOUNTER — OFFICE VISIT (OUTPATIENT)
Dept: FAMILY MEDICINE CLINIC | Facility: CLINIC | Age: 50
End: 2025-08-12
Payer: COMMERCIAL

## 2025-08-13 ENCOUNTER — OFFICE VISIT (OUTPATIENT)
Dept: BARIATRICS | Facility: CLINIC | Age: 50
End: 2025-08-13
Payer: COMMERCIAL

## 2025-08-14 LAB — VIT B1 BLD-SCNC: 80.3 NMOL/L (ref 66.5–200)

## 2025-08-15 ENCOUNTER — OFFICE VISIT (OUTPATIENT)
Dept: SLEEP CENTER | Facility: CLINIC | Age: 50
End: 2025-08-15

## 2025-08-15 PROBLEM — G47.01 INSOMNIA DUE TO MEDICAL CONDITION: Status: ACTIVE | Noted: 2025-08-15

## 2025-08-15 PROBLEM — G47.19 EXCESSIVE DAYTIME SLEEPINESS: Status: ACTIVE | Noted: 2025-08-15

## 2025-08-19 ENCOUNTER — OFFICE VISIT (OUTPATIENT)
Dept: OBGYN CLINIC | Facility: OTHER | Age: 50
End: 2025-08-19

## 2025-08-19 VITALS — BODY MASS INDEX: 44.41 KG/M2 | HEIGHT: 68 IN | WEIGHT: 293 LBS

## 2025-08-19 DIAGNOSIS — M25.562 ACUTE PAIN OF LEFT KNEE: ICD-10-CM

## 2025-08-19 DIAGNOSIS — M17.12 PRIMARY OSTEOARTHRITIS OF LEFT KNEE: Primary | ICD-10-CM

## 2025-08-19 PROCEDURE — 99204 OFFICE O/P NEW MOD 45 MIN: CPT | Performed by: ORTHOPAEDIC SURGERY

## 2025-08-19 RX ORDER — CELECOXIB 100 MG/1
100 CAPSULE ORAL 2 TIMES DAILY PRN
Qty: 40 CAPSULE | Refills: 0 | Status: SHIPPED | OUTPATIENT
Start: 2025-08-19